# Patient Record
Sex: MALE | Race: WHITE | Employment: PART TIME | URBAN - METROPOLITAN AREA
[De-identification: names, ages, dates, MRNs, and addresses within clinical notes are randomized per-mention and may not be internally consistent; named-entity substitution may affect disease eponyms.]

---

## 2017-01-24 ENCOUNTER — OFFICE VISIT (OUTPATIENT)
Dept: FAMILY MEDICINE CLINIC | Facility: CLINIC | Age: 22
End: 2017-01-24

## 2017-01-24 VITALS
TEMPERATURE: 99 F | DIASTOLIC BLOOD PRESSURE: 80 MMHG | SYSTOLIC BLOOD PRESSURE: 139 MMHG | HEIGHT: 64 IN | BODY MASS INDEX: 19.26 KG/M2 | WEIGHT: 112.81 LBS | HEART RATE: 102 BPM | RESPIRATION RATE: 14 BRPM

## 2017-01-24 DIAGNOSIS — M21.372 FOOT DROP, BILATERAL: ICD-10-CM

## 2017-01-24 DIAGNOSIS — M21.371 FOOT DROP, BILATERAL: ICD-10-CM

## 2017-01-24 DIAGNOSIS — Q05.9 SPINA BIFIDA, UNSPECIFIED HYDROCEPHALUS PRESENCE, UNSPECIFIED SPINAL REGION (HCC): Primary | ICD-10-CM

## 2017-01-24 PROCEDURE — 99212 OFFICE O/P EST SF 10 MIN: CPT | Performed by: FAMILY MEDICINE

## 2017-01-24 PROCEDURE — 99214 OFFICE O/P EST MOD 30 MIN: CPT | Performed by: FAMILY MEDICINE
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
## 2017-02-16 ENCOUNTER — TELEPHONE (OUTPATIENT)
Dept: FAMILY MEDICINE CLINIC | Facility: CLINIC | Age: 22
End: 2017-02-16

## 2017-02-16 NOTE — TELEPHONE ENCOUNTER
Patient requesting a note from DR. Chris Avery stating he is okay to return to work.    Patient states he has been out from work for about 3 weeks due to his broken leg and is now ready to go back   Requesting note to state he can return back to work tomorrow Fr

## 2017-04-26 ENCOUNTER — APPOINTMENT (OUTPATIENT)
Dept: GENERAL RADIOLOGY | Facility: HOSPITAL | Age: 22
End: 2017-04-26
Payer: OTHER MISCELLANEOUS

## 2017-04-26 ENCOUNTER — HOSPITAL ENCOUNTER (EMERGENCY)
Facility: HOSPITAL | Age: 22
Discharge: HOME | End: 2017-04-26
Attending: EMERGENCY MEDICINE
Payer: OTHER MISCELLANEOUS

## 2017-04-26 VITALS
SYSTOLIC BLOOD PRESSURE: 144 MMHG | TEMPERATURE: 98 F | HEART RATE: 100 BPM | RESPIRATION RATE: 16 BRPM | OXYGEN SATURATION: 97 % | DIASTOLIC BLOOD PRESSURE: 79 MMHG

## 2017-04-26 DIAGNOSIS — S90.212A CONTUSION OF LEFT GREAT TOE WITH DAMAGE TO NAIL, INITIAL ENCOUNTER: ICD-10-CM

## 2017-04-26 PROCEDURE — 73630 X-RAY EXAM OF FOOT: CPT

## 2017-04-26 PROCEDURE — 99283 EMERGENCY DEPT VISIT LOW MDM: CPT

## 2017-04-26 RX ORDER — IBUPROFEN 600 MG/1
600 TABLET ORAL EVERY 8 HOURS PRN
Qty: 30 TABLET | Refills: 0 | Status: SHIPPED | OUTPATIENT
Start: 2017-04-26 | End: 2017-05-03

## 2017-04-26 RX ORDER — IBUPROFEN 600 MG/1
600 TABLET ORAL ONCE
Status: COMPLETED | OUTPATIENT
Start: 2017-04-26 | End: 2017-04-26

## 2017-04-27 NOTE — ED PROVIDER NOTES
Patient Seen in: [facility] AND [facility] Emergency Department    History   Patient presents with:  Lower Extremity Injury (musculoskeletal)    Stated Complaint:     HPI    24-year-old male up-to-date on immunizations presents for complaint of left great toe in Current:/79 mmHg  Pulse 100  Temp(Src) 98 °F (36.7 °C) (Oral)  Resp 16  SpO2 97%        Physical Exam   Constitutional: He is oriented to person, place, and time. He appears well-developed and well-nourished. HENT:   Head: Normocephalic.    Pulmonar Clinical impression as well as any lab results and radiology findings were discussed with the patient. Patient is advised to follow up with PCP for reevaluation. Return precautions were given.  Patient voices understanding and agreement with the treatment p

## 2017-04-28 ENCOUNTER — APPOINTMENT (OUTPATIENT)
Dept: OTHER | Facility: HOSPITAL | Age: 22
End: 2017-04-28
Attending: EMERGENCY MEDICINE

## 2017-05-04 ENCOUNTER — HOSPITAL ENCOUNTER (OUTPATIENT)
Dept: GENERAL RADIOLOGY | Facility: HOSPITAL | Age: 22
Discharge: HOME | End: 2017-05-04
Attending: EMERGENCY MEDICINE

## 2017-05-04 ENCOUNTER — OFFICE VISIT (OUTPATIENT)
Dept: OTHER | Facility: HOSPITAL | Age: 22
End: 2017-05-04
Attending: EMERGENCY MEDICINE

## 2017-05-04 DIAGNOSIS — R52 PAIN: ICD-10-CM

## 2017-05-04 DIAGNOSIS — R52 PAIN: Primary | ICD-10-CM

## 2017-05-04 PROCEDURE — 73650 X-RAY EXAM OF HEEL: CPT | Performed by: EMERGENCY MEDICINE

## 2017-06-14 ENCOUNTER — OFFICE VISIT (OUTPATIENT)
Dept: FAMILY MEDICINE CLINIC | Facility: CLINIC | Age: 22
End: 2017-06-14

## 2017-06-14 VITALS
HEART RATE: 95 BPM | RESPIRATION RATE: 16 BRPM | TEMPERATURE: 98 F | HEIGHT: 64 IN | SYSTOLIC BLOOD PRESSURE: 121 MMHG | DIASTOLIC BLOOD PRESSURE: 77 MMHG

## 2017-06-14 DIAGNOSIS — T30.0 FIRST DEGREE BURN: Primary | ICD-10-CM

## 2017-06-14 PROCEDURE — 99213 OFFICE O/P EST LOW 20 MIN: CPT | Performed by: FAMILY MEDICINE

## 2017-06-14 PROCEDURE — 99212 OFFICE O/P EST SF 10 MIN: CPT | Performed by: FAMILY MEDICINE

## 2017-06-14 NOTE — PROGRESS NOTES
Harmony Bonilla is a 20year old male. Patient presents with: Foot Injury    HPI:   Pt had therapy and they placed heat on his toes instead of ice and did not realize was burning because has no feeling due to spina bifida.  In evenings noted the blisters on his

## 2017-06-16 ENCOUNTER — OFFICE VISIT (OUTPATIENT)
Dept: FAMILY MEDICINE CLINIC | Facility: CLINIC | Age: 22
End: 2017-06-16

## 2017-06-16 VITALS
TEMPERATURE: 98 F | WEIGHT: 113 LBS | DIASTOLIC BLOOD PRESSURE: 77 MMHG | RESPIRATION RATE: 16 BRPM | HEART RATE: 80 BPM | BODY MASS INDEX: 19.29 KG/M2 | SYSTOLIC BLOOD PRESSURE: 128 MMHG | HEIGHT: 64 IN

## 2017-06-16 DIAGNOSIS — S90.212A CONTUSION OF LEFT GREAT TOE WITH DAMAGE TO NAIL, INITIAL ENCOUNTER: Primary | ICD-10-CM

## 2017-06-16 DIAGNOSIS — T25.222D BURN OF LEFT FOOT, SECOND DEGREE, SUBSEQUENT ENCOUNTER: ICD-10-CM

## 2017-06-16 PROCEDURE — 99214 OFFICE O/P EST MOD 30 MIN: CPT | Performed by: FAMILY MEDICINE

## 2017-06-16 PROCEDURE — 99212 OFFICE O/P EST SF 10 MIN: CPT | Performed by: FAMILY MEDICINE
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
## 2017-06-18 ENCOUNTER — HOSPITAL ENCOUNTER (EMERGENCY)
Facility: HOSPITAL | Age: 22
Discharge: HOME | End: 2017-06-18
Attending: EMERGENCY MEDICINE
Payer: MEDICAID

## 2017-06-18 VITALS
HEART RATE: 93 BPM | SYSTOLIC BLOOD PRESSURE: 151 MMHG | BODY MASS INDEX: 19.63 KG/M2 | WEIGHT: 115 LBS | OXYGEN SATURATION: 99 % | HEIGHT: 64 IN | RESPIRATION RATE: 18 BRPM | DIASTOLIC BLOOD PRESSURE: 91 MMHG | TEMPERATURE: 99 F

## 2017-06-18 DIAGNOSIS — L03.032 CELLULITIS OF TOE OF LEFT FOOT: ICD-10-CM

## 2017-06-18 DIAGNOSIS — T30.0 BURN: Primary | ICD-10-CM

## 2017-06-18 PROCEDURE — 99283 EMERGENCY DEPT VISIT LOW MDM: CPT

## 2017-06-18 RX ORDER — CEFADROXIL 500 MG/1
500 CAPSULE ORAL 2 TIMES DAILY
Qty: 14 CAPSULE | Refills: 0 | Status: SHIPPED | OUTPATIENT
Start: 2017-06-18 | End: 2017-06-25
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
## 2017-06-21 ENCOUNTER — HOSPITAL (OUTPATIENT)
Dept: OTHER | Age: 22
End: 2017-06-21
Attending: EMERGENCY MEDICINE

## 2017-06-21 LAB
ANALYZER ANC (IANC): ABNORMAL
ANION GAP SERPL CALC-SCNC: 11 MMOL/L (ref 10–20)
BASOPHILS # BLD: 0 THOUSAND/MCL (ref 0–0.3)
BASOPHILS NFR BLD: 0 %
BUN SERPL-MCNC: 9 MG/DL (ref 6–20)
BUN/CREAT SERPL: 13 (ref 7–25)
CALCIUM SERPL-MCNC: 9.2 MG/DL (ref 8.4–10.2)
CHLORIDE: 101 MMOL/L (ref 98–107)
CO2 SERPL-SCNC: 29 MMOL/L (ref 21–32)
CREAT SERPL-MCNC: 0.7 MG/DL (ref 0.67–1.17)
DIFFERENTIAL METHOD BLD: ABNORMAL
EOSINOPHIL # BLD: 0.1 THOUSAND/MCL (ref 0.1–0.5)
EOSINOPHIL NFR BLD: 1 %
ERYTHROCYTE [DISTWIDTH] IN BLOOD: 12.5 % (ref 11–15)
GLUCOSE SERPL-MCNC: 92 MG/DL (ref 65–99)
HEMATOCRIT: 46.1 % (ref 39–51)
HGB BLD-MCNC: 16.1 GM/DL (ref 13–17)
LYMPHOCYTES # BLD: 1.7 THOUSAND/MCL (ref 1–4.8)
LYMPHOCYTES NFR BLD: 16 %
MCH RBC QN AUTO: 30 PG (ref 26–34)
MCHC RBC AUTO-ENTMCNC: 34.9 GM/DL (ref 32–36.5)
MCV RBC AUTO: 85.8 FL (ref 78–100)
MONOCYTES # BLD: 0.6 THOUSAND/MCL (ref 0.3–0.9)
MONOCYTES NFR BLD: 6 %
NEUTROPHILS # BLD: 8.4 THOUSAND/MCL (ref 1.8–7.7)
NEUTROPHILS NFR BLD: 77 %
NEUTS SEG NFR BLD: ABNORMAL %
PERCENT NRBC: ABNORMAL
PLATELET # BLD: 239 THOUSAND/MCL (ref 140–450)
POTASSIUM SERPL-SCNC: 4.1 MMOL/L (ref 3.4–5.1)
RBC # BLD: 5.37 MILLION/MCL (ref 4.5–5.9)
SODIUM SERPL-SCNC: 137 MMOL/L (ref 135–145)
WBC # BLD: 10.7 THOUSAND/MCL (ref 4.2–11)

## 2017-07-28 ENCOUNTER — TELEPHONE (OUTPATIENT)
Dept: FAMILY MEDICINE CLINIC | Facility: CLINIC | Age: 22
End: 2017-07-28

## 2017-07-28 NOTE — TELEPHONE ENCOUNTER
Colby calling requesting order for catheter supplies, saline and syringes. Pt needs orders to receive shipment this month current orders have . Please call back with verbal orders. Pt is low on supplies.

## 2017-09-21 ENCOUNTER — OFFICE VISIT (OUTPATIENT)
Dept: FAMILY MEDICINE CLINIC | Facility: CLINIC | Age: 22
End: 2017-09-21

## 2017-09-21 VITALS
HEART RATE: 83 BPM | HEIGHT: 64 IN | DIASTOLIC BLOOD PRESSURE: 69 MMHG | SYSTOLIC BLOOD PRESSURE: 117 MMHG | WEIGHT: 120 LBS | TEMPERATURE: 98 F | BODY MASS INDEX: 20.49 KG/M2

## 2017-09-21 DIAGNOSIS — N31.9 NEUROGENIC BLADDER: Primary | ICD-10-CM

## 2017-09-21 DIAGNOSIS — Q05.9 SPINA BIFIDA, UNSPECIFIED HYDROCEPHALUS PRESENCE, UNSPECIFIED SPINAL REGION (HCC): ICD-10-CM

## 2017-09-21 DIAGNOSIS — L21.9 SEBORRHEIC DERMATITIS: ICD-10-CM

## 2017-09-21 PROCEDURE — 99212 OFFICE O/P EST SF 10 MIN: CPT | Performed by: FAMILY MEDICINE

## 2017-09-21 PROCEDURE — 99214 OFFICE O/P EST MOD 30 MIN: CPT | Performed by: FAMILY MEDICINE

## 2017-09-21 RX ORDER — OXYBUTYNIN CHLORIDE 15 MG/1
15 TABLET, EXTENDED RELEASE ORAL
Qty: 90 TABLET | Refills: 1 | Status: SHIPPED | OUTPATIENT
Start: 2017-09-21 | End: 2018-03-22

## 2017-09-21 RX ORDER — KETOCONAZOLE 20 MG/ML
SHAMPOO TOPICAL
Qty: 120 ML | Refills: 2 | Status: SHIPPED | OUTPATIENT
Start: 2017-09-21 | End: 2018-03-22

## 2017-09-21 NOTE — PROGRESS NOTES
Patient ID: Nettie Cockayne is a 20year old male. HPI  Patient presents with:  Medication Request    He has spina bifida and saw his specialist in May 2017. He needs his oxybutynin for his neurogenic bladder.   He has been compliant with cathing himself 4 kg)    Height: 5' 4\" (1.626 m)      Physical Exam   Constitutional: Patient is oriented to person, place, and time. Patient appears well-developed and well-nourished. No distress. HENT:   Head: Normocephalic and atraumatic.    Neck: Normal range of motio

## 2017-09-22 ENCOUNTER — TELEPHONE (OUTPATIENT)
Dept: FAMILY MEDICINE CLINIC | Facility: CLINIC | Age: 22
End: 2017-09-22

## 2017-09-22 DIAGNOSIS — K59.00 CONSTIPATION, UNSPECIFIED CONSTIPATION TYPE: ICD-10-CM

## 2017-09-22 RX ORDER — DOCUSATE SODIUM 100 MG/1
100 CAPSULE, LIQUID FILLED ORAL 2 TIMES DAILY
Qty: 60 CAPSULE | Refills: 2 | Status: SHIPPED | OUTPATIENT
Start: 2017-09-22 | End: 2017-11-07

## 2017-09-22 NOTE — TELEPHONE ENCOUNTER
Please advise regarding pended refill request.    Rx'd = 5/23/16 #60 & 2-R      Recent Outpatient Visits            Yesterday Neurogenic bladder    [address], [address], [place], [place]    Office Visit    3 months ago Contusion of left g

## 2017-09-23 RX ORDER — DOCUSATE SODIUM 100 MG/1
100 CAPSULE, LIQUID FILLED ORAL 2 TIMES DAILY
Qty: 60 CAPSULE | Refills: 2 | OUTPATIENT
Start: 2017-09-23

## 2017-11-07 ENCOUNTER — APPOINTMENT (OUTPATIENT)
Dept: CT IMAGING | Facility: HOSPITAL | Age: 22
DRG: 872 | End: 2017-11-07
Attending: PHYSICIAN ASSISTANT
Payer: COMMERCIAL

## 2017-11-07 ENCOUNTER — HOSPITAL ENCOUNTER (INPATIENT)
Facility: HOSPITAL | Age: 22
LOS: 3 days | Discharge: HOME | DRG: 872 | End: 2017-11-10
Attending: HOSPITALIST | Admitting: HOSPITALIST
Payer: COMMERCIAL

## 2017-11-07 ENCOUNTER — APPOINTMENT (OUTPATIENT)
Dept: GENERAL RADIOLOGY | Facility: HOSPITAL | Age: 22
DRG: 872 | End: 2017-11-07
Attending: PHYSICIAN ASSISTANT
Payer: COMMERCIAL

## 2017-11-07 DIAGNOSIS — N39.0 SEPSIS DUE TO URINARY TRACT INFECTION (HCC): ICD-10-CM

## 2017-11-07 DIAGNOSIS — R00.0 TACHYCARDIA: ICD-10-CM

## 2017-11-07 DIAGNOSIS — A41.9 SEPSIS DUE TO URINARY TRACT INFECTION (HCC): ICD-10-CM

## 2017-11-07 DIAGNOSIS — N12 PYELONEPHRITIS: ICD-10-CM

## 2017-11-07 DIAGNOSIS — Q05.9 SPINA BIFIDA, UNSPECIFIED HYDROCEPHALUS PRESENCE, UNSPECIFIED SPINAL REGION (HCC): Primary | ICD-10-CM

## 2017-11-07 PROCEDURE — 71010 XR CHEST AP PORTABLE  (CPT=71010): CPT | Performed by: PHYSICIAN ASSISTANT

## 2017-11-07 PROCEDURE — 74176 CT ABD & PELVIS W/O CONTRAST: CPT | Performed by: PHYSICIAN ASSISTANT

## 2017-11-07 PROCEDURE — 99223 1ST HOSP IP/OBS HIGH 75: CPT | Performed by: HOSPITALIST

## 2017-11-07 PROCEDURE — 99220 INITIAL OBSERVATION CARE,LEVL III: CPT | Performed by: UROLOGY

## 2017-11-07 PROCEDURE — 71260 CT THORAX DX C+: CPT | Performed by: PHYSICIAN ASSISTANT

## 2017-11-07 RX ORDER — POTASSIUM CHLORIDE 20 MEQ/1
40 TABLET, EXTENDED RELEASE ORAL EVERY 4 HOURS
Status: COMPLETED | OUTPATIENT
Start: 2017-11-07 | End: 2017-11-07

## 2017-11-07 RX ORDER — ONDANSETRON 2 MG/ML
4 INJECTION INTRAMUSCULAR; INTRAVENOUS EVERY 6 HOURS PRN
Status: DISCONTINUED | OUTPATIENT
Start: 2017-11-07 | End: 2017-11-10

## 2017-11-07 RX ORDER — POLYETHYLENE GLYCOL 3350 17 G/17G
17 POWDER, FOR SOLUTION ORAL 2 TIMES DAILY
Status: DISCONTINUED | OUTPATIENT
Start: 2017-11-07 | End: 2017-11-10

## 2017-11-07 RX ORDER — SODIUM CHLORIDE 9 MG/ML
INJECTION, SOLUTION INTRAVENOUS CONTINUOUS
Status: DISCONTINUED | OUTPATIENT
Start: 2017-11-07 | End: 2017-11-10

## 2017-11-07 RX ORDER — IBUPROFEN 600 MG/1
600 TABLET ORAL ONCE
Status: COMPLETED | OUTPATIENT
Start: 2017-11-07 | End: 2017-11-07

## 2017-11-07 RX ORDER — SODIUM CHLORIDE 0.9 % (FLUSH) 0.9 %
3 SYRINGE (ML) INJECTION AS NEEDED
Status: DISCONTINUED | OUTPATIENT
Start: 2017-11-07 | End: 2017-11-10

## 2017-11-07 RX ORDER — ACETAMINOPHEN 325 MG/1
650 TABLET ORAL EVERY 6 HOURS PRN
Status: DISCONTINUED | OUTPATIENT
Start: 2017-11-07 | End: 2017-11-10

## 2017-11-07 NOTE — ED INITIAL ASSESSMENT (HPI)
Fevers since Sunday, tmax 102 and reports \"dark urine\". Denies dysuria, urgency, frequency. Hx of spina bifida- takes oxybutin.

## 2017-11-07 NOTE — ED PROVIDER NOTES
Patient Seen in: [facility] AND [facility] Emergency Department    History   No chief complaint on file. Stated Complaint: Dark Urine; Fever    HPI    20 yom with pmhx of spina bifida who self caths with onset of fever of 102 2 days ago.  Patient recently retu HPI.  Constitutional and vital signs reviewed. All other systems reviewed and negative except as noted above.     Physical Exam   ED Triage Vitals [11/07/17 1104]  BP: (!) 161/77  Pulse: 130  Resp: 20  Temp: 100.9 °F (38.3 °C)  Temp src: Oral  SpO2: 95 suggesting acute on chronic cystitis in the setting of neurogenic bladder. 3. Nonspecific hypoattenuation of the left aspect of the prostate gland.  This is not well assessed, but correlation with clinical parameters is advised to assess for potential pros CULTURE REFLEX   Collection Time: 11/07/17 11:31 AM   Result Value Ref Range   Urine Color Yellow Yellow   Clarity Urine Cloudy (A) Clear   Spec Gravity 1.015 1.002 - 1.035   pH Urine 5.0 5.0 - 8.0   Protein Urine 100  (A) Negative mg/dL   Glucose Urine Ne 144 mmol/L   Potassium 3.5 3.3 - 5.1 mmol/L   Chloride 96 95 - 110 mmol/L   CO2 25 22 - 32 mmol/L   BUN 10 8 - 20 mg/dL   Creatinine 0.98 0.50 - 1.50 mg/dL   Calcium, Total 9.1 8.5 - 10.5 mg/dL   ALT 22 17 - 63 U/L   AST 23 15 - 41 U/L   Alkaline Phosphata 11/7/2017     Pyelonephritis N12 11/7/2017     Sepsis due to urinary tract infection ([facility] A41.9, N39.0 11/7/2017 Unknown    Tachycardia R00.0 11/7/2017     UTI (urinary tract infection) N39.0 11/7/2017 Unknown

## 2017-11-08 PROCEDURE — 99232 SBSQ HOSP IP/OBS MODERATE 35: CPT | Performed by: HOSPITALIST

## 2017-11-08 RX ORDER — BISACODYL 10 MG
10 SUPPOSITORY, RECTAL RECTAL
Status: DISCONTINUED | OUTPATIENT
Start: 2017-11-08 | End: 2017-11-10

## 2017-11-08 RX ORDER — SODIUM PHOSPHATE, DIBASIC AND SODIUM PHOSPHATE, MONOBASIC 7; 19 G/133ML; G/133ML
1 ENEMA RECTAL ONCE AS NEEDED
Status: DISCONTINUED | OUTPATIENT
Start: 2017-11-08 | End: 2017-11-10

## 2017-11-08 RX ORDER — DOCUSATE SODIUM 100 MG/1
100 CAPSULE, LIQUID FILLED ORAL 2 TIMES DAILY
Status: DISCONTINUED | OUTPATIENT
Start: 2017-11-08 | End: 2017-11-10

## 2017-11-08 NOTE — PLAN OF CARE
Problem: Patient/Family Goals  Goal: Patient/Family Long Term Goal  Patient's Long Term Goal: Discharge home    Interventions:  - IVF, IV abx, tyl as needed.    - See additional Care Plan goals for specific interventions   Outcome: Progressing    Goal: Ora retention  INTERVENTIONS:  - Assess patient’s ability to void and empty bladder  - Monitor intake/output and perform bladder scan as needed  - Follow urinary retention protocol/standard of care  - Consider collaborating with pharmacy to review patient's me

## 2017-11-08 NOTE — CM/SW NOTE
MDO received indicating the pt has switched insurances, but are awaiting the mother to bring in the documents. OMER spoke with Alcides Ace who is aware that these records will be emailed to them once obtained.     JONO nieto [id number]

## 2017-11-08 NOTE — CONSULTS
[facility]    Report of Consultation    Mel Maher Patient Status:  Observation    3/18/1995 MRN [id number]   Location [facility] 5SW/SE Attending Ruben Truong MD   Hosp Day # 0 PCP Ale Dejesus DO     Date of Admission:  6 SURGICAL HISTORY      Comment: bladder augmentation/catheterizable channel   Family History   Problem Relation Age of Onset   • No Known Problems Father    • No Known Problems Mother       Social History: Smoking status: Never Smoker interaction and psychiatric symptoms  Respiratory:  Negative for cough, dyspnea and wheezing    PHYSICAL EXAM:   Constitutional: appears well hydrated alert and responsive no acute distress noted  Neurological: Oriented to time, place, person with normal a Moderate*   NITRITE Positive*   UROBILINOGEN 2.0*   LEUUR Large*   UASA Negative   WBCUR 724*   RBCUR 93*   BACUR Many*       Urine Culture -- sent 17  Imagin/7/2017      CT ABDOMEN + PELVIS KIDNEYSTONE 2D RNDR (NO IV NO ORAL)  KIDNEYS: Incomplete fusion of the posterior elements is demonstrated the lumbosacral spine, spanning at least the L4-S1 levels. Associated probable meningocele  is noted, not well assessed.  There is levoscoliosis of the lumbar spine and compensatory scoliosis of th INDICATIONS: Tachycardia, elevated d- dimer, recent travel  TECHNIQUE: Multidetector CT images of the chest were obtained with non-ionic intravenous contrast material. Automated exposure control for dose reduction was used.  Adjustment of the mA and/or kV w Approved by (CST): Shakeel Landaverde MD on 11/07/2017 at 14:38        CONCLUSION:  1. No acute pulmonary embolism. 2. Multifocal partially imaged left superior pole striated nephrogram with possible minimal similar findings on the right.  Findings raise suspi hydrocephalus presence, unspecified spinal region ([facility]    1. Febrile infection with mild leukocytosis but spiking fevers--felt to be UTI. Urine cultures and blood culture sent and patient started on IV Rocephin    2.   Left pyelonephritis -  As per 11/7/ self intermittent catheterization. 6.  MiraLAX 17 g twice daily       Rosario Zepeda is a very pleasant patient and I thank you for consulting our urology service. I will do my best to keep you informed of  all significant urological  developments.              11/

## 2017-11-08 NOTE — PLAN OF CARE
Problem: CARDIOVASCULAR - ADULT  Goal: Absence of cardiac arrhythmias or at baseline  INTERVENTIONS:  - Continuous cardiac monitoring, monitor vital signs, obtain 12 lead EKG if indicated  - Evaluate effectiveness of antiarrhythmic and heart rate control m METABOLIC/FLUID AND ELECTROLYTES - ADULT  Goal: Electrolytes maintained within normal limits  INTERVENTIONS:  - Monitor labs and rhythm and assess patient for signs and symptoms of electrolyte imbalances  - Administer electrolyte replacement as ordered  -

## 2017-11-09 PROCEDURE — 99233 SBSQ HOSP IP/OBS HIGH 50: CPT | Performed by: UROLOGY

## 2017-11-09 PROCEDURE — 99232 SBSQ HOSP IP/OBS MODERATE 35: CPT | Performed by: HOSPITALIST

## 2017-11-09 NOTE — PLAN OF CARE
Problem: Patient/Family Goals  Goal: Patient/Family Long Term Goal  Patient's Long Term Goal: Discharge home    Interventions:  - IVF, IV abx, tyl as needed.    - See additional Care Plan goals for specific interventions    Outcome: Progressing  Pt continue Enhance eating environment   Outcome: Progressing  Pt with good appetite, able to eat most of each meal ordered.     Problem: GENITOURINARY - ADULT  Goal: Absence of urinary retention  INTERVENTIONS:  - Assess patient’s ability to void and empty bladder  -

## 2017-11-09 NOTE — PLAN OF CARE
Problem: Patient/Family Goals  Goal: Patient/Family Long Term Goal  Patient's Long Term Goal: Discharge home    Interventions:  - IVF, IV abx, tyl as needed.    - See additional Care Plan goals for specific interventions    Outcome: Progressing    Goal: Libby Parker ability to void and empty bladder  - Monitor intake/output and perform bladder scan as needed  - Follow urinary retention protocol/standard of care  - Consider collaborating with pharmacy to review patient's medication profile  - Implement strategies to pr

## 2017-11-09 NOTE — PROGRESS NOTES
Micah Sherman is a 20year old male. HPI:   No chief complaint on file. History provided by patient and mother. E. coli pyelonephritis  Pansensitive. Patient on IV ceftriaxone.   Patient states feels normal.  Denies fevers or chills or decrease in tobacco: Never Used                      Alcohol use:  No                 Medications :    Current Facility-Administered Medications:   •  docusate sodium (COLACE) cap 100 mg, 100 mg, Oral, BID  •  magnesium hydroxide (MILK OF MAGNESIA) 400 MG/5ML suspensio Hematology:    WBC   Date Value Ref Range Status   11/08/2017 10.6 4.0 - 11.0 K/UL Final   ----------  HGB   Date Value Ref Range Status   11/08/2017 13.8 13.5 - 17.5 g/dL Final   ----------  HCT   Date Value Ref Range Status   11/08/2017 40.1 (L) 41.0 - sacralization of the L5 vertebral body. Well-formed pseudoarthrosis formation is present between the right transverse process of the transitional segment and the right sacral ala.   A rudimentary disc is appreciated between the transitional body and the rem amount perihepatic fluid may relate to one of the catheters. No pseudocyst formation is appreciated.   7. Lesser incidental findings as above.             11/7/2017                CT OF THE CHEST W CONTRAST PAIN/PE PROTOCOL  COMPARISON:         Rosina Sahu superior pole striated nephrogram. Possible minimal involvement of the right kidney. There is trace perihepatic free fluid. BONES:   Mild thoracic levoscoliosis. A few scattered sclerotic foci likely represent bone islands.  OTHER:            Multiple right [facility]; After that, he has been on self intermittent catheterization; catheterizes himself 4-6 times a day; does not know how much urine comes out. No difficulty performing the self-catheterization, no pain, no obstruction.   Uses a 15 [address] c vesicoureteral reflux in the past     3. Neurogenic bladder --secondary to spina bifida  As an outpatient, patient on self-catheterization every 4-6 hours; presently has Brink catheter in place. During this febrile UTI, Brink catheter.   I discussed with with normal saline, as per his own routine every 1-2 days for urine mucus from intestinal augmentation bladder.     5.   I reviewed with patient and mother that after discharge patient is to follow-up with his urologist since my physician group of the [facility]

## 2017-11-10 VITALS
OXYGEN SATURATION: 97 % | DIASTOLIC BLOOD PRESSURE: 76 MMHG | WEIGHT: 121 LBS | HEART RATE: 83 BPM | SYSTOLIC BLOOD PRESSURE: 129 MMHG | TEMPERATURE: 98 F | BODY MASS INDEX: 20.66 KG/M2 | HEIGHT: 64 IN | RESPIRATION RATE: 18 BRPM

## 2017-11-10 PROCEDURE — 99239 HOSP IP/OBS DSCHRG MGMT >30: CPT | Performed by: HOSPITALIST

## 2017-11-10 RX ORDER — SULFAMETHOXAZOLE AND TRIMETHOPRIM 800; 160 MG/1; MG/1
1 TABLET ORAL EVERY 12 HOURS SCHEDULED
Status: DISCONTINUED | OUTPATIENT
Start: 2017-11-10 | End: 2017-11-10

## 2017-11-10 RX ORDER — SULFAMETHOXAZOLE AND TRIMETHOPRIM 800; 160 MG/1; MG/1
1 TABLET ORAL EVERY 12 HOURS SCHEDULED
Qty: 20 TABLET | Refills: 0 | Status: SHIPPED | OUTPATIENT
Start: 2017-11-11 | End: 2017-11-21

## 2017-11-10 NOTE — PAYOR COMM NOTE
--------------  ADMISSION REVIEW     Payor: Lyle Cantorwilliam #:  [id number]  Authorization Number: N/A    Admit date: 11/7/17  Admit time: 0960       Admitting Physician: Kaleb Quezada MD  Attending Physician:  Saray Rasheed MD  Primary Care P chills[SW.3],[SW.1] diaphoresis[SW.3] and[SW.1] fever[SW.3].    HENT: Negative for[SW.1] congestion[SW.3],[SW.1] ear discharge[SW.3],[SW.1] ear pain[SW.3],[SW.1] facial swelling[SW.3],[SW.1] rhinorrhea[SW.3],[SW.1] sinus pressure[SW.3] and[SW.1] sneezing[SW and[SW.1] mucous membranes are normal[SW.3]. No[SW.1] oropharyngeal exudate[SW.3],[SW.1] posterior oropharyngeal erythema[SW.3] or[SW.1] tonsillar abscesses[SW.3].    Eyes:[SW.1] Conjunctivae[SW.3] and[SW.1] EOM[SW.3] are normal.[SW.1] Pupils are equal, rou over the aforementioned meningocele defect. 6. There appear to be 2 ventriculoperitoneal shunt catheters extending into the peritoneal cavity. A small amount perihepatic fluid may relate to one of the catheters. No pseudocyst formation is appreciated.   7. Negative   Nitrite Urine Positive (A) Negative   Urobilinogen Urine 2.0 (A) <2.0   Leukocyte Esterase Urine Large (A) Negative   Ascorbic Acid Urine Negative Negative mg/dL   Squamous Epi.  Cells Few /HPF   WBC Urine 724 (H) 0 - 5 /HPF   WBC Clump Few (A) N Globulin 3.9 (H) 2.5 - 3.7 g/dL   A/G Ratio 1.0 1.0 - 2.0   Anion Gap 12 0 - 18 mmol/L   BUN/CREA Ratio 10.2 10.0 - 20.0   Calculated Osmolality 276 275 - 295 mOsm/kg   GFR, Non-African American >60 >=60   GFR, -American >60 >=60   -CK CREATINE KI initially seen and evaluated by Jim Ponce for complaint of fever. Please see their dictation for complete details. The midlevel and I discussed the care and disposition of the patient.      Luiz Rodrigez    I authorize my typed signature that I authentica also had a  shunt surgery. PAST SURGICAL HISTORY:  As mentioned above. MEDICATIONS:  Please see medication reconciliation list.    ALLERGIES:  Latex. FAMILY HISTORY:  Both parents are alive, no significant medical problems.     SOCIAL HISTORY: 13:18:53  [place] [id number]  FB/  [FB.1]  Electronically signed by Juan Carlos Aguirre MD on 11/8/2017 11:29 AM   Attribution Key     FB. 1 - Juan Carlos Aguirre MD on 11/7/2017  1:28 PM                  MEDICATIONS ADMINISTERED IN LAST 1 DAY:  CefTRIAXon

## 2017-11-10 NOTE — PLAN OF CARE
Problem: Patient/Family Goals  Goal: Patient/Family Long Term Goal  Patient's Long Term Goal: Discharge home    Interventions:  - IVF, IV abx, tyl as needed.    - See additional Care Plan goals for specific interventions    Outcome: Progressing    Goal: Yessica Mcmanus Outcome: Progressing  Pt with good appetite, able to eat breakfast and lunch.     Problem: GENITOURINARY - ADULT  Goal: Absence of urinary retention  INTERVENTIONS:  - Assess patient’s ability to void and empty bladder  - Monitor intake/output and perform

## 2017-11-10 NOTE — PROGRESS NOTES
[facility]    Progress Note    Kathrine Bejarano Patient Status:  Inpatient    3/18/1995 MRN [id number]   Location [facility] 5SW/SE Attending Comfort Dunlap MD   Hosp Day # 2 PCP Kimo Ceballos DO       Subjective:   Kathrine Bejarano is f Lab Results  Component Value Date   WBC 10.6 11/08/2017   HGB 13.8 11/08/2017   HCT 40.1 (L) 11/08/2017    11/08/2017   CREATSERUM 1.02 11/08/2017   BUN 5 (L) 11/08/2017    (L) 11/08/2017   K 4.3 11/08/2017    11/08/2017   CO2 25 1 plan and workup  Rachel Wilson MD

## 2017-11-10 NOTE — PROGRESS NOTES
Pt discharged, mother provided transportation home. Discharge information and prescriptions reviewed. Pt verbalized understanding. All questions answered. IV access discontinued.

## 2017-11-10 NOTE — PROGRESS NOTES
[facility]    Progress Note    Chari Cruz Patient Status:  Inpatient    3/18/1995 MRN [id number]   Location [facility] 5SW/SE Attending Merry Zambrano MD   Hosp Day # 2 PCP Malgorzata Irby,        Subjective:   Chari Cruz is f Lab Results  Component Value Date   WBC 10.6 11/08/2017   HGB 13.8 11/08/2017   HCT 40.1 (L) 11/08/2017    11/08/2017   CREATSERUM 1.02 11/08/2017   BUN 5 (L) 11/08/2017    (L) 11/08/2017   K 4.3 11/08/2017    11/08/2017   CO2 25 11/ plan and workup  Zhao Kennedy MD

## 2017-11-11 NOTE — DISCHARGE SUMMARY
[facility] - [facility]    Discharge Summary    Michael Reese Patient Status:  Inpatient    3/18/1995 MRN [id number]   Location [facility] 5SW/SE Attending No att. providers found   Hosp Day # 3 PCP Berta Mckenzie DO     Date of Admission:  catheter, suggesting acute on chronic cystitis in the setting of neurogenic bladder. 3. Nonspecific hypoattenuation of the left aspect of the prostate gland.  This is not well assessed, but correlation with clinical parameters is advised to assess for pote 11/11/2017      Take 1 tablet by mouth every 12 (twelve) hours.    Stop taking on:  11/21/2017  Quantity:  20 tablet  Refills:  0        CONTINUE taking these medications      Instructions Prescription details   Ketoconazole 2 % Sham  Commonly known as:  NI

## 2017-11-13 NOTE — PAYOR COMM NOTE
--------------  DISCHARGE REVIEW    Payor: Jorje Pauline #:  [id number]  Authorization Number: [id number]    Admit date: 11/7/17  Admit time:  7767  Discharge Date: 11/10/2017  3:52 PM     Admitting Physician: Toni Ramey MD  Attending Physi CULTURE, ROUTINE     Status: Abnormal   Collection Time: 11/07/17 11:31 AM   Result Value Ref Range   Urine Culture >100,000 CFU/ML Escherichia coli (A) N/A   *Culture results found, see result in Chart Review         IMAGING STUDIES: SOME MAY NEED FOLLOW examination suggested to ensure resolution. 3. Minimal subsegmental dependent atelectasis, left greater than right. 4. Noncalcified 2 mm subpleural nodule in the right upper lobe is unchanged since January, 2013.  Long-term stability suggests benign etiolog

## 2017-11-17 ENCOUNTER — OFFICE VISIT (OUTPATIENT)
Dept: FAMILY MEDICINE CLINIC | Facility: CLINIC | Age: 22
End: 2017-11-17

## 2017-11-17 VITALS
HEIGHT: 64 IN | TEMPERATURE: 98 F | SYSTOLIC BLOOD PRESSURE: 127 MMHG | BODY MASS INDEX: 21.68 KG/M2 | WEIGHT: 127 LBS | DIASTOLIC BLOOD PRESSURE: 75 MMHG | HEART RATE: 83 BPM

## 2017-11-17 DIAGNOSIS — A41.9 SEPSIS DUE TO URINARY TRACT INFECTION (HCC): ICD-10-CM

## 2017-11-17 DIAGNOSIS — K59.00 CONSTIPATION, UNSPECIFIED CONSTIPATION TYPE: ICD-10-CM

## 2017-11-17 DIAGNOSIS — N12 E. COLI PYELONEPHRITIS: ICD-10-CM

## 2017-11-17 DIAGNOSIS — N39.0 SEPSIS DUE TO URINARY TRACT INFECTION (HCC): ICD-10-CM

## 2017-11-17 DIAGNOSIS — B96.20 E. COLI PYELONEPHRITIS: ICD-10-CM

## 2017-11-17 DIAGNOSIS — N31.9 NEUROGENIC BLADDER: Primary | ICD-10-CM

## 2017-11-17 PROCEDURE — 99212 OFFICE O/P EST SF 10 MIN: CPT | Performed by: FAMILY MEDICINE

## 2017-11-17 PROCEDURE — 99214 OFFICE O/P EST MOD 30 MIN: CPT | Performed by: FAMILY MEDICINE

## 2017-11-17 RX ORDER — DOCUSATE SODIUM 100 MG/1
100 CAPSULE, LIQUID FILLED ORAL 2 TIMES DAILY
Qty: 60 CAPSULE | Refills: 2 | Status: SHIPPED | OUTPATIENT
Start: 2017-11-17 | End: 2018-03-22 | Stop reason: ALTCHOICE

## 2017-11-17 NOTE — PROGRESS NOTES
Patient ID: Desi Yoder is a 20year old male.     HPI  Patient presents with:  Hospital F/U: Dean Brown MD   Hospitalist   Expand All Collapse All    []Manual[]Template  []Copied     [facility]     Discharge Summary    CONCLUSION:  1. Extensive urothelial thickening throughout the left ureter and left collecting system with surrounding inflammatory changes. Findings are most compatible with ascending urinary tract infection/pyelonephritis.   2. Irregular bladder wall thic CONCLUSION:  1. No acute pulmonary embolism. 2. Multifocal partially imaged left superior pole striated nephrogram with possible minimal similar findings on the right. Findings raise suspicion for multifocal pyelonephritis.  No drainable perinephric fluid c He returned to [place] on November 1, 2017. He then started having had fevers and just feeling a bit tired on November 7, 2017 and was admitted to the hospital.  He is here with his mother. He feels much better now.   They went from [facility] to [address] [place] [address] Blood pressure 127/75, pulse 83, temperature 98 °F (36.7 °C), temperature source Oral, height 5' 4\" (1.626 m), weight 127 lb (57.6 kg). Physical Exam   Constitutional: Patient is oriented to person, place, and time.  Patient appears well-developed and wel

## 2017-11-21 ENCOUNTER — TELEPHONE (OUTPATIENT)
Dept: SURGERY | Facility: CLINIC | Age: 22
End: 2017-11-21

## 2017-11-21 NOTE — TELEPHONE ENCOUNTER
Pt. States that he was in the hosp from 11/7 through 11/11, for UTI, and he states that he saw [facility], and was told to sched a f/up after taking his antibiotic for 10 days, but this pt has Rani Francisco., Please advise.  Does an auth need to be called in fo

## 2017-12-04 ENCOUNTER — TELEPHONE (OUTPATIENT)
Dept: FAMILY MEDICINE CLINIC | Facility: CLINIC | Age: 22
End: 2017-12-04

## 2017-12-04 NOTE — TELEPHONE ENCOUNTER
Called patient left message to call office. Prior authorization is needed and initiated at this time for ER follow up visit.

## 2017-12-04 NOTE — TELEPHONE ENCOUNTER
Patient called requesting an order to see his Neurologist, Dr Karie Pereyra. He currently has [facility] and will have [facility] in January. Dr Zaina Flannery requires order to be seen. Order can be faxed to [phone].

## 2018-01-02 ENCOUNTER — OFFICE VISIT (OUTPATIENT)
Dept: SURGERY | Facility: CLINIC | Age: 23
End: 2018-01-02

## 2018-01-02 VITALS
WEIGHT: 120 LBS | TEMPERATURE: 98 F | DIASTOLIC BLOOD PRESSURE: 72 MMHG | HEART RATE: 76 BPM | BODY MASS INDEX: 20.49 KG/M2 | HEIGHT: 64 IN | SYSTOLIC BLOOD PRESSURE: 130 MMHG

## 2018-01-02 DIAGNOSIS — N31.9 NEUROGENIC BLADDER: ICD-10-CM

## 2018-01-02 DIAGNOSIS — N12 PYELONEPHRITIS: Primary | ICD-10-CM

## 2018-01-02 DIAGNOSIS — N13.70 VESICOURETERAL REFLUX: ICD-10-CM

## 2018-01-02 DIAGNOSIS — K59.09 CHRONIC CONSTIPATION: ICD-10-CM

## 2018-01-02 PROCEDURE — 99215 OFFICE O/P EST HI 40 MIN: CPT | Performed by: UROLOGY

## 2018-01-02 PROCEDURE — 99212 OFFICE O/P EST SF 10 MIN: CPT | Performed by: UROLOGY

## 2018-01-02 NOTE — PATIENT INSTRUCTIONS
1.  Continue sterile intermittent catheterization 4 times a day as you are doing--for your neurogenic bladder    2.    Please take cranberries daily to lower possibility of urinary tract infection--either a glass of pure cranberry juice twice daily or else

## 2018-01-02 NOTE — PROGRESS NOTES
Carl Bhandari is a 20year old male. HPI:     History provided by pt and mother. 1. Febrile E. coli pyelonephritis   Pt hospitalized [address] [place] [address] 11/7-11/9/17. Pansensitive.  Patient was on IV ceftriaxone; pt had temperature of 103 F and denied flank pain; martina age.  Neurogenic bladder . History of left vesicoureteral reflux. Bladder augmentation with intestine of neurogenic bladder, associated with spina bifida, [address] 2010;    After that, he has been on self intermittent catheterization; 90 tablet Rfl: 1   Ketoconazole 2 % External Shampoo Apply to scalp 2-3 times per week as needed. Disp: 120 mL Rfl: 2   docusate sodium 100 MG Oral Cap Take 1 capsule (100 mg total) by mouth 2 (two) times daily.  Disp: 60 capsule Rfl: 2       Allergies: TempSrc: Oral   Weight: 120 lb (54.4 kg)   Height: 5' 4\" (1.626 m)      I spent 40 minutes with patient, and majority of this time was spent discussing treatment options, answering questions about treatment and coordinating care.       Body mass index is feels this condition is resolved and will monitor symptomatically.     (N31.9) Neurogenic bladder  Plan: Pt feels this condition is stable. Discussed treatment options including to continue sterile intermittent catheterization 4 times a day.  I fully explai you start experiencing urinary tract infections, we will want to see you sooner than that.   Blood draw for renal function/ kidney  panel a few days before visit      Desi Yoder  is a very pleasant patient and I thoroughly enjoyed evaluating him  in consult

## 2018-02-08 ENCOUNTER — TELEPHONE (OUTPATIENT)
Dept: FAMILY MEDICINE CLINIC | Facility: CLINIC | Age: 23
End: 2018-02-08

## 2018-02-08 NOTE — TELEPHONE ENCOUNTER
Pt's mother dropped off parking placard form to be completed. Please call patient when ready. Placed in 's file at [facility].

## 2018-03-22 ENCOUNTER — OFFICE VISIT (OUTPATIENT)
Dept: FAMILY MEDICINE CLINIC | Facility: CLINIC | Age: 23
End: 2018-03-22

## 2018-03-22 ENCOUNTER — LAB ENCOUNTER (OUTPATIENT)
Dept: LAB | Age: 23
End: 2018-03-22
Attending: FAMILY MEDICINE
Payer: MEDICAID

## 2018-03-22 VITALS
SYSTOLIC BLOOD PRESSURE: 139 MMHG | HEIGHT: 64 IN | DIASTOLIC BLOOD PRESSURE: 88 MMHG | BODY MASS INDEX: 21.34 KG/M2 | TEMPERATURE: 98 F | WEIGHT: 125 LBS | HEART RATE: 76 BPM

## 2018-03-22 DIAGNOSIS — N31.9 NEUROGENIC BLADDER: ICD-10-CM

## 2018-03-22 DIAGNOSIS — Z23 NEED FOR VACCINATION: ICD-10-CM

## 2018-03-22 DIAGNOSIS — Q05.9 SPINA BIFIDA, UNSPECIFIED HYDROCEPHALUS PRESENCE, UNSPECIFIED SPINAL REGION (HCC): ICD-10-CM

## 2018-03-22 DIAGNOSIS — Z00.00 ADULT GENERAL MEDICAL EXAM: ICD-10-CM

## 2018-03-22 DIAGNOSIS — M21.372 FOOT DROP, BILATERAL: ICD-10-CM

## 2018-03-22 DIAGNOSIS — M21.371 FOOT DROP, BILATERAL: ICD-10-CM

## 2018-03-22 DIAGNOSIS — L70.0 PUSTULAR ACNE: ICD-10-CM

## 2018-03-22 DIAGNOSIS — Z01.00 ENCOUNTER FOR VISION SCREENING: ICD-10-CM

## 2018-03-22 DIAGNOSIS — Z00.00 ADULT GENERAL MEDICAL EXAM: Primary | ICD-10-CM

## 2018-03-22 DIAGNOSIS — L21.9 SEBORRHEIC DERMATITIS: ICD-10-CM

## 2018-03-22 LAB
ALBUMIN SERPL BCP-MCNC: 4.6 G/DL (ref 3.5–4.8)
ALBUMIN/GLOB SERPL: 1.4 {RATIO} (ref 1–2)
ALP SERPL-CCNC: 74 U/L (ref 32–100)
ALT SERPL-CCNC: 23 U/L (ref 17–63)
ANION GAP SERPL CALC-SCNC: 7 MMOL/L (ref 0–18)
AST SERPL-CCNC: 24 U/L (ref 15–41)
BASOPHILS # BLD: 0.1 K/UL (ref 0–0.2)
BASOPHILS NFR BLD: 1 %
BILIRUB SERPL-MCNC: 0.6 MG/DL (ref 0.3–1.2)
BUN SERPL-MCNC: 8 MG/DL (ref 8–20)
BUN/CREAT SERPL: 9.9 (ref 10–20)
CALCIUM SERPL-MCNC: 9.4 MG/DL (ref 8.5–10.5)
CHLORIDE SERPL-SCNC: 102 MMOL/L (ref 95–110)
CHOLEST SERPL-MCNC: 176 MG/DL (ref 110–200)
CO2 SERPL-SCNC: 29 MMOL/L (ref 22–32)
CREAT SERPL-MCNC: 0.81 MG/DL (ref 0.5–1.5)
EOSINOPHIL # BLD: 0.2 K/UL (ref 0–0.7)
EOSINOPHIL NFR BLD: 2 %
ERYTHROCYTE [DISTWIDTH] IN BLOOD BY AUTOMATED COUNT: 12.9 % (ref 11–15)
GLOBULIN PLAS-MCNC: 3.2 G/DL (ref 2.5–3.7)
GLUCOSE SERPL-MCNC: 94 MG/DL (ref 70–99)
HCT VFR BLD AUTO: 49.6 % (ref 41–52)
HDLC SERPL-MCNC: 47 MG/DL
HGB BLD-MCNC: 16.9 G/DL (ref 13.5–17.5)
LDLC SERPL CALC-MCNC: 88 MG/DL (ref 0–99)
LYMPHOCYTES # BLD: 2.9 K/UL (ref 1–4)
LYMPHOCYTES NFR BLD: 34 %
MCH RBC QN AUTO: 29.5 PG (ref 27–32)
MCHC RBC AUTO-ENTMCNC: 34.1 G/DL (ref 32–37)
MCV RBC AUTO: 86.6 FL (ref 80–100)
MONOCYTES # BLD: 0.6 K/UL (ref 0–1)
MONOCYTES NFR BLD: 7 %
NEUTROPHILS # BLD AUTO: 4.7 K/UL (ref 1.8–7.7)
NEUTROPHILS NFR BLD: 56 %
NONHDLC SERPL-MCNC: 129 MG/DL
OSMOLALITY UR CALC.SUM OF ELEC: 284 MOSM/KG (ref 275–295)
PATIENT FASTING: YES
PHOSPHATE SERPL-MCNC: 3.1 MG/DL (ref 2.4–4.7)
PLATELET # BLD AUTO: 203 K/UL (ref 140–400)
PMV BLD AUTO: 9.8 FL (ref 7.4–10.3)
POTASSIUM SERPL-SCNC: 3.9 MMOL/L (ref 3.3–5.1)
PROT SERPL-MCNC: 7.8 G/DL (ref 5.9–8.4)
RBC # BLD AUTO: 5.73 M/UL (ref 4.5–5.9)
SODIUM SERPL-SCNC: 138 MMOL/L (ref 136–144)
TRIGL SERPL-MCNC: 203 MG/DL (ref 1–149)
TSH SERPL-ACNC: 2.64 UIU/ML (ref 0.45–5.33)
WBC # BLD AUTO: 8.4 K/UL (ref 4–11)

## 2018-03-22 PROCEDURE — 99214 OFFICE O/P EST MOD 30 MIN: CPT | Performed by: FAMILY MEDICINE

## 2018-03-22 PROCEDURE — 80050 GENERAL HEALTH PANEL: CPT

## 2018-03-22 PROCEDURE — 90715 TDAP VACCINE 7 YRS/> IM: CPT | Performed by: FAMILY MEDICINE

## 2018-03-22 PROCEDURE — 90471 IMMUNIZATION ADMIN: CPT | Performed by: FAMILY MEDICINE

## 2018-03-22 PROCEDURE — 99395 PREV VISIT EST AGE 18-39: CPT | Performed by: FAMILY MEDICINE

## 2018-03-22 PROCEDURE — 36415 COLL VENOUS BLD VENIPUNCTURE: CPT

## 2018-03-22 PROCEDURE — 84100 ASSAY OF PHOSPHORUS: CPT

## 2018-03-22 PROCEDURE — 80061 LIPID PANEL: CPT

## 2018-03-22 PROCEDURE — 85025 COMPLETE CBC W/AUTO DIFF WBC: CPT

## 2018-03-22 PROCEDURE — 80053 COMPREHEN METABOLIC PANEL: CPT

## 2018-03-22 RX ORDER — OXYBUTYNIN CHLORIDE 15 MG/1
15 TABLET, EXTENDED RELEASE ORAL
Qty: 90 TABLET | Refills: 1 | Status: SHIPPED | OUTPATIENT
Start: 2018-03-22 | End: 2018-11-26

## 2018-03-22 RX ORDER — KETOCONAZOLE 20 MG/ML
SHAMPOO TOPICAL
Qty: 120 ML | Refills: 2 | Status: SHIPPED | OUTPATIENT
Start: 2018-03-22 | End: 2018-05-30

## 2018-03-22 NOTE — PROGRESS NOTES
Patient ID: Harmony Bonilla is a 24year old male. HPI  Patient presents with:  Routine Physical: Completion of form SOS  license medical review form. Pt did want to change for physical.    Took  ed in HS and never gave him his permit.  DMV: . arsenio allergies. Neurological: Positive for weakness (in legs BILAT, left worse. ). Negative for dizziness, seizures, syncope, numbness and headaches. Hematological: Negative for adenopathy. Does not bruise/bleed easily.    Psychiatric/Behavioral: Positive fo no  murmur heard. Pulmonary/Chest: Effort normal and breath sounds normal. No respiratory distress. Abdominal: Soft. Bowel sounds are normal. There is no hepatosplenomegaly. There is no tenderness. Lymphadenopathy:     He has no cervical adenopathy. although he still was able to pass but he does need to see the eye doctor  Neurogenic bladder  -     Oxybutynin Chloride ER 15 MG Oral Tablet 24 Hr; Take 1 tablet (15 mg total) by mouth once daily.     Pustular acne  -     DERM - INTERNAL    Seborrheic derm

## 2018-03-27 ENCOUNTER — TELEPHONE (OUTPATIENT)
Dept: FAMILY MEDICINE CLINIC | Facility: CLINIC | Age: 23
End: 2018-03-27

## 2018-04-04 ENCOUNTER — OFFICE VISIT (OUTPATIENT)
Dept: DERMATOLOGY CLINIC | Facility: CLINIC | Age: 23
End: 2018-04-04

## 2018-04-04 DIAGNOSIS — L70.0 ACNE VULGARIS: Primary | ICD-10-CM

## 2018-04-04 PROCEDURE — 99212 OFFICE O/P EST SF 10 MIN: CPT | Performed by: DERMATOLOGY

## 2018-04-04 PROCEDURE — 99202 OFFICE O/P NEW SF 15 MIN: CPT | Performed by: DERMATOLOGY

## 2018-04-04 RX ORDER — DOXYCYCLINE HYCLATE 50 MG/1
50 CAPSULE ORAL 2 TIMES DAILY
Qty: 60 CAPSULE | Refills: 12 | Status: SHIPPED | OUTPATIENT
Start: 2018-04-04 | End: 2019-04-04

## 2018-04-10 ENCOUNTER — OFFICE VISIT (OUTPATIENT)
Dept: OPTOMETRY | Facility: CLINIC | Age: 23
End: 2018-04-10

## 2018-04-10 DIAGNOSIS — H52.223 REGULAR ASTIGMATISM OF BOTH EYES: ICD-10-CM

## 2018-04-10 DIAGNOSIS — H50.9 STRABISMUS: Primary | ICD-10-CM

## 2018-04-10 PROCEDURE — 92015 DETERMINE REFRACTIVE STATE: CPT | Performed by: OPTOMETRIST

## 2018-04-10 PROCEDURE — 92004 COMPRE OPH EXAM NEW PT 1/>: CPT | Performed by: OPTOMETRIST

## 2018-04-10 NOTE — ASSESSMENT & PLAN NOTE
New glasses RX given to update as needed. Patient knows that RX is mild and it is his choice to have RX filled.

## 2018-04-10 NOTE — PATIENT INSTRUCTIONS
Strabismus  No treatment is required. Will continue to observe. Patient had surgery to correct problem as a child. Regular astigmatism of both eyes  New glasses RX given to update as needed.  Patient knows that RX is mild and it is his choice to have RX

## 2018-04-10 NOTE — ASSESSMENT & PLAN NOTE
No treatment is required. Will continue to observe. Patient had surgery to correct problem as a child.

## 2018-04-10 NOTE — PROGRESS NOTES
Micah Sherman is a 24year old male. HPI:     HPI     Patient is in for an annual eye exam. He had surgery on his left eye  as a child because the eye turned in. His eyes are cosmetically straight today.  He notes that his distance vision is not as clear wi - Linear)       Right Left    Dist sc 20/25 20/30    Near sc 4pt 4pt          Tonometry (Non-contact air puff, 8:20 AM)       Right Left    Pressure 20 18          Pupils       Pupils    Right PERRL    Left PERRL          Visual Fields       Left Right prescriptions requested or ordered in this encounter     Follow up instructions:  Return in about 2 years (around 4/10/2020) for Eye Exam.    4/10/2018  Scribed by: Radha Santiago

## 2018-04-11 ENCOUNTER — HOSPITAL (OUTPATIENT)
Dept: OTHER | Age: 23
End: 2018-04-11
Attending: INTERNAL MEDICINE

## 2018-04-11 LAB
ALBUMIN SERPL-MCNC: 4.3 GM/DL (ref 3.6–5.1)
ALBUMIN/GLOB SERPL: 1.1 {RATIO} (ref 1–2.4)
ALP SERPL-CCNC: 94 UNIT/L (ref 45–117)
ALT SERPL-CCNC: 32 UNIT/L
ANALYZER ANC (IANC): NORMAL
ANION GAP SERPL CALC-SCNC: 10 MMOL/L (ref 10–20)
APTT PPP: 28 SECONDS (ref 22–30)
APTT PPP: NORMAL S
AST SERPL-CCNC: 15 UNIT/L
BASOPHILS # BLD: 0 THOUSAND/MCL (ref 0–0.3)
BASOPHILS NFR BLD: 0 %
BILIRUB SERPL-MCNC: 0.7 MG/DL (ref 0.2–1)
BUN SERPL-MCNC: 15 MG/DL (ref 6–20)
BUN/CREAT SERPL: 18 (ref 7–25)
CALCIUM SERPL-MCNC: 8.7 MG/DL (ref 8.4–10.2)
CHLORIDE: 104 MMOL/L (ref 98–107)
CO2 SERPL-SCNC: 29 MMOL/L (ref 21–32)
CREAT SERPL-MCNC: 0.82 MG/DL (ref 0.67–1.17)
DIFFERENTIAL METHOD BLD: NORMAL
EOSINOPHIL # BLD: 0.2 THOUSAND/MCL (ref 0.1–0.5)
EOSINOPHIL NFR BLD: 2 %
ERYTHROCYTE [DISTWIDTH] IN BLOOD: 12.5 % (ref 11–15)
GLOBULIN SER-MCNC: 4 GM/DL (ref 2–4)
GLUCOSE SERPL-MCNC: 88 MG/DL (ref 65–99)
HEMATOCRIT: 45.9 % (ref 39–51)
HGB BLD-MCNC: 16.2 GM/DL (ref 13–17)
INR PPP: 1
LYMPHOCYTES # BLD: 3.8 THOUSAND/MCL (ref 1–4.8)
LYMPHOCYTES NFR BLD: 38 %
MCH RBC QN AUTO: 30.2 PG (ref 26–34)
MCHC RBC AUTO-ENTMCNC: 35.3 GM/DL (ref 32–36.5)
MCV RBC AUTO: 85.6 FL (ref 78–100)
MONOCYTES # BLD: 0.8 THOUSAND/MCL (ref 0.3–0.9)
MONOCYTES NFR BLD: 8 %
NEUTROPHILS # BLD: 5.1 THOUSAND/MCL (ref 1.8–7.7)
NEUTROPHILS NFR BLD: 52 %
NEUTS SEG NFR BLD: NORMAL %
PERCENT NRBC: NORMAL
PLATELET # BLD: 251 THOUSAND/MCL (ref 140–450)
POTASSIUM SERPL-SCNC: 3.8 MMOL/L (ref 3.4–5.1)
PROT SERPL-MCNC: 8.3 GM/DL (ref 6.4–8.2)
PROTHROMBIN TIME: 10.5 SECONDS (ref 9.7–11.8)
PROTHROMBIN TIME: NORMAL
RBC # BLD: 5.36 MILLION/MCL (ref 4.5–5.9)
SODIUM SERPL-SCNC: 139 MMOL/L (ref 135–145)
WBC # BLD: 10 THOUSAND/MCL (ref 4.2–11)

## 2018-04-15 NOTE — PROGRESS NOTES
Chari Cruz is a 24year old male. Patient presents with:  Acne: New pt with c/o of acne \"mostly at back,\" also some at chest, neck and face. Chest is itchy. Using ketoconazole shampoo at forehead that he states helps the redness.              Latex Number of children: N/A     Occupational History  None on file     Social History Main Topics   Smoking status: Never Smoker    Smokeless tobacco: Never Used    Alcohol use No    Drug use: No    Sexual activity: Not on file     Other Topics Concern    Caff regimen. Consider more aggressive therapy if not responding. Over-the-counter washes continue. topical medications unlikely to be covered his insurance reviewed.   Consider more aggressive management higher dose medication    ASSESSMENT AND PLAN:

## 2018-05-02 ENCOUNTER — HOSPITAL (OUTPATIENT)
Dept: OTHER | Age: 23
End: 2018-05-02
Attending: NEUROLOGICAL SURGERY

## 2018-05-24 ENCOUNTER — CHARTING TRANS (OUTPATIENT)
Dept: OTHER | Age: 23
End: 2018-05-24

## 2018-05-30 ENCOUNTER — OFFICE VISIT (OUTPATIENT)
Dept: DERMATOLOGY CLINIC | Facility: CLINIC | Age: 23
End: 2018-05-30

## 2018-05-30 DIAGNOSIS — L70.0 ACNE VULGARIS: Primary | ICD-10-CM

## 2018-05-30 DIAGNOSIS — L21.9 SEBORRHEIC DERMATITIS: ICD-10-CM

## 2018-05-30 PROCEDURE — 99212 OFFICE O/P EST SF 10 MIN: CPT | Performed by: DERMATOLOGY

## 2018-05-30 PROCEDURE — 99213 OFFICE O/P EST LOW 20 MIN: CPT | Performed by: DERMATOLOGY

## 2018-05-30 RX ORDER — ACETAMINOPHEN 500 MG
TABLET ORAL
COMMUNITY
Start: 2018-04-13 | End: 2020-07-16

## 2018-05-30 RX ORDER — CLINDAMYCIN PHOSPHATE 10 MG/G
1 GEL TOPICAL 2 TIMES DAILY
Qty: 60 G | Refills: 11 | Status: SHIPPED | OUTPATIENT
Start: 2018-05-30 | End: 2018-06-29

## 2018-05-30 RX ORDER — KETOCONAZOLE 20 MG/ML
SHAMPOO TOPICAL
Qty: 120 ML | Refills: 6 | Status: SHIPPED | OUTPATIENT
Start: 2018-05-30 | End: 2019-12-21

## 2018-06-10 NOTE — PROGRESS NOTES
Laureano Bell is a 24year old male. Patient presents with:  Acne: LOV: 04/04/18. Pt presents with f/u to back acne, pt states condition is slightly improved. Pt states he stopped Doxycycline due to \"issues\".              Latex    Current Outpatient Pre Disp: 90 tablet Rfl: 1   Doxycycline Hyclate 50 MG Oral Cap Take 1 capsule (50 mg total) by mouth 2 (two) times daily.  Disp: 60 capsule Rfl: 12     Allergies:     Latex                   RASH    Past Medical History:   Diagnosis Date   • Depression    • Ot complaints above. No sports or external factors aggravating. Denies GI upset, photosensitivity. No other systemic complaints. No problems tolerating previous medications.       Physical examination:  Well-developed well-nourished  man alert, oriented to instead of further systemic meds. Also would not be a great candidate for isotretinoin given possible increased intracranial pressure with this as well. Discussed possible Bactrim if worsening acne. Alternatives reviewed.   Over-the-counter's, additional

## 2018-06-20 ENCOUNTER — TELEPHONE (OUTPATIENT)
Dept: FAMILY MEDICINE CLINIC | Facility: CLINIC | Age: 23
End: 2018-06-20

## 2018-06-20 NOTE — TELEPHONE ENCOUNTER
Pt stts he has a test scheduled tomorrow to determine if he can drive. Pt stts the company is requesting a note from PCP stating he is okay to have test. Pt did not know the name of the test or the name of the company . Pt asking to  note ASAP.  Plea

## 2018-06-30 ENCOUNTER — HOSPITAL (OUTPATIENT)
Dept: OTHER | Age: 23
End: 2018-06-30
Attending: NEUROLOGICAL SURGERY

## 2018-07-02 ENCOUNTER — HOSPITAL ENCOUNTER (EMERGENCY)
Facility: HOSPITAL | Age: 23
Discharge: HOME | End: 2018-07-02
Attending: EMERGENCY MEDICINE
Payer: MEDICAID

## 2018-07-02 ENCOUNTER — NURSE TRIAGE (OUTPATIENT)
Dept: OTHER | Age: 23
End: 2018-07-02

## 2018-07-02 ENCOUNTER — APPOINTMENT (OUTPATIENT)
Dept: GENERAL RADIOLOGY | Facility: HOSPITAL | Age: 23
End: 2018-07-02
Attending: EMERGENCY MEDICINE
Payer: MEDICAID

## 2018-07-02 VITALS
SYSTOLIC BLOOD PRESSURE: 136 MMHG | BODY MASS INDEX: 21 KG/M2 | RESPIRATION RATE: 18 BRPM | OXYGEN SATURATION: 97 % | DIASTOLIC BLOOD PRESSURE: 91 MMHG | WEIGHT: 125 LBS | HEART RATE: 105 BPM | TEMPERATURE: 98 F

## 2018-07-02 DIAGNOSIS — M79.2 RADICULAR PAIN IN RIGHT ARM: ICD-10-CM

## 2018-07-02 DIAGNOSIS — M79.601 RIGHT ARM PAIN: Primary | ICD-10-CM

## 2018-07-02 PROCEDURE — 73030 X-RAY EXAM OF SHOULDER: CPT | Performed by: EMERGENCY MEDICINE

## 2018-07-02 PROCEDURE — 96372 THER/PROPH/DIAG INJ SC/IM: CPT

## 2018-07-02 PROCEDURE — 99283 EMERGENCY DEPT VISIT LOW MDM: CPT

## 2018-07-02 PROCEDURE — 73060 X-RAY EXAM OF HUMERUS: CPT | Performed by: EMERGENCY MEDICINE

## 2018-07-02 RX ORDER — KETOROLAC TROMETHAMINE 30 MG/ML
60 INJECTION, SOLUTION INTRAMUSCULAR; INTRAVENOUS ONCE
Status: COMPLETED | OUTPATIENT
Start: 2018-07-02 | End: 2018-07-02

## 2018-07-02 NOTE — TELEPHONE ENCOUNTER
Action Requested: Summary for Provider     []  Critical Lab, Recommendations Needed  [] Need Additional Advice  []   FYI    []   Need Orders  [] Need Medications Sent to Pharmacy  []  Other     SUMMARY: advised pt to go ER for numbness, tingling right arm.

## 2018-07-02 NOTE — ED INITIAL ASSESSMENT (HPI)
Triage:  Patient c/o R arm pain. States last night had headache and stomach pain and then developed sudden R arm numbness. Now unable to make a fist and cannot lift R arm. Hx of  shunt. Last normal 5pm yesterday.

## 2018-07-02 NOTE — ED NOTES
Pt provided with arm sling for comfort, pt able to sign discharge instructions, and elevate arm easier. Pt will f/u with pmd and neurology as referred and return with any new or worsening symptoms.

## 2018-07-02 NOTE — ED PROVIDER NOTES
Patient Seen in: [facility] AND [facility] Emergency Department    History   Patient presents with:  Arm Pain  Numbness Weakness (neurologic)    Stated Complaint: R arm pain    HPI    Patient presents to the emergency department with he had some transient pain e External Shampoo,  Apply to scalp 2-3 times per week as needed. Doxycycline Hyclate 50 MG Oral Cap,  Take 1 capsule (50 mg total) by mouth 2 (two) times daily.        Family History   Problem Relation Age of Onset   • No Known Problems Father    • No Know this caused him significant pain in the shoulder and arm he was able to hold it in the air when I put it in the air he was able to move his fingers but he stated was causing him severe pain is very tender to touch to the anterior right shoulder and the mus

## 2018-07-03 NOTE — TELEPHONE ENCOUNTER
Pt seen at [facility] on 7/2/18 for right arm pain/weakness. Pt scheduled f/u appt with Tristen Fay. PAVAN 7/5/18 @ 1:45pm and will need referral to ortho. Dr OVIEDO for further evaluation and may need MRI. Tasked to Tristen BROWNE as update.

## 2018-07-05 ENCOUNTER — OFFICE VISIT (OUTPATIENT)
Dept: FAMILY MEDICINE CLINIC | Facility: CLINIC | Age: 23
End: 2018-07-05

## 2018-07-05 VITALS
HEART RATE: 85 BPM | BODY MASS INDEX: 21.85 KG/M2 | DIASTOLIC BLOOD PRESSURE: 80 MMHG | WEIGHT: 128 LBS | SYSTOLIC BLOOD PRESSURE: 131 MMHG | HEIGHT: 64 IN

## 2018-07-05 DIAGNOSIS — Q05.9 SPINA BIFIDA, UNSPECIFIED HYDROCEPHALUS PRESENCE, UNSPECIFIED SPINAL REGION (HCC): ICD-10-CM

## 2018-07-05 DIAGNOSIS — R20.0 RIGHT ARM NUMBNESS: Primary | ICD-10-CM

## 2018-07-05 PROCEDURE — 99213 OFFICE O/P EST LOW 20 MIN: CPT | Performed by: NURSE PRACTITIONER

## 2018-07-05 NOTE — PATIENT INSTRUCTIONS
con't ibuprofen  Ice to right upper arm 20 min 4-6 times per day    Please call if symptoms worsen or are not resolving.

## 2018-07-05 NOTE — ASSESSMENT & PLAN NOTE
Ibuprofen 400 mg q 6 hrs prn  Ice to post right upper arm q 4-6 hrs prn  Please call if symptoms worsen or are not resolving.

## 2018-07-05 NOTE — PROGRESS NOTES
HPI    Pt here for right upper arm numbness. Was seen in ER for s/s 2 days ago. Has recently started taking class for hand operated motor vehicle operation. S/s started 4 days ago. S/s are improved with ibuprofen intake. Pt has h/o spina bifida.    Review Doxycycline Hyclate 50 MG Oral Cap Take 1 capsule (50 mg total) by mouth 2 (two) times daily. Disp: 60 capsule Rfl: 12   Oxybutynin Chloride ER 15 MG Oral Tablet 24 Hr Take 1 tablet (15 mg total) by mouth once daily.  Disp: 90 tablet Rfl: 1       Allergies:

## 2018-07-16 ENCOUNTER — OFFICE VISIT (OUTPATIENT)
Dept: ORTHOPEDICS CLINIC | Facility: CLINIC | Age: 23
End: 2018-07-16

## 2018-07-16 DIAGNOSIS — M75.121 COMPLETE TEAR OF RIGHT ROTATOR CUFF: Primary | ICD-10-CM

## 2018-07-16 PROCEDURE — 99243 OFF/OP CNSLTJ NEW/EST LOW 30: CPT | Performed by: ORTHOPAEDIC SURGERY

## 2018-07-16 PROCEDURE — 99212 OFFICE O/P EST SF 10 MIN: CPT | Performed by: ORTHOPAEDIC SURGERY

## 2018-07-16 RX ORDER — MELOXICAM 15 MG/1
15 TABLET ORAL DAILY
Qty: 30 TABLET | Refills: 1 | Status: SHIPPED | OUTPATIENT
Start: 2018-07-16 | End: 2018-09-17

## 2018-07-16 NOTE — H&P
Chief Complaint: Right shoulder pain    NURSING INTAKE COMMENTS: Patient presents with:  Consult: Pt had lost feeling in his right arm  this happened about 2-3 weeks ago. Went to the ER last Monday.   Pt states now he stiff and pain in the shoulder and in t    Abd WNL 90   IR WNL T12   ER WNL WNL   Atrophy None None   Bruising None None   Strength        Supraspinatus 5/5 5/5      Infraspinatus 5/5 5/5      Teres Minor 5/5 5/5      Subscapularis 5/5 5/5      Deltoid 5/5 5/5      Biceps 5/5 5/5        P

## 2018-07-19 ENCOUNTER — TELEPHONE (OUTPATIENT)
Dept: ORTHOPEDICS CLINIC | Facility: CLINIC | Age: 23
End: 2018-07-19

## 2018-07-19 NOTE — TELEPHONE ENCOUNTER
Called Reji and s/w Damien KAUR to initiate PA for MRI right shoulder. Gave clinicals per [facility] OV note. MRI approved [facility]#[id number] exp 9/2/18 at [address] [place] [address]. Called pt and informed him of MRI auth and exp date.  Gave him phone # to CS to make appt and advised to f

## 2018-07-27 ENCOUNTER — TELEPHONE (OUTPATIENT)
Dept: ORTHOPEDICS CLINIC | Facility: CLINIC | Age: 23
End: 2018-07-27

## 2018-07-27 NOTE — TELEPHONE ENCOUNTER
S/w pt and he states he has been taking mobic since OV on 7/16/18 and it has helped and he doesn't have any pain. He wants to know  1. Should he keep taking Mobic consistently or just prn? 2. Can he return to learning to drive with the hand controls?  And

## 2018-07-27 NOTE — TELEPHONE ENCOUNTER
If he is feeling completely fine, he may stop the meloxicam and return to learning how to drive his car with the hand controls and other activities as tolerated.

## 2018-07-27 NOTE — TELEPHONE ENCOUNTER
Patient states his arm is feeling better after taking meloxicam. Patient would like to know if he should continue taking medication or stop. Please call. Thank you.      Patient would also like to know if he is able to go back to normal activities with no r

## 2018-09-17 ENCOUNTER — NURSE TRIAGE (OUTPATIENT)
Dept: OTHER | Age: 23
End: 2018-09-17

## 2018-09-17 ENCOUNTER — HOSPITAL ENCOUNTER (OUTPATIENT)
Dept: GENERAL RADIOLOGY | Age: 23
Discharge: HOME | End: 2018-09-17
Attending: FAMILY MEDICINE
Payer: MEDICAID

## 2018-09-17 ENCOUNTER — APPOINTMENT (OUTPATIENT)
Dept: LAB | Age: 23
End: 2018-09-17
Attending: FAMILY MEDICINE
Payer: MEDICAID

## 2018-09-17 ENCOUNTER — OFFICE VISIT (OUTPATIENT)
Dept: FAMILY MEDICINE CLINIC | Facility: CLINIC | Age: 23
End: 2018-09-17
Payer: MEDICAID

## 2018-09-17 VITALS
TEMPERATURE: 97 F | HEART RATE: 79 BPM | BODY MASS INDEX: 21.34 KG/M2 | DIASTOLIC BLOOD PRESSURE: 83 MMHG | SYSTOLIC BLOOD PRESSURE: 143 MMHG | WEIGHT: 125 LBS | OXYGEN SATURATION: 97 % | HEIGHT: 64 IN

## 2018-09-17 DIAGNOSIS — R07.89 ATYPICAL CHEST PAIN: ICD-10-CM

## 2018-09-17 DIAGNOSIS — M94.0 COSTOCHONDRITIS: ICD-10-CM

## 2018-09-17 DIAGNOSIS — R07.89 ATYPICAL CHEST PAIN: Primary | ICD-10-CM

## 2018-09-17 PROCEDURE — 93010 ELECTROCARDIOGRAM REPORT: CPT | Performed by: FAMILY MEDICINE

## 2018-09-17 PROCEDURE — 93005 ELECTROCARDIOGRAM TRACING: CPT

## 2018-09-17 PROCEDURE — 99214 OFFICE O/P EST MOD 30 MIN: CPT | Performed by: FAMILY MEDICINE

## 2018-09-17 PROCEDURE — 99212 OFFICE O/P EST SF 10 MIN: CPT | Performed by: FAMILY MEDICINE

## 2018-09-17 PROCEDURE — 71046 X-RAY EXAM CHEST 2 VIEWS: CPT | Performed by: FAMILY MEDICINE

## 2018-09-17 RX ORDER — NAPROXEN 375 MG/1
375 TABLET ORAL 2 TIMES DAILY WITH MEALS
Qty: 42 TABLET | Refills: 0 | Status: SHIPPED | OUTPATIENT
Start: 2018-09-17 | End: 2019-02-21

## 2018-09-17 NOTE — TELEPHONE ENCOUNTER
Action Requested: Summary for Provider     []  Critical Lab, Recommendations Needed  [] Need Additional Advice  []   FYI    []   Need Orders  [] Need Medications Sent to Pharmacy  []  Other     SUMMARY: appt scheduled 9/17/18 as add on  Onset 2 days ago of

## 2018-09-17 NOTE — PROGRESS NOTES
Patient ID: Snehal Keating is a 24year old male.     HPI  Patient presents with:  Shortness Of Breath  Chest Pressure    Action Requested: Summary for Provider     []  Critical Lab, Recommendations Needed  [] Need Additional Advice  []   FYI    []   Need Orde 125 lb (56.7 kg)  03/22/18 : 125 lb (56.7 kg)  01/02/18 : 120 lb (54.4 kg)  11/17/17 : 127 lb (57.6 kg)      BMI Readings from Last 6 Encounters:  09/17/18 : 21.46 kg/m²  07/05/18 : 21.97 kg/m²  07/02/18 : 21.46 kg/m²  03/22/18 : 21.46 kg/m²  01/02/18 : 20 daily. Disp: 60 capsule Rfl: 12     Allergies:  Latex                   RASH   PHYSICAL EXAM:   Physical Exam  Blood pressure 143/83, pulse 79, temperature 97.3 °F (36.3 °C), temperature source Oral, height 5' 4\" (1.626 m), weight 125 lb (56.7 kg), SpO2 9 the defined types were placed in this encounter. Follow up if symptoms persist.  Take medicine (if given) as prescribed. Approach to treatment discussed and patient/family member understands and agrees to plan.          Doroteo Burnham DO  9/17/2018

## 2018-10-31 ENCOUNTER — APPOINTMENT (OUTPATIENT)
Dept: GENERAL RADIOLOGY | Age: 23
End: 2018-10-31
Attending: EMERGENCY MEDICINE
Payer: MEDICAID

## 2018-10-31 ENCOUNTER — HOSPITAL ENCOUNTER (OUTPATIENT)
Age: 23
Discharge: HOME | End: 2018-10-31
Attending: EMERGENCY MEDICINE
Payer: MEDICAID

## 2018-10-31 VITALS
RESPIRATION RATE: 16 BRPM | BODY MASS INDEX: 21 KG/M2 | SYSTOLIC BLOOD PRESSURE: 143 MMHG | OXYGEN SATURATION: 97 % | HEART RATE: 93 BPM | DIASTOLIC BLOOD PRESSURE: 90 MMHG | WEIGHT: 120 LBS | TEMPERATURE: 98 F

## 2018-10-31 DIAGNOSIS — R31.9 URINARY TRACT INFECTION WITH HEMATURIA, SITE UNSPECIFIED: Primary | ICD-10-CM

## 2018-10-31 DIAGNOSIS — N39.0 URINARY TRACT INFECTION WITH HEMATURIA, SITE UNSPECIFIED: Primary | ICD-10-CM

## 2018-10-31 PROCEDURE — 87430 STREP A AG IA: CPT

## 2018-10-31 PROCEDURE — 87088 URINE BACTERIA CULTURE: CPT | Performed by: EMERGENCY MEDICINE

## 2018-10-31 PROCEDURE — 99214 OFFICE O/P EST MOD 30 MIN: CPT

## 2018-10-31 PROCEDURE — 85025 COMPLETE CBC W/AUTO DIFF WBC: CPT | Performed by: EMERGENCY MEDICINE

## 2018-10-31 PROCEDURE — 71046 X-RAY EXAM CHEST 2 VIEWS: CPT | Performed by: EMERGENCY MEDICINE

## 2018-10-31 PROCEDURE — 87186 SC STD MICRODIL/AGAR DIL: CPT | Performed by: EMERGENCY MEDICINE

## 2018-10-31 PROCEDURE — 87086 URINE CULTURE/COLONY COUNT: CPT | Performed by: EMERGENCY MEDICINE

## 2018-10-31 PROCEDURE — 81003 URINALYSIS AUTO W/O SCOPE: CPT

## 2018-10-31 RX ORDER — CEPHALEXIN 500 MG/1
500 TABLET ORAL 3 TIMES DAILY
Qty: 30 TABLET | Refills: 0 | Status: SHIPPED | OUTPATIENT
Start: 2018-10-31 | End: 2018-11-10

## 2018-11-05 ENCOUNTER — OFFICE VISIT (OUTPATIENT)
Dept: FAMILY MEDICINE CLINIC | Facility: CLINIC | Age: 23
End: 2018-11-05
Payer: MEDICAID

## 2018-11-05 VITALS
HEIGHT: 64 IN | WEIGHT: 128.81 LBS | BODY MASS INDEX: 21.99 KG/M2 | TEMPERATURE: 99 F | RESPIRATION RATE: 16 BRPM | SYSTOLIC BLOOD PRESSURE: 132 MMHG | DIASTOLIC BLOOD PRESSURE: 83 MMHG | HEART RATE: 104 BPM

## 2018-11-05 DIAGNOSIS — L21.9 SEBORRHEIC DERMATITIS: ICD-10-CM

## 2018-11-05 DIAGNOSIS — R07.89 LEFT-SIDED CHEST WALL PAIN: ICD-10-CM

## 2018-11-05 DIAGNOSIS — Q05.9 SPINA BIFIDA, UNSPECIFIED HYDROCEPHALUS PRESENCE, UNSPECIFIED SPINAL REGION (HCC): ICD-10-CM

## 2018-11-05 DIAGNOSIS — N30.90 CYSTITIS: Primary | ICD-10-CM

## 2018-11-05 PROCEDURE — 99214 OFFICE O/P EST MOD 30 MIN: CPT | Performed by: FAMILY MEDICINE

## 2018-11-05 PROCEDURE — 99212 OFFICE O/P EST SF 10 MIN: CPT | Performed by: FAMILY MEDICINE

## 2018-11-15 ENCOUNTER — CHARTING TRANS (OUTPATIENT)
Dept: OTHER | Age: 23
End: 2018-11-15

## 2018-11-26 DIAGNOSIS — N31.9 NEUROGENIC BLADDER: ICD-10-CM

## 2018-11-26 DIAGNOSIS — Q05.9 SPINA BIFIDA, UNSPECIFIED HYDROCEPHALUS PRESENCE, UNSPECIFIED SPINAL REGION (HCC): ICD-10-CM

## 2018-11-26 RX ORDER — OXYBUTYNIN CHLORIDE 15 MG/1
TABLET, EXTENDED RELEASE ORAL
Qty: 90 TABLET | Refills: 0 | Status: SHIPPED | OUTPATIENT
Start: 2018-11-26 | End: 2019-03-07

## 2018-12-04 ENCOUNTER — OFFICE VISIT (OUTPATIENT)
Dept: FAMILY MEDICINE CLINIC | Facility: CLINIC | Age: 23
End: 2018-12-04
Payer: MEDICARE

## 2018-12-04 VITALS
BODY MASS INDEX: 21.34 KG/M2 | WEIGHT: 125 LBS | SYSTOLIC BLOOD PRESSURE: 140 MMHG | DIASTOLIC BLOOD PRESSURE: 84 MMHG | TEMPERATURE: 98 F | HEIGHT: 64 IN | HEART RATE: 83 BPM

## 2018-12-04 DIAGNOSIS — Z23 NEED FOR VACCINATION: ICD-10-CM

## 2018-12-04 DIAGNOSIS — M21.372 FOOT DROP, BILATERAL: ICD-10-CM

## 2018-12-04 DIAGNOSIS — Q05.9 SPINA BIFIDA, UNSPECIFIED HYDROCEPHALUS PRESENCE, UNSPECIFIED SPINAL REGION (HCC): Primary | ICD-10-CM

## 2018-12-04 DIAGNOSIS — M21.371 FOOT DROP, BILATERAL: ICD-10-CM

## 2018-12-04 PROCEDURE — 99213 OFFICE O/P EST LOW 20 MIN: CPT | Performed by: FAMILY MEDICINE

## 2018-12-04 PROCEDURE — G0463 HOSPITAL OUTPT CLINIC VISIT: HCPCS | Performed by: FAMILY MEDICINE

## 2018-12-04 PROCEDURE — 90686 IIV4 VACC NO PRSV 0.5 ML IM: CPT | Performed by: FAMILY MEDICINE

## 2018-12-04 PROCEDURE — G0008 ADMIN INFLUENZA VIRUS VAC: HCPCS | Performed by: FAMILY MEDICINE

## 2018-12-04 NOTE — PROGRESS NOTES
Patient ID: Yudith Joyce is a 24year old male. HPI  Patient presents with:  Complete Form    He did occupational therapy and behind the wheel  evaluations completed on June 21, 2018 through [facility].   He has a post rock hand control for the le Q4H, PRN pain moderate, Tab, Maintenance Disp:  Rfl:    Benzoyl Peroxide 6 % External Lotion Use bid as dir Disp: 340 g Rfl: 3   Ketoconazole 2 % External Shampoo Apply to scalp 2-3 times per week as needed.  Disp: 120 mL Rfl: 6   Doxycycline Hyclate 50 MG understands and agrees to plan.          James Norris, DO  12/4/2018

## 2018-12-07 VITALS — SYSTOLIC BLOOD PRESSURE: 139 MMHG | WEIGHT: 125.33 LBS | HEART RATE: 93 BPM | DIASTOLIC BLOOD PRESSURE: 96 MMHG

## 2019-01-03 ENCOUNTER — TELEPHONE (OUTPATIENT)
Dept: NEUROSURGERY | Age: 24
End: 2019-01-03

## 2019-01-09 ENCOUNTER — HOSPITAL (OUTPATIENT)
Dept: OTHER | Age: 24
End: 2019-01-09

## 2019-01-09 ENCOUNTER — HOSPITAL (OUTPATIENT)
Dept: OTHER | Age: 24
End: 2019-01-09
Attending: FAMILY MEDICINE

## 2019-01-09 LAB
ANALYZER ANC (IANC): NORMAL
ANION GAP SERPL CALC-SCNC: 8 MMOL/L (ref 10–20)
BASOPHILS # BLD: 0 THOUSAND/MCL (ref 0–0.3)
BASOPHILS NFR BLD: 0 %
BUN SERPL-MCNC: 9 MG/DL (ref 6–20)
BUN/CREAT SERPL: 11 (ref 7–25)
CALCIUM SERPL-MCNC: 8.8 MG/DL (ref 8.4–10.2)
CHLORIDE: 106 MMOL/L (ref 98–107)
CO2 SERPL-SCNC: 27 MMOL/L (ref 21–32)
CREAT SERPL-MCNC: 0.79 MG/DL (ref 0.67–1.17)
CRP SERPL-MCNC: <0.3 MG/DL
DIFFERENTIAL METHOD BLD: NORMAL
EOSINOPHIL # BLD: 0.2 THOUSAND/MCL (ref 0.1–0.5)
EOSINOPHIL NFR BLD: 2 %
ERYTHROCYTE [DISTWIDTH] IN BLOOD: 12.3 % (ref 11–15)
GLUCOSE SERPL-MCNC: 98 MG/DL (ref 65–99)
HEMATOCRIT: 46.2 % (ref 39–51)
HGB BLD-MCNC: 15.8 GM/DL (ref 13–17)
IMM GRANULOCYTES # BLD AUTO: 0.1 THOUSAND/MCL (ref 0–0.2)
IMM GRANULOCYTES NFR BLD: 1 %
INR PPP: 1.1
LYMPHOCYTES # BLD: 2.2 THOUSAND/MCL (ref 1–4.8)
LYMPHOCYTES NFR BLD: 28 %
MCH RBC QN AUTO: 29.9 PG (ref 26–34)
MCHC RBC AUTO-ENTMCNC: 34.2 GM/DL (ref 32–36.5)
MCV RBC AUTO: 87.3 FL (ref 78–100)
MONOCYTES # BLD: 0.6 THOUSAND/MCL (ref 0.3–0.9)
MONOCYTES NFR BLD: 7 %
NEUTROPHILS # BLD: 4.8 THOUSAND/MCL (ref 1.8–7.7)
NEUTROPHILS NFR BLD: 62 %
NEUTS SEG NFR BLD: NORMAL %
NRBC (NRBCRE): 0 /100 WBC
PLATELET # BLD: 227 THOUSAND/MCL (ref 140–450)
POTASSIUM SERPL-SCNC: 3.8 MMOL/L (ref 3.4–5.1)
PROTHROMBIN TIME: 11.1 SECONDS (ref 9.7–11.8)
PROTHROMBIN TIME: NORMAL
RBC # BLD: 5.29 MILLION/MCL (ref 4.5–5.9)
SODIUM SERPL-SCNC: 137 MMOL/L (ref 135–145)
WBC # BLD: 7.8 THOUSAND/MCL (ref 4.2–11)

## 2019-01-10 LAB
ALBUMIN SERPL-MCNC: 3.9 GM/DL (ref 3.6–5.1)
ALBUMIN/GLOB SERPL: 1 {RATIO} (ref 1–2.4)
ALP SERPL-CCNC: 76 UNIT/L (ref 45–117)
ALT SERPL-CCNC: 31 UNIT/L
AMORPH SED URNS QL MICRO: ABNORMAL
ANALYZER ANC (IANC): NORMAL
ANION GAP SERPL CALC-SCNC: 11 MMOL/L (ref 10–20)
APPEARANCE UR: ABNORMAL
AST SERPL-CCNC: 18 UNIT/L
BACTERIA #/AREA URNS HPF: ABNORMAL /HPF
BASOPHILS # BLD: 0.1 THOUSAND/MCL (ref 0–0.3)
BASOPHILS NFR BLD: 1 %
BILIRUB SERPL-MCNC: 0.5 MG/DL (ref 0.2–1)
BILIRUB UR QL: NEGATIVE
BUN SERPL-MCNC: 13 MG/DL (ref 6–20)
BUN/CREAT SERPL: 15 (ref 7–25)
CALCIUM SERPL-MCNC: 9.2 MG/DL (ref 8.4–10.2)
CAOX CRY URNS QL MICRO: ABNORMAL
CHLORIDE: 107 MMOL/L (ref 98–107)
CO2 SERPL-SCNC: 28 MMOL/L (ref 21–32)
COLOR UR: YELLOW
CREAT SERPL-MCNC: 0.88 MG/DL (ref 0.67–1.17)
DIFFERENTIAL METHOD BLD: NORMAL
EOSINOPHIL # BLD: 0.2 THOUSAND/MCL (ref 0.1–0.5)
EOSINOPHIL NFR BLD: 3 %
EPITH CASTS #/AREA URNS LPF: ABNORMAL /[LPF]
ERYTHROCYTE [DISTWIDTH] IN BLOOD: 12.3 % (ref 11–15)
FATTY CASTS #/AREA URNS LPF: ABNORMAL /[LPF]
GLOBULIN SER-MCNC: 3.9 GM/DL (ref 2–4)
GLUCOSE SERPL-MCNC: 108 MG/DL (ref 65–99)
GLUCOSE UR-MCNC: NEGATIVE MG/DL
GRAN CASTS #/AREA URNS LPF: ABNORMAL /[LPF]
HEMATOCRIT: 46.9 % (ref 39–51)
HGB BLD-MCNC: 15.8 GM/DL (ref 13–17)
HGB UR QL: ABNORMAL
HYALINE CASTS #/AREA URNS LPF: ABNORMAL /LPF (ref 0–5)
IMM GRANULOCYTES # BLD AUTO: 0 THOUSAND/MCL (ref 0–0.2)
IMM GRANULOCYTES NFR BLD: 1 %
KETONES UR-MCNC: NEGATIVE MG/DL
LEUKOCYTE ESTERASE UR QL STRIP: ABNORMAL
LYMPHOCYTES # BLD: 2.8 THOUSAND/MCL (ref 1–4.8)
LYMPHOCYTES NFR BLD: 33 %
MCH RBC QN AUTO: 29.5 PG (ref 26–34)
MCHC RBC AUTO-ENTMCNC: 33.7 GM/DL (ref 32–36.5)
MCV RBC AUTO: 87.7 FL (ref 78–100)
MIXED CELL CASTS #/AREA URNS LPF: ABNORMAL /[LPF]
MONOCYTES # BLD: 0.7 THOUSAND/MCL (ref 0.3–0.9)
MONOCYTES NFR BLD: 8 %
MUCOUS THREADS URNS QL MICRO: PRESENT
NEUTROPHILS # BLD: 4.7 THOUSAND/MCL (ref 1.8–7.7)
NEUTROPHILS NFR BLD: 54 %
NEUTS SEG NFR BLD: NORMAL %
NITRITE UR QL: NEGATIVE
NRBC (NRBCRE): 0 /100 WBC
PH UR: 6 UNIT (ref 5–7)
PLATELET # BLD: 248 THOUSAND/MCL (ref 140–450)
POTASSIUM SERPL-SCNC: 4.4 MMOL/L (ref 3.4–5.1)
PROT SERPL-MCNC: 7.8 GM/DL (ref 6.4–8.2)
PROT UR QL: NEGATIVE MG/DL
RBC # BLD: 5.35 MILLION/MCL (ref 4.5–5.9)
RBC #/AREA URNS HPF: >100 /HPF (ref 0–3)
RBC CASTS #/AREA URNS LPF: ABNORMAL /[LPF]
RENAL EPI CELLS #/AREA URNS HPF: ABNORMAL /[HPF]
SODIUM SERPL-SCNC: 142 MMOL/L (ref 135–145)
SP GR UR: 1.01 (ref 1–1.03)
SPECIMEN SOURCE: ABNORMAL
SPERM URNS QL MICRO: ABNORMAL
SQUAMOUS #/AREA URNS HPF: ABNORMAL /HPF (ref 0–5)
T VAGINALIS URNS QL MICRO: ABNORMAL
TRI-PHOS CRY URNS QL MICRO: ABNORMAL
URATE CRY URNS QL MICRO: ABNORMAL
URINE REFLEX: ABNORMAL
URNS CMNT MICRO: ABNORMAL
UROBILINOGEN UR QL: 0.2 MG/DL (ref 0–1)
WAXY CASTS #/AREA URNS LPF: ABNORMAL /[LPF]
WBC # BLD: 8.6 THOUSAND/MCL (ref 4.2–11)
WBC #/AREA URNS HPF: >100 /HPF (ref 0–5)
WBC CASTS #/AREA URNS LPF: ABNORMAL /[LPF]
YEAST HYPHAE URNS QL MICRO: ABNORMAL
YEAST URNS QL MICRO: ABNORMAL

## 2019-01-15 ENCOUNTER — TELEPHONE (OUTPATIENT)
Dept: FAMILY MEDICINE CLINIC | Facility: CLINIC | Age: 24
End: 2019-01-15

## 2019-01-15 NOTE — TELEPHONE ENCOUNTER
Patient requesting to see Dr. Kelly Pettit today 01/15/2019 for hospital followup. Patient was discharged 01/11/2019. Patient only wants to see Dr. Kelly Pettit. Declined visit with another provider. Can he be added to schedule today?     Please reply to pool:

## 2019-01-18 ENCOUNTER — OFFICE VISIT (OUTPATIENT)
Dept: FAMILY MEDICINE CLINIC | Facility: CLINIC | Age: 24
End: 2019-01-18
Payer: MEDICARE

## 2019-01-18 VITALS
HEART RATE: 98 BPM | HEIGHT: 64 IN | WEIGHT: 129.19 LBS | BODY MASS INDEX: 22.06 KG/M2 | RESPIRATION RATE: 20 BRPM | TEMPERATURE: 97 F | DIASTOLIC BLOOD PRESSURE: 85 MMHG | SYSTOLIC BLOOD PRESSURE: 136 MMHG

## 2019-01-18 DIAGNOSIS — L02.811 CELLULITIS AND ABSCESS OF HEAD: Primary | ICD-10-CM

## 2019-01-18 DIAGNOSIS — L21.9 SEBORRHEIC DERMATITIS: ICD-10-CM

## 2019-01-18 DIAGNOSIS — L03.811 CELLULITIS AND ABSCESS OF HEAD: Primary | ICD-10-CM

## 2019-01-18 DIAGNOSIS — L21.9 SEBORRHEIC DERMATITIS OF SCALP: ICD-10-CM

## 2019-01-18 PROCEDURE — 99214 OFFICE O/P EST MOD 30 MIN: CPT | Performed by: FAMILY MEDICINE

## 2019-01-18 PROCEDURE — G0463 HOSPITAL OUTPT CLINIC VISIT: HCPCS | Performed by: FAMILY MEDICINE

## 2019-01-18 RX ORDER — SULFAMETHOXAZOLE AND TRIMETHOPRIM 800; 160 MG/1; MG/1
1 TABLET ORAL 2 TIMES DAILY
COMMUNITY
Start: 2019-01-11 | End: 2019-02-21

## 2019-01-18 NOTE — PROGRESS NOTES
Patient ID: Matty Padilla is a 24year old male. HPI  Patient presents with:  ER F/U: pt was in ER for headache and light sensativity, skin rash     He is admitted to [facility] on January 9, 2019 and then discharged on January 11, 2019.   H Current Outpatient Medications:  Sulfamethoxazole-TMP -160 MG Oral Tab per tablet Take 1 tablet by mouth 2 (two) times daily. Disp:  Rfl:    Cetirizine HCl 10 MG Oral Cap Take 1 tablet by mouth daily.  Disp:  Rfl:    OXYBUTYNIN CHLORIDE ER 15 MG Ora Neurological: Patient is alert and oriented to person, place, and time. Patient has normal strength and normal reflexes. No cranial nerve deficit or sensory deficit. Psychiatric: Patient has a normal mood and affect.     Skin: he has some minimal seborr

## 2019-02-21 ENCOUNTER — HOSPITAL ENCOUNTER (OUTPATIENT)
Dept: GENERAL RADIOLOGY | Age: 24
Discharge: HOME | End: 2019-02-21
Attending: FAMILY MEDICINE
Payer: MEDICARE

## 2019-02-21 ENCOUNTER — OFFICE VISIT (OUTPATIENT)
Dept: FAMILY MEDICINE CLINIC | Facility: CLINIC | Age: 24
End: 2019-02-21
Payer: MEDICARE

## 2019-02-21 VITALS
BODY MASS INDEX: 22.94 KG/M2 | SYSTOLIC BLOOD PRESSURE: 140 MMHG | RESPIRATION RATE: 16 BRPM | WEIGHT: 134.38 LBS | HEIGHT: 64 IN | DIASTOLIC BLOOD PRESSURE: 84 MMHG | HEART RATE: 119 BPM | TEMPERATURE: 97 F

## 2019-02-21 DIAGNOSIS — Q05.9 SPINA BIFIDA, UNSPECIFIED HYDROCEPHALUS PRESENCE, UNSPECIFIED SPINAL REGION (HCC): ICD-10-CM

## 2019-02-21 DIAGNOSIS — M25.562 ACUTE PAIN OF LEFT KNEE: ICD-10-CM

## 2019-02-21 DIAGNOSIS — M25.562 ACUTE PAIN OF LEFT KNEE: Primary | ICD-10-CM

## 2019-02-21 PROCEDURE — G0463 HOSPITAL OUTPT CLINIC VISIT: HCPCS | Performed by: FAMILY MEDICINE

## 2019-02-21 PROCEDURE — 99214 OFFICE O/P EST MOD 30 MIN: CPT | Performed by: FAMILY MEDICINE

## 2019-02-21 PROCEDURE — 73562 X-RAY EXAM OF KNEE 3: CPT | Performed by: FAMILY MEDICINE

## 2019-02-21 RX ORDER — NAPROXEN 500 MG/1
500 TABLET ORAL 2 TIMES DAILY WITH MEALS
Qty: 42 TABLET | Refills: 0 | Status: SHIPPED | OUTPATIENT
Start: 2019-02-21 | End: 2019-08-05

## 2019-02-21 NOTE — PROGRESS NOTES
Patient ID: Tevin Seo is a 24year old male. HPI  Patient presents with:  Pain: left knee swelling     The patient has left knee pain. He was walking on a turf field on 2/17/2019 and thinks he may have gotten his foot caught on the turf.  He was able t Surgical History:   Procedure Laterality Date   • OTHER SURGICAL HISTORY  2010    bladder augmentation/catheterizable channel   • STRABISMUS SURGERY Left 1999 or 2000          Current Outpatient Medications:  Cetirizine HCl 10 MG Oral Cap Take 1 tablet by line tenderness       Medial ++      Lateral neg      Pes anserinus None      Patellar tendon None   Posteromedial joint Site of the most pain       Jose's Medial Negative   Jose's Lateral Negative       Stability Testing       Anterior Drawer Nega attest that this documentation has been prepared under the direction and in the presence of Shaw Gonzalez DO.    Electronically Signed: Ayanna Quiñones, 2/21/2019, 10:35 AM.    I, Shaw Gonzalez DO,  personally performed the services described in this d

## 2019-02-25 RX ORDER — OXYBUTYNIN CHLORIDE 15 MG/1
TABLET, EXTENDED RELEASE ORAL
COMMUNITY
End: 2023-11-02 | Stop reason: SDUPTHER

## 2019-03-07 DIAGNOSIS — N31.9 NEUROGENIC BLADDER: ICD-10-CM

## 2019-03-07 DIAGNOSIS — Q05.9 SPINA BIFIDA, UNSPECIFIED HYDROCEPHALUS PRESENCE, UNSPECIFIED SPINAL REGION (HCC): ICD-10-CM

## 2019-03-08 RX ORDER — OXYBUTYNIN CHLORIDE 15 MG/1
TABLET, EXTENDED RELEASE ORAL
Qty: 90 TABLET | Refills: 0 | Status: SHIPPED | OUTPATIENT
Start: 2019-03-08 | End: 2019-06-04

## 2019-03-08 NOTE — TELEPHONE ENCOUNTER
Laura Casillas called from [facility] to follow up on medication refill    She stated Pt is completely out of the medication and mom was there yesterday    See previous encounter

## 2019-05-10 ENCOUNTER — HOSPITAL ENCOUNTER (OUTPATIENT)
Age: 24
Discharge: HOME | End: 2019-05-10
Attending: FAMILY MEDICINE
Payer: MEDICARE

## 2019-05-10 VITALS
SYSTOLIC BLOOD PRESSURE: 139 MMHG | DIASTOLIC BLOOD PRESSURE: 86 MMHG | RESPIRATION RATE: 16 BRPM | HEART RATE: 103 BPM | TEMPERATURE: 98 F

## 2019-05-10 DIAGNOSIS — L70.0 NODULOCYSTIC ACNE: Primary | ICD-10-CM

## 2019-05-10 PROCEDURE — 99214 OFFICE O/P EST MOD 30 MIN: CPT

## 2019-05-10 PROCEDURE — 99213 OFFICE O/P EST LOW 20 MIN: CPT

## 2019-05-10 RX ORDER — MINOCYCLINE HYDROCHLORIDE 50 MG/1
50 CAPSULE ORAL DAILY
Qty: 7 CAPSULE | Refills: 0 | Status: SHIPPED | OUTPATIENT
Start: 2019-05-10 | End: 2019-05-17

## 2019-05-10 NOTE — ED PROVIDER NOTES
Patient Seen in: [facility] AND CLINICS Immediate Care In [address] [place] [address]    History   Patient presents with:   Allergic Rxn Allergies (immune)    Stated Complaint: Left side of face swollen     HPI    Patient is here with a small bump noted over the left  upper jayleen is alert and oriented to person, place, and time. Skin: He is not diaphoretic. Small approximately 0.25 cm in diameter nodulocystic acne single lesion noted over the left upper cheek. No abrasion. No ulceration. No drainage or discharge.    Nursing n

## 2019-05-23 ENCOUNTER — OFFICE VISIT (OUTPATIENT)
Dept: FAMILY MEDICINE CLINIC | Facility: CLINIC | Age: 24
End: 2019-05-23
Payer: MEDICARE

## 2019-05-23 VITALS
TEMPERATURE: 97 F | BODY MASS INDEX: 22.64 KG/M2 | SYSTOLIC BLOOD PRESSURE: 126 MMHG | WEIGHT: 132.63 LBS | DIASTOLIC BLOOD PRESSURE: 77 MMHG | HEIGHT: 64 IN | HEART RATE: 92 BPM

## 2019-05-23 DIAGNOSIS — L21.9 SEBORRHEIC DERMATITIS: ICD-10-CM

## 2019-05-23 DIAGNOSIS — L21.9 SEBORRHEIC DERMATITIS OF SCALP: ICD-10-CM

## 2019-05-23 DIAGNOSIS — L70.0 NODULOCYSTIC ACNE: Primary | ICD-10-CM

## 2019-05-23 PROCEDURE — G0463 HOSPITAL OUTPT CLINIC VISIT: HCPCS | Performed by: FAMILY MEDICINE

## 2019-05-23 PROCEDURE — 99214 OFFICE O/P EST MOD 30 MIN: CPT | Performed by: FAMILY MEDICINE

## 2019-05-23 RX ORDER — MINOCYCLINE HYDROCHLORIDE 50 MG/1
50 CAPSULE ORAL 2 TIMES DAILY
Qty: 180 CAPSULE | Refills: 0 | Status: SHIPPED | OUTPATIENT
Start: 2019-05-23 | End: 2019-08-05

## 2019-05-23 RX ORDER — KETOCONAZOLE 20 MG/ML
SHAMPOO TOPICAL
Qty: 120 ML | Refills: 1 | Status: SHIPPED | OUTPATIENT
Start: 2019-05-23 | End: 2019-08-05

## 2019-05-23 RX ORDER — CLINDAMYCIN AND BENZOYL PEROXIDE 10; 50 MG/G; MG/G
1 GEL TOPICAL NIGHTLY
Qty: 50 G | Refills: 0 | Status: SHIPPED | OUTPATIENT
Start: 2019-05-23 | End: 2019-08-05

## 2019-05-23 NOTE — PROGRESS NOTES
Patient ID: Tracy Zhao is a 21year old male. HPI  Patient presents with: Follow - Up: urgent care    HPI from [address] [place] [address] on 5/10/19  Patient is here with a small bump noted over the left  upper cheek earlier today.   Patient was concerned about possible 20.60 kg/m²      BP Readings from Last 6 Encounters:  05/23/19 : 126/77  05/10/19 : 139/86  02/21/19 : 140/84  01/18/19 : 136/85  12/04/18 : 140/84  11/05/18 : 132/83        Review of Systems   Constitutional: Negative for chills and fever.    HENT: Negativ well-developed and well-nourished. No distress. HENT:   Head: Normocephalic. Eyes: Conjunctivae and EOM are normal.   Neck: Normal range of motion. No thyromegaly present. Cardiovascular: Normal rate, regular rhythm and normal heart sounds.     Pulmon Approach to treatment discussed and patient/family member understands and agrees to plan. Return in about 2 months (around 7/23/2019).       Lila Back  5/23/2019      By signing my name below, I, Lila Back,  attest that this documentation has be

## 2019-05-23 NOTE — PATIENT INSTRUCTIONS
The clindamycin gel you use at nighttime on your face where the acne is. Take the minocycline twice daily which is the antibiotic to kill off the acne as well.   The shampoo is to be used 2-3 times per week in the shower and you can use this on your scalp

## 2019-06-04 DIAGNOSIS — Q05.9 SPINA BIFIDA, UNSPECIFIED HYDROCEPHALUS PRESENCE, UNSPECIFIED SPINAL REGION (HCC): ICD-10-CM

## 2019-06-04 DIAGNOSIS — N31.9 NEUROGENIC BLADDER: ICD-10-CM

## 2019-06-04 RX ORDER — OXYBUTYNIN CHLORIDE 15 MG/1
TABLET, EXTENDED RELEASE ORAL
Qty: 90 TABLET | Refills: 1 | Status: SHIPPED | OUTPATIENT
Start: 2019-06-04 | End: 2019-12-04

## 2019-06-04 NOTE — TELEPHONE ENCOUNTER
Refill passed per [facility] protocol.   Refill Protocol Appointment Criteria  · Appointment scheduled in the past 12 months or in the next 3 months  Recent Outpatient Visits            1 week ago [facility] [facility], [facility]

## 2019-08-05 ENCOUNTER — HOSPITAL ENCOUNTER (OUTPATIENT)
Dept: GENERAL RADIOLOGY | Age: 24
Discharge: HOME | End: 2019-08-05
Attending: FAMILY MEDICINE
Payer: MEDICARE

## 2019-08-05 ENCOUNTER — OFFICE VISIT (OUTPATIENT)
Dept: FAMILY MEDICINE CLINIC | Facility: CLINIC | Age: 24
End: 2019-08-05
Payer: MEDICARE

## 2019-08-05 VITALS
TEMPERATURE: 99 F | DIASTOLIC BLOOD PRESSURE: 73 MMHG | BODY MASS INDEX: 22.2 KG/M2 | HEART RATE: 97 BPM | SYSTOLIC BLOOD PRESSURE: 110 MMHG | HEIGHT: 64 IN | WEIGHT: 130 LBS

## 2019-08-05 DIAGNOSIS — S63.635A SPRAIN OF INTERPHALANGEAL JOINT OF LEFT RING FINGER, INITIAL ENCOUNTER: ICD-10-CM

## 2019-08-05 DIAGNOSIS — S63.635A SPRAIN OF INTERPHALANGEAL JOINT OF LEFT RING FINGER, INITIAL ENCOUNTER: Primary | ICD-10-CM

## 2019-08-05 DIAGNOSIS — Q05.9 SPINA BIFIDA, UNSPECIFIED HYDROCEPHALUS PRESENCE, UNSPECIFIED SPINAL REGION (HCC): ICD-10-CM

## 2019-08-05 DIAGNOSIS — M79.645 PAIN OF FINGER OF LEFT HAND: ICD-10-CM

## 2019-08-05 PROCEDURE — 99214 OFFICE O/P EST MOD 30 MIN: CPT | Performed by: FAMILY MEDICINE

## 2019-08-05 PROCEDURE — 73140 X-RAY EXAM OF FINGER(S): CPT | Performed by: FAMILY MEDICINE

## 2019-08-05 PROCEDURE — G0463 HOSPITAL OUTPT CLINIC VISIT: HCPCS | Performed by: FAMILY MEDICINE

## 2019-08-05 RX ORDER — NAPROXEN 500 MG/1
500 TABLET ORAL 2 TIMES DAILY WITH MEALS
Qty: 42 TABLET | Refills: 0 | Status: SHIPPED | OUTPATIENT
Start: 2019-08-05 | End: 2019-08-26

## 2019-08-05 NOTE — PROGRESS NOTES
Patient ID: Jason Cavanaugh is a 21year old male. HPI  Patient presents with:  Hand Pain: left hand x 1 day  Swelling: left hand x 1 day        He walked in today wanting to be seen. He states yesterday at 6 PM his left hand was swollen and painful.   He d RASH   PHYSICAL EXAM:   Physical Exam  Blood pressure (!) 147/99, pulse 97, temperature 98.5 °F (36.9 °C), temperature source Oral, height 5' 4\" (1.626 m), weight 130 lb (59 kg).    08/05/19  1324 08/05/19  1357   BP: (!) 147/99 110/73   Pulse: 97    Tem of left hand  -     XR FINGER(S) (MIN 2 VIEWS), LEFT 4TH (CPT=73140); Future  -     naproxen 500 MG Oral Tab; Take 1 tablet (500 mg total) by mouth 2 (two) times daily with meals for 21 days.  (Pain and Inflammation)    Spina bifida, unspecified hydrocephal

## 2019-08-16 DIAGNOSIS — L21.9 SEBORRHEIC DERMATITIS OF SCALP: ICD-10-CM

## 2019-08-16 DIAGNOSIS — L21.9 SEBORRHEIC DERMATITIS: ICD-10-CM

## 2019-08-17 RX ORDER — KETOCONAZOLE 20 MG/ML
SHAMPOO TOPICAL
Qty: 120 ML | Refills: 1 | Status: SHIPPED | OUTPATIENT
Start: 2019-08-17 | End: 2019-08-22

## 2019-08-17 NOTE — TELEPHONE ENCOUNTER
Last refilled by Osvaldo Santillan MD      Review pended refill request as it does not fall under a protocol.   Requested Prescriptions     Pending Prescriptions Disp Refills   • KETOCONAZOLE 2 % External Shampoo [Pharmacy Med Name: Ketoconazole 2 % Dickson Foster

## 2019-08-20 ENCOUNTER — APPOINTMENT (OUTPATIENT)
Dept: GENERAL RADIOLOGY | Facility: HOSPITAL | Age: 24
End: 2019-08-20
Attending: NURSE PRACTITIONER
Payer: MEDICARE

## 2019-08-20 ENCOUNTER — HOSPITAL ENCOUNTER (OUTPATIENT)
Age: 24
Discharge: ANOTHER HOSPITAL | End: 2019-08-20
Attending: EMERGENCY MEDICINE
Payer: MEDICARE

## 2019-08-20 ENCOUNTER — HOSPITAL ENCOUNTER (EMERGENCY)
Facility: HOSPITAL | Age: 24
Discharge: HOME | End: 2019-08-20
Payer: MEDICARE

## 2019-08-20 VITALS
OXYGEN SATURATION: 99 % | TEMPERATURE: 98 F | HEART RATE: 97 BPM | SYSTOLIC BLOOD PRESSURE: 154 MMHG | RESPIRATION RATE: 18 BRPM | WEIGHT: 135 LBS | DIASTOLIC BLOOD PRESSURE: 104 MMHG | BODY MASS INDEX: 23 KG/M2

## 2019-08-20 VITALS
HEIGHT: 64 IN | RESPIRATION RATE: 20 BRPM | OXYGEN SATURATION: 94 % | WEIGHT: 130 LBS | SYSTOLIC BLOOD PRESSURE: 130 MMHG | BODY MASS INDEX: 22.2 KG/M2 | HEART RATE: 79 BPM | TEMPERATURE: 98 F | DIASTOLIC BLOOD PRESSURE: 85 MMHG

## 2019-08-20 DIAGNOSIS — R20.2 NUMBNESS AND TINGLING OF RIGHT ARM: Primary | ICD-10-CM

## 2019-08-20 DIAGNOSIS — M25.511 RIGHT SHOULDER PAIN, UNSPECIFIED CHRONICITY: Primary | ICD-10-CM

## 2019-08-20 DIAGNOSIS — R20.0 NUMBNESS AND TINGLING OF RIGHT ARM: Primary | ICD-10-CM

## 2019-08-20 PROCEDURE — 99213 OFFICE O/P EST LOW 20 MIN: CPT

## 2019-08-20 PROCEDURE — 99283 EMERGENCY DEPT VISIT LOW MDM: CPT

## 2019-08-20 PROCEDURE — 99212 OFFICE O/P EST SF 10 MIN: CPT

## 2019-08-20 PROCEDURE — 96372 THER/PROPH/DIAG INJ SC/IM: CPT

## 2019-08-20 PROCEDURE — 73030 X-RAY EXAM OF SHOULDER: CPT | Performed by: NURSE PRACTITIONER

## 2019-08-20 RX ORDER — KETOROLAC TROMETHAMINE 30 MG/ML
30 INJECTION, SOLUTION INTRAMUSCULAR; INTRAVENOUS ONCE
Status: DISCONTINUED | OUTPATIENT
Start: 2019-08-20 | End: 2019-08-20

## 2019-08-20 RX ORDER — KETOROLAC TROMETHAMINE 30 MG/ML
60 INJECTION, SOLUTION INTRAMUSCULAR; INTRAVENOUS ONCE
Status: COMPLETED | OUTPATIENT
Start: 2019-08-20 | End: 2019-08-20

## 2019-08-20 NOTE — ED PROVIDER NOTES
Patient Seen in: [facility] AND CLINICS Immediate Care In [address] [place] [address]    History   Patient presents with:  Upper Extremity Injury (musculoskeletal)    Stated Complaint: rt shoulder pain    HPI    Patient complains of right shoulder pain and right paracervical ne per week as needed. naproxen 500 MG Oral Tab,  Take 1 tablet (500 mg total) by mouth 2 (two) times daily with meals for 21 days.  (Pain and Inflammation)       Family History   Problem Relation Age of Onset   • No Known Problems Father    • No Known Probl Reviewed - No data to display    MDM       Cardiac Monitor: Pulse Readings from Last 1 Encounters:  08/20/19 : 97  , ,      Radiology findings:       Procedures:      Critical Care:        MDM   Patient would like to go to ER to possibly get CT.   Explained

## 2019-08-20 NOTE — ED INITIAL ASSESSMENT (HPI)
sukhdev has a pinched nerve in his neck for years, and today a few hours ago it started acting up with numbness in rt arm and unable to move fingers - took nothing for pain used ice.

## 2019-08-20 NOTE — ED INITIAL ASSESSMENT (HPI)
Patient here with c/o right arm numbness after getting up from couch this afternoon. States he felt a \"popping\" due to his pinched nerve he has had for years. Patient denies any other injury.

## 2019-08-21 NOTE — ED PROVIDER NOTES
Patient Seen in: [facility] AND [facility] Emergency Department    History   Patient presents with:  Numbness    Stated Complaint: Right Arm Pain     HPI    HPI: Marvia Felty is a 21year old male who presents after an injury to the right shoulder  that occurred p 08/20/19 1723 98.8 °F (37.1 °C)   Temp src 08/20/19 1916 Oral   SpO2 08/20/19 1723 97 %   O2 Device 08/20/19 1723 None (Room air)       Current:/85   Pulse 79   Temp 98.2 °F (36.8 °C) (Oral)   Resp 20   Ht 162.6 cm (5' 4\")   Wt 59 kg   SpO2 94%   BM

## 2019-08-21 NOTE — PROGRESS NOTES
Make sure he follows up with me regard to his emergency room visit. I have openings tomorrow morning and then also Friday.   If he comes in Friday please make sure he comes before 2 PM.

## 2019-08-22 ENCOUNTER — HOSPITAL ENCOUNTER (OUTPATIENT)
Dept: GENERAL RADIOLOGY | Age: 24
Discharge: HOME | End: 2019-08-22
Attending: FAMILY MEDICINE
Payer: MEDICARE

## 2019-08-22 ENCOUNTER — OFFICE VISIT (OUTPATIENT)
Dept: FAMILY MEDICINE CLINIC | Facility: CLINIC | Age: 24
End: 2019-08-22
Payer: MEDICARE

## 2019-08-22 VITALS
WEIGHT: 130.63 LBS | HEIGHT: 64 IN | BODY MASS INDEX: 22.3 KG/M2 | HEART RATE: 90 BPM | TEMPERATURE: 98 F | SYSTOLIC BLOOD PRESSURE: 135 MMHG | DIASTOLIC BLOOD PRESSURE: 82 MMHG

## 2019-08-22 DIAGNOSIS — R20.2 PARESTHESIA OF RIGHT ARM: ICD-10-CM

## 2019-08-22 DIAGNOSIS — Q05.9 SPINA BIFIDA, UNSPECIFIED HYDROCEPHALUS PRESENCE, UNSPECIFIED SPINAL REGION (HCC): ICD-10-CM

## 2019-08-22 DIAGNOSIS — M54.12 CERVICAL RADICULOPATHY: ICD-10-CM

## 2019-08-22 DIAGNOSIS — S46.811A TRAPEZIUS STRAIN, RIGHT, INITIAL ENCOUNTER: ICD-10-CM

## 2019-08-22 DIAGNOSIS — M54.12 CERVICAL RADICULOPATHY: Primary | ICD-10-CM

## 2019-08-22 PROCEDURE — G0463 HOSPITAL OUTPT CLINIC VISIT: HCPCS | Performed by: FAMILY MEDICINE

## 2019-08-22 PROCEDURE — 72050 X-RAY EXAM NECK SPINE 4/5VWS: CPT | Performed by: FAMILY MEDICINE

## 2019-08-22 PROCEDURE — 99214 OFFICE O/P EST MOD 30 MIN: CPT | Performed by: FAMILY MEDICINE

## 2019-08-22 RX ORDER — CYCLOBENZAPRINE HCL 10 MG
10 TABLET ORAL NIGHTLY
Qty: 30 TABLET | Refills: 0 | Status: SHIPPED | OUTPATIENT
Start: 2019-08-22 | End: 2019-09-21

## 2019-08-22 NOTE — PROGRESS NOTES
Patient ID: Leighton Davidson is a 21year old male. HPI  Patient presents with:  ER F/U: right arm numbness    HPI from UC visit on 8/20/19:  Patient complains of right shoulder pain and right paracervical neck pain that is been going on since today.   [place] trip at the start of September.  He will tell me the name of the specialist when he goes to the spina bifida specialist.      Xr Shoulder, Complete (min 2 Views), Right (cpt=73030)    Result Date: 8/20/2019  CONCLUSION: Unremarkable right shoulder radiograp Medications:  naproxen 500 MG Oral Tab Take 1 tablet (500 mg total) by mouth 2 (two) times daily with meals for 21 days.  (Pain and Inflammation) Disp: 42 tablet Rfl: 0   OXYBUTYNIN CHLORIDE ER 15 MG Oral Tablet 24 Hr take 1 tablet once daily Disp: 90 table and all orders for this visit:    Cervical radiculopathy  -     XR CERVICAL SPINE (4VIEWS) (CPT=72049); Future  -     PHYSICAL THERAPY - INTERNAL  -     Cyclobenzaprine HCl 10 MG Oral Tab; Take 1 tablet (10 mg total) by mouth nightly.  As needed for muscle at home. Start Flexeril at bedtime which will help you sleep and help relax the muscles. Work on your posture and do not always look downward. Let me the know the name of the new spina bifida clinic doctorNasrin Reno  8/22/2019      By sign

## 2019-08-22 NOTE — PATIENT INSTRUCTIONS
Ice your right trapezius and neck area 2-3 times daily for 5 to 10 minutes at a time. Start physical therapy. Take the naproxen twice daily with food which you already have at home.   Start Flexeril at bedtime which will help you sleep and help relax the

## 2019-08-26 ENCOUNTER — OFFICE VISIT (OUTPATIENT)
Dept: PHYSICAL THERAPY | Age: 24
End: 2019-08-26
Attending: FAMILY MEDICINE
Payer: MEDICARE

## 2019-08-26 DIAGNOSIS — M54.12 CERVICAL RADICULOPATHY: ICD-10-CM

## 2019-08-26 DIAGNOSIS — S46.811A TRAPEZIUS STRAIN, RIGHT, INITIAL ENCOUNTER: ICD-10-CM

## 2019-08-26 DIAGNOSIS — R20.2 PARESTHESIA OF RIGHT ARM: ICD-10-CM

## 2019-08-26 PROCEDURE — 97162 PT EVAL MOD COMPLEX 30 MIN: CPT | Performed by: PHYSICAL THERAPIST

## 2019-08-26 PROCEDURE — 97110 THERAPEUTIC EXERCISES: CPT | Performed by: PHYSICAL THERAPIST

## 2019-08-26 NOTE — PROGRESS NOTES
P.T. EVALUATION:   Referring Physician: Dr. Kylie Feliciano  Diagnosis: Cervical radiculopathy (M54.12)  Paresthesia of right arm (R20.2)  Trapezius strain, right, initial encounter (T85.562G)    Date of Onset: 8/22/2019 Date of Service: 8/26/2019     PATIENT SUMM all planes. Grossly 3/5 into L elbow flexion and extension. Sensation: decreased sensation of R forearm and hand    Posture: slouched sitting, some forward head protrusion. Balance: WFL    Gait: Walks independently without a device.     Today’s Treatm and return this letter via fax as soon as possible to [phone].  If you have any questions, please contact me at Dept: [phone]    Sincerely,  Electronically signed by therapist: Montserrat Williamson PT    Physician's certification required: Yes  I certi

## 2019-08-29 ENCOUNTER — OFFICE VISIT (OUTPATIENT)
Dept: PHYSICAL THERAPY | Age: 24
End: 2019-08-29
Attending: FAMILY MEDICINE
Payer: MEDICARE

## 2019-08-29 DIAGNOSIS — R20.2 PARESTHESIA OF RIGHT ARM: ICD-10-CM

## 2019-08-29 DIAGNOSIS — S46.811A TRAPEZIUS STRAIN, RIGHT, INITIAL ENCOUNTER: ICD-10-CM

## 2019-08-29 DIAGNOSIS — M54.12 CERVICAL RADICULOPATHY: ICD-10-CM

## 2019-08-29 PROCEDURE — 97110 THERAPEUTIC EXERCISES: CPT

## 2019-08-29 NOTE — PROGRESS NOTES
Diagnosis: Cervical radiculopathy (M54.12)  Paresthesia of right arm (R20.2)  Trapezius strain, right, initial encounter (U23.836U)    Date of Onset: 8/22/2019        Next MD visit: none scheduled  Fall Risk: standard         Precautions: n/a          Medi

## 2019-09-05 ENCOUNTER — OFFICE VISIT (OUTPATIENT)
Dept: PHYSICAL THERAPY | Age: 24
End: 2019-09-05
Attending: FAMILY MEDICINE
Payer: MEDICARE

## 2019-09-05 DIAGNOSIS — R20.2 PARESTHESIA OF RIGHT ARM: ICD-10-CM

## 2019-09-05 DIAGNOSIS — S46.811A TRAPEZIUS STRAIN, RIGHT, INITIAL ENCOUNTER: ICD-10-CM

## 2019-09-05 DIAGNOSIS — M54.12 CERVICAL RADICULOPATHY: ICD-10-CM

## 2019-09-05 PROCEDURE — 97110 THERAPEUTIC EXERCISES: CPT | Performed by: PHYSICAL THERAPIST

## 2019-09-05 NOTE — PROGRESS NOTES
Diagnosis: Cervical radiculopathy (M54.12)  Paresthesia of right arm (R20.2)  Trapezius strain, right, initial encounter (B93.096B)    Date of Onset: 8/22/2019        Next MD visit: none scheduled  Fall Risk: standard         Precautions: n/a          Medi ability. 4. Increase UE strength to at least grossly 4/5 to improve liting and carrying ability of L UE  5. Increase cervical spine ROM to [facility] to improve neck mobility. Plan: Continue PT.      Charges: EX 3       Total Timed Treatment: 40 min  Total

## 2019-09-12 ENCOUNTER — OFFICE VISIT (OUTPATIENT)
Dept: PHYSICAL THERAPY | Age: 24
End: 2019-09-12
Attending: FAMILY MEDICINE
Payer: MEDICARE

## 2019-09-12 DIAGNOSIS — M54.12 CERVICAL RADICULOPATHY: ICD-10-CM

## 2019-09-12 DIAGNOSIS — S46.811A TRAPEZIUS STRAIN, RIGHT, INITIAL ENCOUNTER: ICD-10-CM

## 2019-09-12 DIAGNOSIS — R20.2 PARESTHESIA OF RIGHT ARM: ICD-10-CM

## 2019-09-12 PROCEDURE — 97110 THERAPEUTIC EXERCISES: CPT | Performed by: PHYSICAL THERAPIST

## 2019-09-12 PROCEDURE — 97140 MANUAL THERAPY 1/> REGIONS: CPT | Performed by: PHYSICAL THERAPIST

## 2019-09-12 NOTE — PROGRESS NOTES
Diagnosis: Cervical radiculopathy (M54.12)  Paresthesia of right arm (R20.2)  Trapezius strain, right, initial encounter (Q15.687I)    Date of Onset: 8/22/2019        Next MD visit: none scheduled  Fall Risk: standard         Precautions: n/a          Medi high row with [address] 10 x 2 sets   - standing B shoulder ER with [address] 10 x 2 sets   - standing R/L shoulder ER with towel with RTB 10 x 1 set each  - seated C-retraction 10 x 1     Manual PT   x10min  - supine cervical mobilization into rotation and lateral fl

## 2019-09-18 ENCOUNTER — OFFICE VISIT (OUTPATIENT)
Dept: PHYSICAL THERAPY | Age: 24
End: 2019-09-18
Attending: FAMILY MEDICINE
Payer: MEDICARE

## 2019-09-18 DIAGNOSIS — M54.12 CERVICAL RADICULOPATHY: ICD-10-CM

## 2019-09-18 DIAGNOSIS — S46.811A TRAPEZIUS STRAIN, RIGHT, INITIAL ENCOUNTER: ICD-10-CM

## 2019-09-18 DIAGNOSIS — R20.2 PARESTHESIA OF RIGHT ARM: ICD-10-CM

## 2019-09-18 PROCEDURE — 97110 THERAPEUTIC EXERCISES: CPT

## 2019-09-18 PROCEDURE — 97140 MANUAL THERAPY 1/> REGIONS: CPT

## 2019-09-18 NOTE — PROGRESS NOTES
Diagnosis: Cervical radiculopathy (M54.12)  Paresthesia of right arm (R20.2)  Trapezius strain, right, initial encounter (C09.802O)    Date of Onset: 8/22/2019        Next MD visit: none scheduled  Fall Risk: standard         Precautions: n/a          Medi sets  - seated B shoulder row with [address] 10 x 2 sets   - seated B shoulder ER with [address] 10 x 2 sets   - B shoulder flexion, abduction AROM 10 x 2 - seated C-retraction 10 x   - seated C-extension 10 x  - supine B shoulder overhead flexion with tatiana 10 x 2  - s to [facility] to improve neck mobility. Plan: Continue PT.      Charges: EX 3, Manual PT 1       Total Timed Treatment: 55 min  Total Treatment Time: 55 min

## 2019-09-20 ENCOUNTER — APPOINTMENT (OUTPATIENT)
Dept: PHYSICAL THERAPY | Age: 24
End: 2019-09-20
Attending: FAMILY MEDICINE
Payer: MEDICARE

## 2019-09-25 ENCOUNTER — OFFICE VISIT (OUTPATIENT)
Dept: PHYSICAL THERAPY | Age: 24
End: 2019-09-25
Attending: FAMILY MEDICINE
Payer: MEDICARE

## 2019-09-25 DIAGNOSIS — M54.12 CERVICAL RADICULOPATHY: ICD-10-CM

## 2019-09-25 DIAGNOSIS — R20.2 PARESTHESIA OF RIGHT ARM: ICD-10-CM

## 2019-09-25 DIAGNOSIS — S46.811A TRAPEZIUS STRAIN, RIGHT, INITIAL ENCOUNTER: ICD-10-CM

## 2019-09-25 PROCEDURE — 97110 THERAPEUTIC EXERCISES: CPT

## 2019-09-25 PROCEDURE — 97140 MANUAL THERAPY 1/> REGIONS: CPT

## 2019-09-25 NOTE — PROGRESS NOTES
Diagnosis: Cervical radiculopathy (M54.12)  Paresthesia of right arm (R20.2)  Trapezius strain, right, initial encounter (U25.513Y)    Date of Onset: 8/22/2019        Next MD visit: none scheduled  Fall Risk: standard         Precautions: n/a          Medi supine B horizontal abduction AROM 10 x 2  - supine B shoulder flex OH with 1# DB 10 x 2 sets  - sidelying R shoulder external rotation with 1lb wt 10 x 3  - sidelying R horizontal abduction AROM 10 x 3 sets   - prone R shoulder extension 10 x 2 sets  - st min  Total Treatment Time: 55 min

## 2019-09-27 ENCOUNTER — APPOINTMENT (OUTPATIENT)
Dept: PHYSICAL THERAPY | Age: 24
End: 2019-09-27
Attending: FAMILY MEDICINE
Payer: MEDICARE

## 2019-09-30 ENCOUNTER — OFFICE VISIT (OUTPATIENT)
Dept: PHYSICAL THERAPY | Age: 24
End: 2019-09-30
Attending: FAMILY MEDICINE
Payer: MEDICARE

## 2019-09-30 DIAGNOSIS — S46.811A TRAPEZIUS STRAIN, RIGHT, INITIAL ENCOUNTER: ICD-10-CM

## 2019-09-30 DIAGNOSIS — M54.12 CERVICAL RADICULOPATHY: ICD-10-CM

## 2019-09-30 DIAGNOSIS — R20.2 PARESTHESIA OF RIGHT ARM: ICD-10-CM

## 2019-09-30 PROCEDURE — 97140 MANUAL THERAPY 1/> REGIONS: CPT | Performed by: PHYSICAL THERAPIST

## 2019-09-30 PROCEDURE — 97110 THERAPEUTIC EXERCISES: CPT | Performed by: PHYSICAL THERAPIST

## 2019-09-30 NOTE — PROGRESS NOTES
Diagnosis: Cervical radiculopathy (M54.12)  Paresthesia of right arm (R20.2)  Trapezius strain, right, initial encounter (S00.690I)    Date of Onset: 8/22/2019        Next MD visit: none scheduled  Fall Risk: standard         Precautions: n/a          Medi seated C-retraction 10 x 2 sets   - seated C-retraction with extension 10 x 1   - seated c-retraction with extension R/L wiggle x 10   - seated shoulder rolls x 20 each   - seated thoracic extension 10 x 2 sets   - supine neck retraction with OP with towel

## 2019-10-02 ENCOUNTER — OFFICE VISIT (OUTPATIENT)
Dept: PHYSICAL THERAPY | Age: 24
End: 2019-10-02
Attending: FAMILY MEDICINE
Payer: MEDICARE

## 2019-10-02 DIAGNOSIS — M54.12 CERVICAL RADICULOPATHY: ICD-10-CM

## 2019-10-02 DIAGNOSIS — S46.811A TRAPEZIUS STRAIN, RIGHT, INITIAL ENCOUNTER: ICD-10-CM

## 2019-10-02 DIAGNOSIS — R20.2 PARESTHESIA OF RIGHT ARM: ICD-10-CM

## 2019-10-02 PROCEDURE — 97140 MANUAL THERAPY 1/> REGIONS: CPT | Performed by: PHYSICAL THERAPIST

## 2019-10-02 PROCEDURE — 97110 THERAPEUTIC EXERCISES: CPT | Performed by: PHYSICAL THERAPIST

## 2019-10-02 NOTE — PROGRESS NOTES
Diagnosis: Cervical radiculopathy (M54.12)  Paresthesia of right arm (R20.2)  Trapezius strain, right, initial encounter (T77.864W)    Date of Onset: 8/22/2019        Next MD visit: none scheduled  Fall Risk: standard         Precautions: n/a          Medi horizontal abduction AROM 10 x 3 sets   - prone B shoulder extension 10 x 3 sets with 1# DBs  - prone B mid trap 10 x 3 sets   - standing B shoulder extension with [address] 10  x 3 sets   - standing B shoulder narrow row with [address] 10 x 3 sets   - standing R shou

## 2019-10-07 ENCOUNTER — OFFICE VISIT (OUTPATIENT)
Dept: PHYSICAL THERAPY | Age: 24
End: 2019-10-07
Attending: FAMILY MEDICINE
Payer: MEDICARE

## 2019-10-07 PROCEDURE — 97110 THERAPEUTIC EXERCISES: CPT

## 2019-10-07 PROCEDURE — 97140 MANUAL THERAPY 1/> REGIONS: CPT

## 2019-10-07 NOTE — PROGRESS NOTES
Diagnosis: Cervical radiculopathy (M54.12)  Paresthesia of right arm (R20.2)  Trapezius strain, right, initial encounter (Y45.554G)    Date of Onset: 8/22/2019        Next MD visit: none scheduled  Fall Risk: standard         Precautions: n/a          Medi abduction 10 x 2  - seated B shoulder extension with [address] 10  x 3 sets   - seated B shoulder narrow row with GSC 10 x 3 sets   - seated R shoulder ER with towel with RTB 10 x 3 sets  - standing R shoulder ER @ 90 degrees abduction with YTB 10 x 3 sets    x exercises. Therapy Goals      1. Patient will be independent with HEP, its progression, and management of residual symptoms. 2. Patient will report neck and L shoulder pain of not more than 1/10 during activity.   3. Increase L shoulder ROM to improve

## 2019-10-09 ENCOUNTER — TELEPHONE (OUTPATIENT)
Dept: PHYSICAL THERAPY | Age: 24
End: 2019-10-09

## 2019-10-14 ENCOUNTER — TELEPHONE (OUTPATIENT)
Dept: PHYSICAL THERAPY | Age: 24
End: 2019-10-14

## 2019-10-16 ENCOUNTER — OFFICE VISIT (OUTPATIENT)
Dept: PHYSICAL THERAPY | Age: 24
End: 2019-10-16
Attending: FAMILY MEDICINE
Payer: MEDICARE

## 2019-10-16 PROCEDURE — 97140 MANUAL THERAPY 1/> REGIONS: CPT | Performed by: PHYSICAL THERAPIST

## 2019-10-16 PROCEDURE — 97110 THERAPEUTIC EXERCISES: CPT | Performed by: PHYSICAL THERAPIST

## 2019-10-16 NOTE — PROGRESS NOTES
Diagnosis: Cervical radiculopathy (M54.12)  Paresthesia of right arm (R20.2)  Trapezius strain, right, initial encounter (V72.791I)    Date of Onset: 8/22/2019        Next MD visit: none scheduled  Fall Risk: standard         Precautions: n/a          Medi flexion with tatiana 10 x 2  - supine B shoulder horizontal abduction x 25  - manual PT  - repeat overhead flexion, horizontal abduction  10 x 2  - seated neck retraction   - seated neck rotation, lateral flexion  - scapula retraction 10 x 5 sec hold  - seated neck and L shoulder pain of not more than 1/10 during activity. 3. Increase L shoulder ROM to improve reaching ability. 4. Increase UE strength to at least grossly 4/5 to improve liting and carrying ability of L UE  5.  Increase cervical spine ROM to [facility]

## 2019-10-21 ENCOUNTER — OFFICE VISIT (OUTPATIENT)
Dept: PHYSICAL THERAPY | Age: 24
End: 2019-10-21
Attending: FAMILY MEDICINE
Payer: MEDICARE

## 2019-10-21 PROCEDURE — 97110 THERAPEUTIC EXERCISES: CPT

## 2019-10-21 NOTE — PROGRESS NOTES
Diagnosis: Cervical radiculopathy (M54.12)  Paresthesia of right arm (R20.2)  Trapezius strain, right, initial encounter (A29.074Q)    Date of Onset: 8/22/2019        Next MD visit: none scheduled  Fall Risk: standard         Precautions: n/a          Medi seated C-retraction with self OP 10 x 2   - seated C-retraction with extension 10 x 1   - supine C-retraction with self OP with towel roll under neck 5\" holds 10 x 2 sets  - supine B shoulder flexion OH x 20   - supine B shoulder horizontal abduction x 20 supine cervical mobilization into rotation and lateral flexion X 10 minutes  - Soft tissue mobilization over R SCM  - supine cervical mobilization into rotation and lateral flexion                                 Assessment: Increased focus on scapular str

## 2019-10-23 ENCOUNTER — OFFICE VISIT (OUTPATIENT)
Dept: PHYSICAL THERAPY | Age: 24
End: 2019-10-23
Attending: FAMILY MEDICINE
Payer: MEDICARE

## 2019-10-23 PROCEDURE — 97110 THERAPEUTIC EXERCISES: CPT

## 2019-10-23 NOTE — PROGRESS NOTES
Diagnosis: Cervical radiculopathy (M54.12)  Paresthesia of right arm (R20.2)  Trapezius strain, right, initial encounter (W21.845B)    Date of Onset: 8/22/2019        Next MD visit: none scheduled  Fall Risk: standard         Precautions: n/a          Medi sidelying R shoulder abduction 1# DB 10 x 3 sets each  - seated B shoulder rows with green sport cord 10 x 3  - seated B shoulder external rotation with red sport cord 10 x 3 sets  - seated R shoulder ER with RTB towel under elbow 10 x 3 sets  - seated C-r with 3# DBs 10 x 2   - supine B shoulder flex OH with 3# DB 10 x 2 sets  - sidelying R/L shoulder external rotation with 2 lb wt 10 x 3  - seated B shoulder abduction 10 x 2  - seated B shoulder extension with [address] 10  x 3 sets   - seated B shoulder narrow

## 2019-11-21 ENCOUNTER — OFFICE VISIT (OUTPATIENT)
Dept: NEUROSURGERY | Age: 24
End: 2019-11-21

## 2019-11-21 VITALS
HEIGHT: 64 IN | DIASTOLIC BLOOD PRESSURE: 85 MMHG | WEIGHT: 134.92 LBS | BODY MASS INDEX: 23.03 KG/M2 | SYSTOLIC BLOOD PRESSURE: 140 MMHG | HEART RATE: 94 BPM

## 2019-11-21 DIAGNOSIS — Q05.2: Primary | ICD-10-CM

## 2019-11-21 PROCEDURE — 99213 OFFICE O/P EST LOW 20 MIN: CPT | Performed by: NEUROLOGICAL SURGERY

## 2019-11-21 ASSESSMENT — ENCOUNTER SYMPTOMS
GASTROINTESTINAL NEGATIVE: 1
EYES NEGATIVE: 1
NEUROLOGICAL NEGATIVE: 1
RESPIRATORY NEGATIVE: 1
CONSTITUTIONAL NEGATIVE: 1

## 2019-12-04 DIAGNOSIS — N31.9 NEUROGENIC BLADDER: ICD-10-CM

## 2019-12-04 DIAGNOSIS — Q05.9 SPINA BIFIDA, UNSPECIFIED HYDROCEPHALUS PRESENCE, UNSPECIFIED SPINAL REGION (HCC): ICD-10-CM

## 2019-12-05 RX ORDER — OXYBUTYNIN CHLORIDE 15 MG/1
TABLET, EXTENDED RELEASE ORAL
Qty: 90 TABLET | Refills: 1 | Status: SHIPPED | OUTPATIENT
Start: 2019-12-05 | End: 2020-06-08

## 2019-12-06 NOTE — TELEPHONE ENCOUNTER
Refill passed per [facility] protocol.     Refill Protocol Appointment Criteria  · Appointment scheduled in the past 12 months or in the next 3 months  Recent Outpatient Visits            1 month ago     [facility]. in [place], [place]

## 2019-12-21 DIAGNOSIS — L21.9 SEBORRHEIC DERMATITIS: ICD-10-CM

## 2019-12-22 RX ORDER — KETOCONAZOLE 20 MG/ML
SHAMPOO TOPICAL
Qty: 120 ML | Refills: 0 | Status: SHIPPED | OUTPATIENT
Start: 2019-12-22 | End: 2020-03-01

## 2019-12-23 NOTE — TELEPHONE ENCOUNTER
Refilled times 1 but needs appointment before the next prescription will be filled. Please call patient and set up a physical exam.    He has Medicare and Medicaid. I do not know if he needs a Medicare physical or just a regular physical.  Please set up the appropriate 1.

## 2020-01-08 ENCOUNTER — HOSPITAL ENCOUNTER (OUTPATIENT)
Age: 25
Discharge: HOME | End: 2020-01-08
Attending: EMERGENCY MEDICINE
Payer: MEDICARE

## 2020-01-08 VITALS
DIASTOLIC BLOOD PRESSURE: 96 MMHG | TEMPERATURE: 98 F | HEART RATE: 79 BPM | BODY MASS INDEX: 22.2 KG/M2 | WEIGHT: 130 LBS | RESPIRATION RATE: 18 BRPM | HEIGHT: 64 IN | SYSTOLIC BLOOD PRESSURE: 156 MMHG | OXYGEN SATURATION: 96 %

## 2020-01-08 DIAGNOSIS — J06.9 UPPER RESPIRATORY TRACT INFECTION, UNSPECIFIED TYPE: Primary | ICD-10-CM

## 2020-01-08 DIAGNOSIS — H10.31 ACUTE CONJUNCTIVITIS OF RIGHT EYE, UNSPECIFIED ACUTE CONJUNCTIVITIS TYPE: ICD-10-CM

## 2020-01-08 PROCEDURE — 99212 OFFICE O/P EST SF 10 MIN: CPT

## 2020-01-22 NOTE — ED PROVIDER NOTES
Patient Seen in: [facility] In [address] [place] [address]      History   Patient presents with: Eye Visual Problem    Stated Complaint: rt eye redness swelling     HPI    Awoke with right eye pain and redness. No vision change. Some crusting.  Has ha Extraocular movements intact. Pupils: Pupils are equal, round, and reactive to light. Comments: Mild right  Conjunctival injection. Scant exudate. Neck:      Musculoskeletal: Normal range of motion.    Cardiovascular:      Rate and Rhythm: Norm

## 2020-01-28 ENCOUNTER — OFFICE VISIT (OUTPATIENT)
Dept: FAMILY MEDICINE CLINIC | Facility: CLINIC | Age: 25
End: 2020-01-28
Payer: MEDICARE

## 2020-01-28 VITALS
TEMPERATURE: 98 F | HEIGHT: 64 IN | WEIGHT: 132 LBS | DIASTOLIC BLOOD PRESSURE: 80 MMHG | BODY MASS INDEX: 22.53 KG/M2 | HEART RATE: 96 BPM | SYSTOLIC BLOOD PRESSURE: 142 MMHG

## 2020-01-28 DIAGNOSIS — Q05.9 SPINA BIFIDA, UNSPECIFIED HYDROCEPHALUS PRESENCE, UNSPECIFIED SPINAL REGION (HCC): ICD-10-CM

## 2020-01-28 DIAGNOSIS — N31.9 NEUROGENIC BLADDER: ICD-10-CM

## 2020-01-28 DIAGNOSIS — Z23 NEED FOR VACCINATION: ICD-10-CM

## 2020-01-28 DIAGNOSIS — N39.3 STRESS INCONTINENCE OF URINE: Primary | ICD-10-CM

## 2020-01-28 DIAGNOSIS — E55.9 VITAMIN D DEFICIENCY: ICD-10-CM

## 2020-01-28 PROCEDURE — G0463 HOSPITAL OUTPT CLINIC VISIT: HCPCS | Performed by: FAMILY MEDICINE

## 2020-01-28 PROCEDURE — 99214 OFFICE O/P EST MOD 30 MIN: CPT | Performed by: FAMILY MEDICINE

## 2020-01-28 PROCEDURE — G0008 ADMIN INFLUENZA VIRUS VAC: HCPCS | Performed by: FAMILY MEDICINE

## 2020-01-28 PROCEDURE — 90686 IIV4 VACC NO PRSV 0.5 ML IM: CPT | Performed by: FAMILY MEDICINE

## 2020-01-28 NOTE — PROGRESS NOTES
Patient ID: Pat Hutchison is a 21year old male. HPI  Patient presents with: Follow - Up    Last seen by me on 8/22/19. He was told by his doctor in [facility] that he will need bladder surgery.  He does not know the name of the surgery but states it will Negative for chest tightness and shortness of breath. Cardiovascular: Negative for chest pain. Gastrointestinal: Negative for abdominal pain. Genitourinary: Negative for hematuria. Skin: Negative for color change.    Neurological: Negative for spee 97.7 °F (36.5 °C), temperature source Oral, height 5' 4\" (1.626 m), weight 132 lb (59.9 kg).    01/28/20  1518 01/28/20  1528   BP: 157/89 142/80   Pulse: 102 96   Temp: 97.7 °F (36.5 °C)    TempSrc: Oral    Weight: 132 lb (59.9 kg)    Height: 5' 4\" (1.62

## 2020-01-28 NOTE — PATIENT INSTRUCTIONS
Ask your family who the doctor is that you saw downtown and then give them a call and have them fax the last progress note to [phone].

## 2020-02-27 DIAGNOSIS — L21.9 SEBORRHEIC DERMATITIS: ICD-10-CM

## 2020-02-28 NOTE — TELEPHONE ENCOUNTER
Review pended refill request as it does not fall under a protocol.     LOV:  1-    Last Rx: 12-      Recent Visits  Date Type Provider Dept   01/28/20 Office Visit Dede Farrell DO [facility]   08/22/19 Office Visit Dede Farrell DO

## 2020-03-01 RX ORDER — KETOCONAZOLE 20 MG/ML
SHAMPOO TOPICAL
Qty: 120 ML | Refills: 0 | Status: SHIPPED | OUTPATIENT
Start: 2020-03-01 | End: 2020-04-10

## 2020-04-10 ENCOUNTER — HOSPITAL ENCOUNTER (OUTPATIENT)
Dept: GENERAL RADIOLOGY | Age: 25
Discharge: HOME | End: 2020-04-10
Attending: FAMILY MEDICINE
Payer: MEDICARE

## 2020-04-10 ENCOUNTER — OFFICE VISIT (OUTPATIENT)
Dept: FAMILY MEDICINE CLINIC | Facility: CLINIC | Age: 25
End: 2020-04-10
Payer: MEDICARE

## 2020-04-10 ENCOUNTER — NURSE TRIAGE (OUTPATIENT)
Dept: FAMILY MEDICINE CLINIC | Facility: CLINIC | Age: 25
End: 2020-04-10

## 2020-04-10 VITALS
DIASTOLIC BLOOD PRESSURE: 91 MMHG | BODY MASS INDEX: 23 KG/M2 | HEIGHT: 64 IN | TEMPERATURE: 99 F | SYSTOLIC BLOOD PRESSURE: 142 MMHG | HEART RATE: 103 BPM

## 2020-04-10 DIAGNOSIS — L81.9 DISCOLORATION OF SKIN OF FOOT: ICD-10-CM

## 2020-04-10 DIAGNOSIS — M79.672 LEFT FOOT PAIN: ICD-10-CM

## 2020-04-10 DIAGNOSIS — L21.9 SEBORRHEIC DERMATITIS: ICD-10-CM

## 2020-04-10 DIAGNOSIS — Q05.9 SPINA BIFIDA, UNSPECIFIED HYDROCEPHALUS PRESENCE, UNSPECIFIED SPINAL REGION (HCC): ICD-10-CM

## 2020-04-10 DIAGNOSIS — M79.672 LEFT FOOT PAIN: Primary | ICD-10-CM

## 2020-04-10 PROCEDURE — 99214 OFFICE O/P EST MOD 30 MIN: CPT | Performed by: FAMILY MEDICINE

## 2020-04-10 PROCEDURE — 73630 X-RAY EXAM OF FOOT: CPT | Performed by: FAMILY MEDICINE

## 2020-04-10 RX ORDER — KETOCONAZOLE 20 MG/ML
SHAMPOO TOPICAL
Qty: 120 ML | Refills: 1 | Status: SHIPPED | OUTPATIENT
Start: 2020-04-10 | End: 2020-08-06

## 2020-04-10 NOTE — PROGRESS NOTES
Patient ID: Snehal Keating is a 31year old male. HPI  Patient presents with: Toe Pain: L     Telephone message from 8:58AM today 4/10/20:  SUMMARY: Per protocol disposition, ER should be advised.  Pt informed since purple/black are in between toes is so s She never received this note from Dr. Wilbur Fountain. She forgot to ask for the note during her visit with me yesterday.       Wt Readings from Last 6 Encounters:  01/28/20 : 132 lb (59.9 kg)  01/08/20 : 130 lb (59 kg)  08/22/19 : 130 lb 9.6 oz (59.2 kg)  08/20/19 : acetaminophen 500 MG Oral Tab   500 mg = 1 tab, Oral, Q4H, PRN pain moderate, Tab, Maintenance       Allergies:  Latex                   RASH     Physical Exam:       Physical Exam   Physical Exam   Constitutional: Patient is oriented to person, place, and by (CST): Vineet Andrew MD on 4/10/2020 at 11:45 AM              Discoloration of skin of foot  -     XR FOOT, COMPLETE (MIN 3 VIEWS), LEFT (CPT=73630);  Future  Due to the ecchymosis  Seborrheic dermatitis  -     Ketoconazole 2 % External Shampoo

## 2020-04-10 NOTE — TELEPHONE ENCOUNTER
Action Requested: Summary for Provider     []  Critical Lab, Recommendations Needed  [x] Need Additional Advice  []   FYI    []   Need Orders  [] Need Medications Sent to Pharmacy  []  Other     SUMMARY: Per protocol disposition, ER should be advised.  Pt i

## 2020-04-10 NOTE — TELEPHONE ENCOUNTER
Due to pt's hx pt should be having video or in person visit today for further evaluation-likely will need xray so in office may be preferred

## 2020-04-10 NOTE — TELEPHONE ENCOUNTER
This RN received response from Dr Zenaida Balderas instructing to schedule patient for office visit within the next hour. Pt contacted and scheduled for 9:40 am with Dr Zenaida Balderas today.

## 2020-05-05 ENCOUNTER — TELEPHONE (OUTPATIENT)
Dept: FAMILY MEDICINE CLINIC | Facility: CLINIC | Age: 25
End: 2020-05-05

## 2020-05-05 DIAGNOSIS — N31.9 NEUROGENIC BLADDER: ICD-10-CM

## 2020-05-05 DIAGNOSIS — Q05.9 SPINA BIFIDA, UNSPECIFIED HYDROCEPHALUS PRESENCE, UNSPECIFIED SPINAL REGION (HCC): Primary | ICD-10-CM

## 2020-05-05 NOTE — TELEPHONE ENCOUNTER
Pended referral for DME. Please review diagnosis and sign off if you agree.     Thank you,  [facility] [phone]

## 2020-05-12 NOTE — TELEPHONE ENCOUNTER
Spoke with the patient who wanted to check on the status of the DME referral. Message routed to the managed care department to provide the patient with an update.

## 2020-06-07 DIAGNOSIS — Q05.9 SPINA BIFIDA, UNSPECIFIED HYDROCEPHALUS PRESENCE, UNSPECIFIED SPINAL REGION (HCC): ICD-10-CM

## 2020-06-07 DIAGNOSIS — N31.9 NEUROGENIC BLADDER: ICD-10-CM

## 2020-06-08 RX ORDER — OXYBUTYNIN CHLORIDE 15 MG/1
TABLET, EXTENDED RELEASE ORAL
Qty: 90 TABLET | Refills: 0 | Status: SHIPPED | OUTPATIENT
Start: 2020-06-08 | End: 2020-08-27

## 2020-06-19 ENCOUNTER — OFFICE VISIT (OUTPATIENT)
Dept: FAMILY MEDICINE CLINIC | Facility: CLINIC | Age: 25
End: 2020-06-19
Payer: MEDICARE

## 2020-06-19 ENCOUNTER — NURSE TRIAGE (OUTPATIENT)
Dept: FAMILY MEDICINE CLINIC | Facility: CLINIC | Age: 25
End: 2020-06-19

## 2020-06-19 VITALS
WEIGHT: 129.19 LBS | HEIGHT: 64 IN | BODY MASS INDEX: 22.06 KG/M2 | SYSTOLIC BLOOD PRESSURE: 145 MMHG | HEART RATE: 97 BPM | TEMPERATURE: 99 F | DIASTOLIC BLOOD PRESSURE: 90 MMHG

## 2020-06-19 DIAGNOSIS — S31.41XA PERINEAL LACERATION INVOLVING SKIN, INITIAL ENCOUNTER: Primary | ICD-10-CM

## 2020-06-19 DIAGNOSIS — N31.9 NEUROGENIC BLADDER: ICD-10-CM

## 2020-06-19 DIAGNOSIS — Q05.9 SPINA BIFIDA, UNSPECIFIED HYDROCEPHALUS PRESENCE, UNSPECIFIED SPINAL REGION (HCC): ICD-10-CM

## 2020-06-19 PROCEDURE — 99214 OFFICE O/P EST MOD 30 MIN: CPT | Performed by: FAMILY MEDICINE

## 2020-06-19 RX ORDER — CEPHALEXIN 500 MG/1
500 CAPSULE ORAL 3 TIMES DAILY
Qty: 30 CAPSULE | Refills: 0 | Status: SHIPPED | OUTPATIENT
Start: 2020-06-19 | End: 2020-06-29

## 2020-06-19 NOTE — TELEPHONE ENCOUNTER
Action Requested: Summary for Provider     []  Critical Lab, Recommendations Needed  [] Need Additional Advice  []   FYI    []   Need Orders  [] Need Medications Sent to Pharmacy  []  Other     SUMMARY: Pt wanting to see only Dr Lorie Escalera.  Dr Lorie Escalera contacted

## 2020-06-19 NOTE — PATIENT INSTRUCTIONS
You may want to apply some gauze pads to this area just to keep it clean and avoid anything from getting inside the wound. Also keep it dry and after shower pat it dry with a towel. See the general surgeon.   If you start having odorous discharge coming f

## 2020-06-19 NOTE — PROGRESS NOTES
Patient ID: Ángela Elliott is a 31year old male. HPI  Patient presents with:  Cut: pt states he has a cut near groins    Pt noticed a cut in his groin that was bleeding minimally 2 days ago.  He states he showered this morning and did not have any bleeding History:   Procedure Laterality Date   • OTHER SURGICAL HISTORY  2010    bladder augmentation/catheterizable channel   • STRABISMUS SURGERY Left 1999 or 2000          Current Outpatient Medications   Medication Sig Dispense Refill   • OXYBUTYNIN CHLORIDE E unspecified spinal region [facility])  -     SURGERY - INTERNAL  -     cephALEXin (KEFLEX) 500 MG Oral Cap; Take 1 capsule (500 mg total) by mouth 3 (three) times daily for 10 days.  (Antibiotic)    Neurogenic bladder  -     SURGERY - INTERNAL  -     cephALEXin ( Caro, 6/19/2020, 10:14 AM.    I, Nitin Mejía DO,  personally performed the services described in this documentation. All medical record entries made by the scribe were at my direction and in my presence.   I have reviewed the chart and discharge ins

## 2020-06-25 ENCOUNTER — HOSPITAL ENCOUNTER (EMERGENCY)
Facility: HOSPITAL | Age: 25
Discharge: HOME | End: 2020-06-25
Attending: EMERGENCY MEDICINE
Payer: MEDICARE

## 2020-06-25 ENCOUNTER — TELEPHONE (OUTPATIENT)
Dept: FAMILY MEDICINE CLINIC | Facility: CLINIC | Age: 25
End: 2020-06-25

## 2020-06-25 ENCOUNTER — APPOINTMENT (OUTPATIENT)
Dept: CT IMAGING | Facility: HOSPITAL | Age: 25
End: 2020-06-25
Attending: EMERGENCY MEDICINE
Payer: MEDICARE

## 2020-06-25 VITALS
RESPIRATION RATE: 18 BRPM | OXYGEN SATURATION: 96 % | HEART RATE: 97 BPM | TEMPERATURE: 100 F | BODY MASS INDEX: 22.06 KG/M2 | WEIGHT: 129.19 LBS | DIASTOLIC BLOOD PRESSURE: 97 MMHG | HEIGHT: 64 IN | SYSTOLIC BLOOD PRESSURE: 144 MMHG

## 2020-06-25 DIAGNOSIS — L03.90 CELLULITIS, UNSPECIFIED CELLULITIS SITE: Primary | ICD-10-CM

## 2020-06-25 PROCEDURE — 80048 BASIC METABOLIC PNL TOTAL CA: CPT | Performed by: EMERGENCY MEDICINE

## 2020-06-25 PROCEDURE — 72193 CT PELVIS W/DYE: CPT | Performed by: EMERGENCY MEDICINE

## 2020-06-25 PROCEDURE — 99284 EMERGENCY DEPT VISIT MOD MDM: CPT

## 2020-06-25 PROCEDURE — 36415 COLL VENOUS BLD VENIPUNCTURE: CPT

## 2020-06-25 PROCEDURE — 85025 COMPLETE CBC W/AUTO DIFF WBC: CPT | Performed by: EMERGENCY MEDICINE

## 2020-06-25 RX ORDER — SULFAMETHOXAZOLE AND TRIMETHOPRIM 800; 160 MG/1; MG/1
1 TABLET ORAL 2 TIMES DAILY
Qty: 14 TABLET | Refills: 0 | Status: SHIPPED | OUTPATIENT
Start: 2020-06-25 | End: 2020-07-02 | Stop reason: ALTCHOICE

## 2020-06-25 NOTE — ED PROVIDER NOTES
Patient Seen in: [facility] Emergency Department    History   Patient presents with:  Laceration Abrasion    Stated Complaint: genital laceration + poss infection down leg    HPI    Patient is here with previous history of spina bifida who noticed ERGOCALCIFEROL, OR,  Take by mouth.    acetaminophen 500 MG Oral Tab,    500 mg = 1 tab, Oral, Q4H, PRN pain moderate, Tab, Maintenance         Review of Systems  Constitutional: no fever  Cardiovascular: no chest pain  Respiratory: no shortness of breath partner Dr. Fe Medina he states that he or Dr. Catherine Redd can see this patient on Monday. I recommended the patient to call today to change his appointment.   I recommended changing the antibiotic I discussed wound care as well we will replace and put a fresh band

## 2020-06-25 NOTE — TELEPHONE ENCOUNTER
Spoke with Al Jacobo RN at [id number]. Informed of message below. No availability today. First available appointment is Monday at [facility] June 29. Discussed case with Juan Rucker, will advise to ER for further evaluation.     Contacted patient and informed that we ar

## 2020-06-25 NOTE — TELEPHONE ENCOUNTER
Patient contacted office with a condition update. LOV 6-. Has a \"cut\" between left upper leg and testicle area. Patient started antibiotics 6-19-20. Does not recall how he cut himself.    Patient states he feels like his left upper leg is [employer]

## 2020-06-25 NOTE — ED INITIAL ASSESSMENT (HPI)
Pt wears AFO's, presents with genital laceration. Unknown etiology. Pain to left upper leg since last night. Denies injury.

## 2020-06-25 NOTE — TELEPHONE ENCOUNTER
Explained that Dr. Hi Mukherjee did not have any openings, and the earliest opening would be with Dr. Myesha Naqvi on Monday, June 29 at 3:00. Was notified that Dr. Jah Munguia would be asked and they would get back to me.   Received a call at approximately 4:15, the jamia

## 2020-06-25 NOTE — TELEPHONE ENCOUNTER
Contacted 's office, spoke with Tena Fontanez. No answer on Nurses line. Was advised to call [phone] for nurses station. No answer at this time.

## 2020-06-25 NOTE — TELEPHONE ENCOUNTER
Is there any way you can call and see if he can get into general surgery sooner like possibly today with any of the providers?

## 2020-06-26 NOTE — TELEPHONE ENCOUNTER
Pt went to ER. Clinical Impression:  Cellulitis, unspecified cellulitis site  (primary encounter diagnosis)     Disposition:  Discharge     Follow-up:  Jacques Sousa MD  [address]  [address]  [phone]          Monday

## 2020-06-29 ENCOUNTER — OFFICE VISIT (OUTPATIENT)
Dept: SURGERY | Facility: CLINIC | Age: 25
End: 2020-06-29
Payer: MEDICARE

## 2020-06-29 VITALS — BODY MASS INDEX: 22.02 KG/M2 | WEIGHT: 129 LBS | HEIGHT: 64 IN

## 2020-06-29 DIAGNOSIS — N36.0 PERINEAL FISTULA: Primary | ICD-10-CM

## 2020-06-29 PROCEDURE — 99204 OFFICE O/P NEW MOD 45 MIN: CPT | Performed by: SURGERY

## 2020-06-29 NOTE — H&P
History and Physical      HPI   Patient presents with:  Laceration: Patient is here to follow up for ED visit last week for laceration near his groin and swelling of L upper thigh.        HPI  Leighton Davidson is a 31year old male who presents with evidence of a Activity      Alcohol use: No      Drug use: No    Family History   Problem Relation Age of Onset   • No Known Problems Father    • No Known Problems Mother    • Diabetes Neg    • Glaucoma Neg        Review of Systems   A comprehensive 10 point review of s

## 2020-06-29 NOTE — H&P (VIEW-ONLY)
History and Physical      HPI   Patient presents with:  Laceration: Patient is here to follow up for ED visit last week for laceration near his groin and swelling of L upper thigh.        HPI  Navdeep Callaway is a 31year old male who presents with evidence of a Activity      Alcohol use: No      Drug use: No    Family History   Problem Relation Age of Onset   • No Known Problems Father    • No Known Problems Mother    • Diabetes Neg    • Glaucoma Neg        Review of Systems   A comprehensive 10 point review of s

## 2020-06-30 ENCOUNTER — TELEPHONE (OUTPATIENT)
Dept: SURGERY | Facility: CLINIC | Age: 25
End: 2020-06-30

## 2020-06-30 DIAGNOSIS — K60.5 ANORECTAL FISTULA: Primary | ICD-10-CM

## 2020-07-02 ENCOUNTER — TELEPHONE (OUTPATIENT)
Dept: FAMILY MEDICINE CLINIC | Facility: CLINIC | Age: 25
End: 2020-07-02

## 2020-07-02 NOTE — TELEPHONE ENCOUNTER
Patient is asking for a call back regarding the cut on his left leg he is not getting and answer form the Dr who was suppose to do surgery and he just trying to seeking some advise       Please advise   [phone]

## 2020-07-03 ENCOUNTER — APPOINTMENT (OUTPATIENT)
Dept: LAB | Facility: HOSPITAL | Age: 25
End: 2020-07-03
Attending: SURGERY
Payer: MEDICARE

## 2020-07-03 ENCOUNTER — VIRTUAL PHONE E/M (OUTPATIENT)
Dept: FAMILY MEDICINE CLINIC | Facility: CLINIC | Age: 25
End: 2020-07-03

## 2020-07-03 DIAGNOSIS — N36.0 PERINEAL FISTULA: Primary | ICD-10-CM

## 2020-07-03 DIAGNOSIS — N36.0 PERINEAL FISTULA: ICD-10-CM

## 2020-07-03 DIAGNOSIS — N31.9 NEUROGENIC BLADDER: ICD-10-CM

## 2020-07-03 DIAGNOSIS — Q05.9 SPINA BIFIDA, UNSPECIFIED HYDROCEPHALUS PRESENCE, UNSPECIFIED SPINAL REGION (HCC): ICD-10-CM

## 2020-07-03 PROBLEM — D69.6 THROMBOCYTOPENIA: Chronic | Status: ACTIVE | Noted: 2020-07-03

## 2020-07-03 PROBLEM — D69.6 THROMBOCYTOPENIA (HCC): Chronic | Status: ACTIVE | Noted: 2020-07-03

## 2020-07-03 LAB
ALBUMIN SERPL-MCNC: 4.4 G/DL (ref 3.4–5)
ALBUMIN/GLOB SERPL: 1.1 {RATIO} (ref 1–2)
ALP LIVER SERPL-CCNC: 73 U/L (ref 45–117)
ALT SERPL-CCNC: 29 U/L (ref 16–61)
ANION GAP SERPL CALC-SCNC: 7 MMOL/L (ref 0–18)
AST SERPL-CCNC: 16 U/L (ref 15–37)
BILIRUB SERPL-MCNC: 1 MG/DL (ref 0.1–2)
BUN BLD-MCNC: 11 MG/DL (ref 7–18)
BUN/CREAT SERPL: 11.3 (ref 10–20)
CALCIUM BLD-MCNC: 9.4 MG/DL (ref 8.5–10.1)
CHLORIDE SERPL-SCNC: 107 MMOL/L (ref 98–112)
CO2 SERPL-SCNC: 28 MMOL/L (ref 21–32)
CREAT BLD-MCNC: 0.97 MG/DL (ref 0.7–1.3)
GLOBULIN PLAS-MCNC: 4 G/DL (ref 2.8–4.4)
GLUCOSE BLD-MCNC: 95 MG/DL (ref 70–99)
M PROTEIN MFR SERPL ELPH: 8.4 G/DL (ref 6.4–8.2)
OSMOLALITY SERPL CALC.SUM OF ELEC: 293 MOSM/KG (ref 275–295)
PATIENT FASTING Y/N/NP: YES
POTASSIUM SERPL-SCNC: 3.8 MMOL/L (ref 3.5–5.1)
SODIUM SERPL-SCNC: 142 MMOL/L (ref 136–145)

## 2020-07-03 PROCEDURE — 80053 COMPREHEN METABOLIC PANEL: CPT

## 2020-07-03 PROCEDURE — 99442 PHONE E/M BY PHYS 11-20 MIN: CPT | Performed by: FAMILY MEDICINE

## 2020-07-03 PROCEDURE — 36415 COLL VENOUS BLD VENIPUNCTURE: CPT

## 2020-07-03 NOTE — PROGRESS NOTES
TELEPHONE VISIT PROGRESS NOTE  Todays date: 7/3/2020 6:07 PM        Most recent Nurse Triage message / [facility] [phone] message from patient:      Sue Barrett         Telephone Encounter   Signed   Encounter Date:  7/2/2020               Signed             Zachariah Blum This also goes for family members who would rather speak to me on behalf of their Romansh-speaking (or another foreign language) patient. The patient will give consent but I will be speaking to the person that would be translating.   Also some of these pat ? Other:        Treatments tried: nothing     Symptoms since onset: Improving []   Worsening  []   Unchanged  [x]     Waxing/Waning  []  N/A  []            Physical Exam:   Limited examination due to this being a telephone visit       Patient was speaking Medication Sig Dispense Refill   • OXYBUTYNIN CHLORIDE ER 15 MG Oral Tablet 24 Hr take 1 tablet once daily 90 tablet 0   • Ketoconazole 2 % External Shampoo You can apply this not only to the scalp 2 or 3 times a week in the shower but also to your beard a

## 2020-07-04 ENCOUNTER — LAB ENCOUNTER (OUTPATIENT)
Dept: LAB | Facility: HOSPITAL | Age: 25
End: 2020-07-04
Attending: SURGERY
Payer: MEDICARE

## 2020-07-04 DIAGNOSIS — Z01.818 PREOP TESTING: ICD-10-CM

## 2020-07-04 LAB — SARS-COV-2 RNA RESP QL NAA+PROBE: NOT DETECTED

## 2020-07-06 ENCOUNTER — HOSPITAL ENCOUNTER (OUTPATIENT)
Facility: HOSPITAL | Age: 25
Setting detail: HOSPITAL OUTPATIENT SURGERY
Discharge: HOME | End: 2020-07-06
Attending: SURGERY | Admitting: SURGERY
Payer: MEDICARE

## 2020-07-06 ENCOUNTER — ANESTHESIA (OUTPATIENT)
Dept: SURGERY | Facility: HOSPITAL | Age: 25
End: 2020-07-06
Payer: MEDICARE

## 2020-07-06 ENCOUNTER — ANESTHESIA EVENT (OUTPATIENT)
Dept: SURGERY | Facility: HOSPITAL | Age: 25
End: 2020-07-06
Payer: MEDICARE

## 2020-07-06 VITALS
OXYGEN SATURATION: 96 % | RESPIRATION RATE: 16 BRPM | WEIGHT: 126 LBS | HEART RATE: 91 BPM | DIASTOLIC BLOOD PRESSURE: 83 MMHG | HEIGHT: 64 IN | TEMPERATURE: 98 F | SYSTOLIC BLOOD PRESSURE: 133 MMHG | BODY MASS INDEX: 21.51 KG/M2

## 2020-07-06 DIAGNOSIS — K60.5 ANORECTAL FISTULA: ICD-10-CM

## 2020-07-06 DIAGNOSIS — Z01.818 PREOP TESTING: Primary | ICD-10-CM

## 2020-07-06 PROCEDURE — 0DBP3ZZ EXCISION OF RECTUM, PERCUTANEOUS APPROACH: ICD-10-PCS | Performed by: SURGERY

## 2020-07-06 PROCEDURE — 0DBQ3ZZ EXCISION OF ANUS, PERCUTANEOUS APPROACH: ICD-10-PCS | Performed by: SURGERY

## 2020-07-06 PROCEDURE — 46270 REMOVE ANAL FIST SUBQ: CPT | Performed by: SURGERY

## 2020-07-06 RX ORDER — HYDROMORPHONE HYDROCHLORIDE 1 MG/ML
0.4 INJECTION, SOLUTION INTRAMUSCULAR; INTRAVENOUS; SUBCUTANEOUS EVERY 5 MIN PRN
Status: DISCONTINUED | OUTPATIENT
Start: 2020-07-06 | End: 2020-07-06

## 2020-07-06 RX ORDER — MORPHINE SULFATE 4 MG/ML
2 INJECTION, SOLUTION INTRAMUSCULAR; INTRAVENOUS EVERY 10 MIN PRN
Status: DISCONTINUED | OUTPATIENT
Start: 2020-07-06 | End: 2020-07-06

## 2020-07-06 RX ORDER — HYDROMORPHONE HYDROCHLORIDE 1 MG/ML
0.6 INJECTION, SOLUTION INTRAMUSCULAR; INTRAVENOUS; SUBCUTANEOUS EVERY 5 MIN PRN
Status: DISCONTINUED | OUTPATIENT
Start: 2020-07-06 | End: 2020-07-06

## 2020-07-06 RX ORDER — SODIUM CHLORIDE, SODIUM LACTATE, POTASSIUM CHLORIDE, CALCIUM CHLORIDE 600; 310; 30; 20 MG/100ML; MG/100ML; MG/100ML; MG/100ML
INJECTION, SOLUTION INTRAVENOUS CONTINUOUS
Status: DISCONTINUED | OUTPATIENT
Start: 2020-07-06 | End: 2020-07-06

## 2020-07-06 RX ORDER — ONDANSETRON 2 MG/ML
INJECTION INTRAMUSCULAR; INTRAVENOUS AS NEEDED
Status: DISCONTINUED | OUTPATIENT
Start: 2020-07-06 | End: 2020-07-06 | Stop reason: SURG

## 2020-07-06 RX ORDER — DOCUSATE SODIUM 100 MG/1
100 CAPSULE, LIQUID FILLED ORAL 2 TIMES DAILY
Qty: 20 CAPSULE | Refills: 0 | Status: SHIPPED | OUTPATIENT
Start: 2020-07-06 | End: 2020-07-16

## 2020-07-06 RX ORDER — ACETAMINOPHEN 500 MG
1000 TABLET ORAL ONCE
Status: COMPLETED | OUTPATIENT
Start: 2020-07-06 | End: 2020-07-06

## 2020-07-06 RX ORDER — LIDOCAINE HYDROCHLORIDE 10 MG/ML
INJECTION, SOLUTION EPIDURAL; INFILTRATION; INTRACAUDAL; PERINEURAL AS NEEDED
Status: DISCONTINUED | OUTPATIENT
Start: 2020-07-06 | End: 2020-07-06 | Stop reason: SURG

## 2020-07-06 RX ORDER — MIDAZOLAM HYDROCHLORIDE 1 MG/ML
INJECTION INTRAMUSCULAR; INTRAVENOUS AS NEEDED
Status: DISCONTINUED | OUTPATIENT
Start: 2020-07-06 | End: 2020-07-06 | Stop reason: SURG

## 2020-07-06 RX ORDER — METOCLOPRAMIDE 10 MG/1
10 TABLET ORAL ONCE
Status: DISCONTINUED | OUTPATIENT
Start: 2020-07-06 | End: 2020-07-06 | Stop reason: HOSPADM

## 2020-07-06 RX ORDER — FAMOTIDINE 20 MG/1
20 TABLET ORAL ONCE
Status: DISCONTINUED | OUTPATIENT
Start: 2020-07-06 | End: 2020-07-06 | Stop reason: HOSPADM

## 2020-07-06 RX ORDER — MORPHINE SULFATE 4 MG/ML
4 INJECTION, SOLUTION INTRAMUSCULAR; INTRAVENOUS EVERY 10 MIN PRN
Status: DISCONTINUED | OUTPATIENT
Start: 2020-07-06 | End: 2020-07-06

## 2020-07-06 RX ORDER — MORPHINE SULFATE 10 MG/ML
6 INJECTION, SOLUTION INTRAMUSCULAR; INTRAVENOUS EVERY 10 MIN PRN
Status: DISCONTINUED | OUTPATIENT
Start: 2020-07-06 | End: 2020-07-06

## 2020-07-06 RX ORDER — PROCHLORPERAZINE EDISYLATE 5 MG/ML
5 INJECTION INTRAMUSCULAR; INTRAVENOUS ONCE AS NEEDED
Status: DISCONTINUED | OUTPATIENT
Start: 2020-07-06 | End: 2020-07-06

## 2020-07-06 RX ORDER — METRONIDAZOLE 500 MG/100ML
500 INJECTION, SOLUTION INTRAVENOUS ONCE
Status: COMPLETED | OUTPATIENT
Start: 2020-07-06 | End: 2020-07-06

## 2020-07-06 RX ORDER — HYDROMORPHONE HYDROCHLORIDE 1 MG/ML
0.2 INJECTION, SOLUTION INTRAMUSCULAR; INTRAVENOUS; SUBCUTANEOUS EVERY 5 MIN PRN
Status: DISCONTINUED | OUTPATIENT
Start: 2020-07-06 | End: 2020-07-06

## 2020-07-06 RX ORDER — HYDROCODONE BITARTRATE AND ACETAMINOPHEN 5; 325 MG/1; MG/1
2 TABLET ORAL AS NEEDED
Status: DISCONTINUED | OUTPATIENT
Start: 2020-07-06 | End: 2020-07-06

## 2020-07-06 RX ORDER — DEXAMETHASONE SODIUM PHOSPHATE 4 MG/ML
VIAL (ML) INJECTION AS NEEDED
Status: DISCONTINUED | OUTPATIENT
Start: 2020-07-06 | End: 2020-07-06 | Stop reason: SURG

## 2020-07-06 RX ORDER — BUPIVACAINE HYDROCHLORIDE AND EPINEPHRINE 2.5; 5 MG/ML; UG/ML
INJECTION, SOLUTION INFILTRATION; PERINEURAL AS NEEDED
Status: DISCONTINUED | OUTPATIENT
Start: 2020-07-06 | End: 2020-07-06 | Stop reason: HOSPADM

## 2020-07-06 RX ORDER — ONDANSETRON 2 MG/ML
4 INJECTION INTRAMUSCULAR; INTRAVENOUS ONCE AS NEEDED
Status: DISCONTINUED | OUTPATIENT
Start: 2020-07-06 | End: 2020-07-06

## 2020-07-06 RX ORDER — CEFAZOLIN SODIUM/WATER 2 G/20 ML
2 SYRINGE (ML) INTRAVENOUS ONCE
Status: COMPLETED | OUTPATIENT
Start: 2020-07-06 | End: 2020-07-06

## 2020-07-06 RX ORDER — HYDROCODONE BITARTRATE AND ACETAMINOPHEN 5; 325 MG/1; MG/1
1 TABLET ORAL EVERY 6 HOURS PRN
Qty: 20 TABLET | Refills: 0 | Status: SHIPPED | OUTPATIENT
Start: 2020-07-06 | End: 2020-07-16

## 2020-07-06 RX ORDER — NALOXONE HYDROCHLORIDE 0.4 MG/ML
80 INJECTION, SOLUTION INTRAMUSCULAR; INTRAVENOUS; SUBCUTANEOUS AS NEEDED
Status: DISCONTINUED | OUTPATIENT
Start: 2020-07-06 | End: 2020-07-06

## 2020-07-06 RX ORDER — HALOPERIDOL 5 MG/ML
0.25 INJECTION INTRAMUSCULAR ONCE AS NEEDED
Status: DISCONTINUED | OUTPATIENT
Start: 2020-07-06 | End: 2020-07-06

## 2020-07-06 RX ORDER — HYDROCODONE BITARTRATE AND ACETAMINOPHEN 5; 325 MG/1; MG/1
1 TABLET ORAL AS NEEDED
Status: DISCONTINUED | OUTPATIENT
Start: 2020-07-06 | End: 2020-07-06

## 2020-07-06 RX ADMIN — MIDAZOLAM HYDROCHLORIDE 2 MG: 1 INJECTION INTRAMUSCULAR; INTRAVENOUS at 07:44:00

## 2020-07-06 RX ADMIN — METRONIDAZOLE 500 MG: 500 INJECTION, SOLUTION INTRAVENOUS at 07:52:00

## 2020-07-06 RX ADMIN — ONDANSETRON 4 MG: 2 INJECTION INTRAMUSCULAR; INTRAVENOUS at 07:44:00

## 2020-07-06 RX ADMIN — LIDOCAINE HYDROCHLORIDE 50 MG: 10 INJECTION, SOLUTION EPIDURAL; INFILTRATION; INTRACAUDAL; PERINEURAL at 07:44:00

## 2020-07-06 RX ADMIN — SODIUM CHLORIDE, SODIUM LACTATE, POTASSIUM CHLORIDE, CALCIUM CHLORIDE: 600; 310; 30; 20 INJECTION, SOLUTION INTRAVENOUS at 08:08:00

## 2020-07-06 RX ADMIN — SODIUM CHLORIDE, SODIUM LACTATE, POTASSIUM CHLORIDE, CALCIUM CHLORIDE: 600; 310; 30; 20 INJECTION, SOLUTION INTRAVENOUS at 07:40:00

## 2020-07-06 RX ADMIN — DEXAMETHASONE SODIUM PHOSPHATE 4 MG: 4 MG/ML VIAL (ML) INJECTION at 07:44:00

## 2020-07-06 RX ADMIN — CEFAZOLIN SODIUM/WATER 2 G: 2 G/20 ML SYRINGE (ML) INTRAVENOUS at 07:48:00

## 2020-07-06 NOTE — OPERATIVE REPORT
[facility]    PATIENT'S NAME: Ted Main PHYSICIAN: Codey Salamanca MD   OPERATING PHYSICIAN: Codey Salamanca MD   PATIENT ACCOUNT#:   [id number]    LOCATION:  [facility] [address] [place] [address]  MEDICAL RECORD #:   [id number]       DATE OF BIRTH:

## 2020-07-06 NOTE — ANESTHESIA PROCEDURE NOTES
Airway  Urgency: Elective      General Information and Staff    Patient location during procedure: OR  Anesthesiologist: Roderick Jung MD  Performed: anesthesiologist     Indications and Patient Condition  Indications for airway management: anesthesia  Se

## 2020-07-06 NOTE — INTERVAL H&P NOTE
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   I discussed wit

## 2020-07-06 NOTE — ANESTHESIA POSTPROCEDURE EVALUATION
Patient: Micah Sherman    Procedure Summary     Date:  07/06/20 Room / Location:  [address] [place] [address] MAIN OR 11 / [address] [place] [address] MAIN OR    Anesthesia Start:  6541 Anesthesia Stop:      Procedure:  RECTAL FISSURECTOMY OR FISTULECTOMY (N/A Anus) Diagnosis:       Anorectal fistula

## 2020-07-06 NOTE — ANESTHESIA PREPROCEDURE EVALUATION
Anesthesia PreOp Note    HPI:     Yesenia Toth is a 31year old male who presents for preoperative consultation requested by: Arabella Manning MD    Date of Surgery: 7/6/2020    Procedure(s):  RECTAL FISSURECTOMY OR FISTULECTOMY  Indication: Anorectal fistula 1994   • STRABISMUS SURGERY Left 1999 or 2000       OXYBUTYNIN CHLORIDE ER 15 MG Oral Tablet 24 Hr, take 1 tablet once daily, Disp: 90 tablet, Rfl: 0, 7/6/2020 at 0445  Ketoconazole 2 % External Shampoo, You can apply this not only to the scalp 2 or 3 time Alcohol use: Yes        Frequency: Monthly or less      Drug use: No      Sexual activity: Not on file    Lifestyle      Physical activity:        Days per week: Not on file        Minutes per session: Not on file      Stress: Not on file    Relationships GLU 95 07/03/2020    CA 9.4 07/03/2020          Vital Signs: Body mass index is 21.63 kg/m². height is 1.626 m (5' 4\") and weight is 57.2 kg (126 lb). His oral temperature is 98.7 °F (37.1 °C). His blood pressure is 146/84 and his pulse is 101.  His

## 2020-07-07 ENCOUNTER — TELEPHONE (OUTPATIENT)
Dept: FAMILY MEDICINE CLINIC | Facility: CLINIC | Age: 25
End: 2020-07-07

## 2020-07-07 ENCOUNTER — TELEPHONE (OUTPATIENT)
Dept: SURGERY | Facility: CLINIC | Age: 25
End: 2020-07-07

## 2020-07-07 NOTE — TELEPHONE ENCOUNTER
Went over the discharge instructions with patient with verbal understanding received.         Malcolm Lopes MD      Pull packing out and replace with new piece as written in dc instructions

## 2020-07-07 NOTE — TELEPHONE ENCOUNTER
Patient calling reports had surgery on his leg yesterday  ( fistula ) and is home recovering     Wants to express his thanks  to Dr. Gorge Cottrell  for his care     Has updates on pt condition and pt will drop off papers in regards to the surgery     Asking if ne

## 2020-07-10 ENCOUNTER — TELEPHONE (OUTPATIENT)
Dept: SURGERY | Facility: CLINIC | Age: 25
End: 2020-07-10

## 2020-07-10 NOTE — TELEPHONE ENCOUNTER
Patient had surgery this past Monday, and has been bleeding on surgery sight, please call at:[phone],thanks.

## 2020-07-10 NOTE — TELEPHONE ENCOUNTER
Spoke to Radha. He states that after walking with his walker there was some blood coming from his surgical site, which is decreasing today. No fever/chills, but some constipation.  I recommended that he continue stool softeners 2X daily, and he can drink pru

## 2020-07-13 ENCOUNTER — OFFICE VISIT (OUTPATIENT)
Dept: SURGERY | Facility: CLINIC | Age: 25
End: 2020-07-13
Payer: MEDICARE

## 2020-07-13 DIAGNOSIS — T14.8XXA OPEN WOUND: Primary | ICD-10-CM

## 2020-07-13 PROBLEM — K59.00 CONSTIPATION: Status: ACTIVE | Noted: 2020-07-13

## 2020-07-13 PROCEDURE — 99024 POSTOP FOLLOW-UP VISIT: CPT | Performed by: SURGERY

## 2020-07-13 NOTE — TELEPHONE ENCOUNTER
Pt has an appt today states his stitches opened over the weekend and that he hasn't had a bowel movement at all since surgery on last Monday please advise

## 2020-07-13 NOTE — PATIENT INSTRUCTIONS
Change packing once a day to the wound. Okay to shower or sit in a warm bath.   Take MiraLAX for constipation and stop the Norco.  Take ibuprofen or Tylenol for pain    Follow-up 2 weeks

## 2020-07-13 NOTE — PROGRESS NOTES
19 [facility] SURGERY    Progress Note    Navdeep Callaway Patient Status:  Outpatient    3/18/1995 MRN [id number]   Location [address] [place] [address] Attending No att. providers found   Hosp Day # 0 PCP Gal Serra DO

## 2020-07-13 NOTE — TELEPHONE ENCOUNTER
Informed patient to cont. To pack area and MD will access wound this afternoon. Pt instructed to cont. Prune juice and stool softners per MD.  Patient verbalized instructions.

## 2020-07-16 ENCOUNTER — OFFICE VISIT (OUTPATIENT)
Dept: FAMILY MEDICINE CLINIC | Facility: CLINIC | Age: 25
End: 2020-07-16
Payer: MEDICARE

## 2020-07-16 ENCOUNTER — TELEPHONE (OUTPATIENT)
Dept: FAMILY MEDICINE CLINIC | Facility: CLINIC | Age: 25
End: 2020-07-16

## 2020-07-16 VITALS
SYSTOLIC BLOOD PRESSURE: 136 MMHG | BODY MASS INDEX: 22 KG/M2 | HEART RATE: 91 BPM | TEMPERATURE: 98 F | DIASTOLIC BLOOD PRESSURE: 87 MMHG | HEIGHT: 64 IN

## 2020-07-16 DIAGNOSIS — Q05.9 SPINA BIFIDA, UNSPECIFIED HYDROCEPHALUS PRESENCE, UNSPECIFIED SPINAL REGION (HCC): ICD-10-CM

## 2020-07-16 DIAGNOSIS — N36.0 PERINEAL FISTULA: Primary | ICD-10-CM

## 2020-07-16 DIAGNOSIS — K59.09 OTHER CONSTIPATION: ICD-10-CM

## 2020-07-16 DIAGNOSIS — N31.9 NEUROGENIC BLADDER: ICD-10-CM

## 2020-07-16 PROBLEM — D69.6 THROMBOCYTOPENIA (HCC): Chronic | Status: RESOLVED | Noted: 2020-07-03 | Resolved: 2020-07-16

## 2020-07-16 PROBLEM — D69.6 THROMBOCYTOPENIA: Chronic | Status: RESOLVED | Noted: 2020-07-03 | Resolved: 2020-07-16

## 2020-07-16 PROCEDURE — 3075F SYST BP GE 130 - 139MM HG: CPT | Performed by: FAMILY MEDICINE

## 2020-07-16 PROCEDURE — 3008F BODY MASS INDEX DOCD: CPT | Performed by: FAMILY MEDICINE

## 2020-07-16 PROCEDURE — 99214 OFFICE O/P EST MOD 30 MIN: CPT | Performed by: FAMILY MEDICINE

## 2020-07-16 PROCEDURE — 3079F DIAST BP 80-89 MM HG: CPT | Performed by: FAMILY MEDICINE

## 2020-07-16 RX ORDER — SENNOSIDES 8.6 MG
8.6 TABLET ORAL 2 TIMES DAILY
Qty: 20 TABLET | Refills: 0 | Status: SHIPPED | OUTPATIENT
Start: 2020-07-16 | End: 2021-01-18

## 2020-07-16 RX ORDER — IBUPROFEN 600 MG/1
600 TABLET ORAL EVERY 6 HOURS PRN
COMMUNITY
End: 2021-06-22

## 2020-07-16 RX ORDER — POLYETHYLENE GLYCOL 3350 17 G/17G
17 POWDER, FOR SOLUTION ORAL DAILY
COMMUNITY
End: 2021-06-22

## 2020-07-16 NOTE — PROGRESS NOTES
Patient ID: Santos Calderón is a 31year old male. HPI  Patient presents with:  Surgical Followup    Present today with his mother. Pt is present today for a post-surgical follow up. He had a perineal fistula excision on 7/6/20 per Dr. Martha Booker.  The surger 22.18 kg/m²  04/10/20 : 22.66 kg/m²      BP Readings from Last 6 Encounters:  07/16/20 : 136/87  07/06/20 : 133/83  06/25/20 : (!) 144/97  06/19/20 : 145/90  04/10/20 : (!) 142/91  01/28/20 : 142/80        Review of Systems   Constitutional: Negative for c RASH     Physical Exam:       Physical Exam   Physical Exam   Constitutional: Patient is oriented to person, place, and time. Patient appears well-developed and well-nourished. No distress. Using a wheelchair. Head: Normocephalic.    Eyes: Conjunctivae now send this request to for the catheter, syringes, tubing etc?  Please see the other referrals that we have done in the past as he needs his supplies.           Referral Priority:Routine          Referral Type:[facility]          Requested Specialty:Durable Me

## 2020-07-16 NOTE — TELEPHONE ENCOUNTER
Kathy from [employer] stated  Senna 8.6 MG Oral Tab 20 tablet 0 7/16/2020     Sig - Route: Take 1 tablet (8.6 mg total) by mouth 2 (two) times daily. To help soften the stool. - Oral          Is not covered, but the Senna S is covered. Thank you.

## 2020-07-16 NOTE — TELEPHONE ENCOUNTER
Routed to Dr Reji Snell for advise, thanks.       Future Appointments   Date Time Provider Arti Kong   7/27/2020  3:00 PM Jd Taylor  [facility]   8/6/2020 10:15 AM Nitin Mejía DO [facility]

## 2020-07-17 ENCOUNTER — TELEPHONE (OUTPATIENT)
Dept: FAMILY MEDICINE CLINIC | Facility: CLINIC | Age: 25
End: 2020-07-17

## 2020-07-17 RX ORDER — AMOXICILLIN 250 MG
1 CAPSULE ORAL DAILY
Qty: 20 TABLET | Refills: 0 | Status: SHIPPED | OUTPATIENT
Start: 2020-07-17 | End: 2020-07-17

## 2020-07-17 NOTE — TELEPHONE ENCOUNTER
Sent Mychart message to mom to forward her paperwork to the Forms dept and that the HIPAA packet will need to be signed by her son.

## 2020-07-22 ENCOUNTER — TELEPHONE (OUTPATIENT)
Dept: SURGERY | Facility: CLINIC | Age: 25
End: 2020-07-22

## 2020-07-22 NOTE — TELEPHONE ENCOUNTER
Per pt he is running out of medication that he packs wound with, pt requesting to speak to RN. Please call thank you.

## 2020-07-22 NOTE — TELEPHONE ENCOUNTER
Spoke with patient, advising him he may not need packing after he is done with that bottle. If he does, then he will be provided with the open bottle of packing gauze at his Monday appointment.

## 2020-07-24 ENCOUNTER — TELEPHONE (OUTPATIENT)
Dept: SURGERY | Facility: CLINIC | Age: 25
End: 2020-07-24

## 2020-07-24 NOTE — TELEPHONE ENCOUNTER
Returned call. Patient has follow up appointment on Monday 7-. Patient has ran out of dressing and unable to buy it over the counter. (Per patient)     I will have some ready for patient to  at the  at [facility]. Patient agreed to the plan to pick uip and will follow up with GL Monday 7-27-20.

## 2020-07-27 ENCOUNTER — OFFICE VISIT (OUTPATIENT)
Dept: SURGERY | Facility: CLINIC | Age: 25
End: 2020-07-27
Payer: MEDICARE

## 2020-07-27 VITALS
TEMPERATURE: 98 F | WEIGHT: 128 LBS | SYSTOLIC BLOOD PRESSURE: 132 MMHG | DIASTOLIC BLOOD PRESSURE: 78 MMHG | HEIGHT: 64 IN | BODY MASS INDEX: 21.85 KG/M2 | HEART RATE: 86 BPM

## 2020-07-27 DIAGNOSIS — T14.8XXA OPEN WOUND: Primary | ICD-10-CM

## 2020-07-27 PROCEDURE — 3078F DIAST BP <80 MM HG: CPT | Performed by: SURGERY

## 2020-07-27 PROCEDURE — 99024 POSTOP FOLLOW-UP VISIT: CPT | Performed by: SURGERY

## 2020-07-27 PROCEDURE — 3008F BODY MASS INDEX DOCD: CPT | Performed by: SURGERY

## 2020-07-27 PROCEDURE — 3075F SYST BP GE 130 - 139MM HG: CPT | Performed by: SURGERY

## 2020-07-27 NOTE — PATIENT INSTRUCTIONS
Continue dressing changes daily. Apply bacitracin ointment or Vaseline to the wound if you notice it is dry.   2 weeks

## 2020-07-27 NOTE — PROGRESS NOTES
19 [facility] SURGERY    Progress Note    Mel Gunnar Patient Status:  Outpatient    3/18/1995 MRN [id number]   Location [address] [facility] Attending No att. providers found   Hosp Day # 0 PCP Ale Dejesus DO

## 2020-08-06 ENCOUNTER — OFFICE VISIT (OUTPATIENT)
Dept: FAMILY MEDICINE CLINIC | Facility: CLINIC | Age: 25
End: 2020-08-06
Payer: MEDICARE

## 2020-08-06 VITALS
HEIGHT: 64 IN | TEMPERATURE: 98 F | HEART RATE: 97 BPM | SYSTOLIC BLOOD PRESSURE: 126 MMHG | BODY MASS INDEX: 21.85 KG/M2 | WEIGHT: 128 LBS | DIASTOLIC BLOOD PRESSURE: 78 MMHG

## 2020-08-06 DIAGNOSIS — N36.0 PERINEAL FISTULA: Primary | ICD-10-CM

## 2020-08-06 DIAGNOSIS — Q05.9 SPINA BIFIDA, UNSPECIFIED HYDROCEPHALUS PRESENCE, UNSPECIFIED SPINAL REGION (HCC): ICD-10-CM

## 2020-08-06 DIAGNOSIS — L21.9 SEBORRHEIC DERMATITIS: ICD-10-CM

## 2020-08-06 DIAGNOSIS — N31.9 NEUROGENIC BLADDER: ICD-10-CM

## 2020-08-06 PROCEDURE — 3078F DIAST BP <80 MM HG: CPT | Performed by: FAMILY MEDICINE

## 2020-08-06 PROCEDURE — 99214 OFFICE O/P EST MOD 30 MIN: CPT | Performed by: FAMILY MEDICINE

## 2020-08-06 PROCEDURE — 3008F BODY MASS INDEX DOCD: CPT | Performed by: FAMILY MEDICINE

## 2020-08-06 PROCEDURE — 3074F SYST BP LT 130 MM HG: CPT | Performed by: FAMILY MEDICINE

## 2020-08-06 RX ORDER — KETOCONAZOLE 20 MG/ML
SHAMPOO TOPICAL
Qty: 120 ML | Refills: 1 | Status: SHIPPED | OUTPATIENT
Start: 2020-08-06 | End: 2020-12-15

## 2020-08-06 NOTE — PROGRESS NOTES
Patient ID: Matty Padilla is a 31year old male. HPI  Patient presents with:  Surgical Followup    Last seen by me on 7/16/2020. Pt is present today for a post-surgical follow up. He had a perineal fistula excision on 7/6/20 per Dr. Tamy Esparza.  During his v Readings from Last 6 Encounters:  08/06/20 : 21.97 kg/m²  07/27/20 : 21.97 kg/m²  07/16/20 : 21.63 kg/m²  07/06/20 : 21.63 kg/m²  06/29/20 : 22.14 kg/m²  06/25/20 : 22.18 kg/m²      BP Readings from Last 6 Encounters:  08/06/20 : 126/78  07/27/20 : 132/78 Ketoconazole 2 % External Shampoo You can apply this not only to the scalp 2 or 3 times a week in the shower but also to your beard area and then also the chest where the rash is. 120 mL 1   • VITAMIN D, ERGOCALCIFEROL, OR Take by mouth daily.          [facility] your beard area and then also the chest where the rash is. Can also use on the face. -     DERM - INTERNAL  He did not realize he to use the ketoconazole shampoo that he is using on his scalp and chest for the seborrheic dermatitis on his face.   If he do

## 2020-08-10 ENCOUNTER — OFFICE VISIT (OUTPATIENT)
Dept: SURGERY | Facility: CLINIC | Age: 25
End: 2020-08-10
Payer: MEDICARE

## 2020-08-10 DIAGNOSIS — T14.8XXA OPEN WOUND: Primary | ICD-10-CM

## 2020-08-10 PROCEDURE — 17250 CHEM CAUT OF GRANLTJ TISSUE: CPT | Performed by: SURGERY

## 2020-08-10 PROCEDURE — 99024 POSTOP FOLLOW-UP VISIT: CPT | Performed by: SURGERY

## 2020-08-10 NOTE — PROGRESS NOTES
[facility] SURGERY    Progress Note    Marvia Felty Patient Status:  Outpatient    3/18/1995 MRN [id number]   Location [address] [facility] Attending No att. providers found   Hosp Day # 0 PCP Nena Schumacher DO

## 2020-08-24 ENCOUNTER — TELEPHONE (OUTPATIENT)
Dept: FAMILY MEDICINE CLINIC | Facility: CLINIC | Age: 25
End: 2020-08-24

## 2020-08-24 NOTE — TELEPHONE ENCOUNTER
Patient calling on the status of his referral for DME for catheter supplies for his spina bifida (dated 7/16/20). Patient indicates the company who used to supply the equipment no longer accepts his insurance.  He is calling on status of referral.

## 2020-08-26 DIAGNOSIS — N31.9 NEUROGENIC BLADDER: ICD-10-CM

## 2020-08-26 DIAGNOSIS — Q05.9 SPINA BIFIDA, UNSPECIFIED HYDROCEPHALUS PRESENCE, UNSPECIFIED SPINAL REGION (HCC): ICD-10-CM

## 2020-08-27 RX ORDER — OXYBUTYNIN CHLORIDE 15 MG/1
TABLET, EXTENDED RELEASE ORAL
Qty: 90 TABLET | Refills: 0 | Status: SHIPPED | OUTPATIENT
Start: 2020-08-27 | End: 2020-12-01

## 2020-08-31 ENCOUNTER — TELEPHONE (OUTPATIENT)
Dept: FAMILY MEDICINE CLINIC | Facility: CLINIC | Age: 25
End: 2020-08-31

## 2020-08-31 NOTE — TELEPHONE ENCOUNTER
Patient is asking for recommendations for a different company who sends out medical supplies. He is having difficulty with getting catheters and other equipment, the current company does not accept current insurance.   Instructed him to contact his dafnee

## 2020-09-17 NOTE — TELEPHONE ENCOUNTER
Spoke with patient and advised need to contact [facility] for participating providers. Patient voiced understanding.

## 2020-09-18 ENCOUNTER — NURSE TRIAGE (OUTPATIENT)
Dept: FAMILY MEDICINE CLINIC | Facility: CLINIC | Age: 25
End: 2020-09-18

## 2020-09-18 NOTE — TELEPHONE ENCOUNTER
Spoke with pt,  verified  Pt informed of  MD recommendation, pt stated understanding. Pt requested appt on Monday as he is off work, appt made.        FYI      Future Appointments   Date Time Provider Arti oKng   2020 11:00 AM Steph Trinidad

## 2020-09-18 NOTE — TELEPHONE ENCOUNTER
Okay to give appointment with PCP for next week. Symptoms could be related to a panic attack or anxiety. Please advise patient that if his symptoms worsen or starts to develop new associated symptoms he should go to the emergency room for evaluation.

## 2020-09-18 NOTE — TELEPHONE ENCOUNTER
for . Pt stated that he has been feeling chest pressure on/off this week. Also he feels if he talks a lot he feels some SOB and has to take a deep breath. Pt stated that it does not happen all the time.  Pt denied having a cough,fever or ch

## 2020-09-21 NOTE — PATIENT INSTRUCTIONS
Try over-the-counter melatonin at 5 mg every night. This should help regulate your sleep. If after 1 week you are still having trouble sleeping start taking 2 at the same time at nighttime.
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
## 2020-10-27 ENCOUNTER — TELEPHONE (OUTPATIENT)
Dept: FAMILY MEDICINE CLINIC | Facility: CLINIC | Age: 25
End: 2020-10-27

## 2020-10-27 NOTE — TELEPHONE ENCOUNTER
Spoke with patient ( verified)--following up on supplies ordered by Dr. Cordelia Soni at last office visit.     Please see DME order placed at 2020 office visit    Routed to triage support for assist

## 2020-10-28 NOTE — TELEPHONE ENCOUNTER
I called [address] [place] [address] at [phone] to see fit they received the referral that was placed in 9-21-20. They state they can't locate the patient. I called and spoke to [facility] to see if he's heard from [address] [place] [address] and he states he hasn't.  I stated to him that it doesn

## 2020-11-13 NOTE — TELEPHONE ENCOUNTER
Patient calling again regarding receipt of his DME. Per message below, referral sent to [employer] [facility] [place] on 9/21/20 and again 10/27/20. Phoned [facility] today and they indicated they did not have patient in their system and that they do not carry catheter supplies.     Patric Melissa

## 2020-11-13 NOTE — TELEPHONE ENCOUNTER
Left message for patient to call us back.     [facility] doesn't supply catheters so he will need to let us know what other supplier his insurance accepts so that we can send them the order

## 2020-11-16 NOTE — TELEPHONE ENCOUNTER
Advised of Abigail's message below. He plans to schedule an appointment for follow-up, will address this, advised of providers covering for Dr Rosita Vigil. Patient verbalized understanding, will call back to schedule.

## 2020-11-18 NOTE — TELEPHONE ENCOUNTER
Patient called back because he wasn't sure who he should schedule an appointment with since gus Le is out of the office. I stated to him that he can see Kay Garcia, PAVAN.  He was transferred so he could make an appointment

## 2020-11-19 ENCOUNTER — OFFICE VISIT (OUTPATIENT)
Dept: NEUROSURGERY | Age: 25
End: 2020-11-19

## 2020-11-19 VITALS
HEIGHT: 65 IN | HEART RATE: 104 BPM | DIASTOLIC BLOOD PRESSURE: 96 MMHG | BODY MASS INDEX: 20.79 KG/M2 | SYSTOLIC BLOOD PRESSURE: 148 MMHG | WEIGHT: 124.78 LBS

## 2020-11-19 DIAGNOSIS — Q05.4: Primary | ICD-10-CM

## 2020-11-19 PROCEDURE — 99213 OFFICE O/P EST LOW 20 MIN: CPT | Performed by: NEUROLOGICAL SURGERY

## 2020-11-19 ASSESSMENT — ENCOUNTER SYMPTOMS
GASTROINTESTINAL NEGATIVE: 1
ENDOCRINE NEGATIVE: 1
PSYCHIATRIC NEGATIVE: 1
RESPIRATORY NEGATIVE: 1
ALLERGIC/IMMUNOLOGIC NEGATIVE: 1
HEMATOLOGIC/LYMPHATIC NEGATIVE: 1
CONSTITUTIONAL NEGATIVE: 1
EYES NEGATIVE: 1

## 2020-11-20 ENCOUNTER — TELEPHONE (OUTPATIENT)
Dept: FAMILY MEDICINE CLINIC | Facility: CLINIC | Age: 25
End: 2020-11-20

## 2020-11-20 ENCOUNTER — OFFICE VISIT (OUTPATIENT)
Dept: FAMILY MEDICINE CLINIC | Facility: CLINIC | Age: 25
End: 2020-11-20
Payer: MEDICARE

## 2020-11-20 VITALS
BODY MASS INDEX: 21 KG/M2 | WEIGHT: 123 LBS | DIASTOLIC BLOOD PRESSURE: 93 MMHG | HEIGHT: 64 IN | SYSTOLIC BLOOD PRESSURE: 139 MMHG | HEART RATE: 83 BPM

## 2020-11-20 DIAGNOSIS — Q05.9 SPINA BIFIDA, UNSPECIFIED HYDROCEPHALUS PRESENCE, UNSPECIFIED SPINAL REGION (HCC): ICD-10-CM

## 2020-11-20 DIAGNOSIS — N31.9 NEUROGENIC BLADDER: ICD-10-CM

## 2020-11-20 DIAGNOSIS — Q05.9 SPINA BIFIDA, UNSPECIFIED HYDROCEPHALUS PRESENCE, UNSPECIFIED SPINAL REGION (HCC): Primary | ICD-10-CM

## 2020-11-20 DIAGNOSIS — K52.9 GASTROENTERITIS: Primary | ICD-10-CM

## 2020-11-20 PROBLEM — N39.0 UTI (URINARY TRACT INFECTION): Status: RESOLVED | Noted: 2017-11-07 | Resolved: 2020-11-20

## 2020-11-20 PROCEDURE — 3075F SYST BP GE 130 - 139MM HG: CPT | Performed by: NURSE PRACTITIONER

## 2020-11-20 PROCEDURE — 3008F BODY MASS INDEX DOCD: CPT | Performed by: NURSE PRACTITIONER

## 2020-11-20 PROCEDURE — 99213 OFFICE O/P EST LOW 20 MIN: CPT | Performed by: NURSE PRACTITIONER

## 2020-11-20 PROCEDURE — 3080F DIAST BP >= 90 MM HG: CPT | Performed by: NURSE PRACTITIONER

## 2020-11-20 NOTE — ASSESSMENT & PLAN NOTE
Advised to contact his insurance to find out who their preferred medical supply provider is and then I will send in new order for self cath supplies.

## 2020-11-20 NOTE — PROGRESS NOTES
HPI  Pt here for abd discomfort. Denies n/v/d/c  Moved bowels normally this am    No change in diet  No covid s/s    Has spina bifida-needs self cath supplies-former supplier no longer takes his insurance.      Review of Systems   Constitutional: Negative f Medical: Not on file        Non-medical: Not on file    Tobacco Use      Smoking status: Never Smoker      Smokeless tobacco: Never Used    Substance and Sexual Activity      Alcohol use: Yes        Frequency: Monthly or less      Drug use: No      Sexual but also to your beard area and then also the chest where the rash is. Can also use on the face. 120 mL 1   • ibuprofen 600 MG Oral Tab Take 600 mg by mouth every 6 (six) hours as needed for Pain.      • PEG 3350 17 g Oral Powd Pack Take 17 g by mouth felix

## 2020-11-20 NOTE — PATIENT INSTRUCTIONS
Noninfectious Gastroenteritis (Adult)    Gastroenteritis can cause nausea, vomiting, diarrhea, and cramping in the belly.  This may occur from food sensitivity, inflammation of your gastrointestinal tract, medicines, stress, or other causes not related to · If you eat, avoid fatty, greasy, spicy, or fried foods. · Don't eat dairy products if you have diarrhea; they can make the diarrhea worse.   During the first 24 hours (the first full day), follow the diet below:  · Beverages: Water, clear liquids, soft d · Seizure  · Stiff neck  When to seek medical advice  Call your healthcare provider right away if any of these occur:   · Increasing belly pain or constant lower right belly pain  · Continued vomiting (unable to keep liquids down)  · Frequent diarrhea (mor

## 2020-11-20 NOTE — TELEPHONE ENCOUNTER
The patient called back with the names of supply company  I pended the order but diagnotic code is needed      He needs his medical supplies to either    [address] [facility]  [facility]     Supplies need self  Catheters  12 g

## 2020-11-23 NOTE — TELEPHONE ENCOUNTER
Left message for patient, see Sheri's message below. Also, clarification needed for supply company. Scanned order for [facility] on chart. Is patient changing companies?

## 2020-11-24 NOTE — TELEPHONE ENCOUNTER
Shobha Orosco, [facility] order has been pended.  Please sign off    Please reply to pool: EM TRIAGE SUPPORT    [facility] at [phone]

## 2020-11-30 DIAGNOSIS — N31.9 NEUROGENIC BLADDER: ICD-10-CM

## 2020-11-30 DIAGNOSIS — Q05.9 SPINA BIFIDA, UNSPECIFIED HYDROCEPHALUS PRESENCE, UNSPECIFIED SPINAL REGION (HCC): ICD-10-CM

## 2020-12-01 RX ORDER — OXYBUTYNIN CHLORIDE 15 MG/1
15 TABLET, EXTENDED RELEASE ORAL DAILY
Qty: 90 TABLET | Refills: 1 | Status: SHIPPED | OUTPATIENT
Start: 2020-12-01 | End: 2021-06-07

## 2020-12-15 DIAGNOSIS — L21.9 SEBORRHEIC DERMATITIS: ICD-10-CM

## 2020-12-15 RX ORDER — KETOCONAZOLE 20 MG/ML
SHAMPOO TOPICAL
Qty: 120 ML | Refills: 0 | Status: SHIPPED | OUTPATIENT
Start: 2020-12-15 | End: 2021-01-18

## 2020-12-17 NOTE — TELEPHONE ENCOUNTER
Per pt he states that he was to send orders to [facility] do not stock those items; contacted [employer] and his insurance is accepted    New orders placed.

## 2020-12-17 NOTE — TELEPHONE ENCOUNTER
Joshua Gudino, please advise on order location. Order was changed by you to [facility] from [facility] as pended.       [facility] to [facility], spoke to Kulwant who states order was not received and they are unable to dispense requested supplie

## 2020-12-30 ENCOUNTER — TELEPHONE (OUTPATIENT)
Dept: FAMILY MEDICINE CLINIC | Facility: CLINIC | Age: 25
End: 2020-12-30

## 2020-12-30 NOTE — TELEPHONE ENCOUNTER
Verified name and . Patient calling to follow up on medical supply order as seen below. He states that no one has reached back out to him in regards to this and that he is running out of catheters.  Patient was advised that the order was sent to [facility]

## 2021-01-10 ENCOUNTER — HOSPITAL ENCOUNTER (OUTPATIENT)
Age: 26
Discharge: HOME | End: 2021-01-10
Payer: MEDICARE

## 2021-01-10 VITALS
OXYGEN SATURATION: 98 % | WEIGHT: 120 LBS | HEIGHT: 64 IN | BODY MASS INDEX: 20.49 KG/M2 | HEART RATE: 98 BPM | SYSTOLIC BLOOD PRESSURE: 162 MMHG | DIASTOLIC BLOOD PRESSURE: 95 MMHG | TEMPERATURE: 99 F | RESPIRATION RATE: 14 BRPM

## 2021-01-10 DIAGNOSIS — R82.90 CLOUDY URINE: ICD-10-CM

## 2021-01-10 DIAGNOSIS — R43.0 LOSS OF SENSE OF SMELL: Primary | ICD-10-CM

## 2021-01-10 LAB — SARS-COV-2 RNA RESP QL NAA+PROBE: NOT DETECTED

## 2021-01-10 PROCEDURE — 99203 OFFICE O/P NEW LOW 30 MIN: CPT | Performed by: NURSE PRACTITIONER
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
## 2021-01-11 ENCOUNTER — LAB ENCOUNTER (OUTPATIENT)
Dept: LAB | Age: 26
End: 2021-01-11
Attending: NURSE PRACTITIONER
Payer: MEDICARE

## 2021-01-11 DIAGNOSIS — R43.0 LOSS OF SENSE OF SMELL: ICD-10-CM

## 2021-01-11 LAB
RBC #/AREA URNS AUTO: 4 /HPF
WBC #/AREA URNS AUTO: 38 /HPF

## 2021-01-11 PROCEDURE — 87086 URINE CULTURE/COLONY COUNT: CPT

## 2021-01-11 PROCEDURE — 87077 CULTURE AEROBIC IDENTIFY: CPT

## 2021-01-11 PROCEDURE — 81015 MICROSCOPIC EXAM OF URINE: CPT

## 2021-01-11 PROCEDURE — 87186 SC STD MICRODIL/AGAR DIL: CPT

## 2021-01-15 ENCOUNTER — NURSE TRIAGE (OUTPATIENT)
Dept: FAMILY MEDICINE CLINIC | Facility: CLINIC | Age: 26
End: 2021-01-15

## 2021-01-15 NOTE — TELEPHONE ENCOUNTER
Action Requested: Summary for Provider     []  Critical Lab, Recommendations Needed  [] Need Additional Advice  []   FYI    []   Need Orders  [] Need Medications Sent to Pharmacy  []  Other     SUMMARY: reports right lower leg/ankle pain for over 2 weeks.

## 2021-01-18 ENCOUNTER — OFFICE VISIT (OUTPATIENT)
Dept: FAMILY MEDICINE CLINIC | Facility: CLINIC | Age: 26
End: 2021-01-18
Payer: MEDICARE

## 2021-01-18 ENCOUNTER — HOSPITAL ENCOUNTER (OUTPATIENT)
Dept: GENERAL RADIOLOGY | Age: 26
Discharge: HOME | End: 2021-01-18
Attending: NURSE PRACTITIONER
Payer: MEDICARE

## 2021-01-18 VITALS
DIASTOLIC BLOOD PRESSURE: 88 MMHG | HEIGHT: 64 IN | BODY MASS INDEX: 21.85 KG/M2 | SYSTOLIC BLOOD PRESSURE: 140 MMHG | WEIGHT: 128 LBS | HEART RATE: 88 BPM

## 2021-01-18 DIAGNOSIS — M21.371 FOOT DROP, BILATERAL: ICD-10-CM

## 2021-01-18 DIAGNOSIS — Q05.9 SPINA BIFIDA, UNSPECIFIED HYDROCEPHALUS PRESENCE, UNSPECIFIED SPINAL REGION (HCC): ICD-10-CM

## 2021-01-18 DIAGNOSIS — M21.372 FOOT DROP, BILATERAL: ICD-10-CM

## 2021-01-18 DIAGNOSIS — M25.571 ACUTE RIGHT ANKLE PAIN: ICD-10-CM

## 2021-01-18 DIAGNOSIS — R26.9 GAIT DISTURBANCE: ICD-10-CM

## 2021-01-18 DIAGNOSIS — M25.571 ACUTE RIGHT ANKLE PAIN: Primary | ICD-10-CM

## 2021-01-18 PROBLEM — A41.9 SEPSIS DUE TO URINARY TRACT INFECTION (HCC): Status: RESOLVED | Noted: 2017-11-07 | Resolved: 2021-01-18

## 2021-01-18 PROBLEM — N39.0 SEPSIS DUE TO URINARY TRACT INFECTION: Status: RESOLVED | Noted: 2017-11-07 | Resolved: 2021-01-18

## 2021-01-18 PROBLEM — A41.9 SEPSIS DUE TO URINARY TRACT INFECTION: Status: RESOLVED | Noted: 2017-11-07 | Resolved: 2021-01-18

## 2021-01-18 PROBLEM — N39.0 SEPSIS DUE TO URINARY TRACT INFECTION  (HCC): Status: RESOLVED | Noted: 2017-11-07 | Resolved: 2021-01-18

## 2021-01-18 PROBLEM — N12 PYELONEPHRITIS: Status: RESOLVED | Noted: 2017-11-07 | Resolved: 2021-01-18

## 2021-01-18 PROBLEM — N39.0 SEPSIS DUE TO URINARY TRACT INFECTION (HCC): Status: RESOLVED | Noted: 2017-11-07 | Resolved: 2021-01-18

## 2021-01-18 PROBLEM — A41.9 SEPSIS DUE TO URINARY TRACT INFECTION  (HCC): Status: RESOLVED | Noted: 2017-11-07 | Resolved: 2021-01-18

## 2021-01-18 PROBLEM — K52.9 GASTROENTERITIS: Status: RESOLVED | Noted: 2020-11-20 | Resolved: 2021-01-18

## 2021-01-18 PROCEDURE — 99214 OFFICE O/P EST MOD 30 MIN: CPT | Performed by: NURSE PRACTITIONER

## 2021-01-18 PROCEDURE — 73610 X-RAY EXAM OF ANKLE: CPT | Performed by: NURSE PRACTITIONER

## 2021-01-18 NOTE — PROGRESS NOTES
HPI  Pt has sharp pain in right ankle for the past 3 weeks. Will radiate up leg. Will have some pain to right hip area. No recent injuries. Has h/o spina bifida. Has seen PT in the past.   Numbness to feet is intermittent.  Has notice increase in [place] Financial resource strain: Not on file      Food insecurity        Worry: Not on file        Inability: Not on file      Transportation needs        Medical: Not on file        Non-medical: Not on file    Tobacco Use      Smoking status: Never Smoker • Oxybutynin Chloride ER 15 MG Oral Tablet 24 Hr Take 1 tablet (15 mg total) by mouth daily. 90 tablet 1   • ibuprofen 600 MG Oral Tab Take 600 mg by mouth every 6 (six) hours as needed for Pain. • PEG 3350 17 g Oral Powd Pack Take 17 g by mouth daily.

## 2021-01-19 ENCOUNTER — TELEPHONE (OUTPATIENT)
Dept: FAMILY MEDICINE CLINIC | Facility: CLINIC | Age: 26
End: 2021-01-19

## 2021-01-19 NOTE — TELEPHONE ENCOUNTER
The patient would like to know should he still see the specialist for his foot since the x ray was negative? He continues to have a sharp pain when standing or walking on the foot.     LOS Will   1/18/2021  7:34 PM      Right ankle xray n

## 2021-01-27 ENCOUNTER — HOSPITAL ENCOUNTER (OUTPATIENT)
Dept: GENERAL RADIOLOGY | Age: 26
Discharge: HOME | End: 2021-01-27
Attending: PHYSICAL MEDICINE & REHABILITATION
Payer: MEDICARE

## 2021-01-27 ENCOUNTER — OFFICE VISIT (OUTPATIENT)
Dept: NEUROLOGY | Facility: CLINIC | Age: 26
End: 2021-01-27
Payer: MEDICARE

## 2021-01-27 ENCOUNTER — TELEPHONE (OUTPATIENT)
Dept: NEUROLOGY | Facility: CLINIC | Age: 26
End: 2021-01-27

## 2021-01-27 VITALS
HEART RATE: 101 BPM | WEIGHT: 120 LBS | OXYGEN SATURATION: 97 % | HEIGHT: 68 IN | SYSTOLIC BLOOD PRESSURE: 136 MMHG | BODY MASS INDEX: 18.19 KG/M2 | DIASTOLIC BLOOD PRESSURE: 80 MMHG

## 2021-01-27 DIAGNOSIS — M79.671 RIGHT FOOT PAIN: ICD-10-CM

## 2021-01-27 DIAGNOSIS — Q05.9 SPINA BIFIDA, UNSPECIFIED HYDROCEPHALUS PRESENCE, UNSPECIFIED SPINAL REGION (HCC): ICD-10-CM

## 2021-01-27 DIAGNOSIS — M79.10 MYALGIA: ICD-10-CM

## 2021-01-27 DIAGNOSIS — M25.571 ACUTE RIGHT ANKLE PAIN: Primary | ICD-10-CM

## 2021-01-27 DIAGNOSIS — S86.111A RIGHT POSTERIOR TIBIAL STRAIN, INITIAL ENCOUNTER: ICD-10-CM

## 2021-01-27 DIAGNOSIS — M25.571 ACUTE RIGHT ANKLE PAIN: ICD-10-CM

## 2021-01-27 PROCEDURE — 3008F BODY MASS INDEX DOCD: CPT | Performed by: PHYSICAL MEDICINE & REHABILITATION

## 2021-01-27 PROCEDURE — 3075F SYST BP GE 130 - 139MM HG: CPT | Performed by: PHYSICAL MEDICINE & REHABILITATION

## 2021-01-27 PROCEDURE — 73630 X-RAY EXAM OF FOOT: CPT | Performed by: PHYSICAL MEDICINE & REHABILITATION

## 2021-01-27 PROCEDURE — 73610 X-RAY EXAM OF ANKLE: CPT | Performed by: PHYSICAL MEDICINE & REHABILITATION

## 2021-01-27 PROCEDURE — 99204 OFFICE O/P NEW MOD 45 MIN: CPT | Performed by: PHYSICAL MEDICINE & REHABILITATION

## 2021-01-27 PROCEDURE — 3079F DIAST BP 80-89 MM HG: CPT | Performed by: PHYSICAL MEDICINE & REHABILITATION

## 2021-01-27 NOTE — TELEPHONE ENCOUNTER
[facility] for authorization of approval of MRI ANKLE, RIGHT wo cpt code 62643. Talked to Christian Oneill who initiated request. Authorization # [id number] effective             01/27/21 to 02/26/21.   Pt. informed of approval. He will call later to

## 2021-01-27 NOTE — H&P
[address] H&P    Requesting Physician: Malick Jimenez DO    Chief Complaint (Reason for Visit):  Patient presents with:   Ankle Pain: New right handed patient states he has R ankle pain with tingling History   Problem Relation Age of Onset   • No Known Problems Father    • No Known Problems Mother    • Diabetes Neg    • Glaucoma Neg        SOCIAL HISTORY:   Social History    Occupational History      Not on file    Tobacco Use      Smoking status: Joella Mcardle guarding  Extremities: No lower extremity edema bilaterally   Skin: No lesions noted.    Cognition: alert & oriented x 3, attentive, able to follow 2 step commands, comprehention intact, spontaneous speech intact  Motor:    Musculoskeletal:    ANKLE/FOOT  I RT, 10/22/2008, 3:56 PM.     INDICATIONS: Right ankle pain. TECHNIQUE: 3 views were obtained. FINDINGS:   BONES: The osseous structures are again demineralized.   Chronic healed fractures are again seen involving the distal right tibial and fibula will follow-up with [facility] to review the images. RTC in to review MRI. Okay for virtual    Discharge Instructions were provided as documented in AVS summary. The patient was in agreement with the assessment and plan. All questions were answered.   Nuha Bach

## 2021-01-27 NOTE — PATIENT INSTRUCTIONS
1) Get Xr of the right foot and ankle today on your way out  2) My office will call you once the MRI is approved by your insurance.  You should then schedule the MRI and call my office again to make an appointment with me 2-3 days after your exam for review

## 2021-01-28 ENCOUNTER — TELEPHONE (OUTPATIENT)
Dept: FAMILY MEDICINE CLINIC | Facility: CLINIC | Age: 26
End: 2021-01-28

## 2021-01-28 RX ORDER — VITAMIN B COMPLEX
2 TABLET ORAL DAILY
Qty: 60 TABLET | Refills: 3 | Status: SHIPPED | OUTPATIENT
Start: 2021-01-28 | End: 2021-01-29

## 2021-01-28 NOTE — TELEPHONE ENCOUNTER
Per pharmacy, pt requesting a refill on the following medication:    VITAMIN D3 Oral Tablet 25 MCG (1000UT)  Sig: Take 2 tablets daily  QTY: 60  Refills: 11       .

## 2021-01-28 NOTE — TELEPHONE ENCOUNTER
Spoke with pt,  verified  Pt is looking for rx ref for Vit D 25 mcg 2 tabs every day. pls advise, thanks.      Requested Prescriptions     Pending Prescriptions Disp Refills   • Cholecalciferol (VITAMIN D) 25 MCG (1000 UT) Oral Tab 60 tablet 3     Sig:

## 2021-01-29 ENCOUNTER — HOSPITAL ENCOUNTER (OUTPATIENT)
Dept: MRI IMAGING | Facility: HOSPITAL | Age: 26
Discharge: HOME | End: 2021-01-29
Attending: PHYSICAL MEDICINE & REHABILITATION
Payer: MEDICARE

## 2021-01-29 DIAGNOSIS — S86.111A RIGHT POSTERIOR TIBIAL STRAIN, INITIAL ENCOUNTER: ICD-10-CM

## 2021-01-29 DIAGNOSIS — M25.571 ACUTE RIGHT ANKLE PAIN: ICD-10-CM

## 2021-01-29 DIAGNOSIS — M79.671 RIGHT FOOT PAIN: ICD-10-CM

## 2021-01-29 DIAGNOSIS — M79.10 MYALGIA: ICD-10-CM

## 2021-01-29 DIAGNOSIS — Q05.9 SPINA BIFIDA, UNSPECIFIED HYDROCEPHALUS PRESENCE, UNSPECIFIED SPINAL REGION (HCC): ICD-10-CM

## 2021-01-29 PROCEDURE — 73721 MRI JNT OF LWR EXTRE W/O DYE: CPT | Performed by: PHYSICAL MEDICINE & REHABILITATION

## 2021-01-29 RX ORDER — VITAMIN B COMPLEX
2 TABLET ORAL DAILY
Qty: 180 TABLET | Refills: 3 | Status: SHIPPED | OUTPATIENT
Start: 2021-01-29 | End: 2021-02-28

## 2021-02-06 ENCOUNTER — TELEMEDICINE (OUTPATIENT)
Dept: NEUROLOGY | Facility: CLINIC | Age: 26
End: 2021-02-06

## 2021-02-06 DIAGNOSIS — Q05.9 SPINA BIFIDA, UNSPECIFIED HYDROCEPHALUS PRESENCE, UNSPECIFIED SPINAL REGION (HCC): ICD-10-CM

## 2021-02-06 DIAGNOSIS — M79.10 MYALGIA: ICD-10-CM

## 2021-02-06 DIAGNOSIS — M25.571 ACUTE RIGHT ANKLE PAIN: Primary | ICD-10-CM

## 2021-02-06 DIAGNOSIS — M79.671 RIGHT FOOT PAIN: ICD-10-CM

## 2021-02-06 DIAGNOSIS — S86.111A RIGHT POSTERIOR TIBIAL STRAIN, INITIAL ENCOUNTER: ICD-10-CM

## 2021-02-06 PROCEDURE — 99214 OFFICE O/P EST MOD 30 MIN: CPT | Performed by: PHYSICAL MEDICINE & REHABILITATION

## 2021-02-06 NOTE — PROGRESS NOTES
[address] [facility]  Video Visit Progress Note      Telehealth outside of [address] Verbal Consent   I conducted a telehealth visit with Juve Curry today, 02/06/21, which was completed using two-way, real-t been using Voltaren gel and states his pain is a 1 out of 10 but does feel occasional weakness in the right lower leg. This is not something new and he has had this for long time given his history of spina bifida.   He denies any paresthesias or incontinen daily. 90 tablet 1   • ibuprofen 600 MG Oral Tab Take 600 mg by mouth every 6 (six) hours as needed for Pain. • PEG 3350 17 g Oral Powd Pack Take 17 g by mouth daily. • VITAMIN D, ERGOCALCIFEROL, OR Take by mouth daily.            ALLERGIES:     Lat (EBI=40797)  Narrative: PROCEDURE: MRI ANKLE, RIGHT (CPT=73721)     COMPARISON: [facility], XR ANKLE (MIN 3 VIEWS), RIGHT (CPT=73610), 1/27/2021, 2:57 PM.  [facility], XR ANKLE (MIN 3 VIEWS), RIGHT (CPT=73610), 1/18/2021, 9:34 is also seen along the distal aspects of the calf musculature. Impression: CONCLUSION:      Extensive fatty atrophy involving the distal calf musculature and plantar foot musculature, likely related to disuse.      Bony deformity and dysplastic infection. The patient was advised that given the current situation with COVID-19, it is in his/her best interest to socially distance his/herself.  Given this, we are not recommending any elective procedures or office visits at the outpatient surgery ce

## 2021-02-06 NOTE — PATIENT INSTRUCTIONS
1) Continue Voltaren gel  2) Continue tylenol as needed  3) Start formal therapy at [address] [place] [address]  4) Consider getting your AFO's re-evaluated  5) Follow up with me in 6 weeks.  If no improvement, then we can consider a RIGHT posterior tibialis or tarsal tunnel C

## 2021-02-10 ENCOUNTER — OFFICE VISIT (OUTPATIENT)
Dept: PHYSICAL THERAPY | Age: 26
End: 2021-02-10
Attending: PHYSICAL MEDICINE & REHABILITATION
Payer: MEDICARE

## 2021-02-10 DIAGNOSIS — M25.571 ACUTE RIGHT ANKLE PAIN: ICD-10-CM

## 2021-02-10 DIAGNOSIS — S86.111A RIGHT POSTERIOR TIBIAL STRAIN, INITIAL ENCOUNTER: ICD-10-CM

## 2021-02-10 DIAGNOSIS — M79.671 RIGHT FOOT PAIN: ICD-10-CM

## 2021-02-10 DIAGNOSIS — M79.10 MYALGIA: ICD-10-CM

## 2021-02-10 DIAGNOSIS — Q05.9 SPINA BIFIDA, UNSPECIFIED HYDROCEPHALUS PRESENCE, UNSPECIFIED SPINAL REGION (HCC): ICD-10-CM

## 2021-02-10 PROCEDURE — 97162 PT EVAL MOD COMPLEX 30 MIN: CPT

## 2021-02-10 PROCEDURE — 97140 MANUAL THERAPY 1/> REGIONS: CPT

## 2021-02-10 NOTE — PROGRESS NOTES
P.T. EVALUATION:   Referring Physician: Dr. Leslie Hernandez  Diagnosis:  Acute right ankle pain (M25.571)  Right posterior tibial strain, initial encounter (A07.545Y)  Right foot pain (M79.671)  Myalgia (M79.10)  Spina bifida, unspecified hydrocephalus presence, un ability to stand, walk, negotiate stairs, and work out. Camelia Whiting would benefit from skilled Physical Therapy to address the above impairments to relieve pain and return to prior level of function.      Precautions:  none   OBJECTIVE:   Observation: pt ambulates inversion/eversion strength to 4/5 or better to improve ankle stability with household ambulation   4- Pt will demo increase in R hip and knee strength to >4/5 to improve overall RLE stability and assist with stair negotiation    Frequency / Duration: Edgar Quintana

## 2021-02-11 ENCOUNTER — TELEPHONE (OUTPATIENT)
Dept: PHYSICAL THERAPY | Age: 26
End: 2021-02-11

## 2021-02-11 ENCOUNTER — APPOINTMENT (OUTPATIENT)
Dept: PHYSICAL THERAPY | Age: 26
End: 2021-02-11
Attending: PHYSICAL MEDICINE & REHABILITATION
Payer: MEDICARE

## 2021-02-12 ENCOUNTER — OFFICE VISIT (OUTPATIENT)
Dept: PHYSICAL THERAPY | Age: 26
End: 2021-02-12
Attending: PHYSICAL MEDICINE & REHABILITATION
Payer: MEDICARE

## 2021-02-12 PROCEDURE — 97110 THERAPEUTIC EXERCISES: CPT

## 2021-02-12 NOTE — PROGRESS NOTES
Diagnosis: Acute right ankle pain (M25.571)  Right posterior tibial strain, initial encounter (Q39.352Q)  Right foot pain (M79.671)  Myalgia (M79.10)  Spina bifida, unspecified hydrocephalus presence, unspecified spinal region ([facility] (Q05.9)     Date of Ons maintenance and progression of HEP  2- Pt will report decrease in R ankle pain to 1/10 or less with community ambulation  3- Pt will demo increase in R ankle inversion/eversion strength to 4/5 or better to improve ankle stability with household ambulation

## 2021-02-15 ENCOUNTER — TELEPHONE (OUTPATIENT)
Dept: PHYSICAL THERAPY | Facility: HOSPITAL | Age: 26
End: 2021-02-15

## 2021-02-15 ENCOUNTER — APPOINTMENT (OUTPATIENT)
Dept: PHYSICAL THERAPY | Age: 26
End: 2021-02-15
Attending: PHYSICAL MEDICINE & REHABILITATION
Payer: MEDICARE

## 2021-02-16 ENCOUNTER — OFFICE VISIT (OUTPATIENT)
Dept: PHYSICAL THERAPY | Age: 26
End: 2021-02-16
Attending: PHYSICAL MEDICINE & REHABILITATION
Payer: MEDICARE

## 2021-02-16 PROCEDURE — 97110 THERAPEUTIC EXERCISES: CPT

## 2021-02-16 NOTE — PROGRESS NOTES
Diagnosis: Acute right ankle pain (M25.571)  Right posterior tibial strain, initial encounter (T37.265V)  Right foot pain (M79.671)  Myalgia (M79.10)  Spina bifida, unspecified hydrocephalus presence, unspecified spinal region ([facility] (Q05.9)     Date of Ons sidelying R hip abduction isometric 5\" holds 1 x 10   - sidelying R hip abduction (unable)  - supine R hip abduction with YTB above knee 2 x 10 for 3\" holds   - supine R hip ER/abduction isometrics 5\" holds 2 x 10 each   - SLS R (unable)  - NBOS 5 x 10

## 2021-02-18 ENCOUNTER — APPOINTMENT (OUTPATIENT)
Dept: PHYSICAL THERAPY | Age: 26
End: 2021-02-18
Attending: PHYSICAL MEDICINE & REHABILITATION
Payer: MEDICARE

## 2021-02-18 ENCOUNTER — TELEPHONE (OUTPATIENT)
Dept: PHYSICAL THERAPY | Facility: HOSPITAL | Age: 26
End: 2021-02-18

## 2021-02-23 ENCOUNTER — OFFICE VISIT (OUTPATIENT)
Dept: PHYSICAL THERAPY | Age: 26
End: 2021-02-23
Attending: PHYSICAL MEDICINE & REHABILITATION
Payer: MEDICARE

## 2021-02-23 PROCEDURE — 97110 THERAPEUTIC EXERCISES: CPT

## 2021-02-23 NOTE — PROGRESS NOTES
Diagnosis: Acute right ankle pain (M25.571)  Right posterior tibial strain, initial encounter (Q75.408O)  Right foot pain (M79.671)  Myalgia (M79.10)  Spina bifida, unspecified hydrocephalus presence, unspecified spinal region ([facility] (Q05.9)     Date of Ons x 10  - supine R SLR with IR/ER 1 x 10 each   - hooklying B hip ER with YTB above knee 3 x 10   - supine R hip abduction with YTB above knee 2 x 10 for 5\" holds   - supine R hip ER/abduction isometrics 5\" holds 3 x 10 each   - supine SB bridging 2 x 10

## 2021-02-25 ENCOUNTER — OFFICE VISIT (OUTPATIENT)
Dept: PHYSICAL THERAPY | Age: 26
End: 2021-02-25
Attending: PHYSICAL MEDICINE & REHABILITATION
Payer: MEDICARE

## 2021-02-25 PROCEDURE — 97110 THERAPEUTIC EXERCISES: CPT

## 2021-02-25 NOTE — PROGRESS NOTES
Diagnosis: Acute right ankle pain (M25.571)  Right posterior tibial strain, initial encounter (S65.182X)  Right foot pain (M79.671)  Myalgia (M79.10)  Spina bifida, unspecified hydrocephalus presence, unspecified spinal region ([facility] (Q05.9)     Date of Ons holds x 10  - supine R SLR with IR/ER 1 x 10 each   - hooklying R/L hip ER with YTB above knee 2 x 10 ea 5 sec holds  - supine R hip abduction with YTB above knee 2 x 10 for 5\" holds   - supine SB bridging 2 x 10   - supine passive R gastroc stretching x

## 2021-03-01 ENCOUNTER — OFFICE VISIT (OUTPATIENT)
Dept: PHYSICAL THERAPY | Age: 26
End: 2021-03-01
Attending: PHYSICAL MEDICINE & REHABILITATION
Payer: MEDICARE

## 2021-03-01 PROCEDURE — 97110 THERAPEUTIC EXERCISES: CPT

## 2021-03-01 NOTE — PROGRESS NOTES
Diagnosis: Acute right ankle pain (M25.571)  Right posterior tibial strain, initial encounter (G28.433P)  Right foot pain (M79.671)  Myalgia (M79.10)  Spina bifida, unspecified hydrocephalus presence, unspecified spinal region ([facility] (Q05.9)     Date of Ons supine R SLR with RTB 2x10  - hooklying R/L hip ER with YTB above knee 2 x 10 ea 5 sec holds  - supine R hip abduction with YTB above knee 2 x 10 for 5\" holds   - supine SB bridging 2 x 10  - supine R/L hip flexion 2x10 ea  - sidelying L clam 1x10 5 sec h

## 2021-03-03 ENCOUNTER — OFFICE VISIT (OUTPATIENT)
Dept: PHYSICAL THERAPY | Age: 26
End: 2021-03-03
Attending: PHYSICAL MEDICINE & REHABILITATION
Payer: MEDICARE

## 2021-03-03 PROCEDURE — 97110 THERAPEUTIC EXERCISES: CPT

## 2021-03-03 NOTE — PROGRESS NOTES
Diagnosis: Acute right ankle pain (M25.571)  Right posterior tibial strain, initial encounter (H44.544E)  Right foot pain (M79.671)  Myalgia (M79.10)  Spina bifida, unspecified hydrocephalus presence, unspecified spinal region ([facility] (Q05.9)     Date of Ons supine R SLR with RTB end range repeats 10 reps x 5 sets  - hooklying R/L hip ER with YTB above knee 2 x 10 ea 5 sec holds  - supine R hip abduction with YTB above knee 3 x 10 for 5\" holds   - supine SB bridging 2 x 10  - supine R/L hip flexion 2x10 ea  -

## 2021-03-09 ENCOUNTER — OFFICE VISIT (OUTPATIENT)
Dept: PHYSICAL THERAPY | Age: 26
End: 2021-03-09
Attending: PHYSICAL MEDICINE & REHABILITATION
Payer: MEDICARE

## 2021-03-09 PROCEDURE — 97110 THERAPEUTIC EXERCISES: CPT

## 2021-03-09 NOTE — PROGRESS NOTES
Diagnosis: Acute right ankle pain (M25.571)  Right posterior tibial strain, initial encounter (T78.428K)  Right foot pain (M79.671)  Myalgia (M79.10)  Spina bifida, unspecified hydrocephalus presence, unspecified spinal region ([facility] (Q05.9)     Date of Ons L/E shuttle knee extension with RTB above knee 3 bands 2 x 10 - supine R SLR with IR/ER 2 x 10 each  - supine R SLR with RTB end range repeats 10 reps x 5 sets  - hooklying R/L hip ER with YTB above knee 2 x 10 ea 5 sec holds  - supine R hip abduction with

## 2021-03-11 ENCOUNTER — APPOINTMENT (OUTPATIENT)
Dept: PHYSICAL THERAPY | Age: 26
End: 2021-03-11
Attending: PHYSICAL MEDICINE & REHABILITATION
Payer: MEDICARE

## 2021-03-12 ENCOUNTER — OFFICE VISIT (OUTPATIENT)
Dept: PHYSICAL THERAPY | Age: 26
End: 2021-03-12
Attending: PHYSICAL MEDICINE & REHABILITATION
Payer: MEDICARE

## 2021-03-12 PROCEDURE — 97110 THERAPEUTIC EXERCISES: CPT

## 2021-03-12 NOTE — PROGRESS NOTES
Diagnosis: Acute right ankle pain (M25.571)  Right posterior tibial strain, initial encounter (D05.162J)  Right foot pain (M79.671)  Myalgia (M79.10)  Spina bifida, unspecified hydrocephalus presence, unspecified spinal region ([facility] (Q05.9)     Date of Ons each  - supine R SLR with RTB end range repeats 10 reps x 5 sets  - hooklying R/L hip ER with YTB above knee 1 x 10 ea 5 sec holds (fatigued today)  - supine R hip abduction with YTB just below knee 2 x 10 for 5\" holds   - supine SB bridging 3 x 10  - sup

## 2021-03-15 DIAGNOSIS — L21.9 SEBORRHEIC DERMATITIS: ICD-10-CM

## 2021-03-15 RX ORDER — KETOCONAZOLE 20 MG/ML
SHAMPOO TOPICAL
Qty: 120 ML | Refills: 0 | OUTPATIENT
Start: 2021-03-15

## 2021-03-17 ENCOUNTER — OFFICE VISIT (OUTPATIENT)
Dept: PHYSICAL THERAPY | Age: 26
End: 2021-03-17
Attending: PHYSICAL MEDICINE & REHABILITATION
Payer: MEDICARE

## 2021-03-17 PROCEDURE — 97110 THERAPEUTIC EXERCISES: CPT

## 2021-03-17 NOTE — PROGRESS NOTES
Diagnosis: Acute right ankle pain (M25.571)  Right posterior tibial strain, initial encounter (G15.156M)  Right foot pain (M79.671)  Myalgia (M79.10)  Spina bifida, unspecified hydrocephalus presence, unspecified spinal region ([facility] (Q05.9)     Date of Ons R/L hip abduction with RTB just below knee 1 x 10 for 5\" holds   - supine R SLR with RTB end range repeats 10 reps x 5 sets  - supine SB bridging 3 x 10 arms across chest  - supine R/L hip flexion with 1.5# 3 x 10 each  - hooklying diaphragmatic breathing

## 2021-03-17 NOTE — LETTER
May 10, 2023      No Recipients     Patient: Jim Rizvi   YOB: 1995   Date of Visit: 5/10/2023       Dear Dr. Santy Bass Recipients: Thank you for referring Jim Rizvi to me for evaluation. Here is my assessment and plan of care:    Jim Rizvi is a 33year old male. HPI:     HPI    EP. 32 yo M here for complete eye exam.  Consult per Dr. Niurka Acosta   Patient complains of decreased vision in the left eye for the past few months. 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 He last saw Dr. Sudhir Hi in 2018. POH: eye muscle surgery OU for strabismus at age 2 done at [facility].  Patient was born in [facility] and came to the [facility] when he was 2  PMH: Spina Bifida  FH: no COAG or ARMD    Last edited by LendingStandard Pump on 5/10/2023  2:58 PM.        Patient History:  Past Medical History:   Diagnosis Date    Depression     Other ill-defined conditions(799.89)     shunt from hydrocephalus    PONV (postoperative nausea and vomiting)     if masked used    S/P  shunt     Spina bifida ([address]     Management:  closure    Strabismus 1998    Done at [facility]       Surgical History: Jim Rizvi has a past surgical history that includes Strabismus surgery (Left, 1999 or 2000); spine surgery procedure unlisted (1994); other surgical history (2010) (bladder augmentation/catheterizable channel); and other surgical history (07/06/2020) (fistulotomy).     Family History   Problem Relation Age of Onset    No Known Problems Father     No Known Problems Mother     Diabetes Neg     Glaucoma Neg        Social History:   Social History     Socioeconomic History    Marital status: Single   Tobacco Use    Smoking status: Never    Smokeless tobacco: Never   Vaping Use    Vaping Use: Never used   Substance and Sexual Activity    Alcohol use: Yes    Drug use: No   Other Topics Concern    Caffeine Concern Yes     Comment: 1 cup a day. Exercise No    Pt has a pacemaker No    Pt has a defibrillator No    Reaction to local anesthetic No   Social History Narrative    The patient uses the following assistive device(s):  Splint on R leg . The patient does live in a home with stairs. Medications:  Current Outpatient Medications   Medication Sig Dispense Refill    Oxybutynin Chloride ER 15 MG Oral Tablet 24 Hr Take 1 tablet (15 mg total) by mouth daily. 90 tablet 1    sodium chloride 0.9 % Irrigation Solution IRRIGATE BLADDER AS DIRECTED      carvedilol 3.125 MG Oral Tab Take 1 tablet (3.125 mg total) by mouth 2 (two) times daily with meals. 180 tablet 1    CHOLECALCIFEROL 25 MCG (1000 UT) Oral Tab TAKE TWO TABLETS BY MOUTH DAILY 180 tablet 0    hydrocortisone 2.5 % External Cream Apply to the affected areas twice daily Monday-Friday. Take weekends off. Stop when rash resolved. 453 g 1    clindamycin 1 % External Lotion Apply twice daily with flares 60 mL 11    ketoconazole 2 % External Shampoo You can apply this not only to the scalp 2 or 3 times a week in the shower but also to your beard area and then also the chest where the rash is. Can also use on the face. 120 mL 11    VITAMIN D, ERGOCALCIFEROL, OR Take by mouth daily.            Allergies:    Latex                   RASH    ROS:     ROS    Positive for: Eyes  Negative for: Constitutional, Gastrointestinal, Neurological, Skin, Genitourinary, Musculoskeletal, HENT, Endocrine, Cardiovascular, Respiratory, Psychiatric, Allergic/Imm, Heme/Lymph  Last edited by Ivana Kang on 5/10/2023  2:52 PM.          PHYSICAL EXAM:     Base Eye Exam       Visual Acuity (Snellen - Linear)         Right Left    Dist sc 20/25 +3 20/30-    Dist ph sc  20/25-    Near sc 20/25 20/20              Tonometry (Icare, 3:12 PM)         Right Left    Pressure 21 17              Pupils         Pupils    Right PERRL    Left PERRL              Visual Fields         Left Right     Full Full Extraocular Movement         Right Left     Full, Nystagmus Full, Nystagmus   Small alternating X at distance; end gaze nystagmus OU    Alternating XT per, end gaze nystagmus OU [facility]             Neuro/Psych       Oriented x3: Yes    Mood/Affect: Normal              Dilation       Both eyes: 1.0% Mydriacyl and 2.5% Bonifacio Synephrine @ 3:10 PM                  Slit Lamp and Fundus Exam       External Exam         Right Left    External Normal Normal              Slit Lamp Exam         Right Left    Lids/Lashes Meibomian gland dysfunction Meibomian gland dysfunction    Conjunctiva/Sclera Normal Normal    Cornea Clear Clear    Anterior Chamber Deep and quiet Deep and quiet    Iris Normal Normal    Lens Clear Clear    Vitreous Clear Clear              Fundus Exam         Right Left    Disc Good rim Good rim    C/D Ratio 0.4 0.4    Macula Normal Normal    Vessels Normal Normal    Periphery Normal Normal                  Refraction       Wearing Rx       Type: No glasses              Manifest Refraction (Auto)         Sphere Cylinder Axis Dist VA    Right +0.75 Sphere      Left -0.50 +0.25 015               Manifest Refraction #2         Sphere Cylinder Axis Dist VA    Right +0.25 Sphere  20/20-    Left -0.50 Sphere  20/20-              Manifest Refraction Comments    Glasses RX given from very mild RX.   Explained to patient that he should ask his optometrist for what lens coating may be beneficial for computer             Cycloplegic Refraction (Auto)         Sphere Cylinder Axis    Right +0.75 Sphere     Left -0.50 +0.25 020              Final Rx         Sphere Cylinder Dist VA    Right +0.25 Sphere 20/20-    Left -0.50 Sphere 20/20-      Type: Single vision                     ASSESSMENT/PLAN:     Diagnoses and Plan:     Hyperopia of right eye  Glasses Rx given today, update as needed    Myopia of left eye  New glasses Rx given today    Exotropia  No treatment    Will see patient as needed      No orders of the defined types were placed in this encounter. Meds This Visit:  Requested Prescriptions      No prescriptions requested or ordered in this encounter        Follow up instructions:  Return if symptoms worsen or fail to improve. 5/10/2023  Scribed by: Sotero Lake MD      If you have questions, please do not hesitate to call me. I look forward to following Isabel Ivy along with you.     Sincerely,        Sotero Lake MD        CC:   No Recipients    Document electronically generated by: Sotero Lake MD Additional Notes: 9 warts on left side, 1 on right Detail Level: Simple Render Risk Assessment In Note?: no Additional Notes: Patient consent was obtained to proceed with the visit and recommended plan of care after discussion of all risks and benefits, including the risks of COVID-19 exposure.

## 2021-03-23 ENCOUNTER — OFFICE VISIT (OUTPATIENT)
Dept: NEUROLOGY | Facility: CLINIC | Age: 26
End: 2021-03-23
Payer: MEDICARE

## 2021-03-23 VITALS
HEIGHT: 64 IN | BODY MASS INDEX: 20.49 KG/M2 | WEIGHT: 120 LBS | DIASTOLIC BLOOD PRESSURE: 88 MMHG | HEART RATE: 106 BPM | OXYGEN SATURATION: 96 % | SYSTOLIC BLOOD PRESSURE: 128 MMHG

## 2021-03-23 DIAGNOSIS — S86.111A RIGHT POSTERIOR TIBIAL STRAIN, INITIAL ENCOUNTER: ICD-10-CM

## 2021-03-23 DIAGNOSIS — M25.571 ACUTE RIGHT ANKLE PAIN: Primary | ICD-10-CM

## 2021-03-23 DIAGNOSIS — M54.17 LUMBOSACRAL RADICULITIS: ICD-10-CM

## 2021-03-23 DIAGNOSIS — Q05.9 SPINA BIFIDA, UNSPECIFIED HYDROCEPHALUS PRESENCE, UNSPECIFIED SPINAL REGION (HCC): ICD-10-CM

## 2021-03-23 DIAGNOSIS — M79.10 MYALGIA: ICD-10-CM

## 2021-03-23 DIAGNOSIS — Q07.01 CHIARI MALFORMATION TYPE II (HCC): ICD-10-CM

## 2021-03-23 DIAGNOSIS — M79.671 RIGHT FOOT PAIN: ICD-10-CM

## 2021-03-23 PROCEDURE — 3008F BODY MASS INDEX DOCD: CPT | Performed by: PHYSICAL MEDICINE & REHABILITATION

## 2021-03-23 PROCEDURE — 99213 OFFICE O/P EST LOW 20 MIN: CPT | Performed by: PHYSICAL MEDICINE & REHABILITATION

## 2021-03-23 PROCEDURE — 3074F SYST BP LT 130 MM HG: CPT | Performed by: PHYSICAL MEDICINE & REHABILITATION

## 2021-03-23 PROCEDURE — 3079F DIAST BP 80-89 MM HG: CPT | Performed by: PHYSICAL MEDICINE & REHABILITATION

## 2021-03-23 NOTE — PROGRESS NOTES
[address]  Progress Note    CHIEF COMPLAINT:  Patient presents with: Ankle Pain: LOV: 2/6/21 Pt comes in for f/u for right ankle pulsing pain, denies N/T. Currently in PT/HEP. Denies taking rx for pain. 1999 or 2000       SOCIAL HISTORY:   Social History    Occupational History      Not on file    Tobacco Use      Smoking status: Never Smoker      Smokeless tobacco: Never Used    Vaping Use      Vaping Use: Never used    Substance and Sexual Activity lesions noted.    Cognition: alert & oriented x 3, attentive, able to follow 2 step commands, comprehention intact, spontaneous speech intact  Motor:    Musculoskeletal:      Gait:  Normal with BILATERAL AFO    Data  [facility] Lab Encounter on 01/11/2021   Compon COMPLEX: Intact   SPRING LIGAMENT: Intact   TARSAL TUNNEL: Normal without mass or space-occupying lesion. FLEXORS TENDONS: Intact     OTHER TENDONS:   EXTENSORS:  Intact  ACHILLES:  Intact. No surrounding abnormality.        PLANTAR FASCIA: No tear or manjeet injections at this time. If his symptoms persist, then I would recommend an MRI of the lumbosacral spine. I will follow-up with Yu Ly as needed going forward. It is okay for him to begin independently working out and he should have his AFOs rechecked.

## 2021-03-24 ENCOUNTER — APPOINTMENT (OUTPATIENT)
Dept: PHYSICAL THERAPY | Age: 26
End: 2021-03-24
Attending: PHYSICAL MEDICINE & REHABILITATION
Payer: MEDICARE

## 2021-03-30 ENCOUNTER — APPOINTMENT (OUTPATIENT)
Dept: PHYSICAL THERAPY | Age: 26
End: 2021-03-30
Attending: PHYSICAL MEDICINE & REHABILITATION
Payer: MEDICARE

## 2021-04-27 ENCOUNTER — OFFICE VISIT (OUTPATIENT)
Dept: NEUROLOGY | Facility: CLINIC | Age: 26
End: 2021-04-27
Payer: MEDICARE

## 2021-04-27 VITALS
HEIGHT: 64 IN | OXYGEN SATURATION: 97 % | BODY MASS INDEX: 20.49 KG/M2 | WEIGHT: 120 LBS | DIASTOLIC BLOOD PRESSURE: 72 MMHG | SYSTOLIC BLOOD PRESSURE: 144 MMHG | HEART RATE: 102 BPM

## 2021-04-27 DIAGNOSIS — Q07.01 CHIARI MALFORMATION TYPE II (HCC): ICD-10-CM

## 2021-04-27 DIAGNOSIS — M79.671 RIGHT FOOT PAIN: ICD-10-CM

## 2021-04-27 DIAGNOSIS — M25.571 ACUTE RIGHT ANKLE PAIN: Primary | ICD-10-CM

## 2021-04-27 DIAGNOSIS — Q05.9 SPINA BIFIDA, UNSPECIFIED HYDROCEPHALUS PRESENCE, UNSPECIFIED SPINAL REGION (HCC): ICD-10-CM

## 2021-04-27 DIAGNOSIS — S86.111A RIGHT POSTERIOR TIBIAL STRAIN, INITIAL ENCOUNTER: ICD-10-CM

## 2021-04-27 DIAGNOSIS — M79.10 MYALGIA: ICD-10-CM

## 2021-04-27 DIAGNOSIS — M54.17 LUMBOSACRAL RADICULITIS: ICD-10-CM

## 2021-04-27 PROCEDURE — 99213 OFFICE O/P EST LOW 20 MIN: CPT | Performed by: PHYSICAL MEDICINE & REHABILITATION

## 2021-04-27 NOTE — PROGRESS NOTES
[address]  Progress Note    CHIEF COMPLAINT:  Patient presents with:  Leg Pain: Right Lower leg. Calf pain. Pain is a 2/10. Patient has been going to the gym and eating healthier.  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 BONES:  There is bony deformity seen involving the talar dome and tibial plafond with a dysplastic appearance of the talar dome and flattening the talar dome. There is no marrow edema to suggest acute injury. unspecified hydrocephalus presence, unspecified spinal region [facility])  Chiari malformation type II [facility]    Fadumo Harmon.  Yany Nelson MD  Physical Medicine and Rehabilitation/Sports Medicine  MED[facility]

## 2021-05-05 DIAGNOSIS — L21.9 SEBORRHEIC DERMATITIS: ICD-10-CM

## 2021-05-05 RX ORDER — KETOCONAZOLE 20 MG/ML
SHAMPOO TOPICAL
Qty: 120 ML | Refills: 0 | Status: SHIPPED | OUTPATIENT
Start: 2021-05-05 | End: 2021-05-17

## 2021-05-17 DIAGNOSIS — L21.9 SEBORRHEIC DERMATITIS: ICD-10-CM

## 2021-05-17 RX ORDER — KETOCONAZOLE 20 MG/ML
SHAMPOO TOPICAL
Qty: 120 ML | Refills: 0 | Status: SHIPPED | OUTPATIENT
Start: 2021-05-17

## 2021-06-07 DIAGNOSIS — Q05.9 SPINA BIFIDA, UNSPECIFIED HYDROCEPHALUS PRESENCE, UNSPECIFIED SPINAL REGION (HCC): ICD-10-CM

## 2021-06-07 DIAGNOSIS — N31.9 NEUROGENIC BLADDER: ICD-10-CM

## 2021-06-07 RX ORDER — OXYBUTYNIN CHLORIDE 15 MG/1
15 TABLET, EXTENDED RELEASE ORAL DAILY
Qty: 90 TABLET | Refills: 0 | Status: SHIPPED | OUTPATIENT
Start: 2021-06-07 | End: 2021-09-07

## 2021-06-19 ENCOUNTER — APPOINTMENT (OUTPATIENT)
Dept: GENERAL RADIOLOGY | Age: 26
End: 2021-06-19
Attending: PHYSICIAN ASSISTANT
Payer: MEDICARE

## 2021-06-19 ENCOUNTER — HOSPITAL ENCOUNTER (OUTPATIENT)
Age: 26
Discharge: HOME | End: 2021-06-19
Attending: PHYSICIAN ASSISTANT
Payer: MEDICARE

## 2021-06-19 ENCOUNTER — NURSE TRIAGE (OUTPATIENT)
Dept: FAMILY MEDICINE CLINIC | Facility: CLINIC | Age: 26
End: 2021-06-19

## 2021-06-19 VITALS
BODY MASS INDEX: 20.49 KG/M2 | HEART RATE: 105 BPM | OXYGEN SATURATION: 97 % | DIASTOLIC BLOOD PRESSURE: 73 MMHG | TEMPERATURE: 99 F | SYSTOLIC BLOOD PRESSURE: 139 MMHG | HEIGHT: 64 IN | RESPIRATION RATE: 18 BRPM | WEIGHT: 120 LBS

## 2021-06-19 DIAGNOSIS — M79.672 LEFT FOOT PAIN: ICD-10-CM

## 2021-06-19 DIAGNOSIS — M25.572 ACUTE LEFT ANKLE PAIN: Primary | ICD-10-CM

## 2021-06-19 DIAGNOSIS — Z87.728 HISTORY OF SPINA BIFIDA: ICD-10-CM

## 2021-06-19 PROCEDURE — 73610 X-RAY EXAM OF ANKLE: CPT | Performed by: PHYSICIAN ASSISTANT

## 2021-06-19 PROCEDURE — 73630 X-RAY EXAM OF FOOT: CPT | Performed by: PHYSICIAN ASSISTANT

## 2021-06-19 PROCEDURE — 99213 OFFICE O/P EST LOW 20 MIN: CPT | Performed by: PHYSICIAN ASSISTANT

## 2021-06-19 RX ORDER — IBUPROFEN 600 MG/1
TABLET ORAL
Qty: 20 TABLET | Refills: 0 | Status: SHIPPED | OUTPATIENT
Start: 2021-06-19 | End: 2021-12-06

## 2021-06-19 RX ORDER — ONDANSETRON 4 MG/1
4 TABLET, ORALLY DISINTEGRATING ORAL EVERY 6 HOURS PRN
Qty: 10 TABLET | Refills: 0 | Status: SHIPPED | OUTPATIENT
Start: 2021-06-19 | End: 2021-06-22

## 2021-06-19 NOTE — TELEPHONE ENCOUNTER
NO PROTOCOL     Advised urgent care today. Patient states his mom will take him. Patient states he woke up this morning with a swollen left ankle and looks \"bruised. \"  Patient's mom concerned and had patient call.   Due to patient's disabilities, he

## 2021-06-19 NOTE — ED PROVIDER NOTES
Patient Seen in: [facility]    History   Patient presents with:  Leg or Foot Injury    Stated Complaint: left ankle pain /nausea    HPI    HPI: Mel Maher is a 31year old male who presents with chief complaint of left ankle pain and left monika for Pain. PEG 3350 17 g Oral Powd Pack,  Take 17 g by mouth daily. Patient not taking: Reported on 4/27/2021    VITAMIN D, ERGOCALCIFEROL, OR,  Take by mouth daily.          Family History   Problem Relation Age of Onset   • No Known Problems Father    • noted. No guarding or peritoneal signs. Genitourinary: Not examined. Lymphatic: No gross lymphadenopathy noted. Musculoskeletal: Left lower extremity-Left lower extremity without acute bony deformity.   Positive swelling, ecchymosis and tenderness to pal reviewed by this provider–no acute fracture. Physical exam remained stable over serial reexaminations as previously documented. Results reviewed with patient. Patient declined offered crutches and walker.     I have given the patient instructions reg

## 2021-06-22 ENCOUNTER — OFFICE VISIT (OUTPATIENT)
Dept: FAMILY MEDICINE CLINIC | Facility: CLINIC | Age: 26
End: 2021-06-22
Payer: MEDICARE

## 2021-06-22 VITALS
DIASTOLIC BLOOD PRESSURE: 83 MMHG | BODY MASS INDEX: 21 KG/M2 | HEIGHT: 64 IN | HEART RATE: 88 BPM | SYSTOLIC BLOOD PRESSURE: 141 MMHG | TEMPERATURE: 98 F

## 2021-06-22 DIAGNOSIS — M21.372 FOOT DROP, BILATERAL: ICD-10-CM

## 2021-06-22 DIAGNOSIS — M25.472 LEFT ANKLE SWELLING: ICD-10-CM

## 2021-06-22 DIAGNOSIS — Q05.9 SPINA BIFIDA, UNSPECIFIED HYDROCEPHALUS PRESENCE, UNSPECIFIED SPINAL REGION (HCC): ICD-10-CM

## 2021-06-22 DIAGNOSIS — M21.371 FOOT DROP, BILATERAL: ICD-10-CM

## 2021-06-22 DIAGNOSIS — M25.572 ACUTE LEFT ANKLE PAIN: Primary | ICD-10-CM

## 2021-06-22 PROCEDURE — 99214 OFFICE O/P EST MOD 30 MIN: CPT | Performed by: FAMILY MEDICINE

## 2021-06-22 NOTE — PROGRESS NOTES
Patient ID: Harmony Bonlila is a 31year old male. HPI  Patient presents with:  Urgent Care F/u    Last seen by me on 9/21/2020. Pt presented to [facility] on 6/19/2021 with left foot and left ankle pain; I reviewed the note.  Pt has spina bif pain.   Musculoskeletal: Positive for arthralgias.            Medical History:      Past Medical History:   Diagnosis Date   • Depression    • Other ill-defined conditions(239.89)     shunt from hydrocephalus   • PONV (postoperative nausea and vomiting) laterally with mild tenderness to palpation. Mild minimal redness over lateral malleolus but without tenderness at this time. There is no skin breakdown. No cellulitis at either of these spots. .     Vitals reviewed.            Assessment/Plan:      Diagno

## 2021-07-13 ENCOUNTER — NURSE TRIAGE (OUTPATIENT)
Dept: FAMILY MEDICINE CLINIC | Facility: CLINIC | Age: 26
End: 2021-07-13

## 2021-07-13 ENCOUNTER — HOSPITAL ENCOUNTER (OUTPATIENT)
Age: 26
Discharge: HOME | End: 2021-07-13
Payer: MEDICARE

## 2021-07-13 ENCOUNTER — TELEPHONE (OUTPATIENT)
Dept: OPHTHALMOLOGY | Facility: CLINIC | Age: 26
End: 2021-07-13

## 2021-07-13 VITALS
DIASTOLIC BLOOD PRESSURE: 86 MMHG | RESPIRATION RATE: 18 BRPM | BODY MASS INDEX: 21.54 KG/M2 | HEIGHT: 64 IN | TEMPERATURE: 99 F | OXYGEN SATURATION: 97 % | HEART RATE: 74 BPM | WEIGHT: 126.19 LBS | SYSTOLIC BLOOD PRESSURE: 152 MMHG

## 2021-07-13 DIAGNOSIS — H00.014 HORDEOLUM OF LEFT UPPER EYELID, UNSPECIFIED HORDEOLUM TYPE: Primary | ICD-10-CM

## 2021-07-13 PROCEDURE — 99212 OFFICE O/P EST SF 10 MIN: CPT | Performed by: NURSE PRACTITIONER

## 2021-07-13 NOTE — TELEPHONE ENCOUNTER
Action Requested: Summary for Provider     []  Critical Lab, Recommendations Needed  [] Need Additional Advice  []   FYI    []   Need Orders  [] Need Medications Sent to Pharmacy  []  Other     SUMMARY: Patient requesting in office appt with Dr. Loi Alford

## 2021-07-13 NOTE — TELEPHONE ENCOUNTER
I spoke to patient who was seen at Urgent care and diagnosed with a Hordeolum JAMISON. Per Dr. Yobany Acuña we advised patient to use warm compresses 30-40 times a day for at least 1 week. Patient will call back to schedule appointment if bump does not improve.

## 2021-07-13 NOTE — ED PROVIDER NOTES
Patient Seen in: [facility]      History   Patient presents with: Eye Visual Problem    Stated Complaint: inflammed lt eye    HPI/Subjective:   HPI    This is a 41-year-old male presenting for upper eyelid swelling.   Patient states, he notic Uncorrected  Left Eye Chart Acuity: 20/50, Uncorrected    Physical Exam  Vitals and nursing note reviewed. Constitutional:       Appearance: Normal appearance. HENT:      Head: Normocephalic.       Right Ear: External ear normal.      Left Ear: External type  (primary encounter diagnosis)     Disposition:  Discharge  7/13/2021 12:24 pm    Follow-up:  Mitul Coppola MD  [address] [place] [address] [address]  [phone]    Schedule an appointment as soon as possible for a visit in 1 day  Re

## 2021-09-07 DIAGNOSIS — N31.9 NEUROGENIC BLADDER: ICD-10-CM

## 2021-09-07 DIAGNOSIS — Q05.9 SPINA BIFIDA, UNSPECIFIED HYDROCEPHALUS PRESENCE, UNSPECIFIED SPINAL REGION (HCC): ICD-10-CM

## 2021-09-07 RX ORDER — OXYBUTYNIN CHLORIDE 15 MG/1
15 TABLET, EXTENDED RELEASE ORAL DAILY
Qty: 90 TABLET | Refills: 1 | Status: SHIPPED | OUTPATIENT
Start: 2021-09-07

## 2021-09-07 NOTE — TELEPHONE ENCOUNTER
Refill passed per [facility] protocol.   Requested Prescriptions   Pending Prescriptions Disp Refills    OXYBUTYNIN CHLORIDE ER 15 MG Oral Tablet 24 Hr [Pharmacy Med Name: Oxybutynin Chloride Er 24hr 15 Mg Tab Zydu] 90 tablet 0     Sig: Take 1 tablet (

## 2021-09-19 ENCOUNTER — HOSPITAL ENCOUNTER (OUTPATIENT)
Age: 26
Discharge: ANOTHER HOSPITAL | End: 2021-09-19
Payer: MEDICARE

## 2021-09-19 VITALS
HEART RATE: 100 BPM | RESPIRATION RATE: 16 BRPM | TEMPERATURE: 99 F | DIASTOLIC BLOOD PRESSURE: 95 MMHG | SYSTOLIC BLOOD PRESSURE: 145 MMHG

## 2021-09-19 DIAGNOSIS — R19.7 DIARRHEA, UNSPECIFIED TYPE: ICD-10-CM

## 2021-09-19 DIAGNOSIS — R10.9 FLANK PAIN: Primary | ICD-10-CM

## 2021-09-19 PROCEDURE — 99213 OFFICE O/P EST LOW 20 MIN: CPT | Performed by: NURSE PRACTITIONER

## 2021-09-19 NOTE — ED PROVIDER NOTES
Patient Seen in: [facility]      History   Patient presents with:  Urinary Symptoms  Back Pain    Stated Complaint: uti sx, back and rib pain    Subjective:   HPI    This is a 23-year-old male with a history of spina bifida who presents with midline. Eyes:      General: Lids are normal.      Extraocular Movements: Extraocular movements intact. Conjunctiva/sclera: Conjunctivae normal.      Pupils: Pupils are equal, round, and reactive to light.    Cardiovascular:      Rate and Rhythm: Nor

## 2021-10-04 ENCOUNTER — HOSPITAL ENCOUNTER (EMERGENCY)
Facility: HOSPITAL | Age: 26
Discharge: HOME | End: 2021-10-04
Attending: EMERGENCY MEDICINE
Payer: MEDICARE

## 2021-10-04 ENCOUNTER — HOSPITAL ENCOUNTER (OUTPATIENT)
Age: 26
Discharge: ANOTHER HOSPITAL | End: 2021-10-04
Payer: MEDICARE

## 2021-10-04 VITALS
WEIGHT: 125 LBS | HEART RATE: 85 BPM | RESPIRATION RATE: 20 BRPM | SYSTOLIC BLOOD PRESSURE: 162 MMHG | BODY MASS INDEX: 21.34 KG/M2 | HEIGHT: 64 IN | OXYGEN SATURATION: 98 % | DIASTOLIC BLOOD PRESSURE: 103 MMHG | TEMPERATURE: 98 F

## 2021-10-04 DIAGNOSIS — R33.9: ICD-10-CM

## 2021-10-04 DIAGNOSIS — T83.511A URINARY TRACT INFECTION ASSOCIATED WITH CATHETERIZATION OF URINARY TRACT, UNSPECIFIED INDWELLING URINARY CATHETER TYPE, INITIAL ENCOUNTER (HCC): Primary | ICD-10-CM

## 2021-10-04 DIAGNOSIS — N39.0 URINARY TRACT INFECTION ASSOCIATED WITH CATHETERIZATION OF URINARY TRACT, UNSPECIFIED INDWELLING URINARY CATHETER TYPE, INITIAL ENCOUNTER (HCC): Primary | ICD-10-CM

## 2021-10-04 DIAGNOSIS — R10.9 FLANK PAIN: Primary | ICD-10-CM

## 2021-10-04 PROCEDURE — 80048 BASIC METABOLIC PNL TOTAL CA: CPT | Performed by: EMERGENCY MEDICINE

## 2021-10-04 PROCEDURE — 81001 URINALYSIS AUTO W/SCOPE: CPT | Performed by: EMERGENCY MEDICINE

## 2021-10-04 PROCEDURE — 99284 EMERGENCY DEPT VISIT MOD MDM: CPT

## 2021-10-04 PROCEDURE — 85025 COMPLETE CBC W/AUTO DIFF WBC: CPT | Performed by: EMERGENCY MEDICINE

## 2021-10-04 PROCEDURE — 99215 OFFICE O/P EST HI 40 MIN: CPT | Performed by: NURSE PRACTITIONER

## 2021-10-04 PROCEDURE — 96365 THER/PROPH/DIAG IV INF INIT: CPT

## 2021-10-04 PROCEDURE — 87186 SC STD MICRODIL/AGAR DIL: CPT | Performed by: EMERGENCY MEDICINE

## 2021-10-04 PROCEDURE — 87077 CULTURE AEROBIC IDENTIFY: CPT | Performed by: EMERGENCY MEDICINE

## 2021-10-04 PROCEDURE — 87086 URINE CULTURE/COLONY COUNT: CPT

## 2021-10-04 PROCEDURE — 81001 URINALYSIS AUTO W/SCOPE: CPT

## 2021-10-04 PROCEDURE — 87086 URINE CULTURE/COLONY COUNT: CPT | Performed by: EMERGENCY MEDICINE

## 2021-10-04 RX ORDER — CEPHALEXIN 500 MG/1
500 CAPSULE ORAL 2 TIMES DAILY
Qty: 20 CAPSULE | Refills: 0 | Status: SHIPPED | OUTPATIENT
Start: 2021-10-04 | End: 2021-10-14

## 2021-10-04 NOTE — ED INITIAL ASSESSMENT (HPI)
Pt complaining of urinary symptoms since 1pm.  Pt had increase urinary frequency with only discharge coming from the penis. Pt has left lower abd pain.

## 2021-10-04 NOTE — ED INITIAL ASSESSMENT (HPI)
Pt to ED from [facility] with c/o urinary retention and difficulty with straight cath at home. Pt concerned for UTI. Pt denies fever. Pt states last successful straight cath at noon today. Pt denies hematuria. Pt denies fall or trauma.  Pt with hx of spina bifida

## 2021-10-04 NOTE — ED PROVIDER NOTES
Patient Seen in: [facility]      History   Patient presents with:  Urinary Symptoms    Stated Complaint: urinary symptoms; rib/abd pain    Subjective:   HPI    Spina bifida, cannot urinate, pus from penis, tachy, flank pain, states recent inc 88   Temp (P) 98.7 °F (37.1 °C) (Temporal)   Resp (P) 18   SpO2 (P) 100%         Physical Exam  Constitutional:       Appearance: He is well-developed. HENT:      Head: Normocephalic and atraumatic.    Eyes:      Extraocular Movements: Extraocular movemen

## 2021-10-05 NOTE — ED QUICK NOTES
Patient notes significant relief post straight cath. Per patient he was having difficulties with his straight caths today. Notes he did not have lubrication when attempting to straight cath today as well, so unsure if that contributed to his issues.

## 2021-10-06 NOTE — ED PROVIDER NOTES
Physician Discharge Summary    Patient ID:  Darion Buckner  [id number]  79 year old  1937    Admit date: 9/4/2017    Discharge date and time:  9/7/2017  12:30    Admitting Physician: Raul Hinds MD     Discharge Physician: Jose David Louise MD    Discharge Diagnoses: DEANNA    Concurrent Diagnoses:  Patient Active Problem List   Diagnosis   • Dyslipidemia (high LDL; low HDL)   • Essential hypertension, benign   • TIA (transient ischemic attack)   • Acute on chronic combined systolic and diastolic congestive heart failure (CMS/HCC)   • Leg cramps, sleep related   • Controlled type 2 diabetes mellitus with diabetic nephropathy, with long-term current use of insulin (CMS/HCC)   • Coronary artery disease involving native coronary artery of native heart without angina pectoris   • Chronic kidney disease, stage IV (severe) (CMS/HCC)   • Paroxysmal atrial fibrillation (CMS/HCC)   • Chronic constipation   • Lichenification   • Leg edema, left   • Hypovolemia with active loss of fluid       Admission Condition: fair    Discharged Condition: fair    Hospital Course:     79 year old male with past medical history of heart failure with reduced EF and CKD IV admitted to the hospital with an acute kidney injury.  For details on admission please refer to the initial history and physical on day one of her hospital stay.  The following acute medical problems were addressed during this admission:    Acute kidney injury    Likely due to overdiuresis, lightly fluid overloaded in the periphery, started on dominick hydration with Creatinine improving from 4.7 to 4.2, his base line is around 3.7. Renal was consulted and the patient is almost at the point of needing RRT.  The patient prefers to wait to see his primary nephrologist for final decision.  Will continue outpatient lasix 80 mg BID.     Heart failure with reduced EF    Nor on exacerbation. EP consulted to evaluate the need for an AICD. Final decision to be made  Patient Seen in: [facility] Emergency Department      History   Patient presents with:  Abdomen/Flank Pain    Stated Complaint: Urine issues, abd discomfort    Subjective:   HPI    31year old male with h/o spina bifida who performs straight cath as an outpatient.    DM/HTN  Continued home medication regimen    Atrial fibrillation/flutter  On rate control with diltiazem. Not on anticoagulation currently due to high bleeding risks, further decisions to be made by PCP and primary cardiologist.              Pending issues to be followed up by PCP   DEANNA    New Medications Started During Hospitalization: None    Medications Discontinued During Hospitalization: None      Discharge Medications:     Summary of your Discharge Medications      Take these Medications      Details   allopurinol 300 MG tablet  Commonly known as:  ZYLOPRIM   Take 1 tablet by mouth daily. Take for gout     aspirin 81 MG tablet   Take 81 mg by mouth daily.     atorvastatin 80 MG tablet  Commonly known as:  LIPITOR   Segundo Gutierrez, Sat     carvedilol 12.5 MG tablet  Commonly known as:  COREG   12.5 mg in the AM, 12.5 mg at noon, 18.75 mg (1.5 tablets) in the PM  Comment:  Patient will contact when refill is needed.     dilTIAZem 120 MG 24 hr capsule  Commonly known as:  CARDIZEM CD   Take 1 capsule by mouth daily (at noon).  Comment:  DOSE ADJUSTMENT     furosemide 40 MG tablet  Commonly known as:  LASIX   2 bid- higher. Refill not needed now     hydrALAZINE 25 MG tablet  Commonly known as:  APRESOLINE   Take 1 tablet by mouth 3  times a day     insulin lispro protamine-insulin lispro (75-25) 100 UNIT/ML pen-injector  Commonly known as:  HUMALOG MIX 75/25 KWIKPEN   20 QAM AND 20 QPM AC  Dx  E11.9- HIGHER DOSE     iron polysaccharide complex 150 MG capsule  Commonly known as:  POLY-IRON 150   Take 1 capsule by mouth 2 times daily.     isosorbide mononitrate 60 MG 24 hr tablet  Commonly known as:  IMDUR   Take 1 tablet by mouth  daily     mupirocin 2 % ointment  Commonly known as:  BACTROBAN   Apply to to affected area BID     tiZANidine 2 MG tablet  Commonly known as:  ZANAFLEX   1-2 QHS     triamcinolone 0.1 % cream  Commonly known as:  ARISTOCORT   Apply bid to rash for 2-3 weeks then prn    reviewed. Constitutional:       General: He is not in acute distress. Appearance: He is well-developed. He is not toxic-appearing or diaphoretic. HENT:      Head: Normocephalic and atraumatic.    Eyes:      Conjunctiva/sclera: Conjunctivae normal.   vitamin B-1 100 MG tablet   Take 100 mg by mouth daily.     vitamin C 500 MG tablet   Take 1 tablet by mouth daily.     Vitamin D 2000 units tablet   Take 2,000 Units by mouth daily.            Consults: nephrology, electrophysiology    Procedures performed Us Renal Complete (comp Urinary System)    Result Date: 9/5/2017  EXAM:US RENAL COMPLETE (COMPLETE URINARY SYSTEM) INDICATION: Acute on chronic renal failure COMPARISON: CT scan 12/14/2015 TECHNIQUE: Ultrasound kidneys complete FINDINGS: Right kidney measures 10.7 cm in length and left kidney measures 11.4 cm in length. Numerous bilateral renal cysts are present. Cysts partially replaces the normal renal parenchyma. The residual renal parenchyma is echogenic suggesting underlying medical renal disease. No hydronephrosis. The largest cyst on the right measures 3.2 cm and the largest cyst on the left measures 5.6 cm. No solid renal mass identified. The urinary bladder is decompressed. Both ureteral jets are identified.     IMPRESSION: 1. Extensive bilateral cystic change in the kidneys could reflect underlying polycystic kidney disease. Increased echogenicity in the renal parenchyma consistent with underlying medical renal disease. 2. No hydronephrosis.     Xr Chest Pa And Lateral    Result Date: 9/4/2017  EXAM:  XR CHEST PA AND LATERAL CLINICAL HISTORY:  eval for dry cough TECHNIQUE:  XR CHEST PA AND LATERAL COMPARISON:  July 24, 2017 FINDINGS: Lung parenchyma:  Unremarkable Pulmonary vascular pattern:  Evenly distributed Heart and mediastinum:  Unremarkable Pleural margins:  Unremarkable Osseous structures and subcutaneous tissues:  Unremarkable Miscellaneous findings:  Cardiac generator as well as respective leads are in expected position.     IMPRESSION: No acute cardiopulmonary process Linear reticular interstitial markings right lung base, remaining stable as compared to July 24, 2017, as well as December 15, 2015. Findings may indicate underlying  11.1 (*)     All other components within normal limits   BASIC METABOLIC PANEL (8) - Normal   CBC WITH DIFFERENTIAL WITH PLATELET    Narrative: The following orders were created for panel order CBC With Differential With Platelet.   Procedure interstitial fibrosis.      Discharge Labs:    Recent Labs  Lab 09/07/17  0400 09/06/17  0405 09/05/17  0352 09/04/17  1540 09/01/17  1018   SODIUM 144 143 140 138 141   POTASSIUM 3.4 3.5 3.4 3.9 4.0   CHLORIDE 102 101 96* 94* 96*   CO2 30 28 30 33* 35*   * 161* 168* 179* 195*   CREATININE 4.15* 4.34* 4.71* 5.27* 5.53*   GLUCOSE 56* 67 191* 277* 277*   WBC  --   --  13.0* 15.2*  --    HGB  --   --  13.0 13.6 13.5   HCT  --   --  38.9* 40.1 41.1   PLT  --   --  138* 128*  --    PT  --   --  11.4  --   --    INR  --   --  1.1  --   --            Discharge Exam:  Blood pressure 138/70, pulse 65, temperature 97.7 °F (36.5 °C), temperature source Oral, resp. rate 18, height 6' 1\" (1.854 m), weight 82.4 kg, SpO2 99 %.    GEN: NAD. AOx3  CHEST: Lungs CTA and percussion anteriorly and posteriorly. No rales, wheezing or crackles.  CV: RRR. S1, S2 normal. No S3, S4. No murmurs, rubs, gallops.   ABDOMEN: Soft, non-tender. No rebound or guarding. No masses or hepatosplenomegaly. No hernias.  EXT: No cyanosis or edema. No joint effusion.      Disposition: Home    Patient Instructions:     Activity:activity as tolerated  Diet: Renal  Wound Care: None needed     Code status: Full Resuscitation    Darrius Smiley MD  [address] [facility]    [facility] [address]  [phone]    On 9/25/2017  at 3:20 pm to discuss ICD    Renal appointment Dr. Luo    Schedule an appointment as soon as possible for a visit in 1 week      Felix López MD  [address] [facility]    [facility] [address] [phone]    Schedule an appointment as soon as possible for a visit in 2 weeks            .    Time spent on discharge was greater then 30 minutes    Signed:  Jose David Louise MD  9/7/2017  12:14 PM

## 2021-11-18 ENCOUNTER — OFFICE VISIT (OUTPATIENT)
Dept: NEUROSURGERY | Age: 26
End: 2021-11-18

## 2021-11-18 VITALS
DIASTOLIC BLOOD PRESSURE: 89 MMHG | SYSTOLIC BLOOD PRESSURE: 145 MMHG | HEART RATE: 102 BPM | WEIGHT: 131.5 LBS | HEIGHT: 68 IN | BODY MASS INDEX: 19.93 KG/M2

## 2021-11-18 DIAGNOSIS — Q05.4: Primary | ICD-10-CM

## 2021-11-18 PROBLEM — M25.569 KNEE PAIN: Status: ACTIVE | Noted: 2021-11-18

## 2021-11-18 PROBLEM — N13.70 VESICOURETERAL REFLUX: Status: ACTIVE | Noted: 2021-11-18

## 2021-11-18 PROCEDURE — 99213 OFFICE O/P EST LOW 20 MIN: CPT | Performed by: NEUROLOGICAL SURGERY

## 2021-11-18 RX ORDER — OXYBUTYNIN CHLORIDE 15 MG/1
15 TABLET, EXTENDED RELEASE ORAL DAILY
COMMUNITY
Start: 2021-09-07

## 2021-11-18 RX ORDER — CHOLECALCIFEROL (VITAMIN D3) 125 MCG
2000 CAPSULE ORAL DAILY
COMMUNITY
Start: 2021-09-07

## 2021-11-18 RX ORDER — IBUPROFEN 600 MG/1
TABLET ORAL
Status: ON HOLD | COMMUNITY
Start: 2021-06-19 | End: 2023-11-03 | Stop reason: ALTCHOICE

## 2021-11-18 ASSESSMENT — ENCOUNTER SYMPTOMS
GASTROINTESTINAL NEGATIVE: 1
RESPIRATORY NEGATIVE: 1
CONSTITUTIONAL NEGATIVE: 1
ALLERGIC/IMMUNOLOGIC NEGATIVE: 1
EYES NEGATIVE: 1
PSYCHIATRIC NEGATIVE: 1
ENDOCRINE NEGATIVE: 1
HEMATOLOGIC/LYMPHATIC NEGATIVE: 1

## 2021-12-03 ENCOUNTER — HOSPITAL ENCOUNTER (OUTPATIENT)
Age: 26
Discharge: HOME | End: 2021-12-03
Payer: MEDICARE

## 2021-12-03 VITALS
SYSTOLIC BLOOD PRESSURE: 154 MMHG | TEMPERATURE: 99 F | OXYGEN SATURATION: 96 % | DIASTOLIC BLOOD PRESSURE: 86 MMHG | RESPIRATION RATE: 18 BRPM | HEART RATE: 105 BPM

## 2021-12-03 DIAGNOSIS — M54.50 ACUTE LEFT-SIDED LOW BACK PAIN WITHOUT SCIATICA: Primary | ICD-10-CM

## 2021-12-03 PROCEDURE — 99213 OFFICE O/P EST LOW 20 MIN: CPT | Performed by: PHYSICIAN ASSISTANT

## 2021-12-03 RX ORDER — IBUPROFEN 600 MG/1
600 TABLET ORAL ONCE
Status: COMPLETED | OUTPATIENT
Start: 2021-12-03 | End: 2021-12-03

## 2021-12-03 RX ORDER — TIZANIDINE 4 MG/1
4 TABLET ORAL 3 TIMES DAILY
Qty: 21 TABLET | Refills: 0 | Status: SHIPPED | OUTPATIENT
Start: 2021-12-03 | End: 2021-12-10

## 2021-12-03 RX ORDER — LIDOCAINE 50 MG/G
1 PATCH TOPICAL EVERY 24 HOURS
Qty: 10 PATCH | Refills: 0 | Status: SHIPPED | OUTPATIENT
Start: 2021-12-03 | End: 2021-12-13

## 2021-12-04 NOTE — ED PROVIDER NOTES
Patient Seen in: [facility]      History   Patient presents with:  Back Pain    Stated Complaint: back pain    Subjective:   HPI    30 yo male with PMH of spina bifida, depression here for evaluation of L sided low back pain.   Pain started 4- distress. HENT:      Head: Normocephalic and atraumatic.       Right Ear: External ear normal.      Left Ear: External ear normal.      Nose: Nose normal.      Mouth/Throat:      Mouth: Mucous membranes are moist.   Eyes:      Extraocular Movements: [address]  [address]  [facility] [address]  [phone]      contact monday if pain persists          Medications Prescribed:  Current Discharge Medication List    START taking these medications    tiZANidine 4 MG Oral Tab  Take 1 tablet (4 mg total) by mouth 3

## 2021-12-06 ENCOUNTER — OFFICE VISIT (OUTPATIENT)
Dept: FAMILY MEDICINE CLINIC | Facility: CLINIC | Age: 26
End: 2021-12-06
Payer: MEDICARE

## 2021-12-06 VITALS
BODY MASS INDEX: 22.26 KG/M2 | WEIGHT: 130.38 LBS | SYSTOLIC BLOOD PRESSURE: 141 MMHG | DIASTOLIC BLOOD PRESSURE: 90 MMHG | HEIGHT: 64 IN | HEART RATE: 101 BPM | TEMPERATURE: 98 F

## 2021-12-06 DIAGNOSIS — N31.9 NEUROGENIC BLADDER: ICD-10-CM

## 2021-12-06 DIAGNOSIS — Q05.9 SPINA BIFIDA, UNSPECIFIED HYDROCEPHALUS PRESENCE, UNSPECIFIED SPINAL REGION (HCC): ICD-10-CM

## 2021-12-06 DIAGNOSIS — R20.2 PARESTHESIAS IN LEFT HAND: ICD-10-CM

## 2021-12-06 DIAGNOSIS — R10.9 LEFT FLANK PAIN: Primary | ICD-10-CM

## 2021-12-06 DIAGNOSIS — R82.81 PYURIA: ICD-10-CM

## 2021-12-06 PROCEDURE — 99214 OFFICE O/P EST MOD 30 MIN: CPT | Performed by: FAMILY MEDICINE

## 2021-12-06 PROCEDURE — 81003 URINALYSIS AUTO W/O SCOPE: CPT | Performed by: FAMILY MEDICINE

## 2021-12-06 RX ORDER — CIPROFLOXACIN 500 MG/1
500 TABLET, FILM COATED ORAL 2 TIMES DAILY
Qty: 14 TABLET | Refills: 0 | Status: SHIPPED | OUTPATIENT
Start: 2021-12-06 | End: 2021-12-13

## 2021-12-06 RX ORDER — MELATONIN
COMMUNITY
Start: 2021-12-04 | End: 2021-12-06

## 2021-12-06 NOTE — PROGRESS NOTES
Patient ID: Caprice Stewart is a 31year old male. HPI  Patient presents with:  Urgent Care F/u    Last seen by me on 6/22/2021. Pt presented to [facility] on 12/3/2021 with left sided back pain; I reviewed the note.  Pt's back pain began 5 d Positive for numbness.            Medical History:      Past Medical History:   Diagnosis Date   • Depression    • Other ill-defined conditions(604.45)     shunt from hydrocephalus   • PONV (postoperative nausea and vomiting)     if masked used   • S/P  s Head: Normocephalic. Eyes: Conjunctivae and EOM are normal.   Cardiovascular: Normal rate, regular rhythm and normal heart sounds. Pulmonary/Chest: Effort normal and breath sounds normal. No respiratory distress.    Neurological: Patient is alert and patient/family member understands and agrees to plan. No follow-ups on file. There are no Patient Instructions on file for this visit.     Harrison Mcgee    12/6/2021    By signing my name below, Katelyn Franklin,  attest that this documentati

## 2021-12-07 ENCOUNTER — TELEPHONE (OUTPATIENT)
Dept: FAMILY MEDICINE CLINIC | Facility: CLINIC | Age: 26
End: 2021-12-07

## 2021-12-07 NOTE — TELEPHONE ENCOUNTER
It should not be an issue. The tizanidine is only a temporary medication. This would be if you are taking these 2 medications on a chronic basis.

## 2021-12-07 NOTE — TELEPHONE ENCOUNTER
Dr. Chelo Lowe:   Patient wants to make sure he can take cipro while on Tizanidine. He states when his mother picked up medication, the pharmacy told her that he should hold off take tizanidine. He just wants to make sure with you also.      Patient also shelly

## 2021-12-08 ENCOUNTER — TELEPHONE (OUTPATIENT)
Dept: FAMILY MEDICINE CLINIC | Facility: CLINIC | Age: 26
End: 2021-12-08

## 2021-12-08 NOTE — TELEPHONE ENCOUNTER
Spoke with patient ( verified)--continues with back pain--is taking Cipro as prescribed, but has not taken Tizanidine since Monday.  Again, relayed to patient Dr. Kelli Clark did state it is ok to take both Cipro and Tizanidine on a temporary basis (see yeste

## 2021-12-09 ENCOUNTER — TELEPHONE (OUTPATIENT)
Dept: FAMILY MEDICINE CLINIC | Facility: CLINIC | Age: 26
End: 2021-12-09

## 2021-12-09 DIAGNOSIS — Q05.9 SPINA BIFIDA, UNSPECIFIED HYDROCEPHALUS PRESENCE, UNSPECIFIED SPINAL REGION (HCC): Primary | ICD-10-CM

## 2021-12-09 DIAGNOSIS — N31.9 NEUROGENIC BLADDER: ICD-10-CM

## 2021-12-09 NOTE — TELEPHONE ENCOUNTER
I spoke to patient. Refer to result notes. I will generate letter.       Ciprofloxacin should be working as the urine culture shows E. coli.  Take the tizanidine for your back pain.  See me next Tuesday.  I will have the staff write a letter stating no work

## 2021-12-09 NOTE — TELEPHONE ENCOUNTER
Patient requesting a DME order to [facility] for saline and syringes for urinary catheter irrigation. Please advise. Order pended.

## 2021-12-14 ENCOUNTER — OFFICE VISIT (OUTPATIENT)
Dept: FAMILY MEDICINE CLINIC | Facility: CLINIC | Age: 26
End: 2021-12-14
Payer: MEDICARE

## 2021-12-14 VITALS
HEIGHT: 64 IN | WEIGHT: 132.38 LBS | SYSTOLIC BLOOD PRESSURE: 124 MMHG | HEART RATE: 88 BPM | BODY MASS INDEX: 22.6 KG/M2 | TEMPERATURE: 98 F | DIASTOLIC BLOOD PRESSURE: 77 MMHG

## 2021-12-14 DIAGNOSIS — R20.2 PARESTHESIAS IN LEFT HAND: ICD-10-CM

## 2021-12-14 DIAGNOSIS — Q05.9 SPINA BIFIDA, UNSPECIFIED HYDROCEPHALUS PRESENCE, UNSPECIFIED SPINAL REGION (HCC): ICD-10-CM

## 2021-12-14 DIAGNOSIS — N30.90 CYSTITIS: Primary | ICD-10-CM

## 2021-12-14 PROCEDURE — 99214 OFFICE O/P EST MOD 30 MIN: CPT | Performed by: FAMILY MEDICINE

## 2021-12-14 NOTE — PROGRESS NOTES
Patient ID: Yesenia Toth is a 31year old male. HPI  Patient presents with:  Note: work note if cleared from back pain   Referral    Last seen by me on 12/6/2021. Pt states he is feeling better since his visit on 12/6/2021.  Pt reports the muscle rela Medical History:      Past Medical History:   Diagnosis Date   • Depression    • Other ill-defined conditions(974.89)     shunt from hydrocephalus   • PONV (postoperative nausea and vomiting)     if masked used   • S/P  shunt    • [facility] tenderness. Left hand: There is no swelling, redness, coolness of the fingers. He has good pulses of the hand. Brisk capillary refill. Vitals reviewed.            Assessment/Plan:      Diagnoses and all orders for this visit:    Cystitis  Doing incredi

## 2022-01-07 ENCOUNTER — TELEPHONE (OUTPATIENT)
Dept: FAMILY MEDICINE CLINIC | Facility: CLINIC | Age: 27
End: 2022-01-07

## 2022-01-07 NOTE — TELEPHONE ENCOUNTER
[place] with [facility] faxed a prescription request for catheter, syringes, lubricant, and sodium chloride.  Please fax the prescriptions to [phone]

## 2022-01-11 NOTE — TELEPHONE ENCOUNTER
Patient calling back, states that he just spoke to [facility] today like 5 minutes ago and they are requesting to fax the orders at [phone]. OFFICE STAFF=please re fax. Thanks.

## 2022-02-14 ENCOUNTER — NURSE TRIAGE (OUTPATIENT)
Dept: FAMILY MEDICINE CLINIC | Facility: CLINIC | Age: 27
End: 2022-02-14

## 2022-02-14 ENCOUNTER — LAB ENCOUNTER (OUTPATIENT)
Dept: LAB | Age: 27
End: 2022-02-14
Attending: FAMILY MEDICINE
Payer: MEDICARE

## 2022-02-14 ENCOUNTER — OFFICE VISIT (OUTPATIENT)
Dept: FAMILY MEDICINE CLINIC | Facility: CLINIC | Age: 27
End: 2022-02-14
Payer: MEDICARE

## 2022-02-14 VITALS
HEIGHT: 64 IN | BODY MASS INDEX: 22.53 KG/M2 | DIASTOLIC BLOOD PRESSURE: 80 MMHG | HEART RATE: 96 BPM | SYSTOLIC BLOOD PRESSURE: 140 MMHG | WEIGHT: 132 LBS | TEMPERATURE: 97 F

## 2022-02-14 DIAGNOSIS — R10.9 LEFT LATERAL ABDOMINAL PAIN: Primary | ICD-10-CM

## 2022-02-14 DIAGNOSIS — M54.50 ACUTE LEFT-SIDED LOW BACK PAIN WITHOUT SCIATICA: ICD-10-CM

## 2022-02-14 DIAGNOSIS — N31.9 NEUROGENIC BLADDER: ICD-10-CM

## 2022-02-14 DIAGNOSIS — R19.7 DIARRHEA, UNSPECIFIED TYPE: ICD-10-CM

## 2022-02-14 DIAGNOSIS — R51.9 HEADACHE, UNSPECIFIED HEADACHE TYPE: ICD-10-CM

## 2022-02-14 LAB
BILIRUB UR QL: NEGATIVE
COLOR UR: YELLOW
GLUCOSE UR-MCNC: NEGATIVE MG/DL
KETONES UR-MCNC: NEGATIVE MG/DL
NITRITE UR QL STRIP.AUTO: NEGATIVE
PH UR: 6.5 [PH] (ref 5–8)
SP GR UR STRIP: 1.02 (ref 1–1.03)
UROBILINOGEN UR STRIP-ACNC: 0.2
WBC #/AREA URNS AUTO: >50 /HPF
WBC CLUMPS UR QL AUTO: PRESENT /HPF

## 2022-02-14 PROCEDURE — 99214 OFFICE O/P EST MOD 30 MIN: CPT | Performed by: FAMILY MEDICINE

## 2022-02-14 PROCEDURE — 87086 URINE CULTURE/COLONY COUNT: CPT

## 2022-02-14 PROCEDURE — 81015 MICROSCOPIC EXAM OF URINE: CPT

## 2022-02-14 PROCEDURE — 81001 URINALYSIS AUTO W/SCOPE: CPT

## 2022-02-14 NOTE — PATIENT INSTRUCTIONS
Start a EcorNaturaSÃ¬ which can help with diarrhea: Bananas, rice, applesauce and toast. Also can try Pedialyte or Gatorade as needed to stay hydrated.

## 2022-02-16 ENCOUNTER — TELEPHONE (OUTPATIENT)
Dept: FAMILY MEDICINE CLINIC | Facility: CLINIC | Age: 27
End: 2022-02-16

## 2022-02-16 RX ORDER — OXYBUTYNIN CHLORIDE 15 MG/1
15 TABLET, EXTENDED RELEASE ORAL DAILY
Qty: 90 TABLET | Refills: 1 | Status: SHIPPED | OUTPATIENT
Start: 2022-02-16

## 2022-02-16 NOTE — TELEPHONE ENCOUNTER
Patient reports viewed ua results via [employer]. Advised urine culture still in process, we will call back once results are received.

## 2022-02-16 NOTE — TELEPHONE ENCOUNTER
Refill passed per [facility] protocol. Requested Prescriptions   Pending Prescriptions Disp Refills    OXYBUTYNIN CHLORIDE ER 15 MG Oral Tablet 24 Hr [Pharmacy Med Name: Oxybutynin Chloride Er 24hr 15 Mg Tab Zydu] 90 tablet 0     Sig: Take 1 tablet (15 mg total) by mouth daily.         Genitourinary Medications Passed - 2/15/2022  7:26 PM        Passed - Patient does not have pulmonary hypertension on problem list        Passed - Appointment in the past 12 or next 3 months            Recent Outpatient Visits              2 days ago Left lateral abdominal pain    [facility], [address], [address], [place], [place]    Office Visit    2 months ago [address], [address], [address], [place]    Office Visit    2 months ago Left flank pain    [facility], [address], [address], [place], [place]    Office Visit    7 months ago Acute left ankle pain    [facility], [address], [address], [place]    Office Visit    9 months ago Acute right ankle pain    [address] MD    Office Visit

## 2022-02-17 NOTE — TELEPHONE ENCOUNTER
Patient calling back regarding urine test. Advised below. He verbalized understanding. Test results now post immediately to your [facility] account. However, your provider may not have the chance to review or comment on them before the results reach you. Our providers review and comment on all test results, normal or otherwise. If you have not heard from our office with comments on your test results within the next several days, please contact us again so we can assist you.

## 2022-03-06 ENCOUNTER — HOSPITAL ENCOUNTER (EMERGENCY)
Facility: HOSPITAL | Age: 27
Discharge: HOME | End: 2022-03-06
Attending: EMERGENCY MEDICINE
Payer: MEDICARE

## 2022-03-06 ENCOUNTER — APPOINTMENT (OUTPATIENT)
Dept: ULTRASOUND IMAGING | Facility: HOSPITAL | Age: 27
End: 2022-03-06
Attending: EMERGENCY MEDICINE
Payer: MEDICARE

## 2022-03-06 ENCOUNTER — HOSPITAL ENCOUNTER (OUTPATIENT)
Age: 27
Discharge: ANOTHER HOSPITAL | End: 2022-03-06
Payer: MEDICARE

## 2022-03-06 ENCOUNTER — APPOINTMENT (OUTPATIENT)
Dept: GENERAL RADIOLOGY | Facility: HOSPITAL | Age: 27
End: 2022-03-06
Attending: EMERGENCY MEDICINE
Payer: MEDICARE

## 2022-03-06 VITALS
DIASTOLIC BLOOD PRESSURE: 110 MMHG | SYSTOLIC BLOOD PRESSURE: 176 MMHG | RESPIRATION RATE: 18 BRPM | HEART RATE: 110 BPM | BODY MASS INDEX: 22.88 KG/M2 | TEMPERATURE: 99 F | WEIGHT: 134 LBS | OXYGEN SATURATION: 97 % | HEIGHT: 64 IN

## 2022-03-06 VITALS
TEMPERATURE: 98 F | DIASTOLIC BLOOD PRESSURE: 95 MMHG | WEIGHT: 134 LBS | OXYGEN SATURATION: 98 % | RESPIRATION RATE: 18 BRPM | SYSTOLIC BLOOD PRESSURE: 146 MMHG | HEART RATE: 102 BPM | BODY MASS INDEX: 23 KG/M2

## 2022-03-06 DIAGNOSIS — R10.9 ABDOMINAL PAIN OF UNKNOWN ETIOLOGY: ICD-10-CM

## 2022-03-06 DIAGNOSIS — M79.89 SWELLING OF LEFT FOOT: Primary | ICD-10-CM

## 2022-03-06 DIAGNOSIS — M79.89 FOOT SWELLING: Primary | ICD-10-CM

## 2022-03-06 DIAGNOSIS — R55 PRE-SYNCOPE: ICD-10-CM

## 2022-03-06 DIAGNOSIS — I10 PRIMARY HYPERTENSION: ICD-10-CM

## 2022-03-06 PROCEDURE — 99205 OFFICE O/P NEW HI 60 MIN: CPT | Performed by: NURSE PRACTITIONER

## 2022-03-06 PROCEDURE — 93971 EXTREMITY STUDY: CPT | Performed by: EMERGENCY MEDICINE

## 2022-03-06 PROCEDURE — 73630 X-RAY EXAM OF FOOT: CPT | Performed by: EMERGENCY MEDICINE

## 2022-03-06 PROCEDURE — 99284 EMERGENCY DEPT VISIT MOD MDM: CPT

## 2022-03-06 RX ORDER — LISINOPRIL 5 MG/1
5 TABLET ORAL DAILY
Qty: 30 TABLET | Refills: 0 | Status: SHIPPED | OUTPATIENT
Start: 2022-03-06 | End: 2022-03-29

## 2022-03-06 NOTE — ED INITIAL ASSESSMENT (HPI)
Patient complains of bilateral foot/anlkle swelling since today, patient states he has spina bifida, denies injury

## 2022-03-06 NOTE — ED INITIAL ASSESSMENT (HPI)
Pt noticed swelling in left foot that he noticed today. Pt states today began having nausea and dizziness with stomach pain. Pt states about 30 minutes into work began feeling like he wanted to pass out.

## 2022-03-07 ENCOUNTER — OFFICE VISIT (OUTPATIENT)
Dept: FAMILY MEDICINE CLINIC | Facility: CLINIC | Age: 27
End: 2022-03-07
Payer: MEDICARE

## 2022-03-07 ENCOUNTER — LAB ENCOUNTER (OUTPATIENT)
Dept: LAB | Age: 27
End: 2022-03-07
Attending: FAMILY MEDICINE
Payer: MEDICARE

## 2022-03-07 VITALS
SYSTOLIC BLOOD PRESSURE: 134 MMHG | WEIGHT: 129.19 LBS | TEMPERATURE: 98 F | HEART RATE: 118 BPM | DIASTOLIC BLOOD PRESSURE: 70 MMHG | BODY MASS INDEX: 22.06 KG/M2 | HEIGHT: 64 IN

## 2022-03-07 DIAGNOSIS — I10 ESSENTIAL HYPERTENSION: ICD-10-CM

## 2022-03-07 DIAGNOSIS — R20.0 NUMBNESS IN BOTH LEGS: ICD-10-CM

## 2022-03-07 DIAGNOSIS — R22.42 LOCALIZED SWELLING OF LEFT FOOT: Primary | ICD-10-CM

## 2022-03-07 DIAGNOSIS — Q05.9 SPINA BIFIDA, UNSPECIFIED HYDROCEPHALUS PRESENCE, UNSPECIFIED SPINAL REGION (HCC): ICD-10-CM

## 2022-03-07 LAB
ANION GAP SERPL CALC-SCNC: 5 MMOL/L (ref 0–18)
BUN BLD-MCNC: 14 MG/DL (ref 7–18)
BUN/CREAT SERPL: 15.6 (ref 10–20)
CALCIUM BLD-MCNC: 9.4 MG/DL (ref 8.5–10.1)
CHLORIDE SERPL-SCNC: 103 MMOL/L (ref 98–112)
CO2 SERPL-SCNC: 31 MMOL/L (ref 21–32)
CREAT BLD-MCNC: 0.9 MG/DL
FASTING STATUS PATIENT QL REPORTED: NO
GLUCOSE BLD-MCNC: 94 MG/DL (ref 70–99)
OSMOLALITY SERPL CALC.SUM OF ELEC: 288 MOSM/KG (ref 275–295)
POTASSIUM SERPL-SCNC: 4.2 MMOL/L (ref 3.5–5.1)
SODIUM SERPL-SCNC: 139 MMOL/L (ref 136–145)

## 2022-03-07 PROCEDURE — 36415 COLL VENOUS BLD VENIPUNCTURE: CPT

## 2022-03-07 PROCEDURE — 99214 OFFICE O/P EST MOD 30 MIN: CPT | Performed by: FAMILY MEDICINE

## 2022-03-07 PROCEDURE — 80048 BASIC METABOLIC PNL TOTAL CA: CPT

## 2022-03-07 PROCEDURE — 84244 ASSAY OF RENIN: CPT

## 2022-03-07 PROCEDURE — 82088 ASSAY OF ALDOSTERONE: CPT

## 2022-03-11 LAB
ALDOSTERONE: 8.3 NG/DL
RENIN ACTIVITY: 16.4 NG/ML/HR

## 2022-03-17 ENCOUNTER — TELEPHONE (OUTPATIENT)
Dept: FAMILY MEDICINE CLINIC | Facility: CLINIC | Age: 27
End: 2022-03-17

## 2022-03-23 NOTE — TELEPHONE ENCOUNTER
Please review refill protocol failed/ no protocol  Requested Prescriptions   Pending Prescriptions Disp Refills    KETOCONAZOLE 2 % External Shampoo [Pharmacy Med Name: Ketoconazole 2 % Sha Pada] 120 mL 0     Sig: You can apply this not only to the scalp 2 or 3 times a week in the shower but also to your beard area and then also the chest where the rash is.   Can also use on the face        There is no refill protocol information for this order

## 2022-03-24 RX ORDER — KETOCONAZOLE 20 MG/ML
SHAMPOO TOPICAL
Qty: 120 ML | Refills: 2 | Status: SHIPPED | OUTPATIENT
Start: 2022-03-24

## 2022-03-24 NOTE — TELEPHONE ENCOUNTER
Please review. Protocol failed or has no protocol. Requested Prescriptions   Pending Prescriptions Disp Refills    ketoconazole 2 % External Shampoo [Pharmacy Med Name: Ketoconazole 2 % Sha Pada] 120 mL 1     Sig: You can apply this not only to the scalp 2 or 3 times a week in the shower but also to your beard area and then also the chest where the rash is. Can also use on the face.         There is no refill protocol information for this order           Recent Outpatient Visits              2 weeks ago Localized swelling of left foot    [address], [address], [address], [place]    Office Visit    1 month ago Left lateral abdominal pain    [address], [address], [place]    Office Visit    3 months ago [address], [address], [place]    Office Visit    3 months ago Left flank pain    [address], [address], [place]    Office Visit    9 months ago Acute left ankle pain    [address], [address], [address], [place]    Office Visit            Future Appointments         Provider Department Appt Notes    In 2 days ADO [address]     In 5 days [facility], [address] [place] [address], [place] Kaminski bp follow up    In 1 week Imelda Tovar MD [address], [facility] Essential hypertension, policy informed

## 2022-03-26 ENCOUNTER — HOSPITAL ENCOUNTER (OUTPATIENT)
Dept: ULTRASOUND IMAGING | Age: 27
Discharge: HOME | End: 2022-03-26
Attending: FAMILY MEDICINE
Payer: MEDICARE

## 2022-03-26 DIAGNOSIS — I10 ESSENTIAL HYPERTENSION: ICD-10-CM

## 2022-03-26 DIAGNOSIS — R79.89 HIGH SERUM RENIN: ICD-10-CM

## 2022-03-29 ENCOUNTER — OFFICE VISIT (OUTPATIENT)
Dept: FAMILY MEDICINE CLINIC | Facility: CLINIC | Age: 27
End: 2022-03-29
Payer: MEDICARE

## 2022-03-29 ENCOUNTER — HOSPITAL ENCOUNTER (OUTPATIENT)
Dept: ULTRASOUND IMAGING | Facility: HOSPITAL | Age: 27
Discharge: HOME | End: 2022-03-29
Attending: FAMILY MEDICINE
Payer: MEDICARE

## 2022-03-29 VITALS
BODY MASS INDEX: 23.22 KG/M2 | WEIGHT: 136 LBS | SYSTOLIC BLOOD PRESSURE: 145 MMHG | HEIGHT: 64 IN | HEART RATE: 94 BPM | TEMPERATURE: 99 F | DIASTOLIC BLOOD PRESSURE: 80 MMHG

## 2022-03-29 DIAGNOSIS — Q05.9 SPINA BIFIDA, UNSPECIFIED HYDROCEPHALUS PRESENCE, UNSPECIFIED SPINAL REGION (HCC): ICD-10-CM

## 2022-03-29 DIAGNOSIS — I70.1 RIGHT RENAL ARTERY STENOSIS (HCC): ICD-10-CM

## 2022-03-29 DIAGNOSIS — I10 ESSENTIAL HYPERTENSION: Primary | ICD-10-CM

## 2022-03-29 DIAGNOSIS — N31.9 NEUROGENIC BLADDER: ICD-10-CM

## 2022-03-29 DIAGNOSIS — N32.9 LESION OF URINARY BLADDER: ICD-10-CM

## 2022-03-29 PROCEDURE — 93975 VASCULAR STUDY: CPT | Performed by: FAMILY MEDICINE

## 2022-03-29 PROCEDURE — 99214 OFFICE O/P EST MOD 30 MIN: CPT | Performed by: FAMILY MEDICINE

## 2022-03-29 PROCEDURE — 76775 US EXAM ABDO BACK WALL LIM: CPT | Performed by: FAMILY MEDICINE

## 2022-03-29 RX ORDER — AMLODIPINE BESYLATE 5 MG/1
5 TABLET ORAL DAILY
Qty: 90 TABLET | Refills: 1 | Status: SHIPPED | OUTPATIENT
Start: 2022-03-29 | End: 2023-03-24

## 2022-04-02 ENCOUNTER — HOSPITAL ENCOUNTER (OUTPATIENT)
Age: 27
Discharge: ANOTHER HOSPITAL | End: 2022-04-02
Payer: MEDICARE

## 2022-04-02 ENCOUNTER — HOSPITAL ENCOUNTER (EMERGENCY)
Facility: HOSPITAL | Age: 27
Discharge: HOME | End: 2022-04-02
Attending: EMERGENCY MEDICINE
Payer: MEDICARE

## 2022-04-02 ENCOUNTER — APPOINTMENT (OUTPATIENT)
Dept: GENERAL RADIOLOGY | Facility: HOSPITAL | Age: 27
End: 2022-04-02
Attending: EMERGENCY MEDICINE
Payer: MEDICARE

## 2022-04-02 ENCOUNTER — APPOINTMENT (OUTPATIENT)
Dept: ULTRASOUND IMAGING | Facility: HOSPITAL | Age: 27
End: 2022-04-02
Attending: EMERGENCY MEDICINE
Payer: MEDICARE

## 2022-04-02 VITALS
WEIGHT: 136 LBS | HEART RATE: 99 BPM | HEIGHT: 64 IN | DIASTOLIC BLOOD PRESSURE: 85 MMHG | OXYGEN SATURATION: 96 % | TEMPERATURE: 98 F | SYSTOLIC BLOOD PRESSURE: 133 MMHG | BODY MASS INDEX: 23.22 KG/M2 | RESPIRATION RATE: 18 BRPM

## 2022-04-02 VITALS
RESPIRATION RATE: 18 BRPM | HEIGHT: 64 IN | SYSTOLIC BLOOD PRESSURE: 126 MMHG | TEMPERATURE: 98 F | HEART RATE: 84 BPM | DIASTOLIC BLOOD PRESSURE: 70 MMHG | WEIGHT: 134 LBS | OXYGEN SATURATION: 98 % | BODY MASS INDEX: 22.88 KG/M2

## 2022-04-02 DIAGNOSIS — R07.9 CHEST PAIN OF UNCERTAIN ETIOLOGY: Primary | ICD-10-CM

## 2022-04-02 DIAGNOSIS — K80.20 CALCULUS OF GALLBLADDER WITHOUT CHOLECYSTITIS WITHOUT OBSTRUCTION: Primary | ICD-10-CM

## 2022-04-02 DIAGNOSIS — R10.11 ABDOMINAL PAIN, RIGHT UPPER QUADRANT: ICD-10-CM

## 2022-04-02 LAB
ALBUMIN SERPL-MCNC: 4.5 G/DL (ref 3.4–5)
ALBUMIN/GLOB SERPL: 1.2 {RATIO} (ref 1–2)
ALP LIVER SERPL-CCNC: 90 U/L
ALT SERPL-CCNC: 30 U/L
ANION GAP SERPL CALC-SCNC: 5 MMOL/L (ref 0–18)
AST SERPL-CCNC: 15 U/L (ref 15–37)
BASOPHILS # BLD AUTO: 0.03 X10(3) UL (ref 0–0.2)
BASOPHILS NFR BLD AUTO: 0.3 %
BILIRUB SERPL-MCNC: 0.6 MG/DL (ref 0.1–2)
BILIRUB UR QL: NEGATIVE
BUN BLD-MCNC: 9 MG/DL (ref 7–18)
BUN/CREAT SERPL: 10 (ref 10–20)
CALCIUM BLD-MCNC: 9.3 MG/DL (ref 8.5–10.1)
CHLORIDE SERPL-SCNC: 103 MMOL/L (ref 98–112)
CLARITY UR: CLEAR
CO2 SERPL-SCNC: 28 MMOL/L (ref 21–32)
COLOR UR: YELLOW
CREAT BLD-MCNC: 0.9 MG/DL
D DIMER PPP FEU-MCNC: <0.27 UG/ML FEU (ref ?–0.5)
DEPRECATED RDW RBC AUTO: 38.1 FL (ref 35.1–46.3)
EOSINOPHIL # BLD AUTO: 0.15 X10(3) UL (ref 0–0.7)
EOSINOPHIL NFR BLD AUTO: 1.7 %
ERYTHROCYTE [DISTWIDTH] IN BLOOD BY AUTOMATED COUNT: 11.9 % (ref 11–15)
GLOBULIN PLAS-MCNC: 3.7 G/DL (ref 2.8–4.4)
GLUCOSE BLD-MCNC: 92 MG/DL (ref 70–99)
GLUCOSE UR-MCNC: NEGATIVE MG/DL
HCT VFR BLD AUTO: 49.9 %
HGB BLD-MCNC: 16.9 G/DL
IMM GRANULOCYTES # BLD AUTO: 0.03 X10(3) UL (ref 0–1)
IMM GRANULOCYTES NFR BLD: 0.3 %
KETONES UR-MCNC: NEGATIVE MG/DL
LIPASE SERPL-CCNC: 89 U/L (ref 73–393)
LYMPHOCYTES # BLD AUTO: 2.44 X10(3) UL (ref 1–4)
LYMPHOCYTES NFR BLD AUTO: 27.9 %
MCH RBC QN AUTO: 29.4 PG (ref 26–34)
MCHC RBC AUTO-ENTMCNC: 33.9 G/DL (ref 31–37)
MCV RBC AUTO: 86.9 FL
MONOCYTES # BLD AUTO: 0.63 X10(3) UL (ref 0.1–1)
MONOCYTES NFR BLD AUTO: 7.2 %
NEUTROPHILS # BLD AUTO: 5.46 X10 (3) UL (ref 1.5–7.7)
NEUTROPHILS # BLD AUTO: 5.46 X10(3) UL (ref 1.5–7.7)
NEUTROPHILS NFR BLD AUTO: 62.6 %
NITRITE UR QL STRIP.AUTO: NEGATIVE
OSMOLALITY SERPL CALC.SUM OF ELEC: 280 MOSM/KG (ref 275–295)
PH UR: 7 [PH] (ref 5–8)
PLATELET # BLD AUTO: 283 10(3)UL (ref 150–450)
POTASSIUM SERPL-SCNC: 3.6 MMOL/L (ref 3.5–5.1)
PROT SERPL-MCNC: 8.2 G/DL (ref 6.4–8.2)
PROT UR-MCNC: NEGATIVE MG/DL
RBC # BLD AUTO: 5.74 X10(6)UL
SODIUM SERPL-SCNC: 136 MMOL/L (ref 136–145)
SP GR UR STRIP: 1.01 (ref 1–1.03)
UROBILINOGEN UR STRIP-ACNC: <2
VIT C UR-MCNC: NEGATIVE MG/DL
WBC # BLD AUTO: 8.7 X10(3) UL (ref 4–11)

## 2022-04-02 PROCEDURE — 76705 ECHO EXAM OF ABDOMEN: CPT | Performed by: EMERGENCY MEDICINE

## 2022-04-02 PROCEDURE — 85025 COMPLETE CBC W/AUTO DIFF WBC: CPT | Performed by: EMERGENCY MEDICINE

## 2022-04-02 PROCEDURE — 71045 X-RAY EXAM CHEST 1 VIEW: CPT | Performed by: EMERGENCY MEDICINE

## 2022-04-02 PROCEDURE — 83690 ASSAY OF LIPASE: CPT | Performed by: EMERGENCY MEDICINE

## 2022-04-02 PROCEDURE — 93000 ELECTROCARDIOGRAM COMPLETE: CPT | Performed by: NURSE PRACTITIONER

## 2022-04-02 PROCEDURE — 99284 EMERGENCY DEPT VISIT MOD MDM: CPT

## 2022-04-02 PROCEDURE — 81001 URINALYSIS AUTO W/SCOPE: CPT | Performed by: EMERGENCY MEDICINE

## 2022-04-02 PROCEDURE — 85379 FIBRIN DEGRADATION QUANT: CPT | Performed by: EMERGENCY MEDICINE

## 2022-04-02 PROCEDURE — 99205 OFFICE O/P NEW HI 60 MIN: CPT | Performed by: NURSE PRACTITIONER

## 2022-04-02 PROCEDURE — 87086 URINE CULTURE/COLONY COUNT: CPT | Performed by: EMERGENCY MEDICINE

## 2022-04-02 PROCEDURE — 36415 COLL VENOUS BLD VENIPUNCTURE: CPT

## 2022-04-02 PROCEDURE — 80053 COMPREHEN METABOLIC PANEL: CPT | Performed by: EMERGENCY MEDICINE

## 2022-04-02 NOTE — ED INITIAL ASSESSMENT (HPI)
Pt c/o RUQ/R rib pain for the past few days, states \"I had an ultrasound of my kidneys a few days ago and they found my artery was blocked and also I'm taking antibiotics for my blood pressure\". Pt also notes nausea associated with the pain. Denies vomiting, diarrhea, fevers    Sent here from [facility] for concerns regarding gall bladder.  EKG done at [address] [place] [address]

## 2022-04-02 NOTE — ED INITIAL ASSESSMENT (HPI)
Pt c/o pain/pressure on right side of chest x 1 week, states he was dizzy yesterday at work, denies dizziness at the moment, c/o nausea and burping.

## 2022-04-04 ENCOUNTER — OFFICE VISIT (OUTPATIENT)
Dept: FAMILY MEDICINE CLINIC | Facility: CLINIC | Age: 27
End: 2022-04-04
Payer: MEDICARE

## 2022-04-04 ENCOUNTER — NURSE TRIAGE (OUTPATIENT)
Dept: FAMILY MEDICINE CLINIC | Facility: CLINIC | Age: 27
End: 2022-04-04

## 2022-04-04 VITALS
BODY MASS INDEX: 22.71 KG/M2 | DIASTOLIC BLOOD PRESSURE: 81 MMHG | HEIGHT: 64 IN | TEMPERATURE: 98 F | SYSTOLIC BLOOD PRESSURE: 137 MMHG | WEIGHT: 133 LBS | HEART RATE: 105 BPM

## 2022-04-04 DIAGNOSIS — K80.20 CALCULUS OF GALLBLADDER WITHOUT CHOLECYSTITIS WITHOUT OBSTRUCTION: Primary | ICD-10-CM

## 2022-04-04 DIAGNOSIS — I10 ESSENTIAL HYPERTENSION: ICD-10-CM

## 2022-04-04 DIAGNOSIS — R10.11 RUQ ABDOMINAL PAIN: ICD-10-CM

## 2022-04-04 PROCEDURE — 99214 OFFICE O/P EST MOD 30 MIN: CPT | Performed by: FAMILY MEDICINE

## 2022-04-04 NOTE — TELEPHONE ENCOUNTER
Patient went to [address] [place] [address] ER on 4/2/2022 and was found to have gall stones. Still having the right sided abdominal pain/pressure and wanted to follow up with Dr Ramón Lynch first before seeing a general surgeon. No other symptoms. Appointment made for today at 1:30pm with Dr Ramón Lynch in [address] [place] [address].

## 2022-04-05 ENCOUNTER — OFFICE VISIT (OUTPATIENT)
Dept: NEPHROLOGY | Facility: CLINIC | Age: 27
End: 2022-04-05
Payer: MEDICARE

## 2022-04-05 VITALS
WEIGHT: 134 LBS | BODY MASS INDEX: 22.88 KG/M2 | SYSTOLIC BLOOD PRESSURE: 120 MMHG | HEART RATE: 91 BPM | RESPIRATION RATE: 16 BRPM | HEIGHT: 64 IN | DIASTOLIC BLOOD PRESSURE: 75 MMHG

## 2022-04-05 DIAGNOSIS — I10 HYPERTENSION, UNSPECIFIED TYPE: Primary | ICD-10-CM

## 2022-04-05 DIAGNOSIS — I70.1 RENAL ARTERY STENOSIS (HCC): ICD-10-CM

## 2022-04-05 PROCEDURE — 99204 OFFICE O/P NEW MOD 45 MIN: CPT | Performed by: INTERNAL MEDICINE

## 2022-04-07 ENCOUNTER — OFFICE VISIT (OUTPATIENT)
Dept: SURGERY | Facility: CLINIC | Age: 27
End: 2022-04-07
Payer: MEDICARE

## 2022-04-07 DIAGNOSIS — K80.20 CALCULUS OF GALLBLADDER WITHOUT CHOLECYSTITIS WITHOUT OBSTRUCTION: Primary | ICD-10-CM

## 2022-04-07 PROCEDURE — 99214 OFFICE O/P EST MOD 30 MIN: CPT | Performed by: SURGERY

## 2022-04-21 ENCOUNTER — TELEPHONE (OUTPATIENT)
Dept: FAMILY MEDICINE CLINIC | Facility: CLINIC | Age: 27
End: 2022-04-21

## 2022-04-21 ENCOUNTER — HOSPITAL ENCOUNTER (OUTPATIENT)
Dept: NUCLEAR MEDICINE | Facility: HOSPITAL | Age: 27
Discharge: HOME | End: 2022-04-21
Attending: FAMILY MEDICINE
Payer: MEDICARE

## 2022-04-21 ENCOUNTER — HOSPITAL ENCOUNTER (OUTPATIENT)
Dept: NUCLEAR MEDICINE | Facility: HOSPITAL | Age: 27
End: 2022-04-21
Attending: FAMILY MEDICINE
Payer: MEDICARE

## 2022-04-21 DIAGNOSIS — K80.20 CALCULUS OF GALLBLADDER WITHOUT CHOLECYSTITIS WITHOUT OBSTRUCTION: ICD-10-CM

## 2022-04-21 PROCEDURE — 78227 HEPATOBIL SYST IMAGE W/DRUG: CPT | Performed by: FAMILY MEDICINE

## 2022-04-21 NOTE — TELEPHONE ENCOUNTER
Pt stated he did not understand results of HIDA scan- advised no inflammation, meaning he does not need surgery,verbalized understanding   Asking if he can return to eating as before, have been eating light -soups   Advised less fatty/fried foods

## 2022-04-23 ENCOUNTER — HOSPITAL ENCOUNTER (OUTPATIENT)
Age: 27
Discharge: HOME | End: 2022-04-23
Payer: MEDICARE

## 2022-04-23 ENCOUNTER — APPOINTMENT (OUTPATIENT)
Dept: GENERAL RADIOLOGY | Age: 27
End: 2022-04-23
Attending: NURSE PRACTITIONER
Payer: MEDICARE

## 2022-04-23 VITALS
RESPIRATION RATE: 16 BRPM | TEMPERATURE: 98 F | HEIGHT: 64 IN | BODY MASS INDEX: 22.71 KG/M2 | SYSTOLIC BLOOD PRESSURE: 130 MMHG | OXYGEN SATURATION: 96 % | HEART RATE: 94 BPM | DIASTOLIC BLOOD PRESSURE: 93 MMHG | WEIGHT: 133 LBS

## 2022-04-23 DIAGNOSIS — S99.922A INJURY OF LEFT FOOT, INITIAL ENCOUNTER: Primary | ICD-10-CM

## 2022-04-23 PROCEDURE — 99213 OFFICE O/P EST LOW 20 MIN: CPT | Performed by: NURSE PRACTITIONER

## 2022-04-23 PROCEDURE — 73630 X-RAY EXAM OF FOOT: CPT | Performed by: NURSE PRACTITIONER

## 2022-05-10 ENCOUNTER — OFFICE VISIT (OUTPATIENT)
Dept: FAMILY MEDICINE CLINIC | Facility: CLINIC | Age: 27
End: 2022-05-10
Payer: MEDICARE

## 2022-05-10 VITALS
WEIGHT: 135 LBS | HEIGHT: 64 IN | DIASTOLIC BLOOD PRESSURE: 84 MMHG | HEART RATE: 105 BPM | TEMPERATURE: 98 F | SYSTOLIC BLOOD PRESSURE: 138 MMHG | BODY MASS INDEX: 23.05 KG/M2

## 2022-05-10 DIAGNOSIS — N31.9 NEUROGENIC BLADDER: ICD-10-CM

## 2022-05-10 DIAGNOSIS — I10 ESSENTIAL HYPERTENSION: Primary | ICD-10-CM

## 2022-05-10 DIAGNOSIS — I70.1 RIGHT RENAL ARTERY STENOSIS (HCC): ICD-10-CM

## 2022-05-10 DIAGNOSIS — G47.00 FREQUENT NOCTURNAL AWAKENING: ICD-10-CM

## 2022-05-10 DIAGNOSIS — M21.372 FOOT DROP, BILATERAL: ICD-10-CM

## 2022-05-10 DIAGNOSIS — Q05.9 SPINA BIFIDA, UNSPECIFIED HYDROCEPHALUS PRESENCE, UNSPECIFIED SPINAL REGION (HCC): ICD-10-CM

## 2022-05-10 DIAGNOSIS — M21.371 FOOT DROP, BILATERAL: ICD-10-CM

## 2022-05-10 PROCEDURE — 99214 OFFICE O/P EST MOD 30 MIN: CPT | Performed by: FAMILY MEDICINE

## 2022-05-10 NOTE — PATIENT INSTRUCTIONS
Can try melatonin 5 mg every night to see if that keeps you asleep. If that stops working or if you become tolerant to it you can bump it up to 10 mg per night.

## 2022-05-11 ENCOUNTER — OFFICE VISIT (OUTPATIENT)
Dept: SURGERY | Facility: CLINIC | Age: 27
End: 2022-05-11
Payer: MEDICARE

## 2022-05-11 VITALS — DIASTOLIC BLOOD PRESSURE: 98 MMHG | SYSTOLIC BLOOD PRESSURE: 151 MMHG | HEART RATE: 98 BPM

## 2022-05-11 DIAGNOSIS — Q05.9 SPINA BIFIDA, UNSPECIFIED HYDROCEPHALUS PRESENCE, UNSPECIFIED SPINAL REGION (HCC): ICD-10-CM

## 2022-05-11 DIAGNOSIS — I70.1 RIGHT RENAL ARTERY STENOSIS (HCC): ICD-10-CM

## 2022-05-11 DIAGNOSIS — N32.9 LESION OF URINARY BLADDER: Primary | ICD-10-CM

## 2022-05-11 DIAGNOSIS — N31.9 NEUROGENIC BLADDER: ICD-10-CM

## 2022-05-11 PROCEDURE — 99204 OFFICE O/P NEW MOD 45 MIN: CPT | Performed by: UROLOGY

## 2022-05-12 NOTE — PROGRESS NOTES
Tell him I am sending him to a interventional radiologist for the renal artery stenosis. Referral to Dr. Leonela Blevins done.

## 2022-05-18 ENCOUNTER — TELEPHONE (OUTPATIENT)
Dept: INTERNAL MEDICINE CLINIC | Facility: CLINIC | Age: 27
End: 2022-05-18

## 2022-05-23 ENCOUNTER — TELEPHONE (OUTPATIENT)
Dept: FAMILY MEDICINE CLINIC | Facility: CLINIC | Age: 27
End: 2022-05-23

## 2022-05-23 ENCOUNTER — HOSPITAL ENCOUNTER (OUTPATIENT)
Age: 27
Discharge: HOME | End: 2022-05-23
Payer: MEDICARE

## 2022-05-23 VITALS
WEIGHT: 125 LBS | BODY MASS INDEX: 21.34 KG/M2 | SYSTOLIC BLOOD PRESSURE: 149 MMHG | TEMPERATURE: 98 F | OXYGEN SATURATION: 100 % | HEART RATE: 86 BPM | HEIGHT: 64 IN | RESPIRATION RATE: 18 BRPM | DIASTOLIC BLOOD PRESSURE: 96 MMHG

## 2022-05-23 DIAGNOSIS — R23.8 SKIN IRRITATION: Primary | ICD-10-CM

## 2022-05-23 PROCEDURE — 99213 OFFICE O/P EST LOW 20 MIN: CPT | Performed by: NURSE PRACTITIONER

## 2022-05-23 NOTE — ED INITIAL ASSESSMENT (HPI)
Pt here for evaluation of small amount of bleeding to old area of surgery on L groin area on Saturday. Denies injuries. No active bleeding.

## 2022-05-23 NOTE — TELEPHONE ENCOUNTER
Patient stated had surgery years ago to left side of abdominal area noticed some bleeding for that area and has bruising.     Patient poor historian, and not able to describe currently what he sees    Denies; blood, pain    Advised UC for evaluation

## 2022-05-28 RX ORDER — MELATONIN
2000 DAILY
Qty: 180 TABLET | Refills: 1 | Status: SHIPPED | OUTPATIENT
Start: 2022-05-28

## 2022-05-28 NOTE — TELEPHONE ENCOUNTER
Please review refill protocol failed/ no protocol  Requested Prescriptions   Pending Prescriptions Disp Refills    CHOLECALCIFEROL 25 MCG (1000 UT) Oral Tab [Pharmacy Med Name: Vitamin D3 25 Mcg Tab Rug] 180 tablet 0     Sig: Take 2 tablets by mouth daily.         There is no refill protocol information for this order

## 2022-06-01 ENCOUNTER — TELEPHONE (OUTPATIENT)
Dept: FAMILY MEDICINE CLINIC | Facility: CLINIC | Age: 27
End: 2022-06-01

## 2022-06-01 NOTE — TELEPHONE ENCOUNTER
Called patient to confirm medication as Lisinopril was discontinued in March. Patient states he does not need any other medications at the moment. He was a bit confused why this medication was being requested.  Please disregard refill request.

## 2022-06-01 NOTE — TELEPHONE ENCOUNTER
Andra is asking for a refill on Lisinopril Oral Tab 5MG   Was originally prescribed by Sophy Quintana and name wast hand scratched out and Dr Marybeth Quarles name was written in.

## 2022-06-02 NOTE — TELEPHONE ENCOUNTER
Darion [phone] is calling for status of the medication refill request. Pt is out of medication and Emry Comment states that the patients mother went to the pharmacy for it.

## 2022-06-21 ENCOUNTER — OFFICE VISIT (OUTPATIENT)
Dept: FAMILY MEDICINE CLINIC | Facility: CLINIC | Age: 27
End: 2022-06-21
Payer: MEDICARE

## 2022-06-21 VITALS
BODY MASS INDEX: 22.6 KG/M2 | HEIGHT: 64 IN | DIASTOLIC BLOOD PRESSURE: 89 MMHG | SYSTOLIC BLOOD PRESSURE: 145 MMHG | HEART RATE: 101 BPM | TEMPERATURE: 97 F | WEIGHT: 132.38 LBS

## 2022-06-21 DIAGNOSIS — I10 ESSENTIAL HYPERTENSION: Primary | ICD-10-CM

## 2022-06-21 DIAGNOSIS — L30.9 DERMATITIS: ICD-10-CM

## 2022-06-21 DIAGNOSIS — L21.9 SEBORRHEIC DERMATITIS: ICD-10-CM

## 2022-06-21 DIAGNOSIS — I70.1 RIGHT RENAL ARTERY STENOSIS (HCC): ICD-10-CM

## 2022-06-21 PROCEDURE — 99214 OFFICE O/P EST MOD 30 MIN: CPT | Performed by: FAMILY MEDICINE

## 2022-06-21 RX ORDER — FLUCONAZOLE 150 MG/1
300 TABLET ORAL
Qty: 4 TABLET | Refills: 0 | Status: SHIPPED | OUTPATIENT
Start: 2022-06-21 | End: 2022-06-29

## 2022-06-21 RX ORDER — AMLODIPINE BESYLATE 10 MG/1
10 TABLET ORAL DAILY
Qty: 90 TABLET | Refills: 1 | Status: SHIPPED | OUTPATIENT
Start: 2022-06-21 | End: 2023-06-16

## 2022-06-21 RX ORDER — KETOCONAZOLE 20 MG/ML
SHAMPOO TOPICAL
Qty: 120 ML | Refills: 2 | Status: SHIPPED | OUTPATIENT
Start: 2022-06-21

## 2022-06-21 NOTE — PATIENT INSTRUCTIONS
I increased her amlodipine dose to 10 mg a day to the 5 mg at the same time. See the nephrologist on July 8, 2022. For the rash on chest given to take 2 tablets at the same time and then you would do this again 7 days later.

## 2022-06-30 ENCOUNTER — HOSPITAL ENCOUNTER (OUTPATIENT)
Age: 27
Discharge: HOME | End: 2022-06-30
Payer: MEDICARE

## 2022-06-30 ENCOUNTER — APPOINTMENT (OUTPATIENT)
Dept: GENERAL RADIOLOGY | Age: 27
End: 2022-06-30
Attending: NURSE PRACTITIONER
Payer: MEDICARE

## 2022-06-30 VITALS
SYSTOLIC BLOOD PRESSURE: 133 MMHG | DIASTOLIC BLOOD PRESSURE: 74 MMHG | TEMPERATURE: 98 F | HEART RATE: 116 BPM | OXYGEN SATURATION: 98 % | RESPIRATION RATE: 18 BRPM

## 2022-06-30 DIAGNOSIS — M76.61 ACHILLES TENDINITIS OF RIGHT LOWER EXTREMITY: ICD-10-CM

## 2022-06-30 DIAGNOSIS — M79.671 RIGHT FOOT PAIN: Primary | ICD-10-CM

## 2022-06-30 PROCEDURE — 99213 OFFICE O/P EST LOW 20 MIN: CPT | Performed by: NURSE PRACTITIONER

## 2022-06-30 PROCEDURE — 73610 X-RAY EXAM OF ANKLE: CPT | Performed by: NURSE PRACTITIONER

## 2022-06-30 RX ORDER — PREDNISONE 20 MG/1
40 TABLET ORAL DAILY
Qty: 10 TABLET | Refills: 0 | Status: SHIPPED | OUTPATIENT
Start: 2022-06-30 | End: 2022-06-30

## 2022-06-30 NOTE — ED INITIAL ASSESSMENT (HPI)
Hx spina bifida. C/o right ankle pain, with a sharp pain radiating up leg, up to buttocks. Denies injury or trauma.

## 2022-07-01 ENCOUNTER — TELEPHONE (OUTPATIENT)
Dept: FAMILY MEDICINE CLINIC | Facility: CLINIC | Age: 27
End: 2022-07-01

## 2022-07-01 ENCOUNTER — OFFICE VISIT (OUTPATIENT)
Dept: FAMILY MEDICINE CLINIC | Facility: CLINIC | Age: 27
End: 2022-07-01
Payer: MEDICARE

## 2022-07-01 VITALS
BODY MASS INDEX: 23.39 KG/M2 | DIASTOLIC BLOOD PRESSURE: 79 MMHG | WEIGHT: 137 LBS | SYSTOLIC BLOOD PRESSURE: 137 MMHG | HEART RATE: 112 BPM | TEMPERATURE: 98 F | HEIGHT: 64 IN

## 2022-07-01 DIAGNOSIS — M79.671 PAIN OF RIGHT HEEL: ICD-10-CM

## 2022-07-01 DIAGNOSIS — M76.61 ACHILLES TENDINITIS OF RIGHT LOWER EXTREMITY: Primary | ICD-10-CM

## 2022-07-01 PROCEDURE — 99214 OFFICE O/P EST MOD 30 MIN: CPT | Performed by: FAMILY MEDICINE

## 2022-07-01 RX ORDER — MELOXICAM 15 MG/1
15 TABLET ORAL DAILY
Qty: 30 TABLET | Refills: 0 | Status: SHIPPED | OUTPATIENT
Start: 2022-07-01 | End: 2022-07-31

## 2022-07-01 NOTE — PATIENT INSTRUCTIONS
You can try places like  prosthetics which has CAM Walker boots. [facility] supply store may also have CAM Walker boots.

## 2022-07-01 NOTE — TELEPHONE ENCOUNTER
Patient tried to make an appointment for physical therapy in [place]. The first available is not until August. I advised him to call about the other locations about appointments at the other [facility] locations. Also advised to call insurance to find out other locations they may cover and call us back if he needs a new referral. He verbalized understanding.

## 2022-07-01 NOTE — TELEPHONE ENCOUNTER
Have him take the prednisone only and once he finishes that he can take the meloxicam as needed for discomfort.

## 2022-07-01 NOTE — TELEPHONE ENCOUNTER
Spoke with the patient,verified full name and     Informed him of message and instructions below    No further questions

## 2022-07-01 NOTE — TELEPHONE ENCOUNTER
Dr Wilfrido Rock: please clarify. .   Patient had picked up 2 medications from pharmacy. Prednisone 20mg and meloxicam 15mg. He had visit at [address] [place] [address] yesterday and prednisone was given. He took both today. Do you prefer he take one medication or both?

## 2022-07-08 ENCOUNTER — OFFICE VISIT (OUTPATIENT)
Dept: NEPHROLOGY | Facility: CLINIC | Age: 27
End: 2022-07-08
Payer: MEDICARE

## 2022-07-08 VITALS
DIASTOLIC BLOOD PRESSURE: 81 MMHG | BODY MASS INDEX: 23.39 KG/M2 | SYSTOLIC BLOOD PRESSURE: 130 MMHG | HEIGHT: 64 IN | HEART RATE: 99 BPM | WEIGHT: 137 LBS

## 2022-07-08 DIAGNOSIS — I70.1 RENAL ARTERY STENOSIS (HCC): ICD-10-CM

## 2022-07-08 DIAGNOSIS — I10 HYPERTENSION, UNSPECIFIED TYPE: Primary | ICD-10-CM

## 2022-07-08 PROCEDURE — 99214 OFFICE O/P EST MOD 30 MIN: CPT | Performed by: INTERNAL MEDICINE

## 2022-07-15 ENCOUNTER — TELEPHONE (OUTPATIENT)
Dept: FAMILY MEDICINE CLINIC | Facility: CLINIC | Age: 27
End: 2022-07-15

## 2022-07-15 DIAGNOSIS — M21.371 FOOT DROP, BILATERAL: ICD-10-CM

## 2022-07-15 DIAGNOSIS — Q05.9 SPINA BIFIDA, UNSPECIFIED HYDROCEPHALUS PRESENCE, UNSPECIFIED SPINAL REGION (HCC): Primary | ICD-10-CM

## 2022-07-15 DIAGNOSIS — M21.372 FOOT DROP, BILATERAL: ICD-10-CM

## 2022-07-15 DIAGNOSIS — R29.898 WEAKNESS OF BOTH LEGS: ICD-10-CM

## 2022-07-15 NOTE — TELEPHONE ENCOUNTER
Patient states he needs a note for work as he took off today due to redness and pain of his right achilles tendinitis and heel pain. Believes this is from his aging braces (other TE regarding order for new braces). Letter pended.

## 2022-07-29 NOTE — TELEPHONE ENCOUNTER
Referral has been pended please approve if appropriate    [facility] orthotics and prosthetics fax # [phone]

## 2022-08-01 ENCOUNTER — OFFICE VISIT (OUTPATIENT)
Dept: FAMILY MEDICINE CLINIC | Facility: CLINIC | Age: 27
End: 2022-08-01
Payer: MEDICARE

## 2022-08-01 VITALS
BODY MASS INDEX: 23.73 KG/M2 | DIASTOLIC BLOOD PRESSURE: 76 MMHG | HEIGHT: 64 IN | SYSTOLIC BLOOD PRESSURE: 115 MMHG | HEART RATE: 101 BPM | TEMPERATURE: 98 F | WEIGHT: 139 LBS

## 2022-08-01 DIAGNOSIS — N20.0 KIDNEY STONE ON LEFT SIDE: ICD-10-CM

## 2022-08-01 DIAGNOSIS — R60.0 EDEMA OF BOTH FEET: Primary | ICD-10-CM

## 2022-08-01 DIAGNOSIS — I10 ESSENTIAL HYPERTENSION: ICD-10-CM

## 2022-08-01 PROCEDURE — 99214 OFFICE O/P EST MOD 30 MIN: CPT | Performed by: FAMILY MEDICINE

## 2022-08-01 RX ORDER — LABETALOL 100 MG/1
100 TABLET, FILM COATED ORAL 2 TIMES DAILY
Qty: 180 TABLET | Refills: 0 | Status: SHIPPED | OUTPATIENT
Start: 2022-08-01

## 2022-08-02 ENCOUNTER — OFFICE VISIT (OUTPATIENT)
Dept: FAMILY MEDICINE CLINIC | Facility: CLINIC | Age: 27
End: 2022-08-02
Payer: MEDICARE

## 2022-08-02 ENCOUNTER — HOSPITAL ENCOUNTER (OUTPATIENT)
Dept: GENERAL RADIOLOGY | Age: 27
Discharge: HOME | End: 2022-08-02
Attending: FAMILY MEDICINE
Payer: MEDICARE

## 2022-08-02 VITALS
HEIGHT: 64 IN | DIASTOLIC BLOOD PRESSURE: 75 MMHG | TEMPERATURE: 97 F | BODY MASS INDEX: 23.73 KG/M2 | HEART RATE: 96 BPM | WEIGHT: 139 LBS | SYSTOLIC BLOOD PRESSURE: 127 MMHG

## 2022-08-02 DIAGNOSIS — L21.9 SEBORRHEIC DERMATITIS: ICD-10-CM

## 2022-08-02 DIAGNOSIS — L70.0 ACNE VULGARIS: ICD-10-CM

## 2022-08-02 DIAGNOSIS — I10 ESSENTIAL HYPERTENSION: Primary | ICD-10-CM

## 2022-08-02 DIAGNOSIS — R07.81 RIB PAIN ON LEFT SIDE: ICD-10-CM

## 2022-08-02 DIAGNOSIS — L30.9 DERMATITIS: ICD-10-CM

## 2022-08-02 PROCEDURE — 71101 X-RAY EXAM UNILAT RIBS/CHEST: CPT | Performed by: FAMILY MEDICINE

## 2022-08-02 PROCEDURE — 99214 OFFICE O/P EST MOD 30 MIN: CPT | Performed by: FAMILY MEDICINE

## 2022-08-08 ENCOUNTER — NURSE TRIAGE (OUTPATIENT)
Dept: FAMILY MEDICINE CLINIC | Facility: CLINIC | Age: 27
End: 2022-08-08

## 2022-08-08 ENCOUNTER — HOSPITAL ENCOUNTER (OUTPATIENT)
Age: 27
Discharge: HOME | End: 2022-08-08
Payer: MEDICARE

## 2022-08-08 VITALS
HEART RATE: 82 BPM | DIASTOLIC BLOOD PRESSURE: 94 MMHG | SYSTOLIC BLOOD PRESSURE: 139 MMHG | RESPIRATION RATE: 18 BRPM | TEMPERATURE: 98 F | OXYGEN SATURATION: 97 %

## 2022-08-08 DIAGNOSIS — N30.90 CYSTITIS: Primary | ICD-10-CM

## 2022-08-08 LAB
BILIRUB UR QL STRIP: NEGATIVE
COLOR UR: YELLOW
GLUCOSE UR STRIP-MCNC: NEGATIVE MG/DL
KETONES UR STRIP-MCNC: NEGATIVE MG/DL
NITRITE UR QL STRIP: NEGATIVE
PH UR STRIP: 7 [PH]
PROT UR STRIP-MCNC: NEGATIVE MG/DL
SP GR UR STRIP: 1.01
UROBILINOGEN UR STRIP-ACNC: <2 MG/DL

## 2022-08-08 PROCEDURE — 99213 OFFICE O/P EST LOW 20 MIN: CPT | Performed by: NURSE PRACTITIONER

## 2022-08-08 PROCEDURE — 81002 URINALYSIS NONAUTO W/O SCOPE: CPT | Performed by: NURSE PRACTITIONER

## 2022-08-08 RX ORDER — CEPHALEXIN 500 MG/1
500 CAPSULE ORAL 2 TIMES DAILY
Qty: 14 CAPSULE | Refills: 0 | Status: SHIPPED | OUTPATIENT
Start: 2022-08-08 | End: 2022-08-15

## 2022-08-16 ENCOUNTER — TELEPHONE (OUTPATIENT)
Dept: FAMILY MEDICINE CLINIC | Facility: CLINIC | Age: 27
End: 2022-08-16

## 2022-08-16 NOTE — TELEPHONE ENCOUNTER
Clinical Staff, please assist    Please Fax the orthopedic referral dated 7/29/22 to [facility] per patient request. Patient states that the office has not received the referral.    [phone]

## 2022-08-17 NOTE — TELEPHONE ENCOUNTER
Sinan Martin is requesting a copy of clinical notes and letter of necessity.  Please fax to [phone]

## 2022-08-17 NOTE — TELEPHONE ENCOUNTER
Spoke to Kim Smith from [employer] requesting clarification on letter of necessity, she states she will fax that letter and needs provided to sign and fax back. I informed her Dr. Loretta White is out of the office for the day and will fax letter back once he is back in the office.

## 2022-08-18 NOTE — TELEPHONE ENCOUNTER
Per Karen Buenrostro from [facility] @ [phone] would like nurses of doctor to call Orthotist Андрей Ramos @[phone] for addendum to office notes.

## 2022-08-22 ENCOUNTER — OFFICE VISIT (OUTPATIENT)
Dept: FAMILY MEDICINE CLINIC | Facility: CLINIC | Age: 27
End: 2022-08-22
Payer: MEDICARE

## 2022-08-22 VITALS
BODY MASS INDEX: 23.56 KG/M2 | SYSTOLIC BLOOD PRESSURE: 137 MMHG | HEIGHT: 64 IN | HEART RATE: 80 BPM | WEIGHT: 138 LBS | TEMPERATURE: 98 F | DIASTOLIC BLOOD PRESSURE: 84 MMHG

## 2022-08-22 DIAGNOSIS — L30.9 DERMATITIS: ICD-10-CM

## 2022-08-22 DIAGNOSIS — L21.9 SEBORRHEIC DERMATITIS: ICD-10-CM

## 2022-08-22 DIAGNOSIS — R19.7 DIARRHEA, UNSPECIFIED TYPE: ICD-10-CM

## 2022-08-22 DIAGNOSIS — R14.0 BLOATED ABDOMEN: ICD-10-CM

## 2022-08-22 DIAGNOSIS — R10.84 GENERALIZED ABDOMINAL PAIN: Primary | ICD-10-CM

## 2022-08-22 PROCEDURE — 99214 OFFICE O/P EST MOD 30 MIN: CPT | Performed by: FAMILY MEDICINE

## 2022-08-22 RX ORDER — KETOCONAZOLE 20 MG/ML
SHAMPOO TOPICAL
Qty: 120 ML | Refills: 2 | Status: SHIPPED | OUTPATIENT
Start: 2022-08-22

## 2022-08-22 RX ORDER — FLUCONAZOLE 150 MG/1
300 TABLET ORAL
Qty: 4 TABLET | Refills: 0 | Status: SHIPPED | OUTPATIENT
Start: 2022-08-22 | End: 2022-08-30

## 2022-08-25 DIAGNOSIS — N31.9 NEUROGENIC BLADDER: ICD-10-CM

## 2022-08-25 DIAGNOSIS — Q05.9 SPINA BIFIDA, UNSPECIFIED HYDROCEPHALUS PRESENCE, UNSPECIFIED SPINAL REGION (HCC): ICD-10-CM

## 2022-08-25 RX ORDER — OXYBUTYNIN CHLORIDE 15 MG/1
15 TABLET, EXTENDED RELEASE ORAL DAILY
Qty: 90 TABLET | Refills: 1 | Status: SHIPPED | OUTPATIENT
Start: 2022-08-25

## 2022-08-26 NOTE — TELEPHONE ENCOUNTER
Refill passed per [facility] protocol. Requested Prescriptions   Pending Prescriptions Disp Refills    OXYBUTYNIN CHLORIDE ER 15 MG Oral Tablet 24 Hr [Pharmacy Med Name: Oxybutynin Chloride Er 24hr 15 Mg Tab Zydu] 90 tablet 0     Sig: Take 1 tablet (15 mg total) by mouth daily.         Genitourinary Medications Passed - 8/25/2022  1:33 AM        Passed - Patient does not have pulmonary hypertension on problem list        Passed - In person appointment or virtual visit in the past 12 mos or appointment in next 3 mos       Recent Outpatient Visits              3 days ago Generalized abdominal pain    [facility], [address], [address], [place], [place]    Office Visit    3 weeks ago Essential hypertension    [facility], [address], [address], [place], [place]    Office Visit    3 weeks ago Edema of both feet    [facility], [address], [address], [place]    Office Visit    1 month ago Hypertension, unspecified type    [facility], [address] [place] [address], Nader Joiner MD    Office Visit    1 month ago Achilles tendinitis of right lower extremity    [facility], [address], [address], [place]    Office Visit     Future Appointments         Provider Department Appt Notes    In 1 week [address], [place], [address] [place] [address], [address], [place] f/u 1 month     In 4 months Adelina Mccord MD [facility], [address] [place] [address] 6 months                   Future Appointments         Provider Department Appt Notes    In 1 week Tegan Lim, [address] [place] [address], [address], [place] f/u 1 month     In 4 months Nader Cortez MD [facility], [address] [place] [address] 6 months          Recent Outpatient Visits              3 days ago Generalized abdominal pain    [facility], [address], [address], [place]    Office Visit    3 weeks ago Essential hypertension    [facility], [address], [address], [place], [place]    Office Visit    3 weeks ago Edema of both feet    [address], [address], [place],     Office Visit    1 month ago Hypertension, unspecified type    [facility], [facility], Isela Joiner MD    Office Visit    1 month ago Achilles tendinitis of right lower extremity    [facility], [address], [address], [place], [place]    Office Visit

## 2022-09-02 ENCOUNTER — OFFICE VISIT (OUTPATIENT)
Dept: FAMILY MEDICINE CLINIC | Facility: CLINIC | Age: 27
End: 2022-09-02
Payer: MEDICARE

## 2022-09-02 VITALS
HEIGHT: 64 IN | BODY MASS INDEX: 24.07 KG/M2 | TEMPERATURE: 97 F | SYSTOLIC BLOOD PRESSURE: 140 MMHG | WEIGHT: 141 LBS | HEART RATE: 99 BPM | DIASTOLIC BLOOD PRESSURE: 84 MMHG

## 2022-09-02 DIAGNOSIS — Q05.9 SPINA BIFIDA, UNSPECIFIED HYDROCEPHALUS PRESENCE, UNSPECIFIED SPINAL REGION (HCC): ICD-10-CM

## 2022-09-02 DIAGNOSIS — L30.9 DERMATITIS: ICD-10-CM

## 2022-09-02 DIAGNOSIS — L21.9 SEBORRHEIC DERMATITIS: ICD-10-CM

## 2022-09-02 DIAGNOSIS — N31.9 NEUROGENIC BLADDER: Primary | ICD-10-CM

## 2022-09-02 PROCEDURE — 99214 OFFICE O/P EST MOD 30 MIN: CPT | Performed by: FAMILY MEDICINE

## 2022-09-02 RX ORDER — SULFAMETHOXAZOLE AND TRIMETHOPRIM 800; 160 MG/1; MG/1
TABLET ORAL
COMMUNITY
Start: 2022-08-25 | End: 2022-09-02 | Stop reason: ALTCHOICE

## 2022-09-02 RX ORDER — LISINOPRIL 10 MG/1
10 TABLET ORAL DAILY
COMMUNITY
End: 2022-09-02

## 2022-09-15 ENCOUNTER — OFFICE VISIT (OUTPATIENT)
Dept: DERMATOLOGY CLINIC | Facility: CLINIC | Age: 27
End: 2022-09-15
Payer: MEDICARE

## 2022-09-15 DIAGNOSIS — L21.9 SEBORRHEIC DERMATITIS: ICD-10-CM

## 2022-09-15 PROCEDURE — 99204 OFFICE O/P NEW MOD 45 MIN: CPT | Performed by: STUDENT IN AN ORGANIZED HEALTH CARE EDUCATION/TRAINING PROGRAM

## 2022-09-15 RX ORDER — CLINDAMYCIN PHOSPHATE 10 UG/ML
LOTION TOPICAL
Qty: 60 ML | Refills: 11 | Status: SHIPPED | OUTPATIENT
Start: 2022-09-15

## 2022-09-15 RX ORDER — CEFDINIR 300 MG/1
300 CAPSULE ORAL 2 TIMES DAILY
COMMUNITY
Start: 2022-09-13

## 2022-09-15 RX ORDER — KETOCONAZOLE 20 MG/ML
SHAMPOO TOPICAL
Qty: 120 ML | Refills: 11 | Status: SHIPPED | OUTPATIENT
Start: 2022-09-15

## 2022-09-15 NOTE — PATIENT INSTRUCTIONS
Acne on back   - Apply clindamycin 1% lotion twice daily to affected areas with flares. Use benzoyl peroxide wash once daily while using this medication. Purchase an over the counter acne wash containing 3-6% benzoyl peroxide. (Examples: Cerave Acne Wash, Cerave Acne Foaming Cream Cleanser, PanOxyl). If you have difficulty finding one, ask your pharmacist for assistance. Use to affected areas. Be sure to rinse thoroughly, as medication may bleach clothing, towels and hair. Seborrheic dermatitis  - Ketoconazole 2% shampoo to use as a body wash and face wash daily for two weeks, then 3 times weekly long term. Lather in and leave on for 5 minutes before washing off.   - Start hydrocortisone 2.5%twice daily Monday-Friday. Take weekends off.  Stop when rash resolves

## 2022-10-14 ENCOUNTER — NURSE TRIAGE (OUTPATIENT)
Dept: FAMILY MEDICINE CLINIC | Facility: CLINIC | Age: 27
End: 2022-10-14

## 2022-10-18 ENCOUNTER — OFFICE VISIT (OUTPATIENT)
Dept: FAMILY MEDICINE CLINIC | Facility: CLINIC | Age: 27
End: 2022-10-18
Payer: MEDICARE

## 2022-10-18 VITALS
HEART RATE: 94 BPM | HEIGHT: 64 IN | TEMPERATURE: 98 F | SYSTOLIC BLOOD PRESSURE: 131 MMHG | BODY MASS INDEX: 24.07 KG/M2 | DIASTOLIC BLOOD PRESSURE: 97 MMHG | WEIGHT: 141 LBS

## 2022-10-18 DIAGNOSIS — N20.0 KIDNEY STONES: ICD-10-CM

## 2022-10-18 DIAGNOSIS — R35.0 URINATION FREQUENCY: Primary | ICD-10-CM

## 2022-10-18 DIAGNOSIS — I10 ESSENTIAL HYPERTENSION: ICD-10-CM

## 2022-10-18 PROCEDURE — 99214 OFFICE O/P EST MOD 30 MIN: CPT | Performed by: FAMILY MEDICINE

## 2022-10-18 RX ORDER — LISINOPRIL 10 MG/1
10 TABLET ORAL DAILY
Qty: 90 TABLET | Refills: 1 | Status: SHIPPED | OUTPATIENT
Start: 2022-10-18

## 2022-11-11 ENCOUNTER — APPOINTMENT (OUTPATIENT)
Dept: CT IMAGING | Facility: HOSPITAL | Age: 27
End: 2022-11-11
Attending: EMERGENCY MEDICINE
Payer: MEDICARE

## 2022-11-11 ENCOUNTER — HOSPITAL ENCOUNTER (OUTPATIENT)
Age: 27
Discharge: ANOTHER HOSPITAL | End: 2022-11-11
Payer: MEDICARE

## 2022-11-11 ENCOUNTER — HOSPITAL ENCOUNTER (EMERGENCY)
Facility: HOSPITAL | Age: 27
Discharge: HOME | End: 2022-11-11
Attending: EMERGENCY MEDICINE
Payer: MEDICARE

## 2022-11-11 VITALS
DIASTOLIC BLOOD PRESSURE: 105 MMHG | BODY MASS INDEX: 22.88 KG/M2 | TEMPERATURE: 99 F | HEART RATE: 103 BPM | RESPIRATION RATE: 16 BRPM | WEIGHT: 134 LBS | SYSTOLIC BLOOD PRESSURE: 145 MMHG | OXYGEN SATURATION: 98 % | HEIGHT: 64 IN

## 2022-11-11 VITALS
HEART RATE: 95 BPM | RESPIRATION RATE: 18 BRPM | OXYGEN SATURATION: 99 % | TEMPERATURE: 98 F | SYSTOLIC BLOOD PRESSURE: 156 MMHG | WEIGHT: 130 LBS | DIASTOLIC BLOOD PRESSURE: 107 MMHG | HEIGHT: 64 IN | BODY MASS INDEX: 22.2 KG/M2

## 2022-11-11 DIAGNOSIS — K29.00 ACUTE GASTRITIS WITHOUT HEMORRHAGE, UNSPECIFIED GASTRITIS TYPE: Primary | ICD-10-CM

## 2022-11-11 DIAGNOSIS — R10.9 ABDOMINAL PAIN, ACUTE: Primary | ICD-10-CM

## 2022-11-11 LAB
ALBUMIN SERPL-MCNC: 4.3 G/DL (ref 3.4–5)
ALP LIVER SERPL-CCNC: 89 U/L
ALT SERPL-CCNC: 34 U/L
ANION GAP SERPL CALC-SCNC: 8 MMOL/L (ref 0–18)
AST SERPL-CCNC: 15 U/L (ref 15–37)
BASOPHILS # BLD AUTO: 0.03 X10(3) UL (ref 0–0.2)
BASOPHILS NFR BLD AUTO: 0.5 %
BILIRUB DIRECT SERPL-MCNC: <0.1 MG/DL (ref 0–0.2)
BILIRUB SERPL-MCNC: 0.6 MG/DL (ref 0.1–2)
BILIRUB UR QL: NEGATIVE
BUN BLD-MCNC: 10 MG/DL (ref 7–18)
BUN/CREAT SERPL: 11.2 (ref 10–20)
CALCIUM BLD-MCNC: 8.9 MG/DL (ref 8.5–10.1)
CHLORIDE SERPL-SCNC: 103 MMOL/L (ref 98–112)
CLARITY UR: CLEAR
CO2 SERPL-SCNC: 30 MMOL/L (ref 21–32)
COLOR UR: YELLOW
CREAT BLD-MCNC: 0.89 MG/DL
DEPRECATED RDW RBC AUTO: 38.5 FL (ref 35.1–46.3)
EOSINOPHIL # BLD AUTO: 0.12 X10(3) UL (ref 0–0.7)
EOSINOPHIL NFR BLD AUTO: 1.8 %
ERYTHROCYTE [DISTWIDTH] IN BLOOD BY AUTOMATED COUNT: 12.1 % (ref 11–15)
GFR SERPLBLD BASED ON 1.73 SQ M-ARVRAT: 120 ML/MIN/1.73M2 (ref 60–?)
GLUCOSE BLD-MCNC: 93 MG/DL (ref 70–99)
GLUCOSE UR-MCNC: NEGATIVE MG/DL
HCT VFR BLD AUTO: 49.3 %
HGB BLD-MCNC: 16.5 G/DL
IMM GRANULOCYTES # BLD AUTO: 0.03 X10(3) UL (ref 0–1)
IMM GRANULOCYTES NFR BLD: 0.5 %
KETONES UR-MCNC: NEGATIVE MG/DL
LIPASE SERPL-CCNC: 91 U/L (ref 73–393)
LYMPHOCYTES # BLD AUTO: 2.09 X10(3) UL (ref 1–4)
LYMPHOCYTES NFR BLD AUTO: 31.7 %
MCH RBC QN AUTO: 29 PG (ref 26–34)
MCHC RBC AUTO-ENTMCNC: 33.5 G/DL (ref 31–37)
MCV RBC AUTO: 86.6 FL
MONOCYTES # BLD AUTO: 0.46 X10(3) UL (ref 0.1–1)
MONOCYTES NFR BLD AUTO: 7 %
NEUTROPHILS # BLD AUTO: 3.87 X10 (3) UL (ref 1.5–7.7)
NEUTROPHILS # BLD AUTO: 3.87 X10(3) UL (ref 1.5–7.7)
NEUTROPHILS NFR BLD AUTO: 58.5 %
NITRITE UR QL STRIP.AUTO: NEGATIVE
OSMOLALITY SERPL CALC.SUM OF ELEC: 291 MOSM/KG (ref 275–295)
PH UR: 8 [PH] (ref 5–8)
PLATELET # BLD AUTO: 252 10(3)UL (ref 150–450)
POTASSIUM SERPL-SCNC: 3.9 MMOL/L (ref 3.5–5.1)
PROT SERPL-MCNC: 7.7 G/DL (ref 6.4–8.2)
PROT UR-MCNC: NEGATIVE MG/DL
RBC # BLD AUTO: 5.69 X10(6)UL
SODIUM SERPL-SCNC: 141 MMOL/L (ref 136–145)
SP GR UR STRIP: 1 (ref 1–1.03)
UROBILINOGEN UR STRIP-ACNC: <2
VIT C UR-MCNC: NEGATIVE MG/DL
WBC # BLD AUTO: 6.6 X10(3) UL (ref 4–11)

## 2022-11-11 PROCEDURE — 74176 CT ABD & PELVIS W/O CONTRAST: CPT | Performed by: EMERGENCY MEDICINE

## 2022-11-11 PROCEDURE — 99284 EMERGENCY DEPT VISIT MOD MDM: CPT

## 2022-11-11 PROCEDURE — 80076 HEPATIC FUNCTION PANEL: CPT | Performed by: EMERGENCY MEDICINE

## 2022-11-11 PROCEDURE — 81001 URINALYSIS AUTO W/SCOPE: CPT | Performed by: EMERGENCY MEDICINE

## 2022-11-11 PROCEDURE — 83690 ASSAY OF LIPASE: CPT | Performed by: EMERGENCY MEDICINE

## 2022-11-11 PROCEDURE — 87077 CULTURE AEROBIC IDENTIFY: CPT | Performed by: EMERGENCY MEDICINE

## 2022-11-11 PROCEDURE — 36415 COLL VENOUS BLD VENIPUNCTURE: CPT

## 2022-11-11 PROCEDURE — 87186 SC STD MICRODIL/AGAR DIL: CPT | Performed by: EMERGENCY MEDICINE

## 2022-11-11 PROCEDURE — 80048 BASIC METABOLIC PNL TOTAL CA: CPT | Performed by: EMERGENCY MEDICINE

## 2022-11-11 PROCEDURE — 99214 OFFICE O/P EST MOD 30 MIN: CPT | Performed by: NURSE PRACTITIONER

## 2022-11-11 PROCEDURE — 87086 URINE CULTURE/COLONY COUNT: CPT | Performed by: EMERGENCY MEDICINE

## 2022-11-11 PROCEDURE — 85025 COMPLETE CBC W/AUTO DIFF WBC: CPT | Performed by: EMERGENCY MEDICINE

## 2022-11-11 NOTE — ED INITIAL ASSESSMENT (HPI)
Pt reports abdominal pain, burping, and distention. Rates pain 7/10. Pt c/o dizziness. Denies nausea and vomiting. Large Bm yesterday and Wednesday. Denies fevers.  History of spina bifida

## 2022-11-11 NOTE — ED INITIAL ASSESSMENT (HPI)
Pt. Present to ED via EMS with a complaint of Abdominal pain since yesterday. Pt rating pain 7/10 to EMS. Upon arrival to ED rating his pain 5/10 stating he is getting relief from burping. Pt has had this pain before due to constipation but states he had a big bowel movement last night. Pain is located in [facility]. Denies N/D/V.

## 2022-11-13 RX ORDER — NITROFURANTOIN 25; 75 MG/1; MG/1
100 CAPSULE ORAL 2 TIMES DAILY
Qty: 10 CAPSULE | Refills: 0 | Status: SHIPPED | OUTPATIENT
Start: 2022-11-13 | End: 2022-11-18

## 2022-11-18 ENCOUNTER — OFFICE VISIT (OUTPATIENT)
Dept: FAMILY MEDICINE CLINIC | Facility: CLINIC | Age: 27
End: 2022-11-18
Payer: MEDICARE

## 2022-11-18 VITALS
DIASTOLIC BLOOD PRESSURE: 90 MMHG | WEIGHT: 142 LBS | TEMPERATURE: 98 F | BODY MASS INDEX: 24.24 KG/M2 | SYSTOLIC BLOOD PRESSURE: 144 MMHG | HEIGHT: 64 IN | HEART RATE: 81 BPM

## 2022-11-18 DIAGNOSIS — K80.20 CALCULUS OF GALLBLADDER WITHOUT CHOLECYSTITIS WITHOUT OBSTRUCTION: ICD-10-CM

## 2022-11-18 DIAGNOSIS — I10 ESSENTIAL HYPERTENSION: ICD-10-CM

## 2022-11-18 DIAGNOSIS — R10.84 GENERALIZED ABDOMINAL PAIN: Primary | ICD-10-CM

## 2022-11-18 DIAGNOSIS — R11.0 NAUSEA: ICD-10-CM

## 2022-11-18 DIAGNOSIS — N20.0 KIDNEY STONES: ICD-10-CM

## 2022-11-18 PROCEDURE — 99214 OFFICE O/P EST MOD 30 MIN: CPT | Performed by: FAMILY MEDICINE

## 2022-11-18 RX ORDER — ONDANSETRON HYDROCHLORIDE 8 MG/1
8 TABLET, FILM COATED ORAL EVERY 8 HOURS PRN
Qty: 20 TABLET | Refills: 0 | Status: SHIPPED | OUTPATIENT
Start: 2022-11-18 | End: 2022-11-25

## 2022-11-20 NOTE — TELEPHONE ENCOUNTER
Please review Protocol Failed/No Protocol        Requested Prescriptions   Pending Prescriptions Disp Refills    CHOLECALCIFEROL 25 MCG (1000 UT) Oral Tab [Pharmacy Med Name: Vitamin D3 25 Mcg Tab Rug] 180 tablet 0     Sig: TAKE TWO TABLETS BY MOUTH DAILY       There is no refill protocol information for this order          Future Appointments         Provider Department Appt Notes    In 1 month Heavenly Bhandari MD [address], [facility] 6 months    In 2 months [facility], [address] [place] [address], [address], [place] 2 month follow up             Recent Outpatient Visits              2 days ago Generalized abdominal pain    [address], [address], [address], [place], [place]    Office Visit    1 month ago Urination frequency    [address], [address], [address], [place], [place]    Office Visit    2 months ago Seborrheic dermatitis    [address] [place] [facility] Debbie Thompson MD    Office Visit    2 months ago Neurogenic bladder    [address] [place], [address], [address], [place], [place]    Office Visit    3 months ago Generalized abdominal pain    [address], [place], [place]    Office Visit

## 2022-11-21 ENCOUNTER — HOSPITAL ENCOUNTER (EMERGENCY)
Facility: HOSPITAL | Age: 27
Discharge: HOME | End: 2022-11-21
Attending: EMERGENCY MEDICINE
Payer: MEDICARE

## 2022-11-21 ENCOUNTER — TELEPHONE (OUTPATIENT)
Dept: CASE MANAGEMENT | Age: 27
End: 2022-11-21

## 2022-11-21 VITALS
BODY MASS INDEX: 23.05 KG/M2 | SYSTOLIC BLOOD PRESSURE: 143 MMHG | RESPIRATION RATE: 18 BRPM | WEIGHT: 135 LBS | HEART RATE: 98 BPM | HEIGHT: 64 IN | OXYGEN SATURATION: 98 % | TEMPERATURE: 99 F | DIASTOLIC BLOOD PRESSURE: 108 MMHG

## 2022-11-21 DIAGNOSIS — R31.9 HEMATURIA, UNSPECIFIED TYPE: Primary | ICD-10-CM

## 2022-11-21 DIAGNOSIS — N30.01 ACUTE CYSTITIS WITH HEMATURIA: ICD-10-CM

## 2022-11-21 DIAGNOSIS — Q05.9 CONGENITAL MENINGOCELE (HCC): Primary | ICD-10-CM

## 2022-11-21 LAB
BILIRUB UR QL: NEGATIVE
CLARITY UR: CLEAR
COLOR UR: YELLOW
GLUCOSE UR-MCNC: NEGATIVE MG/DL
KETONES UR-MCNC: NEGATIVE MG/DL
NITRITE UR QL STRIP.AUTO: POSITIVE
PH UR: 7 [PH] (ref 5–8)
PROT UR-MCNC: NEGATIVE MG/DL
SP GR UR STRIP: 1.01 (ref 1–1.03)
UROBILINOGEN UR STRIP-ACNC: 0.2
WBC #/AREA URNS AUTO: >50 /HPF
WBC #/AREA URNS AUTO: >50 /HPF

## 2022-11-21 PROCEDURE — 87086 URINE CULTURE/COLONY COUNT: CPT | Performed by: EMERGENCY MEDICINE

## 2022-11-21 PROCEDURE — 99284 EMERGENCY DEPT VISIT MOD MDM: CPT

## 2022-11-21 PROCEDURE — 51702 INSERT TEMP BLADDER CATH: CPT

## 2022-11-21 PROCEDURE — 81001 URINALYSIS AUTO W/SCOPE: CPT | Performed by: EMERGENCY MEDICINE

## 2022-11-21 PROCEDURE — 87186 SC STD MICRODIL/AGAR DIL: CPT | Performed by: EMERGENCY MEDICINE

## 2022-11-21 PROCEDURE — 99283 EMERGENCY DEPT VISIT LOW MDM: CPT

## 2022-11-21 PROCEDURE — 87077 CULTURE AEROBIC IDENTIFY: CPT | Performed by: EMERGENCY MEDICINE

## 2022-11-21 PROCEDURE — 81015 MICROSCOPIC EXAM OF URINE: CPT | Performed by: EMERGENCY MEDICINE

## 2022-11-21 RX ORDER — MELATONIN
Qty: 180 TABLET | Refills: 0 | Status: SHIPPED | OUTPATIENT
Start: 2022-11-21

## 2022-11-21 RX ORDER — CIPROFLOXACIN 500 MG/1
500 TABLET, FILM COATED ORAL ONCE
Status: COMPLETED | OUTPATIENT
Start: 2022-11-21 | End: 2022-11-21

## 2022-11-21 RX ORDER — CIPROFLOXACIN 500 MG/1
500 TABLET, FILM COATED ORAL 2 TIMES DAILY
Qty: 14 TABLET | Refills: 0 | Status: SHIPPED | OUTPATIENT
Start: 2022-11-21 | End: 2022-11-28

## 2022-11-21 NOTE — ED QUICK NOTES
Clear yellow urine noted in bag of orta catheter. Orta catheter removed per MD orders. Patient awake, alert, skin WNL. Discharge paperwork, prescription, andd follow-up discussed with pt, pt verbally understands them. Pt discharged ambulatory with steady gait - no distress noted.

## 2022-11-21 NOTE — ED INITIAL ASSESSMENT (HPI)
Pt presents to the ER after seeing blood in urinary cath this morning. Pt self catheterizes d/t spina bifida    Pt just completed PO ABT for UTI last week.
CHEST PAIN

## 2022-11-21 NOTE — ED QUICK NOTES
Patient verbalized understanding discharge teaching. Patient in no acute distress. Patient will continue to straight cath at home per original plan of care prior to current hospital visit.

## 2022-11-21 NOTE — ED QUICK NOTES
Double lumen catheter placed, patient states he had bloody output when he attempted to self catheterize this morning at 0700. Orta placed, yellow clear urine drainage noted, RN irrigated orta catheter with 40 mls of water and irrigated with no difficulties.

## 2022-11-21 NOTE — TELEPHONE ENCOUNTER
GOod Afternoon Dr Daniel Ch and staff,    Fax was received from [facility] for DME supplies.     Please review pended DME order and confirm DX codes if you agree with plan of care    [facility] [phone]  Fax# [phone]    NPI# [id number]  TIN# [id number]    CPT codes:    - 1   - 660   - 2   - 180

## 2022-11-22 ENCOUNTER — TELEPHONE (OUTPATIENT)
Dept: NEUROSURGERY | Age: 27
End: 2022-11-22

## 2022-11-22 ENCOUNTER — OFFICE VISIT (OUTPATIENT)
Dept: NEUROSURGERY | Age: 27
End: 2022-11-22

## 2022-11-22 VITALS
DIASTOLIC BLOOD PRESSURE: 95 MMHG | WEIGHT: 141.09 LBS | HEART RATE: 103 BPM | BODY MASS INDEX: 24.09 KG/M2 | HEIGHT: 64 IN | SYSTOLIC BLOOD PRESSURE: 148 MMHG

## 2022-11-22 DIAGNOSIS — Q05.4: Primary | ICD-10-CM

## 2022-11-22 PROCEDURE — 99213 OFFICE O/P EST LOW 20 MIN: CPT | Performed by: NEUROLOGICAL SURGERY

## 2022-11-22 ASSESSMENT — ENCOUNTER SYMPTOMS
HEMATOLOGIC/LYMPHATIC NEGATIVE: 1
GASTROINTESTINAL NEGATIVE: 1
EYES NEGATIVE: 1
PSYCHIATRIC NEGATIVE: 1
CONSTITUTIONAL NEGATIVE: 1
RESPIRATORY NEGATIVE: 1
ENDOCRINE NEGATIVE: 1
ALLERGIC/IMMUNOLOGIC NEGATIVE: 1

## 2022-11-25 ENCOUNTER — OFFICE VISIT (OUTPATIENT)
Dept: FAMILY MEDICINE CLINIC | Facility: CLINIC | Age: 27
End: 2022-11-25
Payer: MEDICARE

## 2022-11-25 VITALS
WEIGHT: 142 LBS | HEIGHT: 64 IN | HEART RATE: 99 BPM | TEMPERATURE: 99 F | BODY MASS INDEX: 24.24 KG/M2 | DIASTOLIC BLOOD PRESSURE: 86 MMHG | SYSTOLIC BLOOD PRESSURE: 128 MMHG

## 2022-11-25 DIAGNOSIS — Q05.9 SPINA BIFIDA, UNSPECIFIED HYDROCEPHALUS PRESENCE, UNSPECIFIED SPINAL REGION (HCC): ICD-10-CM

## 2022-11-25 DIAGNOSIS — L98.9 SKIN LESION OF CHEST WALL: ICD-10-CM

## 2022-11-25 DIAGNOSIS — L21.9 SEBORRHEIC DERMATITIS: ICD-10-CM

## 2022-11-25 DIAGNOSIS — R31.0 GROSS HEMATURIA: ICD-10-CM

## 2022-11-25 DIAGNOSIS — K59.00 CONSTIPATION, UNSPECIFIED CONSTIPATION TYPE: ICD-10-CM

## 2022-11-25 DIAGNOSIS — N30.90 CYSTITIS: Primary | ICD-10-CM

## 2022-11-25 PROCEDURE — 99214 OFFICE O/P EST MOD 30 MIN: CPT | Performed by: FAMILY MEDICINE

## 2022-12-14 ENCOUNTER — TELEPHONE (OUTPATIENT)
Dept: FAMILY MEDICINE CLINIC | Facility: CLINIC | Age: 27
End: 2022-12-14

## 2022-12-14 DIAGNOSIS — Q05.9 SPINA BIFIDA, UNSPECIFIED HYDROCEPHALUS PRESENCE, UNSPECIFIED SPINAL REGION (HCC): Primary | ICD-10-CM

## 2022-12-14 NOTE — TELEPHONE ENCOUNTER
Pt requesting referral , at office to see Dr Fidelina Worthy , Hx  Spina bifida- received new leg braces , will need a muscle test  But need referral    LOV 11/25/22  Dr Silverio Tilley out of office today

## 2022-12-28 NOTE — TELEPHONE ENCOUNTER
Requesting RX for:       0.9% Sodium Chloride Irrigation USP   250 ML  Brand:  [employer]  Item:  [id number]      (took information off the the bottle mom brought in)

## 2022-12-29 RX ORDER — MAGNESIUM HYDROXIDE 1200 MG/15ML
3000 LIQUID ORAL
Qty: 250 ML | Refills: 0 | OUTPATIENT
Start: 2022-12-29

## 2022-12-29 NOTE — TELEPHONE ENCOUNTER
Called patient (name and  verified) to clarify Rx request as there medication is not previously listed in the medication history. Patient asked if this Rn could call back in 10 minutes as he was not able to talk.      Will call patient back to obtain more information regarding request.

## 2022-12-29 NOTE — TELEPHONE ENCOUNTER
Patient called back stated uses medication for self cath, also thinks it was prescribed by his urologist    Advised him to reach out to urology office for refill    Patient is asking if Dr. Maya Tejada can refill     Pend for review/approval

## 2023-01-09 ENCOUNTER — OFFICE VISIT (OUTPATIENT)
Dept: NEPHROLOGY | Facility: CLINIC | Age: 28
End: 2023-01-09
Payer: MEDICARE

## 2023-01-09 VITALS
SYSTOLIC BLOOD PRESSURE: 152 MMHG | WEIGHT: 141 LBS | HEIGHT: 64 IN | BODY MASS INDEX: 24.07 KG/M2 | DIASTOLIC BLOOD PRESSURE: 90 MMHG | HEART RATE: 106 BPM

## 2023-01-09 DIAGNOSIS — I70.1 RENAL ARTERY STENOSIS (HCC): ICD-10-CM

## 2023-01-09 DIAGNOSIS — I10 HYPERTENSION, UNSPECIFIED TYPE: Primary | ICD-10-CM

## 2023-01-09 PROCEDURE — 99214 OFFICE O/P EST MOD 30 MIN: CPT | Performed by: INTERNAL MEDICINE

## 2023-01-09 RX ORDER — CARVEDILOL 3.12 MG/1
3.12 TABLET ORAL 2 TIMES DAILY WITH MEALS
Qty: 180 TABLET | Refills: 1 | Status: SHIPPED | OUTPATIENT
Start: 2023-01-09

## 2023-01-09 NOTE — PATIENT INSTRUCTIONS
Stop lisinopril 10 mg daily and start on carvedilol 3.125 mg BID   Monitor blood pressure at home 3-4 times a week  Follow up in 6 months

## 2023-01-19 ENCOUNTER — OFFICE VISIT (OUTPATIENT)
Dept: FAMILY MEDICINE CLINIC | Facility: CLINIC | Age: 28
End: 2023-01-19

## 2023-01-19 VITALS
HEIGHT: 64 IN | WEIGHT: 140 LBS | HEART RATE: 78 BPM | SYSTOLIC BLOOD PRESSURE: 153 MMHG | DIASTOLIC BLOOD PRESSURE: 95 MMHG | BODY MASS INDEX: 23.9 KG/M2

## 2023-01-19 DIAGNOSIS — I70.1 RIGHT RENAL ARTERY STENOSIS (HCC): ICD-10-CM

## 2023-01-19 DIAGNOSIS — L98.9 SKIN LESION OF CHEST WALL: ICD-10-CM

## 2023-01-19 DIAGNOSIS — N31.9 NEUROGENIC BLADDER: ICD-10-CM

## 2023-01-19 DIAGNOSIS — L70.0 ACNE VULGARIS: ICD-10-CM

## 2023-01-19 DIAGNOSIS — R51.9 RIGHT-SIDED HEADACHE: ICD-10-CM

## 2023-01-19 DIAGNOSIS — Q05.9 SPINA BIFIDA, UNSPECIFIED HYDROCEPHALUS PRESENCE, UNSPECIFIED SPINAL REGION (HCC): ICD-10-CM

## 2023-01-19 DIAGNOSIS — H53.8 BLURRED VISION, RIGHT EYE: ICD-10-CM

## 2023-01-19 DIAGNOSIS — I10 ESSENTIAL HYPERTENSION: Primary | ICD-10-CM

## 2023-01-19 DIAGNOSIS — L21.9 SEBORRHEIC DERMATITIS: ICD-10-CM

## 2023-01-19 PROCEDURE — 99214 OFFICE O/P EST MOD 30 MIN: CPT | Performed by: FAMILY MEDICINE

## 2023-01-23 ENCOUNTER — TELEPHONE (OUTPATIENT)
Dept: FAMILY MEDICINE CLINIC | Facility: CLINIC | Age: 28
End: 2023-01-23

## 2023-01-23 NOTE — TELEPHONE ENCOUNTER
Referral requested from [facility] for     -Syringe 20CC(48/BX)  - Lubricting jelly 5MG sterile packet  - Cath Int 15 FR Male 16\"    Successfully faxed to [facility]

## 2023-01-26 NOTE — TELEPHONE ENCOUNTER
Form received from [facility] for items below was signed by MD and faxed successfully along with progress notes to [phone].

## 2023-02-03 ENCOUNTER — LAB ENCOUNTER (OUTPATIENT)
Dept: LAB | Age: 28
End: 2023-02-03
Attending: FAMILY MEDICINE
Payer: MEDICARE

## 2023-02-03 ENCOUNTER — OFFICE VISIT (OUTPATIENT)
Dept: FAMILY MEDICINE CLINIC | Facility: CLINIC | Age: 28
End: 2023-02-03

## 2023-02-03 VITALS
HEART RATE: 99 BPM | DIASTOLIC BLOOD PRESSURE: 82 MMHG | BODY MASS INDEX: 23.56 KG/M2 | TEMPERATURE: 97 F | HEIGHT: 64 IN | WEIGHT: 138 LBS | SYSTOLIC BLOOD PRESSURE: 130 MMHG

## 2023-02-03 DIAGNOSIS — I10 ESSENTIAL HYPERTENSION: Primary | ICD-10-CM

## 2023-02-03 DIAGNOSIS — I10 ESSENTIAL HYPERTENSION: ICD-10-CM

## 2023-02-03 DIAGNOSIS — M21.371 FOOT DROP, BILATERAL: ICD-10-CM

## 2023-02-03 DIAGNOSIS — R29.898 WEAKNESS OF BOTH LEGS: ICD-10-CM

## 2023-02-03 DIAGNOSIS — M21.372 FOOT DROP, BILATERAL: ICD-10-CM

## 2023-02-03 DIAGNOSIS — Q05.9 SPINA BIFIDA, UNSPECIFIED HYDROCEPHALUS PRESENCE, UNSPECIFIED SPINAL REGION (HCC): ICD-10-CM

## 2023-02-03 DIAGNOSIS — Z23 NEED FOR VACCINATION: ICD-10-CM

## 2023-02-03 LAB — VIT D+METAB SERPL-MCNC: 17.2 NG/ML (ref 30–100)

## 2023-02-03 PROCEDURE — 36415 COLL VENOUS BLD VENIPUNCTURE: CPT

## 2023-02-03 PROCEDURE — 82306 VITAMIN D 25 HYDROXY: CPT

## 2023-02-03 RX ORDER — MAGNESIUM HYDROXIDE 1200 MG/15ML
LIQUID ORAL
COMMUNITY
Start: 2023-01-06

## 2023-02-17 ENCOUNTER — APPOINTMENT (OUTPATIENT)
Dept: GENERAL RADIOLOGY | Age: 28
End: 2023-02-17
Attending: NURSE PRACTITIONER
Payer: MEDICARE

## 2023-02-17 ENCOUNTER — HOSPITAL ENCOUNTER (OUTPATIENT)
Age: 28
Discharge: HOME | End: 2023-02-17
Payer: MEDICARE

## 2023-02-17 VITALS
WEIGHT: 140 LBS | OXYGEN SATURATION: 98 % | TEMPERATURE: 99 F | RESPIRATION RATE: 18 BRPM | HEART RATE: 88 BPM | HEIGHT: 64 IN | BODY MASS INDEX: 23.9 KG/M2 | DIASTOLIC BLOOD PRESSURE: 97 MMHG | SYSTOLIC BLOOD PRESSURE: 155 MMHG

## 2023-02-17 DIAGNOSIS — M25.532 ACUTE PAIN OF LEFT WRIST: Primary | ICD-10-CM

## 2023-02-17 DIAGNOSIS — G56.02 CARPAL TUNNEL SYNDROME OF LEFT WRIST: ICD-10-CM

## 2023-02-17 PROCEDURE — 99213 OFFICE O/P EST LOW 20 MIN: CPT | Performed by: NURSE PRACTITIONER

## 2023-02-17 PROCEDURE — 73110 X-RAY EXAM OF WRIST: CPT | Performed by: NURSE PRACTITIONER

## 2023-02-17 RX ORDER — IBUPROFEN 600 MG/1
600 TABLET ORAL ONCE
Status: COMPLETED | OUTPATIENT
Start: 2023-02-17 | End: 2023-02-17

## 2023-02-17 RX ORDER — NAPROXEN 500 MG/1
500 TABLET ORAL 2 TIMES DAILY PRN
Qty: 20 TABLET | Refills: 0 | Status: SHIPPED | OUTPATIENT
Start: 2023-02-17 | End: 2023-02-24

## 2023-02-17 NOTE — DISCHARGE INSTRUCTIONS
Buy an over the counter cock up wrist splint and wear it each night. Follow up with your PCP in 1 week. Take naproxen starting 6 hours after you are discharged from the clinic. May additionally ice and take extra strength tylenol for relief. Read attached information regarding carpal tunnel syndrome.

## 2023-02-27 ENCOUNTER — TELEPHONE (OUTPATIENT)
Dept: FAMILY MEDICINE CLINIC | Facility: CLINIC | Age: 28
End: 2023-02-27

## 2023-02-27 DIAGNOSIS — Q05.9 SPINA BIFIDA, UNSPECIFIED HYDROCEPHALUS PRESENCE, UNSPECIFIED SPINAL REGION (HCC): Primary | ICD-10-CM

## 2023-02-27 DIAGNOSIS — N31.9 NEUROGENIC BLADDER: ICD-10-CM

## 2023-02-27 NOTE — TELEPHONE ENCOUNTER
Referral request received from [facility] for Authorization on the following:    - SYRINGE 20CC LUER LOK (48/BX)  - LUBRICATING JELLY 5GM STERILE PACKET 1 LF  -OSTOMY POWDER 1OZ PUFF BOTTLE ADAPT 1LF  -CATH INT 12FR MALE 16\" PVC GENTLECATH 1LF     Faxed successfully to [facility]

## 2023-02-27 NOTE — TELEPHONE ENCOUNTER
Dr. Heather Mora requesting referral for durable medical equipment. Pended referral please review diagnosis and sign off if you agree. Thank you.   Rony Teersa

## 2023-03-03 ENCOUNTER — TELEPHONE (OUTPATIENT)
Dept: FAMILY MEDICINE CLINIC | Facility: CLINIC | Age: 28
End: 2023-03-03

## 2023-03-03 NOTE — TELEPHONE ENCOUNTER
Please inform him because this is due for the [place] we need to do a visit to fill this out. Set something up for next week.

## 2023-03-03 NOTE — TELEPHONE ENCOUNTER
Spoke, with the patient and informed him of the message below. Pt did schedule an appointment to see Ham Lockwood on 3-7-23 a res 24 was used. This was approved per the message below.

## 2023-03-07 ENCOUNTER — OFFICE VISIT (OUTPATIENT)
Dept: FAMILY MEDICINE CLINIC | Facility: CLINIC | Age: 28
End: 2023-03-07

## 2023-03-07 VITALS
BODY MASS INDEX: 23.39 KG/M2 | TEMPERATURE: 98 F | SYSTOLIC BLOOD PRESSURE: 142 MMHG | HEART RATE: 105 BPM | HEIGHT: 64 IN | DIASTOLIC BLOOD PRESSURE: 81 MMHG | WEIGHT: 137 LBS

## 2023-03-07 DIAGNOSIS — M21.372 FOOT DROP, BILATERAL: ICD-10-CM

## 2023-03-07 DIAGNOSIS — Z02.89 ENCOUNTER FOR COMPLETION OF FORM WITH PATIENT: ICD-10-CM

## 2023-03-07 DIAGNOSIS — M21.371 FOOT DROP, BILATERAL: ICD-10-CM

## 2023-03-07 DIAGNOSIS — Q05.9 SPINA BIFIDA, UNSPECIFIED HYDROCEPHALUS PRESENCE, UNSPECIFIED SPINAL REGION (HCC): Primary | ICD-10-CM

## 2023-03-07 DIAGNOSIS — R19.7 DIARRHEA, UNSPECIFIED TYPE: ICD-10-CM

## 2023-03-07 DIAGNOSIS — R10.84 GENERALIZED ABDOMINAL PAIN: ICD-10-CM

## 2023-03-07 PROCEDURE — 99214 OFFICE O/P EST MOD 30 MIN: CPT | Performed by: FAMILY MEDICINE

## 2023-03-09 ENCOUNTER — NURSE TRIAGE (OUTPATIENT)
Dept: FAMILY MEDICINE CLINIC | Facility: CLINIC | Age: 28
End: 2023-03-09

## 2023-03-14 ENCOUNTER — TELEPHONE (OUTPATIENT)
Dept: CASE MANAGEMENT | Age: 28
End: 2023-03-14

## 2023-03-14 DIAGNOSIS — Q05.9 SPINA BIFIDA, UNSPECIFIED HYDROCEPHALUS PRESENCE, UNSPECIFIED SPINAL REGION (HCC): Primary | ICD-10-CM

## 2023-03-14 DIAGNOSIS — N31.9 NEUROGENIC BLADDER: ICD-10-CM

## 2023-03-14 NOTE — TELEPHONE ENCOUNTER
Dr. Ramón Lynch,     Received [facility] request for DME supplies. Pended referral please review diagnosis and sign off if you agree. Thank you.   Rony Teresa

## 2023-04-27 ENCOUNTER — OFFICE VISIT (OUTPATIENT)
Dept: FAMILY MEDICINE CLINIC | Facility: CLINIC | Age: 28
End: 2023-04-27

## 2023-04-27 VITALS
SYSTOLIC BLOOD PRESSURE: 143 MMHG | HEIGHT: 64 IN | DIASTOLIC BLOOD PRESSURE: 86 MMHG | HEART RATE: 87 BPM | WEIGHT: 140 LBS | BODY MASS INDEX: 23.9 KG/M2

## 2023-04-27 DIAGNOSIS — K52.9 GASTROENTERITIS: Primary | ICD-10-CM

## 2023-04-27 DIAGNOSIS — I10 ESSENTIAL HYPERTENSION: ICD-10-CM

## 2023-04-27 DIAGNOSIS — Q05.9 SPINA BIFIDA, UNSPECIFIED HYDROCEPHALUS PRESENCE, UNSPECIFIED SPINAL REGION (HCC): ICD-10-CM

## 2023-04-27 DIAGNOSIS — N31.9 NEUROGENIC BLADDER: ICD-10-CM

## 2023-04-27 DIAGNOSIS — I10 PRIMARY HYPERTENSION: ICD-10-CM

## 2023-04-27 PROCEDURE — 99214 OFFICE O/P EST MOD 30 MIN: CPT | Performed by: NURSE PRACTITIONER

## 2023-04-27 NOTE — PATIENT INSTRUCTIONS
Diarrhea/Gastroenteritis    -avoid fried, greasy or highly spiced foods  -bland diet- no fried foods, spicy foods-encourage protein intake such as eggs, chicken, lactose free milk  -Encourage fluids-clear liquids are best; ice chips by teaspoonful if unable to tolerate drinking fluids  -pedialyte for children for fluid replacement  -dilute regular Gatorade/powerade with water, 50/50 for children and adults  -to ER if diarrhea contains blood or mucus  -to ER if unable to tolerate fluids, ie-vomitting ( after 6 hours for children or elderly, 8-12 hours for healthy adults)  -to ER if patient becomes lethargic  -to ER if patient is a child and has no urine output in 6 hours  -call if symptoms do not improve within 2-3 days or if symptoms worsen

## 2023-04-30 PROBLEM — M25.571 ACUTE RIGHT ANKLE PAIN: Status: RESOLVED | Noted: 2021-01-18 | Resolved: 2023-04-30

## 2023-04-30 PROBLEM — K80.20 CALCULUS OF GALLBLADDER WITHOUT CHOLECYSTITIS WITHOUT OBSTRUCTION: Status: RESOLVED | Noted: 2022-04-04 | Resolved: 2023-04-30

## 2023-05-01 NOTE — ASSESSMENT & PLAN NOTE
Check blood pressure twice per day with arm cuff bp monitor for 2 weeks  Record date, time, blood pressure and pulse reading  Send in copy of bp log to me via [employer] or you may call into nurse triage staff  Call if bp consistently > 140/86  Go to ER if any severe headache, chest pain, shortness of breath, visual changes  Please let me know if you have any questions or concerns.      Continue coreg twice a day  Follow up 2 weeks prior to leaving for [facility]

## 2023-05-01 NOTE — ASSESSMENT & PLAN NOTE
Supportive care discussed to help alleviate symptoms  Please call if symptoms worsen or are not resolving.   Not given for work

## 2023-05-09 ENCOUNTER — TELEPHONE (OUTPATIENT)
Dept: FAMILY MEDICINE CLINIC | Facility: CLINIC | Age: 28
End: 2023-05-09

## 2023-05-10 ENCOUNTER — OFFICE VISIT (OUTPATIENT)
Dept: OPHTHALMOLOGY | Facility: CLINIC | Age: 28
End: 2023-05-10

## 2023-05-10 DIAGNOSIS — H52.01 HYPEROPIA OF RIGHT EYE: Primary | ICD-10-CM

## 2023-05-10 DIAGNOSIS — H52.12 MYOPIA OF LEFT EYE: ICD-10-CM

## 2023-05-10 DIAGNOSIS — H50.10 EXOTROPIA: ICD-10-CM

## 2023-05-10 PROCEDURE — 92015 DETERMINE REFRACTIVE STATE: CPT | Performed by: OPHTHALMOLOGY

## 2023-05-10 PROCEDURE — 92004 COMPRE OPH EXAM NEW PT 1/>: CPT | Performed by: OPHTHALMOLOGY

## 2023-05-10 NOTE — PATIENT INSTRUCTIONS
Hyperopia of right eye  Glasses Rx given today, update as needed    Myopia of left eye  New glasses Rx given today    Exotropia  No treatment    Will see patient as needed

## 2023-07-13 ENCOUNTER — OFFICE VISIT (OUTPATIENT)
Dept: NEPHROLOGY | Facility: CLINIC | Age: 28
End: 2023-07-13

## 2023-07-13 VITALS
DIASTOLIC BLOOD PRESSURE: 83 MMHG | BODY MASS INDEX: 23.39 KG/M2 | RESPIRATION RATE: 16 BRPM | HEIGHT: 64 IN | HEART RATE: 77 BPM | WEIGHT: 137 LBS | SYSTOLIC BLOOD PRESSURE: 132 MMHG

## 2023-07-13 DIAGNOSIS — I10 HYPERTENSION, UNSPECIFIED TYPE: ICD-10-CM

## 2023-07-13 DIAGNOSIS — I70.1 RENAL ARTERY STENOSIS (HCC): Primary | ICD-10-CM

## 2023-07-13 PROCEDURE — 99214 OFFICE O/P EST MOD 30 MIN: CPT | Performed by: INTERNAL MEDICINE

## 2023-07-13 NOTE — PROGRESS NOTES
HPI:     Patient is a 33 yrs old male with pmh of HTN, gall stones, spina bifida, hydrocephalous s/p  shunt, right renal artery stenosis who presented for follow up     Lab test showed BUN/Cr 0.9 mg/dl with ane  ml/min - creatinine stable atleast since 2017. Serum potassium and bicarb in normal range. UA +Ve RBC with no protein     US kidney with doppler showed moderate to severe right renal artery stenosis with left renal artery doppler normal. 2 non obstructing lower pole stone in left kidneys. Focal bladder wall thickening     Non smoker, no alcohol. No leg swelling. Wears bilateral knee down braces as doesn't have much sensation. occ NSAIDs or tylenol     BP Stable at home. Drinks lots of water. On low sodium diet and low animal protein     HISTORY:  Past Medical History:   Diagnosis Date    Depression     Other ill-defined conditions(799.89)     shunt from hydrocephalus    PONV (postoperative nausea and vomiting)     if masked used    S/P  shunt     Spina bifida ([address]     Management:  closure    Strabismus 1998    Done at [facility]      Past Surgical History:   Procedure Laterality Date    OTHER SURGICAL HISTORY  2010    bladder augmentation/catheterizable channel    OTHER SURGICAL HISTORY  07/06/2020    fistulotomy    SPINE SURGERY PROCEDURE UNLISTED  1994    STRABISMUS SURGERY Left 1999 or 2000      Family History   Problem Relation Age of Onset    No Known Problems Father     No Known Problems Mother     Diabetes Neg     Glaucoma Neg       Social History:   Social History     Socioeconomic History    Marital status: Single   Tobacco Use    Smoking status: Never    Smokeless tobacco: Never   Vaping Use    Vaping Use: Never used   Substance and Sexual Activity    Alcohol use: Yes    Drug use: No   Other Topics Concern    Caffeine Concern Yes     Comment: 1 cup a day.     Exercise No    Pt has a pacemaker No    Pt has a defibrillator No    Reaction to local anesthetic No Social History Narrative    The patient uses the following assistive device(s):  Splint on R leg . The patient does live in a home with stairs. Medications (Active prior to today's visit):  Current Outpatient Medications   Medication Sig Dispense Refill    Oxybutynin Chloride ER 15 MG Oral Tablet 24 Hr Take 1 tablet (15 mg total) by mouth daily. 90 tablet 1    sodium chloride 0.9 % Irrigation Solution IRRIGATE BLADDER AS DIRECTED      carvedilol 3.125 MG Oral Tab Take 1 tablet (3.125 mg total) by mouth 2 (two) times daily with meals. 180 tablet 1    CHOLECALCIFEROL 25 MCG (1000 UT) Oral Tab TAKE TWO TABLETS BY MOUTH DAILY 180 tablet 0    hydrocortisone 2.5 % External Cream Apply to the affected areas twice daily Monday-Friday. Take weekends off. Stop when rash resolved. 453 g 1    clindamycin 1 % External Lotion Apply twice daily with flares 60 mL 11    ketoconazole 2 % External Shampoo You can apply this not only to the scalp 2 or 3 times a week in the shower but also to your beard area and then also the chest where the rash is. Can also use on the face. 120 mL 11    VITAMIN D, ERGOCALCIFEROL, OR Take by mouth daily.            Allergies:    Latex                   RASH      ROS:     Constitutional:  Negative for decreased activity, fever, irritability and lethargy  ENMT:  Negative for ear drainage, hearing loss and nasal drainage  Eyes:  Negative for eye discharge and vision loss  Cardiovascular:  Negative for chest pain, sobs  Respiratory:  Negative for cough, dyspnea and wheezing  Gastrointestinal:  Negative for abdominal pain, constipation  Genitourinary:  Negative for dysuria and hematuria  Endocrine:  Negative for abnormal sleep patterns, increased activity  Hema/Lymph:  Negative for easy bleeding and easy bruising  Integumentary:  Negative for pruritus and rash  Musculoskeletal:  Negative for bone/joint symptoms  Neurological:  Negative for gait disturbance       07/13/23  1305   BP: 132/83 Pulse: 77   Resp: 16     Wt Readings from Last 6 Encounters:  07/13/23 : 137 lb (62.1 kg)  04/27/23 : 140 lb (63.5 kg)  03/07/23 : 137 lb (62.1 kg)  02/17/23 : 140 lb (63.5 kg)  02/03/23 : 138 lb (62.6 kg)  01/19/23 : 140 lb (63.5 kg)      PHYSICAL EXAM:     Constitutional: appears well hydrated alert and responsive no acute distress noted  Head/Face: normocephalic  Eyes/Vision: normal extraocular motion is intact  Nose/Mouth/Throat:mucous membranes are moist   Neck/Thyroid: neck is supple  Skin/Hair: no unusual rashes present  Back/Spine: no abnormalities noted  Musculoskeletal:  no deformities  Extremities: no edema  Neurological:  Grossly normal       ASSESSMENT/PLAN:     1. Renal artery stenosis: HTN:  -BUN/Cr 0.9 mg/dl with ane  ml/min - creatinine stable atleast since 2017.- stable   - off of amlodipine for leg swelling and now on lisinopril 10 mg daily - not taking it daily and hasn't taken for a while   - given LYNDON though unilateral will discontinue lisinopril and start on carvedilol 3.125 mg BID   - Monitor blood pressure at home 3-4 times a week and take readings at upcoming visit to Dr. Rafaela Reddy  - UA no protein   - no h/o of HF or sudden pulmonary edema   - US kidney with doppler showed moderate to severe right renal artery stenosis with left renal artery doppler normal. 2 non obstructing lower pole stone in left kidneys. Focal bladder wall thickening - was done for high serum renin level   - per ACC/AHA guidelines and CORAL trial ; given stable kidney function, no refractory HTN (on one BP meds), no HF , will defer any further studies - CTA or any intervention   - cont. to monitor for above symptoms   - monitor BP at home   - off of  NSAIDs    Follow up as needed     Discussed with mother        Orders This Visit:  No orders of the defined types were placed in this encounter.       Meds This Visit:  Requested Prescriptions      No prescriptions requested or ordered in this encounter       Imaging & Referrals:  None     7/13/2023  Jose Luis Gannon MD

## 2023-07-17 NOTE — TELEPHONE ENCOUNTER
Please review. Protocol failed/ No protocol.       Requested Prescriptions   Pending Prescriptions Disp Refills    ERGOCALCIFEROL 1.25 MG (03282 UT) Oral Cap [Pharmacy Med Name: Vitamin D 50,000 Unit Cap Bion] 12 capsule 0     Sig: TAKE ONE CAPSULE BY MOUTH ONCE A WEEK       There is no refill protocol information for this order          Recent Outpatient Visits              4 days ago Renal artery stenosis [facility])    Nils Scheuermann, MD    Office Visit    2 months ago Hyperopia of right eye    [address], [address], [place], Zacarias Vicente MD    Office Visit    2 months ago [address], [address], APRN    Office Visit    4 months ago Spina bifida, unspecified hydrocephalus presence, unspecified spinal region [facility])    [address], [address], [address], [place], DO    Office Visit    5 months ago Essential hypertension    [address], [address], [address], [place], [place]    Office Visit

## 2023-07-18 RX ORDER — ERGOCALCIFEROL 1.25 MG/1
50000 CAPSULE ORAL WEEKLY
Qty: 12 CAPSULE | Refills: 0 | Status: SHIPPED | OUTPATIENT
Start: 2023-07-18 | End: 2023-07-24

## 2023-07-19 ENCOUNTER — NURSE TRIAGE (OUTPATIENT)
Dept: FAMILY MEDICINE CLINIC | Facility: CLINIC | Age: 28
End: 2023-07-19

## 2023-07-19 NOTE — TELEPHONE ENCOUNTER
Please reply to pool: EM RN TRIAGE  Action Requested: Summary for Provider     []  Critical Lab, Recommendations Needed  [] Need Additional Advice  [x]   FYI    []   Need Orders  [] Need Medications Sent to Pharmacy  []  Other     SUMMARY: Patient contacts clinic reporting LUQ pain and diarrhea that began last night. Denies chest pain, shortness of breath, arm or neck pain, nausea or vomiting. Pain is to the left in the rib cage. Denies fever, body aches or chills. Acute visit offered today and tomorrow, patient declines to see anyone but Dr. Eartha Shone. He is hydrating and eating soup/tea. His symptoms are slowly resolving. Acute visit booked 7/24, advised to report back for sooner appt for any progression. He verbalized understanding and compliance.     Reason for call: Acute  Onset: Data Unavailable                       Reason for Disposition   Abdominal pain (Exception: Pain clears completely with each passage of diarrhea stool.)    Protocols used: Diarrhea-A-OH

## 2023-07-21 ENCOUNTER — HOSPITAL ENCOUNTER (OUTPATIENT)
Age: 28
Discharge: HOME | End: 2023-07-21
Payer: MEDICARE

## 2023-07-21 VITALS
RESPIRATION RATE: 18 BRPM | BODY MASS INDEX: 23.9 KG/M2 | SYSTOLIC BLOOD PRESSURE: 144 MMHG | DIASTOLIC BLOOD PRESSURE: 93 MMHG | OXYGEN SATURATION: 98 % | TEMPERATURE: 97 F | HEART RATE: 93 BPM | WEIGHT: 140 LBS | HEIGHT: 64 IN

## 2023-07-21 DIAGNOSIS — K52.9 GASTROENTERITIS: Primary | ICD-10-CM

## 2023-07-21 PROCEDURE — 99213 OFFICE O/P EST LOW 20 MIN: CPT | Performed by: NURSE PRACTITIONER

## 2023-07-21 NOTE — DISCHARGE INSTRUCTIONS
You're declining ER transfer today  Push fluids  SAFIA diet for now then slowly advance.   (Bananas, rice, applesauce, toast, tea)  If you develop abdominal pain, vomiting that will not stop, or worsening symptoms, please go to the emergency room  Follow up with your doctor in 2 days if no improvement

## 2023-07-24 ENCOUNTER — OFFICE VISIT (OUTPATIENT)
Dept: FAMILY MEDICINE CLINIC | Facility: CLINIC | Age: 28
End: 2023-07-24

## 2023-07-24 ENCOUNTER — LAB ENCOUNTER (OUTPATIENT)
Dept: LAB | Age: 28
End: 2023-07-24
Attending: FAMILY MEDICINE
Payer: MEDICARE

## 2023-07-24 VITALS
SYSTOLIC BLOOD PRESSURE: 140 MMHG | BODY MASS INDEX: 22.53 KG/M2 | WEIGHT: 132 LBS | DIASTOLIC BLOOD PRESSURE: 93 MMHG | HEIGHT: 64 IN | HEART RATE: 89 BPM | TEMPERATURE: 98 F

## 2023-07-24 DIAGNOSIS — L65.9 HAIR THINNING: ICD-10-CM

## 2023-07-24 DIAGNOSIS — R19.7 DIARRHEA, UNSPECIFIED TYPE: Primary | ICD-10-CM

## 2023-07-24 DIAGNOSIS — E55.9 VITAMIN D DEFICIENCY: ICD-10-CM

## 2023-07-24 DIAGNOSIS — I10 ESSENTIAL HYPERTENSION: ICD-10-CM

## 2023-07-24 DIAGNOSIS — L30.9 DERMATITIS: ICD-10-CM

## 2023-07-24 LAB
ANION GAP SERPL CALC-SCNC: 4 MMOL/L (ref 0–18)
BASOPHILS # BLD AUTO: 0.06 X10(3) UL (ref 0–0.2)
BASOPHILS NFR BLD AUTO: 0.8 %
BUN BLD-MCNC: 9 MG/DL (ref 7–18)
CALCIUM BLD-MCNC: 9.7 MG/DL (ref 8.5–10.1)
CHLORIDE SERPL-SCNC: 106 MMOL/L (ref 98–112)
CO2 SERPL-SCNC: 28 MMOL/L (ref 21–32)
CREAT BLD-MCNC: 0.99 MG/DL
DEPRECATED HBV CORE AB SER IA-ACNC: 135.9 NG/ML
EGFRCR SERPLBLD CKD-EPI 2021: 106 ML/MIN/1.73M2 (ref 60–?)
EOSINOPHIL # BLD AUTO: 0.11 X10(3) UL (ref 0–0.7)
EOSINOPHIL NFR BLD AUTO: 1.6 %
ERYTHROCYTE [DISTWIDTH] IN BLOOD BY AUTOMATED COUNT: 12.4 %
FASTING STATUS PATIENT QL REPORTED: NO
GLUCOSE BLD-MCNC: 95 MG/DL (ref 70–99)
HCT VFR BLD AUTO: 49.4 %
HGB BLD-MCNC: 16.5 G/DL
IMM GRANULOCYTES # BLD AUTO: 0.03 X10(3) UL (ref 0–1)
IMM GRANULOCYTES NFR BLD: 0.4 %
IRON SATN MFR SERPL: 19 %
IRON SERPL-MCNC: 69 UG/DL
LYMPHOCYTES # BLD AUTO: 1.72 X10(3) UL (ref 1–4)
LYMPHOCYTES NFR BLD AUTO: 24.3 %
MCH RBC QN AUTO: 29.6 PG (ref 26–34)
MCHC RBC AUTO-ENTMCNC: 33.4 G/DL (ref 31–37)
MCV RBC AUTO: 88.5 FL
MONOCYTES # BLD AUTO: 0.57 X10(3) UL (ref 0.1–1)
MONOCYTES NFR BLD AUTO: 8.1 %
NEUTROPHILS # BLD AUTO: 4.58 X10 (3) UL (ref 1.5–7.7)
NEUTROPHILS # BLD AUTO: 4.58 X10(3) UL (ref 1.5–7.7)
NEUTROPHILS NFR BLD AUTO: 64.8 %
OSMOLALITY SERPL CALC.SUM OF ELEC: 284 MOSM/KG (ref 275–295)
PLATELET # BLD AUTO: 267 10(3)UL (ref 150–450)
POTASSIUM SERPL-SCNC: 4.7 MMOL/L (ref 3.5–5.1)
RBC # BLD AUTO: 5.58 X10(6)UL
SODIUM SERPL-SCNC: 138 MMOL/L (ref 136–145)
TIBC SERPL-MCNC: 365 UG/DL (ref 240–450)
TRANSFERRIN SERPL-MCNC: 245 MG/DL (ref 200–360)
TSI SER-ACNC: 1.29 MIU/ML (ref 0.36–3.74)
VIT B12 SERPL-MCNC: 340 PG/ML (ref 193–986)
VIT D+METAB SERPL-MCNC: 89.8 NG/ML (ref 30–100)
WBC # BLD AUTO: 7.1 X10(3) UL (ref 4–11)

## 2023-07-24 PROCEDURE — 82607 VITAMIN B-12: CPT

## 2023-07-24 PROCEDURE — 36415 COLL VENOUS BLD VENIPUNCTURE: CPT

## 2023-07-24 PROCEDURE — 82728 ASSAY OF FERRITIN: CPT

## 2023-07-24 PROCEDURE — 99214 OFFICE O/P EST MOD 30 MIN: CPT | Performed by: FAMILY MEDICINE

## 2023-07-24 PROCEDURE — 83540 ASSAY OF IRON: CPT

## 2023-07-24 PROCEDURE — 85025 COMPLETE CBC W/AUTO DIFF WBC: CPT

## 2023-07-24 PROCEDURE — 83550 IRON BINDING TEST: CPT

## 2023-07-24 PROCEDURE — 84443 ASSAY THYROID STIM HORMONE: CPT

## 2023-07-24 PROCEDURE — 80048 BASIC METABOLIC PNL TOTAL CA: CPT

## 2023-07-24 PROCEDURE — 82306 VITAMIN D 25 HYDROXY: CPT

## 2023-07-24 RX ORDER — CHOLECALCIFEROL (VITAMIN D3) 125 MCG
2000 CAPSULE ORAL DAILY
Qty: 90 TABLET | Refills: 3 | Status: SHIPPED | OUTPATIENT
Start: 2023-07-24

## 2023-07-28 ENCOUNTER — HOSPITAL ENCOUNTER (EMERGENCY)
Facility: HOSPITAL | Age: 28
Discharge: HOME | End: 2023-07-28
Attending: EMERGENCY MEDICINE
Payer: MEDICARE

## 2023-07-28 ENCOUNTER — TELEPHONE (OUTPATIENT)
Dept: FAMILY MEDICINE CLINIC | Facility: CLINIC | Age: 28
End: 2023-07-28

## 2023-07-28 VITALS
SYSTOLIC BLOOD PRESSURE: 106 MMHG | HEART RATE: 72 BPM | BODY MASS INDEX: 23.9 KG/M2 | OXYGEN SATURATION: 99 % | HEIGHT: 64 IN | TEMPERATURE: 98 F | DIASTOLIC BLOOD PRESSURE: 76 MMHG | RESPIRATION RATE: 18 BRPM | WEIGHT: 140 LBS

## 2023-07-28 DIAGNOSIS — R19.7 DIARRHEA, UNSPECIFIED TYPE: Primary | ICD-10-CM

## 2023-07-28 LAB
ALBUMIN SERPL-MCNC: 4.3 G/DL (ref 3.4–5)
ALP LIVER SERPL-CCNC: 74 U/L
ALT SERPL-CCNC: 34 U/L
ANION GAP SERPL CALC-SCNC: 6 MMOL/L (ref 0–18)
AST SERPL-CCNC: 19 U/L (ref 15–37)
BASOPHILS # BLD AUTO: 0.04 X10(3) UL (ref 0–0.2)
BASOPHILS NFR BLD AUTO: 0.5 %
BILIRUB DIRECT SERPL-MCNC: 0.2 MG/DL (ref 0–0.2)
BILIRUB SERPL-MCNC: 1.2 MG/DL (ref 0.1–2)
BUN BLD-MCNC: 14 MG/DL (ref 7–18)
BUN/CREAT SERPL: 12.7 (ref 10–20)
CALCIUM BLD-MCNC: 9.4 MG/DL (ref 8.5–10.1)
CHLORIDE SERPL-SCNC: 107 MMOL/L (ref 98–112)
CO2 SERPL-SCNC: 28 MMOL/L (ref 21–32)
CREAT BLD-MCNC: 1.1 MG/DL
DEPRECATED RDW RBC AUTO: 38.4 FL (ref 35.1–46.3)
EGFRCR SERPLBLD CKD-EPI 2021: 93 ML/MIN/1.73M2 (ref 60–?)
EOSINOPHIL # BLD AUTO: 0.13 X10(3) UL (ref 0–0.7)
EOSINOPHIL NFR BLD AUTO: 1.6 %
ERYTHROCYTE [DISTWIDTH] IN BLOOD BY AUTOMATED COUNT: 12.2 % (ref 11–15)
GLUCOSE BLD-MCNC: 90 MG/DL (ref 70–99)
HCT VFR BLD AUTO: 48.8 %
HGB BLD-MCNC: 16.9 G/DL
IMM GRANULOCYTES # BLD AUTO: 0.05 X10(3) UL (ref 0–1)
IMM GRANULOCYTES NFR BLD: 0.6 %
LIPASE SERPL-CCNC: 29 U/L (ref 13–75)
LYMPHOCYTES # BLD AUTO: 2.46 X10(3) UL (ref 1–4)
LYMPHOCYTES NFR BLD AUTO: 30.5 %
MCH RBC QN AUTO: 29.7 PG (ref 26–34)
MCHC RBC AUTO-ENTMCNC: 34.6 G/DL (ref 31–37)
MCV RBC AUTO: 85.8 FL
MONOCYTES # BLD AUTO: 0.59 X10(3) UL (ref 0.1–1)
MONOCYTES NFR BLD AUTO: 7.3 %
NEUTROPHILS # BLD AUTO: 4.8 X10 (3) UL (ref 1.5–7.7)
NEUTROPHILS # BLD AUTO: 4.8 X10(3) UL (ref 1.5–7.7)
NEUTROPHILS NFR BLD AUTO: 59.5 %
OSMOLALITY SERPL CALC.SUM OF ELEC: 292 MOSM/KG (ref 275–295)
PLATELET # BLD AUTO: 270 10(3)UL (ref 150–450)
POTASSIUM SERPL-SCNC: 4.7 MMOL/L (ref 3.5–5.1)
PROT SERPL-MCNC: 8 G/DL (ref 6.4–8.2)
RBC # BLD AUTO: 5.69 X10(6)UL
SODIUM SERPL-SCNC: 141 MMOL/L (ref 136–145)
WBC # BLD AUTO: 8.1 X10(3) UL (ref 4–11)

## 2023-07-28 PROCEDURE — 83690 ASSAY OF LIPASE: CPT | Performed by: EMERGENCY MEDICINE

## 2023-07-28 PROCEDURE — 80048 BASIC METABOLIC PNL TOTAL CA: CPT | Performed by: EMERGENCY MEDICINE

## 2023-07-28 PROCEDURE — 80076 HEPATIC FUNCTION PANEL: CPT | Performed by: EMERGENCY MEDICINE

## 2023-07-28 PROCEDURE — 85025 COMPLETE CBC W/AUTO DIFF WBC: CPT | Performed by: EMERGENCY MEDICINE

## 2023-07-28 PROCEDURE — 96374 THER/PROPH/DIAG INJ IV PUSH: CPT

## 2023-07-28 PROCEDURE — 99284 EMERGENCY DEPT VISIT MOD MDM: CPT

## 2023-07-28 PROCEDURE — 96361 HYDRATE IV INFUSION ADD-ON: CPT

## 2023-07-28 RX ORDER — SODIUM CHLORIDE 9 MG/ML
INJECTION, SOLUTION INTRAVENOUS CONTINUOUS
Status: DISCONTINUED | OUTPATIENT
Start: 2023-07-28 | End: 2023-07-28

## 2023-07-28 RX ORDER — ONDANSETRON 2 MG/ML
4 INJECTION INTRAMUSCULAR; INTRAVENOUS ONCE
Status: COMPLETED | OUTPATIENT
Start: 2023-07-28 | End: 2023-07-28

## 2023-07-28 RX ORDER — ONDANSETRON 4 MG/1
4 TABLET, ORALLY DISINTEGRATING ORAL EVERY 4 HOURS PRN
Qty: 10 TABLET | Refills: 0 | Status: SHIPPED | OUTPATIENT
Start: 2023-07-28 | End: 2023-08-04

## 2023-07-28 NOTE — TELEPHONE ENCOUNTER
Pt calling back and states GI appointment is 9/5/23    Routing to GI to request sooner OV based on pt symptoms. Thank you. Dr Daniel Ch pt asking in the mean time if he should have any testing done to check for bacteria/parasites.

## 2023-07-28 NOTE — ED INITIAL ASSESSMENT (HPI)
Reports he had food poisoning for the past week with abd pain & diarrhea. Reports pain/pressure to upper abd, continued diarrhea, nausea but no emesis.

## 2023-07-28 NOTE — DISCHARGE INSTRUCTIONS
Follow a clear liquid diet for the next 1 to 2 days and then advance to full liquids for 1 to 2 days. After this you can go to a soft, bland diet. Push fluids. Take Tylenol as needed for pain. See gastroenterology for follow-up. If you are not able to see gastroenterology in the next week please see your primary care doctor for follow-up appointment. Return to the ER if you develop worsening symptoms, inability to tolerate fluids, or any emergent concerns.

## 2023-07-28 NOTE — TELEPHONE ENCOUNTER
Patient contacted. Accepted below appointment and given office directions.     Your Appointments      Tuesday August 01, 2023  9:00 AM  Consult with Maxim Valentin MD  [address], [address] [facility],[address], [facility]) [address] [place] [address],[address]  [phone]

## 2023-07-31 ENCOUNTER — TELEPHONE (OUTPATIENT)
Dept: CASE MANAGEMENT | Age: 28
End: 2023-07-31

## 2023-07-31 DIAGNOSIS — Q05.9 SPINA BIFIDA, UNSPECIFIED HYDROCEPHALUS PRESENCE, UNSPECIFIED SPINAL REGION (HCC): ICD-10-CM

## 2023-07-31 DIAGNOSIS — N31.9 NEUROGENIC BLADDER: Primary | ICD-10-CM

## 2023-07-31 NOTE — TELEPHONE ENCOUNTER
Good Afternoon Dr Aleyda Lawton    Fax received from [facility] for DME supplies. Please review pended referral for catheter supplies for patient.     Thank you for your help    [facility]

## 2023-08-01 ENCOUNTER — TELEPHONE (OUTPATIENT)
Facility: CLINIC | Age: 28
End: 2023-08-01

## 2023-08-01 ENCOUNTER — LAB ENCOUNTER (OUTPATIENT)
Dept: LAB | Facility: HOSPITAL | Age: 28
End: 2023-08-01
Attending: STUDENT IN AN ORGANIZED HEALTH CARE EDUCATION/TRAINING PROGRAM
Payer: MEDICARE

## 2023-08-01 ENCOUNTER — IMAGING SERVICES (OUTPATIENT)
Dept: OTHER | Age: 28
End: 2023-08-01

## 2023-08-01 ENCOUNTER — OFFICE VISIT (OUTPATIENT)
Facility: CLINIC | Age: 28
End: 2023-08-01

## 2023-08-01 ENCOUNTER — HOSPITAL ENCOUNTER (OUTPATIENT)
Dept: GENERAL RADIOLOGY | Facility: HOSPITAL | Age: 28
Discharge: HOME | End: 2023-08-01
Attending: STUDENT IN AN ORGANIZED HEALTH CARE EDUCATION/TRAINING PROGRAM
Payer: MEDICARE

## 2023-08-01 VITALS
HEART RATE: 77 BPM | HEIGHT: 64 IN | WEIGHT: 130 LBS | SYSTOLIC BLOOD PRESSURE: 132 MMHG | DIASTOLIC BLOOD PRESSURE: 79 MMHG | BODY MASS INDEX: 22.2 KG/M2

## 2023-08-01 DIAGNOSIS — R14.0 ABDOMINAL BLOATING: ICD-10-CM

## 2023-08-01 DIAGNOSIS — R19.7 DIARRHEA, UNSPECIFIED TYPE: Primary | ICD-10-CM

## 2023-08-01 PROCEDURE — 83013 H PYLORI (C-13) BREATH: CPT

## 2023-08-01 PROCEDURE — 99204 OFFICE O/P NEW MOD 45 MIN: CPT | Performed by: STUDENT IN AN ORGANIZED HEALTH CARE EDUCATION/TRAINING PROGRAM

## 2023-08-01 PROCEDURE — 74018 RADEX ABDOMEN 1 VIEW: CPT | Performed by: STUDENT IN AN ORGANIZED HEALTH CARE EDUCATION/TRAINING PROGRAM

## 2023-08-01 NOTE — TELEPHONE ENCOUNTER
I spoke to the pt    He states he just ate pancakes and had orange juice and he seemed to tolerated this breakfast

## 2023-08-01 NOTE — H&P
[facility], [facility] - Gastroenterology                                                                                                               Reason for consult: diarrhea    Requesting physician or provider: Rox Osborne DO    Patient presents with:  ER F/U      HPI:   Alden Grant is a 33year old year-old man with history of spina bifida s/p closure, hydrocephalus s/p  shunt, s/p bladder surgery who presents with diarrhea and abdominal bloating. Was seen in urgent care 7/21 and ER 7/28 with complaint of loose stools and abdominal bloating. ER VISIT 7/28 for diarrhea  *BMP with potassium of 4.7  *CBC unremarkable (hgb 16.7)  *TSH normal    He reports chronic issues with constipation in the setting of spina bifida and prior surgeries that seem to have altered his pelvis floor muscles. He reports previously straining with bowel movements and passing smaller stools. He reports chronic abdominal bloating that does improve with a bowel movement. Approximately 3 weeks ago he was out eating dinner with family and that night had a loose stools. Since then, he reports passing smaller/loose stools with associated abdominal bloating. He has the sensation of incomplete evacuation. Over the last week or so symptoms have been improving. He had one formed stool yesterday. He has been consuming a blander diet over the past week which seems to be helping. No nausea. No vomiting. No blood in stool. No sick contacts. No one else he ate with had symptoms. No new abdominal pain other than chronic bloating. Has had several scans of the A/p as outlined below. CT AP 11/2022  CONCLUSION:      1. Small amount of air within the bladder may indicate infection in the absence of recent instrumentation. Irregular bladder contour again noted raising the possibility of neurogenic bladder. 2. Cholelithiasis.   The gallbladder is otherwise normal.      3. Ventriculoperitoneal shunt catheter. 4. L4 spina bifida with probable associated meningocele again noted. 5. Sub 5 mm nonobstructing caliceal calculi in the lower pole of the left kidney. HIDA SCAN 4/2022  CONCLUSION:      Normal hepatic biliary scan with no evidence for cholecystitis. US Gallbladder 4/2022  CONCLUSION:      Cholelithiasis without sonographic evidence of acute cholecystitis. Prior endoscopies:  None    Soc:  -No smoking  -Yes Etoh    Wt Readings from Last 6 Encounters:  08/01/23 : 130 lb (59 kg)  07/28/23 : 140 lb (63.5 kg)  07/24/23 : 132 lb (59.9 kg)  07/21/23 : 140 lb (63.5 kg)  07/13/23 : 137 lb (62.1 kg)  04/27/23 : 140 lb (63.5 kg)       History, Medications, Allergies, ROS:      Past Medical History:   Diagnosis Date    Depression     Other ill-defined conditions(799.89)     shunt from hydrocephalus    PONV (postoperative nausea and vomiting)     if masked used    S/P  shunt     Spina bifida ([facility] [address]     Management:  closure    Strabismus 1998    Done at [facility]      Past Surgical History:   Procedure Laterality Date    OTHER SURGICAL HISTORY  2010    bladder augmentation/catheterizable channel    OTHER SURGICAL HISTORY  07/06/2020    fistulotomy    SPINE SURGERY PROCEDURE UNLISTED  1994    STRABISMUS SURGERY Left 1999 or 2000      Family Hx:   Family History   Problem Relation Age of Onset    No Known Problems Father     No Known Problems Mother     Diabetes Neg     Glaucoma Neg       Social History:   Social History     Socioeconomic History    Marital status: Single   Tobacco Use    Smoking status: Never    Smokeless tobacco: Never   Vaping Use    Vaping Use: Never used   Substance and Sexual Activity    Alcohol use: Yes    Drug use: No   Other Topics Concern    Caffeine Concern Yes     Comment: 1 cup a day.     Exercise No    Pt has a pacemaker No    Pt has a defibrillator No    Reaction to local anesthetic No   Social History Narrative    The patient uses the following assistive device(s):  Splint on R leg . The patient does live in a home with stairs. Medications (Active prior to today's visit):  Current Outpatient Medications   Medication Sig Dispense Refill    ondansetron 4 MG Oral Tablet Dispersible Take 1 tablet (4 mg total) by mouth every 4 (four) hours as needed for Nausea. 10 tablet 0    cholecalciferol 50 MCG (2000 UT) Oral Tab Take 1 tablet (2,000 Units total) by mouth daily. 90 tablet 3    Oxybutynin Chloride ER 15 MG Oral Tablet 24 Hr Take 1 tablet (15 mg total) by mouth daily. 90 tablet 1    sodium chloride 0.9 % Irrigation Solution IRRIGATE BLADDER AS DIRECTED      carvedilol 3.125 MG Oral Tab Take 1 tablet (3.125 mg total) by mouth 2 (two) times daily with meals. 180 tablet 1    hydrocortisone 2.5 % External Cream Apply to the affected areas twice daily Monday-Friday. Take weekends off. Stop when rash resolved. 453 g 1    clindamycin 1 % External Lotion Apply twice daily with flares 60 mL 11    ketoconazole 2 % External Shampoo You can apply this not only to the scalp 2 or 3 times a week in the shower but also to your beard area and then also the chest where the rash is. Can also use on the face. 120 mL 11    VITAMIN D, ERGOCALCIFEROL, OR Take by mouth daily.            Allergies:    Latex                   RASH    ROS:   CONSTITUTIONAL:  negative for fevers, rigors  EYES:  negative for diplopia   RESPIRATORY:  negative for severe shortness of breath  CARDIOVASCULAR:  negative for crushing sub-sternal chest pain  GASTROINTESTINAL:  see HPI  GENITOURINARY:  negative for dysuria or gross hematuria  INTEGUMENT/BREAST:  SKIN:  negative for jaundice   ALLERGIC/IMMUNOLOGIC:  negative for hay fever  ENDOCRINE:  negative for cold intolerance and heat intolerance  MUSCULOSKELETAL:  negative for joint effusion/severe erythema  BEHAVIOR/PSYCH:  negative for psychotic behavior      PHYSICAL EXAM:   Blood pressure 132/79, pulse 77, height 5' 4\" (1.626 m), weight 130 lb (59 kg). Gen- Patient appears comfortable and in no acute discomfort  HEENT: the sclera appears anicteric, oropharynx clear, mucus membranes appear moist  CV- regular rate and rhythm, the extremities are warm and well perfused   Lung- Moves air well; No labored breathing  Abdomen- soft, non-tender exam in all quadrants without rigidity or guarding, non-distended  Skin- No jaundice  Ext: no edema is evident. Neuro- Alert and interactive, and gross movements of extremities normal  Psych - appropriate, non-agitated    Labs/Imaging:     Patient's pertinent labs and imaging were reviewed and discussed with patient today. ASSESSMENT/PLAN:   Ephraim Wood is a 33year old year-old man with history of spina bifida s/p closure, hydrocephalus s/p  shunt, s/p bladder surgery who presents with diarrhea and abdominal bloating. #Altered bowel habits, abdominal bloating  -Suspect symptoms to be multifactorial in etiology given hx of chronic constipation likely due to pelvic floor dysfunction, irritable bowel syndrome mixed and possible SIBO. He has no red flag symptoms at this time -- no blood in stool, no nocturnal symptoms, no vomiting. Several abdomen/pelvis images done in 2022 were unremarkable. Recent labs showed a potassium of 4.7 which speaks against large volume diarrhea. Over the last 48 hours has only had one bowel movement. Given that his abdominal bloating improves with a bowel movement I suspect he may have constipation/overflow diarrhea that does seem to be improving. Will obtain cross-sectional image to assess stool burden. If has large stool burden will start on laxative. For severe cramps will give hyoscyamine PRN. I believe the benefit of hyoscyamine outweighs the possibility of constipation with as needed dosing. Recent TSH was unremarkable. Should symptoms persist despite bowel regimen/anti-spasmodic will consider course of rifaximin. Improve posture with bowel movements discussed today (squatty potty). Rule out H. Pylori. Orders This Visit:  Orders Placed This Encounter      Helicobacter Pylori Breath Test, Adult      Meds This Visit:  Requested Prescriptions      No prescriptions requested or ordered in this encounter       Imaging & Referrals:  None     Ángel Oneal MD  [address] [place] [address] Gastroenterology  8/1/2023      This note was partially prepared using [facility] 3[employer]og[employer] voice recognition dictation software. As a result, errors may occur. When identified, these errors have been corrected.  While every attempt is made to correct errors during dictation, discrepancies may still exist.

## 2023-08-01 NOTE — PATIENT INSTRUCTIONS
1) Get Xray of the abdomen today. 2) Get h. Pylori breath test done. 3) Next steps will depend on the above.

## 2023-08-01 NOTE — TELEPHONE ENCOUNTER
Patient states he just had his xrays done and wanted to know if he is ok to eat. Patient states he hasn't eaten breakfast yet. Wants to know what type of diet he should follow. Please call. Thank you.

## 2023-08-02 ENCOUNTER — TELEPHONE (OUTPATIENT)
Facility: CLINIC | Age: 28
End: 2023-08-02

## 2023-08-02 DIAGNOSIS — A04.8 H. PYLORI INFECTION: Primary | ICD-10-CM

## 2023-08-02 LAB — H PYLORI BREATH TEST: POSITIVE

## 2023-08-02 RX ORDER — OMEPRAZOLE 20 MG/1
20 CAPSULE, DELAYED RELEASE ORAL
Qty: 28 CAPSULE | Refills: 0 | Status: SHIPPED | OUTPATIENT
Start: 2023-08-02 | End: 2023-08-16

## 2023-08-02 RX ORDER — CLARITHROMYCIN 500 MG/1
500 TABLET, COATED ORAL 2 TIMES DAILY
Qty: 28 TABLET | Refills: 0 | Status: SHIPPED | OUTPATIENT
Start: 2023-08-02 | End: 2023-08-16

## 2023-08-02 RX ORDER — AMOXICILLIN 500 MG/1
1000 TABLET, FILM COATED ORAL 2 TIMES DAILY
Qty: 56 TABLET | Refills: 0 | Status: SHIPPED | OUTPATIENT
Start: 2023-08-02 | End: 2023-08-16

## 2023-08-02 NOTE — PROGRESS NOTES
I called the patient, he did not answer. I left a voicemail. He has right sided stool burden on imaging. He should start miralax. Continue as needed hyoscyamine. Use squatty potty. Should the patient call back please relay the above message. We did discuss this after clinic visit.

## 2023-08-02 NOTE — TELEPHONE ENCOUNTER
I attempted to call the patient with H. Pylori diagnosis, he did not answer. I have ordered triple therapy with omeprazole, amoxicillin, clarithromycin -- all medications are BID x 14 days. If the patient calls back please relay the above information to him. GI Staff:    Can you please place recall for H. Pylori breath test in 10 weeks. Thank you.

## 2023-08-02 NOTE — TELEPHONE ENCOUNTER
RN received call back from pt. Discussed recommendations per Dr. Gibran Golden note, start miralax, use squatty potty, and take hyoscyamine as needed. Pt picked up treatment for h pylori, will start treatment as recommended. Pt scheduled for clinic on 9/5/23.

## 2023-08-02 NOTE — TELEPHONE ENCOUNTER
----- Message from Lavern Thomson MD sent at 8/2/2023 12:37 PM CDT -----  I called the patient, he did not answer. I left a voicemail. He has right sided stool burden on imaging. He should start miralax. Continue as needed hyoscyamine. Use squatty potty. Should the patient call back please relay the above message. We did discuss this after clinic visit.

## 2023-08-06 DIAGNOSIS — N31.9 NEUROGENIC BLADDER: ICD-10-CM

## 2023-08-06 DIAGNOSIS — Q05.9 SPINA BIFIDA, UNSPECIFIED HYDROCEPHALUS PRESENCE, UNSPECIFIED SPINAL REGION (HCC): ICD-10-CM

## 2023-08-07 ENCOUNTER — TELEPHONE (OUTPATIENT)
Facility: CLINIC | Age: 28
End: 2023-08-07

## 2023-08-07 RX ORDER — OXYBUTYNIN CHLORIDE 15 MG/1
15 TABLET, EXTENDED RELEASE ORAL DAILY
Qty: 90 TABLET | Refills: 3 | Status: SHIPPED | OUTPATIENT
Start: 2023-08-07

## 2023-08-07 NOTE — TELEPHONE ENCOUNTER
Refill passed per [place] [employer] [place], [facility] protocol. Requested Prescriptions   Pending Prescriptions Disp Refills    OXYBUTYNIN CHLORIDE ER 15 MG Oral Tablet 24 Hr [Pharmacy Med Name: Oxybutynin Chloride Er 24hr 15 Mg Tab Zydu] 90 tablet 0     Sig: Take 1 tablet (15 mg total) by mouth daily.        Genitourinary Medications Passed - 8/6/2023  9:06 AM        Passed - Patient does not have pulmonary hypertension on problem list        Passed - In person appointment or virtual visit in the past 12 mos or appointment in next 3 mos     Recent Outpatient Visits              6 days ago Diarrhea, unspecified type    [address], [address], Sho Donaldson MD    Office Visit    2 weeks ago Diarrhea, unspecified type    [address], [address], [address], [facility]    Office Visit    3 weeks ago Renal artery stenosis [facility])    Rhys Correa True Feast, MD    Office Visit    2 months ago Hyperopia of right eye    [address], [address], Suleiman Barrios MD    Office Visit    3 months ago [facility]    Office Visit          Future Appointments         Provider Department Appt Notes    In 1 week Bryson Caceres MD [address], [address], [place] [facility]    In 4 weeks Jose Narvaez MD [address], [address], [address] f/u, ok per Dr. Coleman Muniz    In 4 weeks Carvel Osler, [address], [place], [place] 6 wk f/u blood pressure                  Recent Outpatient Visits              6 days ago Diarrhea, unspecified type    [address], [address], Sho Donaldson MD    Office Visit    2 weeks ago Diarrhea, unspecified type    [facility], [address], [address], [place],     Office Visit    3 weeks ago Renal artery stenosis [facility])    Rosina Siddiqui Mayank Yell, Dana Calhoun MD    Office Visit    2 months ago Hyperopia of right eye    [employer], [address], [place], Carmelita Cee MD    Office Visit    3 months ago [address], [place] Patient, APRN    Office Visit          Future Appointments         Provider Department Appt Notes    In 1 week MD Kourtney Chavarria [place] [facility]    In 4 weeks Altagracia Clarke MD [employer], [address], [facility] f/u, ok per Dr. Bigg Neville    In 4 weeks [place], [address] [facility], [place] 6 wk f/u blood pressure

## 2023-08-07 NOTE — TELEPHONE ENCOUNTER
Patient indicates he has been taking rx Murelax, but has no output for one week and stomach feels weird. Patient asking if the 3 medications he takes for his stomach is causing him not to go. Please [phone],[facility].

## 2023-08-08 ENCOUNTER — NURSE TRIAGE (OUTPATIENT)
Dept: FAMILY MEDICINE CLINIC | Facility: CLINIC | Age: 28
End: 2023-08-08

## 2023-08-08 NOTE — TELEPHONE ENCOUNTER
Dr Rizwana Barrios    I spoke to [facility]    He states he has been taking MiraLax    He had a hard stool last night and this morning    I told him to keep taking the MiraLax but he can add Colace stool softener to his regimen, 2-6 pills/day in divided doses    Yesterday he had some stomach pain and he is still feeling pressure in his upper abdomen    He thinks the antibiotics he is taking for the h pylori are giving him a headache.  I told him it was ok to take tylenol for that

## 2023-08-08 NOTE — TELEPHONE ENCOUNTER
Action Requested: Summary for Provider     []  Critical Lab, Recommendations Needed  [] Need Additional Advice  []   FYI    []   Need Orders  [] Need Medications Sent to Pharmacy  []  Other     SUMMARY: Per protocol advised : .Go  to IC         Reason for call: Abdominal Pain  Onset: Data Unavailable  Patient calling ( identified name and  ) states has been having abdominal pain and chest pain, only worked for 3 hours yesterday and did not go to work today     Having hard stool, has been taking Miralax to help with constipation but not getting much relief     Denies chest pain today but remains with abdominal pain above the umbilical area , center of abdomen, also had headache , Tylenol did not relieve his headache     Reports  currently being treated for H.Pylori, is about half way thru the treatment     No FM appointments available at any location that meet hi time frame     Advised to go to IC    Provided locations/ hours for [place] [facility]     Patient verbalizes understanding and agrees with plan.        Reason for Disposition   Patient wants to be seen    Protocols used: Abdominal Pain - Upper-A-[place]

## 2023-08-11 ENCOUNTER — HOSPITAL ENCOUNTER (EMERGENCY)
Facility: HOSPITAL | Age: 28
Discharge: HOME | End: 2023-08-11
Attending: EMERGENCY MEDICINE
Payer: MEDICARE

## 2023-08-11 ENCOUNTER — APPOINTMENT (OUTPATIENT)
Dept: CT IMAGING | Facility: HOSPITAL | Age: 28
End: 2023-08-11
Attending: EMERGENCY MEDICINE
Payer: MEDICARE

## 2023-08-11 ENCOUNTER — IMAGING SERVICES (OUTPATIENT)
Dept: OTHER | Age: 28
End: 2023-08-11

## 2023-08-11 VITALS
TEMPERATURE: 98 F | WEIGHT: 140 LBS | BODY MASS INDEX: 23.9 KG/M2 | OXYGEN SATURATION: 97 % | DIASTOLIC BLOOD PRESSURE: 90 MMHG | HEART RATE: 107 BPM | RESPIRATION RATE: 26 BRPM | HEIGHT: 64 IN | SYSTOLIC BLOOD PRESSURE: 136 MMHG

## 2023-08-11 DIAGNOSIS — R51.9 NONINTRACTABLE EPISODIC HEADACHE, UNSPECIFIED HEADACHE TYPE: Primary | ICD-10-CM

## 2023-08-11 PROCEDURE — 96361 HYDRATE IV INFUSION ADD-ON: CPT

## 2023-08-11 PROCEDURE — 99284 EMERGENCY DEPT VISIT MOD MDM: CPT

## 2023-08-11 PROCEDURE — 96375 TX/PRO/DX INJ NEW DRUG ADDON: CPT

## 2023-08-11 PROCEDURE — 96374 THER/PROPH/DIAG INJ IV PUSH: CPT

## 2023-08-11 PROCEDURE — 70450 CT HEAD/BRAIN W/O DYE: CPT | Performed by: EMERGENCY MEDICINE

## 2023-08-11 RX ORDER — DEXAMETHASONE SODIUM PHOSPHATE 10 MG/ML
4 INJECTION, SOLUTION INTRAMUSCULAR; INTRAVENOUS ONCE
Status: COMPLETED | OUTPATIENT
Start: 2023-08-11 | End: 2023-08-11

## 2023-08-11 RX ORDER — DIPHENHYDRAMINE HYDROCHLORIDE 50 MG/ML
12.5 INJECTION INTRAMUSCULAR; INTRAVENOUS ONCE
Status: COMPLETED | OUTPATIENT
Start: 2023-08-11 | End: 2023-08-11

## 2023-08-11 RX ORDER — METOCLOPRAMIDE HYDROCHLORIDE 5 MG/ML
10 INJECTION INTRAMUSCULAR; INTRAVENOUS ONCE
Status: COMPLETED | OUTPATIENT
Start: 2023-08-11 | End: 2023-08-11

## 2023-08-11 NOTE — ED INITIAL ASSESSMENT (HPI)
Pt to ED A&O x 4 w/ c/o: R sided headache intermittently x 4 days, progressively worsening since last night & significantly worse this AM.  Pt w/ hx of spina bifida and has  shunt. Pt also reporting R sided eye pressure.

## 2023-08-15 ENCOUNTER — TELEPHONE (OUTPATIENT)
Dept: FAMILY MEDICINE CLINIC | Facility: CLINIC | Age: 28
End: 2023-08-15

## 2023-08-15 NOTE — TELEPHONE ENCOUNTER
Patient (name and  verified) calling with headache, body aches with chills. Patient went to the ER for Headache on 23. Patient is also taking antibiotics for the H Pylori infection prescribed by Frank Soni. Patient is calling to schedule an earlier follow up visit as patient is still having headache symptoms and stomach issues. Patient reports that he is finishing up the antibiotics and is having stomach upset with soft stool. Assisted patient with appt scheduling, verbalized understanding and agrees to plan.    Future Appointments   Date Time Provider Arti Kong   2023  1:40 PM Gracia Henderson MD [facility]   2023 11:00 AM DO PRATIMA Islas EC JOHN   2023  8:30 AM Chelsea Black MD [facility]   2023 11:45 AM Nitin Mejía DO [facility] ADO

## 2023-08-16 ENCOUNTER — OFFICE VISIT (OUTPATIENT)
Dept: DERMATOLOGY CLINIC | Facility: CLINIC | Age: 28
End: 2023-08-16

## 2023-08-16 DIAGNOSIS — L21.9 SEBORRHEIC DERMATITIS: Primary | ICD-10-CM

## 2023-08-16 PROCEDURE — 99214 OFFICE O/P EST MOD 30 MIN: CPT | Performed by: STUDENT IN AN ORGANIZED HEALTH CARE EDUCATION/TRAINING PROGRAM

## 2023-08-16 RX ORDER — TRIAMCINOLONE ACETONIDE 1 MG/G
CREAM TOPICAL
Qty: 80 G | Refills: 2 | Status: SHIPPED | OUTPATIENT
Start: 2023-08-16

## 2023-08-16 NOTE — PROGRESS NOTES
August 16, 2023    Established patient     Referred by:   No referring provider defined for this encounter. CHIEF COMPLAINT: Seborrheic Dermatitis F/U    HISTORY OF PRESENT ILLNESS:    Location: Chest  Duration: x 1 year  Signs and symptoms: Itching and burning  Current treatment: Ketoconazole 2% shampoo, hydrocortisone 2.5% twice daily Monday-Friday     Personal Dermatologic History  History of chronic skin disease: No    Family History  History of chronic skin disease: No  History autoimmune diseases:  No    Past Medical History  Past Medical History:   Diagnosis Date    Depression     Other ill-defined conditions(799.89)     shunt from hydrocephalus    PONV (postoperative nausea and vomiting)     if masked used    S/P  shunt     Spina bifida ([facility] [place] [address]     Management:  closure    Strabismus 1998    Done at [facility] [address]:  Constitutional: Denies fever, chills, unintentional weight loss. Skin as per HPI    Medications  Current Outpatient Medications   Medication Sig Dispense Refill    Oxybutynin Chloride ER 15 MG Oral Tablet 24 Hr Take 1 tablet (15 mg total) by mouth daily. 90 tablet 3    amoxicillin 500 MG Oral Tab Take 2 tablets (1,000 mg total) by mouth 2 (two) times daily for 14 days. 56 tablet 0    clarithromycin 500 MG Oral Tab Take 1 tablet (500 mg total) by mouth 2 (two) times daily for 14 days. 28 tablet 0    omeprazole 20 MG Oral Capsule Delayed Release Take 1 capsule (20 mg total) by mouth 2 (two) times daily before meals for 14 days. 28 capsule 0    cholecalciferol 50 MCG (2000 UT) Oral Tab Take 1 tablet (2,000 Units total) by mouth daily. 90 tablet 3    sodium chloride 0.9 % Irrigation Solution IRRIGATE BLADDER AS DIRECTED      carvedilol 3.125 MG Oral Tab Take 1 tablet (3.125 mg total) by mouth 2 (two) times daily with meals. 180 tablet 1    hydrocortisone 2.5 % External Cream Apply to the affected areas twice daily Monday-Friday. Take weekends off.  Stop when rash resolved. 453 g 1    clindamycin 1 % External Lotion Apply twice daily with flares 60 mL 11    ketoconazole 2 % External Shampoo You can apply this not only to the scalp 2 or 3 times a week in the shower but also to your beard area and then also the chest where the rash is. Can also use on the face. 120 mL 11    VITAMIN D, ERGOCALCIFEROL, OR Take by mouth daily. PHYSICAL EXAM:  General: awake, alert, no acute distress  Skin: Skin exam was performed today including the following: head and face, scalp, neck, chest (including breasts and axillae), abdomen, groin, buttocks, back, bilateral upper extremities, bilateral lower extremities, hands, feet, digits, nails. Pertinent findings include:   - Eyebrows, nasolabial folds with pink plaques with yellow scaling   - Back with numerous erythematous papules and nodules    ASSESSMENT & PLAN:    Seborrheic dermatitis  - Ketoconazole 2% shampoo to use as a body wash and face wash daily for two weeks, then 3 times weekly long term. Lather in and leave on for 5 minutes before washing off.   - hydrocortisone 2.5%twice daily Monday-Friday. Take weekends off. Stop when rash resolves  - Triamcinolone 0.1% twice daily to affected areas Monday-Friday. Take weekends off. Avoid use on face, breasts, groin, or axiillae.      Jose Woods MD

## 2023-08-17 ENCOUNTER — OFFICE VISIT (OUTPATIENT)
Dept: FAMILY MEDICINE CLINIC | Facility: CLINIC | Age: 28
End: 2023-08-17

## 2023-08-17 VITALS
HEIGHT: 64 IN | WEIGHT: 137 LBS | HEART RATE: 111 BPM | SYSTOLIC BLOOD PRESSURE: 140 MMHG | DIASTOLIC BLOOD PRESSURE: 94 MMHG | TEMPERATURE: 97 F | BODY MASS INDEX: 23.39 KG/M2

## 2023-08-17 DIAGNOSIS — H53.149 PHOTOPHOBIA: ICD-10-CM

## 2023-08-17 DIAGNOSIS — L21.9 SEBORRHEIC DERMATITIS: ICD-10-CM

## 2023-08-17 DIAGNOSIS — E55.9 VITAMIN D DEFICIENCY: ICD-10-CM

## 2023-08-17 DIAGNOSIS — Q07.01 CHIARI MALFORMATION TYPE II (HCC): ICD-10-CM

## 2023-08-17 DIAGNOSIS — R51.9 HEADACHE, UNSPECIFIED HEADACHE TYPE: Primary | ICD-10-CM

## 2023-08-17 DIAGNOSIS — H55.09 HORIZONTAL NYSTAGMUS: ICD-10-CM

## 2023-08-17 DIAGNOSIS — A04.8 H. PYLORI INFECTION: ICD-10-CM

## 2023-08-17 DIAGNOSIS — R14.2 ERUCTATION: ICD-10-CM

## 2023-08-17 PROCEDURE — 99214 OFFICE O/P EST MOD 30 MIN: CPT | Performed by: FAMILY MEDICINE

## 2023-08-17 RX ORDER — OMEPRAZOLE 20 MG/1
20 CAPSULE, DELAYED RELEASE ORAL
COMMUNITY

## 2023-08-18 ENCOUNTER — OFFICE VISIT (OUTPATIENT)
Dept: NEUROSURGERY | Age: 28
End: 2023-08-18

## 2023-08-18 VITALS
DIASTOLIC BLOOD PRESSURE: 89 MMHG | BODY MASS INDEX: 22.85 KG/M2 | WEIGHT: 137.13 LBS | SYSTOLIC BLOOD PRESSURE: 145 MMHG | HEART RATE: 87 BPM | HEIGHT: 65 IN

## 2023-08-18 DIAGNOSIS — Q05.4: Primary | ICD-10-CM

## 2023-08-18 PROCEDURE — 99213 OFFICE O/P EST LOW 20 MIN: CPT | Performed by: NEUROLOGICAL SURGERY

## 2023-08-18 ASSESSMENT — ENCOUNTER SYMPTOMS
RESPIRATORY NEGATIVE: 1
EYES NEGATIVE: 1
ENDOCRINE NEGATIVE: 1
HEMATOLOGIC/LYMPHATIC NEGATIVE: 1
HEADACHES: 1
FATIGUE: 1
PSYCHIATRIC NEGATIVE: 1
ALLERGIC/IMMUNOLOGIC NEGATIVE: 1
GASTROINTESTINAL NEGATIVE: 1

## 2023-08-29 ENCOUNTER — TELEPHONE (OUTPATIENT)
Dept: FAMILY MEDICINE CLINIC | Facility: CLINIC | Age: 28
End: 2023-08-29

## 2023-09-05 ENCOUNTER — OFFICE VISIT (OUTPATIENT)
Facility: CLINIC | Age: 28
End: 2023-09-05

## 2023-09-05 ENCOUNTER — OFFICE VISIT (OUTPATIENT)
Dept: FAMILY MEDICINE CLINIC | Facility: CLINIC | Age: 28
End: 2023-09-05

## 2023-09-05 VITALS — HEART RATE: 94 BPM | DIASTOLIC BLOOD PRESSURE: 87 MMHG | SYSTOLIC BLOOD PRESSURE: 150 MMHG

## 2023-09-05 VITALS
WEIGHT: 131 LBS | HEIGHT: 64 IN | SYSTOLIC BLOOD PRESSURE: 128 MMHG | BODY MASS INDEX: 22.36 KG/M2 | DIASTOLIC BLOOD PRESSURE: 78 MMHG | HEART RATE: 100 BPM | TEMPERATURE: 97 F

## 2023-09-05 DIAGNOSIS — I10 ESSENTIAL HYPERTENSION: Primary | ICD-10-CM

## 2023-09-05 DIAGNOSIS — I70.1 RENAL ARTERY STENOSIS (HCC): ICD-10-CM

## 2023-09-05 DIAGNOSIS — N31.9 NEUROGENIC BLADDER: ICD-10-CM

## 2023-09-05 DIAGNOSIS — A04.8 H. PYLORI INFECTION: Primary | ICD-10-CM

## 2023-09-05 DIAGNOSIS — R76.8 HELICOBACTER PYLORI ANTIBODY POSITIVE: ICD-10-CM

## 2023-09-05 PROCEDURE — 99214 OFFICE O/P EST MOD 30 MIN: CPT | Performed by: STUDENT IN AN ORGANIZED HEALTH CARE EDUCATION/TRAINING PROGRAM

## 2023-09-05 PROCEDURE — 99214 OFFICE O/P EST MOD 30 MIN: CPT | Performed by: FAMILY MEDICINE

## 2023-09-05 RX ORDER — MAGNESIUM HYDROXIDE 1200 MG/15ML
1000 LIQUID ORAL 2 TIMES DAILY PRN
Qty: 3 EACH | Refills: 11 | Status: SHIPPED | OUTPATIENT
Start: 2023-09-05

## 2023-09-05 RX ORDER — CARVEDILOL 3.12 MG/1
3.12 TABLET ORAL 2 TIMES DAILY WITH MEALS
Qty: 180 TABLET | Refills: 3 | Status: SHIPPED | OUTPATIENT
Start: 2023-09-05

## 2023-09-05 NOTE — PATIENT INSTRUCTIONS
1) repeat H. Pylori stool test in 2 months. 2) Continue to use Miralax for short courses if you feel constipated. 3) Return to clinic as needed.

## 2023-09-06 ENCOUNTER — TELEPHONE (OUTPATIENT)
Facility: CLINIC | Age: 28
End: 2023-09-06

## 2023-09-06 NOTE — PROGRESS NOTES
[address] Gastroenterology                                                                                                      Clinic Follow-up Visit    Patient presents with: Follow - Up    Subjective/HPI:   Juan Nguyen is a 33year old year-old man with history of spina bifida s/p closure, hydrocephalus s/p  shunt, s/p bladder surgery who presented to GI clinic initially on 8/1/2023 with altered bowel habits, abdominal bloating and lack of appetite. Labs were unremarkable, KUB with moderate right sided stool burden, H. Pylori breath test was positive for H. Pylori. He completed treatment for h. Pylori with amoxicillin, clarithromycin and PPI. This was very difficult for him and caused many symptoms. However, over the last 2 weeks he reports feeling much better. He has a good appetite and no longer feels bloated. Mom is with him today who also reports significant improvement in his symptoms.       Wt Readings from Last 6 Encounters:  09/05/23 : 131 lb (59.4 kg)  08/17/23 : 137 lb (62.1 kg)  08/11/23 : 140 lb (63.5 kg)  08/01/23 : 130 lb (59 kg)  07/28/23 : 140 lb (63.5 kg)  07/24/23 : 132 lb (59.9 kg)       History, Medications, Allergies, ROS:      Past Medical History:   Diagnosis Date    Depression     Other ill-defined conditions(799.84)     shunt from hydrocephalus    PONV (postoperative nausea and vomiting)     if masked used    S/P  shunt     Spina bifida ([facility] [address]     Management:  closure    Strabismus 1998    Done at [facility]      Past Surgical History:   Procedure Laterality Date    OTHER SURGICAL HISTORY  2010    bladder augmentation/catheterizable channel    OTHER SURGICAL HISTORY  07/06/2020    fistulotomy    SPINE SURGERY PROCEDURE UNLISTED  1994    STRABISMUS SURGERY Left 1999 or 2000      Family History   Problem Relation Age of Onset    No Known Problems Father     No Known Problems Mother     Diabetes Neg     Glaucoma Neg       Social History:   Social History     Socioeconomic History    Marital status: Single   Tobacco Use    Smoking status: Never    Smokeless tobacco: Never   Vaping Use    Vaping Use: Never used   Substance and Sexual Activity    Alcohol use: Not Currently    Drug use: No   Other Topics Concern    Caffeine Concern Yes     Comment: 1 cup a day. Exercise No    Pt has a pacemaker No    Pt has a defibrillator No    Reaction to local anesthetic No   Social History Narrative    The patient uses the following assistive device(s):  Splint on R leg . The patient does live in a home with stairs. Medications (Active prior to today's visit):  Current Outpatient Medications   Medication Sig Dispense Refill    omeprazole 20 MG Oral Capsule Delayed Release Take 1 capsule (20 mg total) by mouth every morning before breakfast.      triamcinolone 0.1 % External Cream Use twice daily  with flares to rash on chets 80 g 2    Oxybutynin Chloride ER 15 MG Oral Tablet 24 Hr Take 1 tablet (15 mg total) by mouth daily. 90 tablet 3    cholecalciferol 50 MCG (2000 UT) Oral Tab Take 1 tablet (2,000 Units total) by mouth daily. 90 tablet 3    sodium chloride 0.9 % Irrigation Solution       hydrocortisone 2.5 % External Cream Apply to the affected areas twice daily Monday-Friday. Take weekends off. Stop when rash resolved. 453 g 1    clindamycin 1 % External Lotion Apply twice daily with flares 60 mL 11    ketoconazole 2 % External Shampoo You can apply this not only to the scalp 2 or 3 times a week in the shower but also to your beard area and then also the chest where the rash is. Can also use on the face. 120 mL 11    VITAMIN D, ERGOCALCIFEROL, OR Take by mouth daily. carvedilol 3.125 MG Oral Tab Take 1 tablet (3.125 mg total) by mouth 2 (two) times daily with meals.  180 tablet 3    Water For Irrigation, Sterile (STERILE WATER FOR IRRIGATION) Irrigation Solution Irrigate with 1,000 mL as directed 2 (two) times daily as needed (Irrigates bladder twice daily due to catheterization. ). 3 each 11       Allergies:    Latex                   RASH    ROS:   CONSTITUTIONAL:  negative for fevers, chills  EYES:  negative for change in vision  RESPIRATORY:  negative for  shortness of breath  CARDIOVASCULAR:  negative for  chest pain  GASTROINTESTINAL:  see HPI  GENITOURINARY:  negative for dysuria  INTEGUMENT/BREAST:  SKIN:  negative for  rash  ALLERGIC/IMMUNOLOGIC:  negative for hay fever  ENDOCRINE:  negative for cold intolerance and heat intolerance  MUSCULOSKELETAL:  negative for  joint stiffness and joint swelling  BEHAVIOR/PSYCH:  negative for depressed mood    PHYSICAL EXAM:   Blood pressure 150/87, pulse 94. Gen- Patient appears comfortable and in no acute discomfort  HEENT: the sclera appears anicteric, oropharynx clear, mucus membranes appear moist  CV- regular rate and rhythm, the extremities are warm and well perfused   Lung- Moves air well; No labored breathing  Abdomen- soft, non-tender exam in all quadrants without rigidity or guarding, non-distended, no masses are palpated  Skin- No jaundice  Ext: no edema is evident. Neuro- Alert and oriented x4, and patient is having movement of all 4 extremities   Psych - appropriate, non-agitated    Labs/Imaging:     Patient's labs and imaging were reviewed and discussed with patient today. ASSESSMENT/PLAN:   Jessica Zuniga is a 33year old year-old man with history of spina bifida s/p closure, hydrocephalus s/p  shunt, s/p bladder surgery who presented to GI clinic initially on 8/1/2023 with altered bowel habits, abdominal bloating and lack of appetite. Labs were unremarkable, KUB with moderate right sided stool burden, H. Pylori breath test was positive for H. Pylori. He completed treatment for h. Pylori with amoxicillin, clarithromycin and PPI. This was very difficult for him and caused many symptoms.     However, over the last 2 weeks he reports feeling much better. He has a good appetite and no longer feels bloated. Hopefully H. Pylori was the cause of his symptoms and it was successfully eradicated with triple therapy. Plan:  -Repeat H. Pylori via stool test in 8 weeks. -Return to clinic as needed. -Use miralax as needed if feeling constipated.     Orders This Visit:  Orders Placed This Encounter      H. Pylori Stool Ag, EIA      Meds This Visit:  Requested Prescriptions      No prescriptions requested or ordered in this encounter       Imaging & Referrals:  None     Ayala Camacho MD  [facility], [facility]  9/5/2023

## 2023-09-06 NOTE — TELEPHONE ENCOUNTER
GI Staff:    Can you please recall patient to have h. Pylori stool test done in 8 weeks.     Thank you

## 2023-09-15 ENCOUNTER — APPOINTMENT (OUTPATIENT)
Dept: CT IMAGING | Facility: HOSPITAL | Age: 28
End: 2023-09-15
Attending: EMERGENCY MEDICINE
Payer: MEDICARE

## 2023-09-15 ENCOUNTER — HOSPITAL ENCOUNTER (EMERGENCY)
Facility: HOSPITAL | Age: 28
Discharge: HOME | End: 2023-09-15
Attending: EMERGENCY MEDICINE
Payer: MEDICARE

## 2023-09-15 VITALS
DIASTOLIC BLOOD PRESSURE: 96 MMHG | BODY MASS INDEX: 23.9 KG/M2 | RESPIRATION RATE: 22 BRPM | SYSTOLIC BLOOD PRESSURE: 136 MMHG | HEIGHT: 64 IN | HEART RATE: 80 BPM | WEIGHT: 140 LBS | TEMPERATURE: 98 F | OXYGEN SATURATION: 98 %

## 2023-09-15 DIAGNOSIS — M54.50 LOW BACK PAIN, NON-SPECIFIC: Primary | ICD-10-CM

## 2023-09-15 LAB
ANION GAP SERPL CALC-SCNC: 7 MMOL/L (ref 0–18)
BASOPHILS # BLD AUTO: 0.04 X10(3) UL (ref 0–0.2)
BASOPHILS NFR BLD AUTO: 0.5 %
BUN BLD-MCNC: 11 MG/DL (ref 7–18)
BUN/CREAT SERPL: 9.9 (ref 10–20)
CALCIUM BLD-MCNC: 8.7 MG/DL (ref 8.5–10.1)
CHLORIDE SERPL-SCNC: 109 MMOL/L (ref 98–112)
CO2 SERPL-SCNC: 26 MMOL/L (ref 21–32)
CREAT BLD-MCNC: 1.11 MG/DL
DEPRECATED RDW RBC AUTO: 35.9 FL (ref 35.1–46.3)
EGFRCR SERPLBLD CKD-EPI 2021: 92 ML/MIN/1.73M2 (ref 60–?)
EOSINOPHIL # BLD AUTO: 0.12 X10(3) UL (ref 0–0.7)
EOSINOPHIL NFR BLD AUTO: 1.6 %
ERYTHROCYTE [DISTWIDTH] IN BLOOD BY AUTOMATED COUNT: 11.9 % (ref 11–15)
GLUCOSE BLD-MCNC: 100 MG/DL (ref 70–99)
HCT VFR BLD AUTO: 45.5 %
HGB BLD-MCNC: 16 G/DL
IMM GRANULOCYTES # BLD AUTO: 0.06 X10(3) UL (ref 0–1)
IMM GRANULOCYTES NFR BLD: 0.8 %
LYMPHOCYTES # BLD AUTO: 2.89 X10(3) UL (ref 1–4)
LYMPHOCYTES NFR BLD AUTO: 38.5 %
MCH RBC QN AUTO: 29.7 PG (ref 26–34)
MCHC RBC AUTO-ENTMCNC: 35.2 G/DL (ref 31–37)
MCV RBC AUTO: 84.4 FL
MONOCYTES # BLD AUTO: 0.63 X10(3) UL (ref 0.1–1)
MONOCYTES NFR BLD AUTO: 8.4 %
NEUTROPHILS # BLD AUTO: 3.77 X10 (3) UL (ref 1.5–7.7)
NEUTROPHILS # BLD AUTO: 3.77 X10(3) UL (ref 1.5–7.7)
NEUTROPHILS NFR BLD AUTO: 50.2 %
OSMOLALITY SERPL CALC.SUM OF ELEC: 293 MOSM/KG (ref 275–295)
PLATELET # BLD AUTO: 267 10(3)UL (ref 150–450)
POTASSIUM SERPL-SCNC: 4.1 MMOL/L (ref 3.5–5.1)
RBC # BLD AUTO: 5.39 X10(6)UL
SODIUM SERPL-SCNC: 142 MMOL/L (ref 136–145)
WBC # BLD AUTO: 7.5 X10(3) UL (ref 4–11)

## 2023-09-15 PROCEDURE — 96375 TX/PRO/DX INJ NEW DRUG ADDON: CPT

## 2023-09-15 PROCEDURE — 80048 BASIC METABOLIC PNL TOTAL CA: CPT | Performed by: EMERGENCY MEDICINE

## 2023-09-15 PROCEDURE — 72128 CT CHEST SPINE W/O DYE: CPT | Performed by: EMERGENCY MEDICINE

## 2023-09-15 PROCEDURE — 99285 EMERGENCY DEPT VISIT HI MDM: CPT

## 2023-09-15 PROCEDURE — 72125 CT NECK SPINE W/O DYE: CPT | Performed by: EMERGENCY MEDICINE

## 2023-09-15 PROCEDURE — 96376 TX/PRO/DX INJ SAME DRUG ADON: CPT

## 2023-09-15 PROCEDURE — 96374 THER/PROPH/DIAG INJ IV PUSH: CPT

## 2023-09-15 PROCEDURE — 72131 CT LUMBAR SPINE W/O DYE: CPT | Performed by: EMERGENCY MEDICINE

## 2023-09-15 PROCEDURE — 85025 COMPLETE CBC W/AUTO DIFF WBC: CPT | Performed by: EMERGENCY MEDICINE

## 2023-09-15 RX ORDER — MORPHINE SULFATE 4 MG/ML
4 INJECTION, SOLUTION INTRAMUSCULAR; INTRAVENOUS ONCE
Status: COMPLETED | OUTPATIENT
Start: 2023-09-15 | End: 2023-09-15

## 2023-09-15 RX ORDER — KETOROLAC TROMETHAMINE 15 MG/ML
15 INJECTION, SOLUTION INTRAMUSCULAR; INTRAVENOUS ONCE
Status: COMPLETED | OUTPATIENT
Start: 2023-09-15 | End: 2023-09-15

## 2023-09-15 RX ORDER — DIAZEPAM 5 MG/1
5 TABLET ORAL EVERY 12 HOURS PRN
Qty: 5 TABLET | Refills: 0 | Status: SHIPPED | OUTPATIENT
Start: 2023-09-15 | End: 2023-09-25

## 2023-09-15 RX ORDER — HYDROCODONE BITARTRATE AND ACETAMINOPHEN 5; 325 MG/1; MG/1
1 TABLET ORAL EVERY 6 HOURS PRN
Qty: 10 TABLET | Refills: 0 | Status: SHIPPED | OUTPATIENT
Start: 2023-09-15 | End: 2023-09-20

## 2023-09-15 RX ORDER — DIAZEPAM 5 MG/1
5 TABLET ORAL ONCE
Status: COMPLETED | OUTPATIENT
Start: 2023-09-15 | End: 2023-09-15

## 2023-09-15 NOTE — ED INITIAL ASSESSMENT (HPI)
Pt log rolled while maintaining c-spine immobilization, back board removed by MD. Normal rectal tone per MD.

## 2023-09-15 NOTE — ED INITIAL ASSESSMENT (HPI)
Pt to ED via EMS s/p fall in c-collar on back board, pt with a hx of spina bifida states he went to get up and felt an intense sharp pain in the middle of his back which caused him to fall, he denies head injury. He reported numbness and tingling to bilateral legs and upper arms which comes and goes. He does reports mid cervical neck pain at this time. He is able to move his arms.

## 2023-09-16 ENCOUNTER — TELEPHONE (OUTPATIENT)
Dept: FAMILY MEDICINE CLINIC | Facility: CLINIC | Age: 28
End: 2023-09-16

## 2023-09-21 ENCOUNTER — OFFICE VISIT (OUTPATIENT)
Dept: INTERNAL MEDICINE CLINIC | Facility: CLINIC | Age: 28
End: 2023-09-21

## 2023-09-21 VITALS
DIASTOLIC BLOOD PRESSURE: 85 MMHG | WEIGHT: 135 LBS | SYSTOLIC BLOOD PRESSURE: 123 MMHG | BODY MASS INDEX: 23.05 KG/M2 | HEIGHT: 64 IN | HEART RATE: 94 BPM

## 2023-09-21 DIAGNOSIS — M62.830 MUSCLE SPASM OF BACK: ICD-10-CM

## 2023-09-21 DIAGNOSIS — M54.10 RADICULITIS: Primary | ICD-10-CM

## 2023-09-21 DIAGNOSIS — Q05.9 SPINA BIFIDA, UNSPECIFIED HYDROCEPHALUS PRESENCE, UNSPECIFIED SPINAL REGION (HCC): ICD-10-CM

## 2023-09-21 PROCEDURE — 99204 OFFICE O/P NEW MOD 45 MIN: CPT | Performed by: INTERNAL MEDICINE

## 2023-09-21 RX ORDER — PREDNISONE 10 MG/1
TABLET ORAL
Qty: 18 TABLET | Refills: 0 | Status: SHIPPED | OUTPATIENT
Start: 2023-09-21 | End: 2023-09-29

## 2023-09-21 RX ORDER — CYCLOBENZAPRINE HCL 5 MG
TABLET ORAL 3 TIMES DAILY PRN
Qty: 90 TABLET | Refills: 1 | Status: SHIPPED | OUTPATIENT
Start: 2023-09-21 | End: 2023-09-25

## 2023-09-21 RX ORDER — HYDROCODONE BITARTRATE AND ACETAMINOPHEN 5; 325 MG/1; MG/1
1 TABLET ORAL 2 TIMES DAILY PRN
Qty: 12 TABLET | Refills: 0 | Status: SHIPPED | OUTPATIENT
Start: 2023-09-21 | End: 2023-09-25

## 2023-09-25 ENCOUNTER — OFFICE VISIT (OUTPATIENT)
Dept: FAMILY MEDICINE CLINIC | Facility: CLINIC | Age: 28
End: 2023-09-25

## 2023-09-25 ENCOUNTER — TELEPHONE (OUTPATIENT)
Dept: FAMILY MEDICINE CLINIC | Facility: CLINIC | Age: 28
End: 2023-09-25

## 2023-09-25 VITALS
BODY MASS INDEX: 23.9 KG/M2 | HEART RATE: 90 BPM | SYSTOLIC BLOOD PRESSURE: 132 MMHG | DIASTOLIC BLOOD PRESSURE: 90 MMHG | WEIGHT: 140 LBS | TEMPERATURE: 97 F | HEIGHT: 64 IN

## 2023-09-25 DIAGNOSIS — M54.6 ACUTE RIGHT-SIDED THORACIC BACK PAIN: Primary | ICD-10-CM

## 2023-09-25 DIAGNOSIS — R20.2 PARESTHESIA OF BOTH HANDS: ICD-10-CM

## 2023-09-25 DIAGNOSIS — M62.830 SPASM OF THORACIC BACK MUSCLE: ICD-10-CM

## 2023-09-25 DIAGNOSIS — N31.9 BLADDER DYSFUNCTION: ICD-10-CM

## 2023-09-25 DIAGNOSIS — Q05.9 SPINA BIFIDA, UNSPECIFIED HYDROCEPHALUS PRESENCE, UNSPECIFIED SPINAL REGION (HCC): ICD-10-CM

## 2023-09-25 DIAGNOSIS — Q05.9 SPINA BIFIDA WITHOUT HYDROCEPHALUS, UNSPECIFIED SPINAL REGION (HCC): Primary | ICD-10-CM

## 2023-09-25 DIAGNOSIS — M21.372 FOOT DROP, BILATERAL: ICD-10-CM

## 2023-09-25 DIAGNOSIS — M21.371 FOOT DROP, BILATERAL: ICD-10-CM

## 2023-09-25 PROCEDURE — 99214 OFFICE O/P EST MOD 30 MIN: CPT | Performed by: FAMILY MEDICINE

## 2023-09-25 RX ORDER — TIZANIDINE 4 MG/1
4 TABLET ORAL EVERY 8 HOURS PRN
Qty: 90 TABLET | Refills: 1 | Status: SHIPPED | OUTPATIENT
Start: 2023-09-25

## 2023-09-25 NOTE — TELEPHONE ENCOUNTER
Received DME referral request. Pended referral. Please review diagnosis and sign off if you agree.     Thank you,  [facility] [phone]

## 2023-10-02 ENCOUNTER — TELEPHONE (OUTPATIENT)
Dept: FAMILY MEDICINE CLINIC | Facility: CLINIC | Age: 28
End: 2023-10-02

## 2023-10-02 ENCOUNTER — ORDER TRANSCRIPTION (OUTPATIENT)
Dept: PHYSICAL THERAPY | Facility: HOSPITAL | Age: 28
End: 2023-10-02

## 2023-10-02 DIAGNOSIS — M54.6 ACUTE RIGHT-SIDED THORACIC BACK PAIN: Primary | ICD-10-CM

## 2023-10-02 DIAGNOSIS — Q05.9 SPINA BIFIDA, UNSPECIFIED HYDROCEPHALUS PRESENCE, UNSPECIFIED SPINAL REGION (HCC): ICD-10-CM

## 2023-10-02 DIAGNOSIS — M62.830 SPASM OF THORACIC BACK MUSCLE: ICD-10-CM

## 2023-10-02 NOTE — TELEPHONE ENCOUNTER
Patient was seen during office visit with Dr. Dayami Milton on 9/25/23 for acute right-sided thoracic back pain (ER follow up). Patient states he had side effects of dizziness while taking prescribed Hydrocodone, Cyclobenzaprine, and Tizanidine so he stopped taking the medications, however, that he continues to have back pain which is causing him to need to use a walker and miss work    He is requesting a follow up appointment with Dr. Dayami Milton.     Appointment scheduled:  Future Appointments   Date Time Provider Arti Kong   10/3/2023 11:00 AM Carvel Osler, DO [facility] ADO   10/12/2023 11:30 AM Alexandra Bennett DO PM[facility]

## 2023-10-03 ENCOUNTER — OFFICE VISIT (OUTPATIENT)
Dept: FAMILY MEDICINE CLINIC | Facility: CLINIC | Age: 28
End: 2023-10-03

## 2023-10-03 VITALS
DIASTOLIC BLOOD PRESSURE: 99 MMHG | BODY MASS INDEX: 24 KG/M2 | TEMPERATURE: 98 F | HEART RATE: 89 BPM | HEIGHT: 64 IN | SYSTOLIC BLOOD PRESSURE: 151 MMHG

## 2023-10-03 DIAGNOSIS — M54.50 RIGHT LUMBAR PAIN: Primary | ICD-10-CM

## 2023-10-03 DIAGNOSIS — R11.0 NAUSEA: ICD-10-CM

## 2023-10-03 DIAGNOSIS — M48.061 FORAMINAL STENOSIS OF LUMBAR REGION: ICD-10-CM

## 2023-10-03 DIAGNOSIS — R20.2 PARESTHESIA OF ARM: ICD-10-CM

## 2023-10-03 PROCEDURE — 96372 THER/PROPH/DIAG INJ SC/IM: CPT | Performed by: FAMILY MEDICINE

## 2023-10-03 PROCEDURE — 99214 OFFICE O/P EST MOD 30 MIN: CPT | Performed by: FAMILY MEDICINE

## 2023-10-03 RX ORDER — ONDANSETRON HYDROCHLORIDE 8 MG/1
8 TABLET, FILM COATED ORAL EVERY 8 HOURS PRN
Qty: 20 TABLET | Refills: 0 | Status: SHIPPED | OUTPATIENT
Start: 2023-10-03 | End: 2023-10-10

## 2023-10-03 RX ORDER — ORPHENADRINE CITRATE 100 MG/1
100 TABLET, EXTENDED RELEASE ORAL 2 TIMES DAILY PRN
Qty: 60 EACH | Refills: 0 | Status: SHIPPED | OUTPATIENT
Start: 2023-10-03

## 2023-10-03 RX ORDER — KETOROLAC TROMETHAMINE 30 MG/ML
30 INJECTION, SOLUTION INTRAMUSCULAR; INTRAVENOUS ONCE
Status: COMPLETED | OUTPATIENT
Start: 2023-10-03 | End: 2023-10-03

## 2023-10-03 RX ORDER — HYDROCODONE BITARTRATE AND ACETAMINOPHEN 5; 325 MG/1; MG/1
1 TABLET ORAL EVERY 6 HOURS PRN
Qty: 28 TABLET | Refills: 0 | Status: SHIPPED | OUTPATIENT
Start: 2023-10-03 | End: 2023-10-10

## 2023-10-03 RX ADMIN — KETOROLAC TROMETHAMINE 30 MG: 30 INJECTION, SOLUTION INTRAMUSCULAR; INTRAVENOUS at 11:32:00

## 2023-10-04 ENCOUNTER — TELEPHONE (OUTPATIENT)
Dept: FAMILY MEDICINE CLINIC | Facility: CLINIC | Age: 28
End: 2023-10-04

## 2023-10-04 ENCOUNTER — TELEPHONE (OUTPATIENT)
Dept: PHYSICAL THERAPY | Facility: HOSPITAL | Age: 28
End: 2023-10-04

## 2023-10-04 NOTE — TELEPHONE ENCOUNTER
Patient is requesting the physical therapy referral be faxed to:    [facility]  FAX # [phone]    Patient mentions he has a scheduled appointment this week either or Thursday or Friday but is not sure.    Please advise.

## 2023-10-10 ENCOUNTER — TELEPHONE (OUTPATIENT)
Dept: NEUROSURGERY | Age: 28
End: 2023-10-10

## 2023-10-10 ENCOUNTER — OFFICE VISIT (OUTPATIENT)
Dept: FAMILY MEDICINE CLINIC | Facility: CLINIC | Age: 28
End: 2023-10-10

## 2023-10-10 VITALS
TEMPERATURE: 97 F | WEIGHT: 136 LBS | HEIGHT: 64 IN | DIASTOLIC BLOOD PRESSURE: 90 MMHG | BODY MASS INDEX: 23.22 KG/M2 | SYSTOLIC BLOOD PRESSURE: 128 MMHG | HEART RATE: 97 BPM

## 2023-10-10 DIAGNOSIS — R29.898 WEAKNESS OF BOTH LEGS: Primary | ICD-10-CM

## 2023-10-10 DIAGNOSIS — M54.42 ACUTE BILATERAL LOW BACK PAIN WITH BILATERAL SCIATICA: ICD-10-CM

## 2023-10-10 DIAGNOSIS — R51.9 ACUTE NONINTRACTABLE HEADACHE, UNSPECIFIED HEADACHE TYPE: ICD-10-CM

## 2023-10-10 DIAGNOSIS — M54.41 ACUTE BILATERAL LOW BACK PAIN WITH BILATERAL SCIATICA: ICD-10-CM

## 2023-10-10 DIAGNOSIS — Q05.9 SPINA BIFIDA, UNSPECIFIED HYDROCEPHALUS PRESENCE, UNSPECIFIED SPINAL REGION (HCC): ICD-10-CM

## 2023-10-10 PROCEDURE — 99214 OFFICE O/P EST MOD 30 MIN: CPT | Performed by: FAMILY MEDICINE

## 2023-10-10 RX ORDER — PREDNISONE 20 MG/1
TABLET ORAL
Qty: 10 TABLET | Refills: 0 | Status: SHIPPED | OUTPATIENT
Start: 2023-10-10

## 2023-10-12 ENCOUNTER — OFFICE VISIT (OUTPATIENT)
Dept: PHYSICAL MEDICINE AND REHAB | Facility: CLINIC | Age: 28
End: 2023-10-12
Payer: MEDICARE

## 2023-10-12 ENCOUNTER — LAB ENCOUNTER (OUTPATIENT)
Dept: LAB | Facility: HOSPITAL | Age: 28
End: 2023-10-12
Attending: PHYSICAL MEDICINE & REHABILITATION
Payer: MEDICARE

## 2023-10-12 VITALS — BODY MASS INDEX: 23.22 KG/M2 | HEART RATE: 86 BPM | HEIGHT: 64 IN | WEIGHT: 136 LBS | OXYGEN SATURATION: 99 %

## 2023-10-12 DIAGNOSIS — R68.83 CHILLS (WITHOUT FEVER): ICD-10-CM

## 2023-10-12 DIAGNOSIS — M54.16 BILATERAL LUMBAR RADICULOPATHY: ICD-10-CM

## 2023-10-12 DIAGNOSIS — R29.898 LEG WEAKNESS, BILATERAL: Primary | ICD-10-CM

## 2023-10-12 LAB
ALBUMIN SERPL-MCNC: 4.3 G/DL (ref 3.4–5)
ALBUMIN/GLOB SERPL: 1.2 {RATIO} (ref 1–2)
ALP LIVER SERPL-CCNC: 67 U/L
ALT SERPL-CCNC: 36 U/L
ANION GAP SERPL CALC-SCNC: 8 MMOL/L (ref 0–18)
AST SERPL-CCNC: 15 U/L (ref 15–37)
BASOPHILS # BLD AUTO: 0.04 X10(3) UL (ref 0–0.2)
BASOPHILS NFR BLD AUTO: 0.6 %
BILIRUB SERPL-MCNC: 0.7 MG/DL (ref 0.1–2)
BUN BLD-MCNC: 11 MG/DL (ref 7–18)
BUN/CREAT SERPL: 12.5 (ref 10–20)
CALCIUM BLD-MCNC: 9.3 MG/DL (ref 8.5–10.1)
CHLORIDE SERPL-SCNC: 107 MMOL/L (ref 98–112)
CO2 SERPL-SCNC: 27 MMOL/L (ref 21–32)
CREAT BLD-MCNC: 0.88 MG/DL
DEPRECATED RDW RBC AUTO: 37.1 FL (ref 35.1–46.3)
EGFRCR SERPLBLD CKD-EPI 2021: 119 ML/MIN/1.73M2 (ref 60–?)
EOSINOPHIL # BLD AUTO: 0.11 X10(3) UL (ref 0–0.7)
EOSINOPHIL NFR BLD AUTO: 1.5 %
ERYTHROCYTE [DISTWIDTH] IN BLOOD BY AUTOMATED COUNT: 11.9 % (ref 11–15)
ERYTHROCYTE [SEDIMENTATION RATE] IN BLOOD: 18 MM/HR
FASTING STATUS PATIENT QL REPORTED: NO
GLOBULIN PLAS-MCNC: 3.7 G/DL (ref 2.8–4.4)
GLUCOSE BLD-MCNC: 137 MG/DL (ref 70–99)
HCT VFR BLD AUTO: 46.5 %
HGB BLD-MCNC: 16.1 G/DL
IMM GRANULOCYTES # BLD AUTO: 0.01 X10(3) UL (ref 0–1)
IMM GRANULOCYTES NFR BLD: 0.1 %
LYMPHOCYTES # BLD AUTO: 1.73 X10(3) UL (ref 1–4)
LYMPHOCYTES NFR BLD AUTO: 23.9 %
MCH RBC QN AUTO: 29.5 PG (ref 26–34)
MCHC RBC AUTO-ENTMCNC: 34.6 G/DL (ref 31–37)
MCV RBC AUTO: 85.3 FL
MONOCYTES # BLD AUTO: 0.44 X10(3) UL (ref 0.1–1)
MONOCYTES NFR BLD AUTO: 6.1 %
NEUTROPHILS # BLD AUTO: 4.9 X10 (3) UL (ref 1.5–7.7)
NEUTROPHILS # BLD AUTO: 4.9 X10(3) UL (ref 1.5–7.7)
NEUTROPHILS NFR BLD AUTO: 67.8 %
OSMOLALITY SERPL CALC.SUM OF ELEC: 296 MOSM/KG (ref 275–295)
PLATELET # BLD AUTO: 220 10(3)UL (ref 150–450)
POTASSIUM SERPL-SCNC: 3.6 MMOL/L (ref 3.5–5.1)
PROT SERPL-MCNC: 8 G/DL (ref 6.4–8.2)
RBC # BLD AUTO: 5.45 X10(6)UL
SODIUM SERPL-SCNC: 142 MMOL/L (ref 136–145)
WBC # BLD AUTO: 7.2 X10(3) UL (ref 4–11)

## 2023-10-12 PROCEDURE — 85652 RBC SED RATE AUTOMATED: CPT

## 2023-10-12 PROCEDURE — 85025 COMPLETE CBC W/AUTO DIFF WBC: CPT

## 2023-10-12 PROCEDURE — 87186 SC STD MICRODIL/AGAR DIL: CPT | Performed by: PHYSICAL MEDICINE & REHABILITATION

## 2023-10-12 PROCEDURE — 87040 BLOOD CULTURE FOR BACTERIA: CPT | Performed by: PHYSICAL MEDICINE & REHABILITATION

## 2023-10-12 PROCEDURE — 36415 COLL VENOUS BLD VENIPUNCTURE: CPT | Performed by: PHYSICAL MEDICINE & REHABILITATION

## 2023-10-12 PROCEDURE — 87077 CULTURE AEROBIC IDENTIFY: CPT | Performed by: PHYSICAL MEDICINE & REHABILITATION

## 2023-10-12 PROCEDURE — 87086 URINE CULTURE/COLONY COUNT: CPT | Performed by: PHYSICAL MEDICINE & REHABILITATION

## 2023-10-12 PROCEDURE — 99215 OFFICE O/P EST HI 40 MIN: CPT | Performed by: PHYSICAL MEDICINE & REHABILITATION

## 2023-10-12 PROCEDURE — 80053 COMPREHEN METABOLIC PANEL: CPT

## 2023-10-14 ENCOUNTER — TELEPHONE (OUTPATIENT)
Dept: FAMILY MEDICINE CLINIC | Facility: CLINIC | Age: 28
End: 2023-10-14

## 2023-10-14 RX ORDER — LEVOFLOXACIN 250 MG/1
250 TABLET ORAL DAILY
Qty: 5 TABLET | Refills: 0 | Status: SHIPPED | OUTPATIENT
Start: 2023-10-14 | End: 2023-10-19

## 2023-10-14 NOTE — TELEPHONE ENCOUNTER
Patient indicated that he had labs done through Dr Ross Young the physiatrist on 10/12/2023. Stated has been having low back pain. Patient saw results on MyChart but did not know what they meant. Does not look like Dr Ross Young reviewed the results but the urine culture shows a urinary tract infection. Please review and advise.      Collected 10/12/2023 12:56 PM       Status: Final result       Dx: Chills (without fever)    Specimen Information: Urine, clean catch     URINE CULTURE >100,000 CFU/ML Klebsiella pneumoniae Abnormal            Resulting Agency: [facility])     Susceptibility     Klebsiella pneumoniae     Not Specified    Ampicillin >=32 Resistant    Ampicillin + Sulbactam 4 Sensitive    Cefazolin <=4 Sensitive    Ciprofloxacin <=0.25 Sensitive    Gentamicin <=1 Sensitive    Levofloxacin <=0.12 Sensitive    Meropenem <=0.25 Sensitive    Nitrofurantoin 64 Intermediate    Piperacillin + Tazobactam <=4 Sensitive    Trimethoprim/Sulfa <=20 Sensitive              Linear View         Specimen Collected: 10/12/23 12:56 PM Last Resulted: 10/14/23  7:30 AM

## 2023-10-16 ENCOUNTER — APPOINTMENT (OUTPATIENT)
Dept: CT IMAGING | Facility: HOSPITAL | Age: 28
DRG: 092 | End: 2023-10-16
Attending: EMERGENCY MEDICINE

## 2023-10-16 ENCOUNTER — HOSPITAL ENCOUNTER (INPATIENT)
Facility: HOSPITAL | Age: 28
LOS: 1 days | Discharge: HOME | DRG: 092 | End: 2023-10-18
Attending: EMERGENCY MEDICINE | Admitting: INTERNAL MEDICINE

## 2023-10-16 ENCOUNTER — IMAGING SERVICES (OUTPATIENT)
Dept: OTHER | Age: 28
End: 2023-10-16

## 2023-10-16 DIAGNOSIS — G91.9 HYDROCEPHALUS, UNSPECIFIED TYPE (HCC): Primary | ICD-10-CM

## 2023-10-16 LAB
ANION GAP SERPL CALC-SCNC: 7 MMOL/L (ref 0–18)
APTT PPP: 33.7 SECONDS (ref 23.3–35.6)
BUN BLD-MCNC: 11 MG/DL (ref 7–18)
BUN/CREAT SERPL: 12.5 (ref 10–20)
CALCIUM BLD-MCNC: 8.4 MG/DL (ref 8.5–10.1)
CHLORIDE SERPL-SCNC: 108 MMOL/L (ref 98–112)
CO2 SERPL-SCNC: 27 MMOL/L (ref 21–32)
CREAT BLD-MCNC: 0.88 MG/DL
EGFRCR SERPLBLD CKD-EPI 2021: 119 ML/MIN/1.73M2 (ref 60–?)
GLUCOSE BLD-MCNC: 98 MG/DL (ref 70–99)
INR BLD: 1.09 (ref 0.8–1.2)
OSMOLALITY SERPL CALC.SUM OF ELEC: 293 MOSM/KG (ref 275–295)
POTASSIUM SERPL-SCNC: 4.1 MMOL/L (ref 3.5–5.1)
PROTHROMBIN TIME: 14.8 SECONDS (ref 11.6–14.8)
SODIUM SERPL-SCNC: 142 MMOL/L (ref 136–145)

## 2023-10-16 PROCEDURE — 70450 CT HEAD/BRAIN W/O DYE: CPT | Performed by: EMERGENCY MEDICINE

## 2023-10-16 RX ORDER — PROCHLORPERAZINE EDISYLATE 5 MG/ML
10 INJECTION INTRAMUSCULAR; INTRAVENOUS ONCE
Status: COMPLETED | OUTPATIENT
Start: 2023-10-16 | End: 2023-10-16

## 2023-10-16 RX ORDER — DIPHENHYDRAMINE HYDROCHLORIDE 50 MG/ML
25 INJECTION INTRAMUSCULAR; INTRAVENOUS ONCE
Status: COMPLETED | OUTPATIENT
Start: 2023-10-16 | End: 2023-10-16

## 2023-10-17 ENCOUNTER — APPOINTMENT (OUTPATIENT)
Dept: CT IMAGING | Facility: HOSPITAL | Age: 28
DRG: 092 | End: 2023-10-17
Attending: STUDENT IN AN ORGANIZED HEALTH CARE EDUCATION/TRAINING PROGRAM

## 2023-10-17 ENCOUNTER — IMAGING SERVICES (OUTPATIENT)
Dept: OTHER | Age: 28
End: 2023-10-17

## 2023-10-17 ENCOUNTER — TELEPHONE (OUTPATIENT)
Dept: NEUROSURGERY | Age: 28
End: 2023-10-17

## 2023-10-17 PROBLEM — M79.605 PAIN IN BOTH LOWER EXTREMITIES: Status: ACTIVE | Noted: 2023-10-17

## 2023-10-17 PROBLEM — M79.604 PAIN IN BOTH LOWER EXTREMITIES: Status: ACTIVE | Noted: 2023-10-17

## 2023-10-17 PROBLEM — Z98.2 S/P VP SHUNT: Status: ACTIVE | Noted: 2023-10-17

## 2023-10-17 LAB
BASOPHILS # BLD AUTO: 0.03 X10(3) UL (ref 0–0.2)
BASOPHILS NFR BLD AUTO: 0.5 %
DEPRECATED RDW RBC AUTO: 35.7 FL (ref 35.1–46.3)
EOSINOPHIL # BLD AUTO: 0.15 X10(3) UL (ref 0–0.7)
EOSINOPHIL NFR BLD AUTO: 2.4 %
ERYTHROCYTE [DISTWIDTH] IN BLOOD BY AUTOMATED COUNT: 11.9 % (ref 11–15)
HCT VFR BLD AUTO: 39.9 %
HGB BLD-MCNC: 14.1 G/DL
IMM GRANULOCYTES # BLD AUTO: 0.03 X10(3) UL (ref 0–1)
IMM GRANULOCYTES NFR BLD: 0.5 %
LYMPHOCYTES # BLD AUTO: 2.4 X10(3) UL (ref 1–4)
LYMPHOCYTES NFR BLD AUTO: 38 %
MCH RBC QN AUTO: 29.6 PG (ref 26–34)
MCHC RBC AUTO-ENTMCNC: 35.3 G/DL (ref 31–37)
MCV RBC AUTO: 83.8 FL
MONOCYTES # BLD AUTO: 0.56 X10(3) UL (ref 0.1–1)
MONOCYTES NFR BLD AUTO: 8.9 %
NEUTROPHILS # BLD AUTO: 3.14 X10 (3) UL (ref 1.5–7.7)
NEUTROPHILS # BLD AUTO: 3.14 X10(3) UL (ref 1.5–7.7)
NEUTROPHILS NFR BLD AUTO: 49.7 %
PLATELET # BLD AUTO: 220 10(3)UL (ref 150–450)
RBC # BLD AUTO: 4.76 X10(6)UL
WBC # BLD AUTO: 6.3 X10(3) UL (ref 4–11)

## 2023-10-17 PROCEDURE — 70450 CT HEAD/BRAIN W/O DYE: CPT | Performed by: STUDENT IN AN ORGANIZED HEALTH CARE EDUCATION/TRAINING PROGRAM

## 2023-10-17 PROCEDURE — 99233 SBSQ HOSP IP/OBS HIGH 50: CPT | Performed by: HOSPITALIST

## 2023-10-17 PROCEDURE — 99222 1ST HOSP IP/OBS MODERATE 55: CPT | Performed by: STUDENT IN AN ORGANIZED HEALTH CARE EDUCATION/TRAINING PROGRAM

## 2023-10-17 RX ORDER — MORPHINE SULFATE 2 MG/ML
2 INJECTION, SOLUTION INTRAMUSCULAR; INTRAVENOUS EVERY 2 HOUR PRN
Status: DISCONTINUED | OUTPATIENT
Start: 2023-10-17 | End: 2023-10-18

## 2023-10-17 RX ORDER — CARVEDILOL 3.12 MG/1
3.12 TABLET ORAL 2 TIMES DAILY WITH MEALS
Status: DISCONTINUED | OUTPATIENT
Start: 2023-10-17 | End: 2023-10-18

## 2023-10-17 RX ORDER — MORPHINE SULFATE 2 MG/ML
1 INJECTION, SOLUTION INTRAMUSCULAR; INTRAVENOUS EVERY 2 HOUR PRN
Status: DISCONTINUED | OUTPATIENT
Start: 2023-10-17 | End: 2023-10-18

## 2023-10-17 RX ORDER — HYDROCODONE BITARTRATE AND ACETAMINOPHEN 5; 325 MG/1; MG/1
1 TABLET ORAL EVERY 6 HOURS PRN
Status: DISCONTINUED | OUTPATIENT
Start: 2023-10-17 | End: 2023-10-18

## 2023-10-17 RX ORDER — LEVOFLOXACIN 250 MG/1
250 TABLET ORAL DAILY
Qty: 3 TABLET | Refills: 0 | Status: DISCONTINUED | OUTPATIENT
Start: 2023-10-17 | End: 2023-10-18

## 2023-10-17 RX ORDER — PROCHLORPERAZINE EDISYLATE 5 MG/ML
5 INJECTION INTRAMUSCULAR; INTRAVENOUS EVERY 8 HOURS PRN
Status: DISCONTINUED | OUTPATIENT
Start: 2023-10-17 | End: 2023-10-18

## 2023-10-17 RX ORDER — MORPHINE SULFATE 4 MG/ML
4 INJECTION, SOLUTION INTRAMUSCULAR; INTRAVENOUS EVERY 2 HOUR PRN
Status: DISCONTINUED | OUTPATIENT
Start: 2023-10-17 | End: 2023-10-18

## 2023-10-17 RX ORDER — ONDANSETRON 2 MG/ML
4 INJECTION INTRAMUSCULAR; INTRAVENOUS EVERY 6 HOURS PRN
Status: DISCONTINUED | OUTPATIENT
Start: 2023-10-17 | End: 2023-10-18

## 2023-10-17 RX ORDER — MELATONIN
2000 DAILY
Status: DISCONTINUED | OUTPATIENT
Start: 2023-10-17 | End: 2023-10-18

## 2023-10-17 RX ORDER — SODIUM CHLORIDE 9 MG/ML
INJECTION, SOLUTION INTRAVENOUS CONTINUOUS
Status: DISCONTINUED | OUTPATIENT
Start: 2023-10-17 | End: 2023-10-17

## 2023-10-17 RX ORDER — ACETAMINOPHEN 500 MG
500 TABLET ORAL EVERY 4 HOURS PRN
Status: DISCONTINUED | OUTPATIENT
Start: 2023-10-17 | End: 2023-10-18

## 2023-10-17 NOTE — PLAN OF CARE
Pt admitted to floor. Alert and oriented x 4 and on room air. Monitoring vital signs- stable at this time. Receiving 0.9% Nacl IV fluids per MD order. NPO for possible procedure. All pt questions and needs met. Fall precautions maintained- bed alarm on, bed locked in lowest position, call light and personal belongings within reach, non-skid socks in place to bilateral feet. Frequent rounding by nursing staff.     Problem: Patient Centered Care  Goal: Patient preferences are identified and integrated in the patient's plan of care  Description: Interventions:  - What would you like us to know as we care for you?   - Provide timely, complete, and accurate information to patient/family  - Incorporate patient and family knowledge, values, beliefs, and cultural backgrounds into the planning and delivery of care  - Encourage patient/family to participate in care and decision-making at the level they choose  - Honor patient and family perspectives and choices  Outcome: Progressing

## 2023-10-17 NOTE — ED INITIAL ASSESSMENT (HPI)
Pt presents from home for c/o headache and right eye pain that started 12 days ago. Pt also being seen already for a pinched never causing weakness in both legs causing him to walk with a walker.

## 2023-10-17 NOTE — ED QUICK NOTES
Orders for admission, patient is aware of plan and ready to go upstairs. Any questions, please call ED RN Beatriz Jimenez at [address].      Patient Covid vaccination status: Fully vaccinated     COVID Test Ordered in ED: None    COVID Suspicion at Admission: N/A    Running Infusions:      Mental Status/LOC at time of transport: A & 0 X 4    Other pertinent information:   CIWA score: N/A   NIH score:  N/A

## 2023-10-17 NOTE — PLAN OF CARE
Problem: PAIN - ADULT  Goal: Verbalizes/displays adequate comfort level or patient's stated pain goal  Description: INTERVENTIONS:  - Encourage pt to monitor pain and request assistance  - Assess pain using appropriate pain scale  - Administer analgesics based on type and severity of pain and evaluate response  - Implement non-pharmacological measures as appropriate and evaluate response  - Consider cultural and social influences on pain and pain management  - Manage/alleviate anxiety  - Utilize distraction and/or relaxation techniques  - Monitor for opioid side effects  - Notify MD/LIP if interventions unsuccessful or patient reports new pain  - Anticipate increased pain with activity and pre-medicate as appropriate  Outcome: Progressing     Problem: RISK FOR INFECTION - ADULT  Goal: Absence of fever/infection during anticipated neutropenic period  Description: INTERVENTIONS  - Monitor WBC  - Administer growth factors as ordered  - Implement neutropenic guidelines  Outcome: Progressing     Problem: SAFETY ADULT - FALL  Goal: Free from fall injury  Description: INTERVENTIONS:  - Assess pt frequently for physical needs  - Identify cognitive and physical deficits and behaviors that affect risk of falls. - [place] fall precautions as indicated by assessment.  - Educate pt/family on patient safety including physical limitations  - Instruct pt to call for assistance with activity based on assessment  - Modify environment to reduce risk of injury  - Provide assistive devices as appropriate  - Consider OT/PT consult to assist with strengthening/mobility  - Encourage toileting schedule  Outcome: Progressing     Went to CT scan today. Tolerating regular diet. Safety precautions in place, frequent nursing rounding completed, call light within reach. All questions answered and needs met.

## 2023-10-18 ENCOUNTER — APPOINTMENT (OUTPATIENT)
Dept: CT IMAGING | Facility: HOSPITAL | Age: 28
DRG: 092 | End: 2023-10-18
Attending: STUDENT IN AN ORGANIZED HEALTH CARE EDUCATION/TRAINING PROGRAM

## 2023-10-18 ENCOUNTER — IMAGING SERVICES (OUTPATIENT)
Dept: OTHER | Age: 28
End: 2023-10-18

## 2023-10-18 VITALS
SYSTOLIC BLOOD PRESSURE: 132 MMHG | OXYGEN SATURATION: 96 % | RESPIRATION RATE: 18 BRPM | BODY MASS INDEX: 23 KG/M2 | WEIGHT: 134.69 LBS | TEMPERATURE: 98 F | HEART RATE: 81 BPM | DIASTOLIC BLOOD PRESSURE: 94 MMHG

## 2023-10-18 LAB
ANION GAP SERPL CALC-SCNC: 9 MMOL/L (ref 0–18)
BUN BLD-MCNC: 9 MG/DL (ref 7–18)
BUN/CREAT SERPL: 9.1 (ref 10–20)
CALCIUM BLD-MCNC: 9.7 MG/DL (ref 8.5–10.1)
CHLORIDE SERPL-SCNC: 105 MMOL/L (ref 98–112)
CO2 SERPL-SCNC: 26 MMOL/L (ref 21–32)
CREAT BLD-MCNC: 0.99 MG/DL
DEPRECATED RDW RBC AUTO: 35.8 FL (ref 35.1–46.3)
EGFRCR SERPLBLD CKD-EPI 2021: 106 ML/MIN/1.73M2 (ref 60–?)
ERYTHROCYTE [DISTWIDTH] IN BLOOD BY AUTOMATED COUNT: 11.9 % (ref 11–15)
GLUCOSE BLD-MCNC: 99 MG/DL (ref 70–99)
HCT VFR BLD AUTO: 43.7 %
HGB BLD-MCNC: 15.8 G/DL
MCH RBC QN AUTO: 30.3 PG (ref 26–34)
MCHC RBC AUTO-ENTMCNC: 36.2 G/DL (ref 31–37)
MCV RBC AUTO: 83.7 FL
OSMOLALITY SERPL CALC.SUM OF ELEC: 289 MOSM/KG (ref 275–295)
PLATELET # BLD AUTO: 272 10(3)UL (ref 150–450)
POTASSIUM SERPL-SCNC: 4.4 MMOL/L (ref 3.5–5.1)
RBC # BLD AUTO: 5.22 X10(6)UL
SODIUM SERPL-SCNC: 140 MMOL/L (ref 136–145)
WBC # BLD AUTO: 8.6 X10(3) UL (ref 4–11)

## 2023-10-18 PROCEDURE — 70450 CT HEAD/BRAIN W/O DYE: CPT | Performed by: STUDENT IN AN ORGANIZED HEALTH CARE EDUCATION/TRAINING PROGRAM

## 2023-10-18 PROCEDURE — 99232 SBSQ HOSP IP/OBS MODERATE 35: CPT | Performed by: PHYSICIAN ASSISTANT

## 2023-10-18 PROCEDURE — 99233 SBSQ HOSP IP/OBS HIGH 50: CPT | Performed by: STUDENT IN AN ORGANIZED HEALTH CARE EDUCATION/TRAINING PROGRAM

## 2023-10-18 PROCEDURE — 99239 HOSP IP/OBS DSCHRG MGMT >30: CPT | Performed by: HOSPITALIST

## 2023-10-18 RX ORDER — LEVOFLOXACIN 250 MG/1
250 TABLET ORAL DAILY
Qty: 3 TABLET | Refills: 0 | Status: SHIPPED | OUTPATIENT
Start: 2023-10-19

## 2023-10-18 NOTE — DISCHARGE INSTRUCTIONS
Follow up with your surgeon in one week. Discuss obtaining a new wheelchair with your primary provider.

## 2023-10-18 NOTE — PHYSICAL THERAPY NOTE
PHYSICAL THERAPY EVALUATION - INPATIENT     Room Number: 540/540-A  Evaluation Date: 10/18/2023  Type of Evaluation: Initial   Physician Order: PT Eval and Treat    Presenting Problem: hydrocephalus  Co-Morbidities : hx spina bifida  Reason for Therapy: Mobility Dysfunction and Discharge Planning    PHYSICAL THERAPY ASSESSMENT     Patient is a 33year old male admitted 10/16/2023 for hydrocephalus. Patient's current functional deficits include impaired gait requiring assistive device, which is below the patient's pre-admission status. However pt is close to baseline and demonstrated all functional mobility at supervision level and is safe to return home with continued 24 hour supervision and OP PT as scheduled. RN approved participation with physical therapy. Pt was received seated on bench in hallway, taking rest break after walking laps with mother, and agreeable to activity. Educated on role of IP PT, use of RW, goals for this session. Pt verbalized understanding. Transfers: supervision for STS with RW from bench  Gait: ambulated 400 ft with RW and supervision, bilateral AFOs donned, slow pace, no unsteady gait or LOB. Baseline gait deviations noted only (see below). Pt was provided with rw adjusted for his height. Per discussion, pt has no further questions or concerns about DC home. Pt was left sitting at EOB with needs within reach, family present, handoff to RN complete. The patient's Approx Degree of Impairment: 46.58% has been calculated based on documentation in the [facility] '6 clicks' Inpatient Basic Mobility Short Form. Research supports that patients with this level of impairment may benefit from home with initial 24 hour supervision, continue current OP PT schedule. DISCHARGE RECOMMENDATIONS  PT Discharge Recommendations: 24 hour care/supervision;Home;Outpatient PT    PLAN    Patient has been evaluated and presents with no skilled Physical Therapy needs at this time.   Patient will be discharged from Physical Therapy services. Please re-order if a new functional limitation presents during this admission. PHYSICAL THERAPY MEDICAL/SOCIAL HISTORY     Problem List  Principal Problem:    Hydrocephalus, unspecified type ([address]  Active Problems:    Weakness of both lower extremities    S/P  shunt    Pain in both lower extremities    HOME SITUATION  Home Situation  Type of Home: Apartment  Home Layout: One level  Stairs to Enter : 1  Lives With: Parent(s)  Drives: Yes  Patient Owned Equipment: AFO (bilateral AFOs)  Patient Regularly Uses: None     Prior Level of [place]: independent with most ADLs and mobility without assistive device; parents assist with ADLs as needed    SUBJECTIVE  Agreeable to activity. PHYSICAL THERAPY EXAMINATION     OBJECTIVE  Precautions: None  Fall Risk: Standard fall risk    WEIGHT BEARING RESTRICTION  none    PAIN ASSESSMENT  [place]    COGNITION  Overall Cognitive Status:  WFL - within functional limits    RANGE OF MOTION AND STRENGTH ASSESSMENT  Upper extremity ROM and strength are within functional limits. Lower extremity ROM is within functional limits. Lower extremity strength is within functional limits. BALANCE  Static Sitting: Good  Dynamic Sitting: Fair +  Static Standing: Fair  Dynamic [facility] '6-Clicks' INPATIENT SHORT FORM - BASIC MOBILITY  How much difficulty does the patient currently have. .. Patient Difficulty: Turning over in bed (including adjusting bedclothes, sheets and blankets)?: A Little   Patient Difficulty: Sitting down on and standing up from a chair with arms (e.g., wheelchair, bedside commode, etc.): A Little   Patient Difficulty: Moving from lying on back to sitting on the side of the bed?: A Little   How much help from another person does the patient currently need. ..    Help from Another: Moving to and from a bed to a chair (including a wheelchair)?: A Little   Help from Another: Need to walk in hospital room?: A Little   Help from Another: Climbing 3-5 steps with a railing?: A Little     AM-PAC Score:  Raw Score: 18   Approx Degree of Impairment: 46.58%   Standardized Score (AM-PAC Scale): 43.63   CMS Modifier (G-Code): CK    FUNCTIONAL ABILITY STATUS  Functional Mobility/Gait Assessment  Gait Assistance: Supervision  Distance (ft): 400  Assistive Device: Rolling walker  Pattern: L Foot drop;R Foot drop; Shuffle;R Hip hike;L Hip hike    Bed Mobility: NT    Transfers: supervision    Exercise/Education Provided: Body mechanics  Energy conservation  Functional activity tolerated  Gait training  Transfer training    Patient End of Session: In bed;Needs met;Call light within reach;RN aware of session/findings; All patient questions and concerns addressed; Family present    Patient was able to achieve the following . ..    Patient able to transfer   Safely and with supervision     Patient able to ambulate on level surfaces   Safely and with supervision      Patient Evaluation Complexity Level:  History Low - no personal factors and/or co-morbidities   Examination of body systems Low - addressing 1-2 elements   Clinical Presentation Low - Stable   Clinical Decision Making Low Complexity       Therapeutic Activity: 10 minutes

## 2023-10-18 NOTE — PLAN OF CARE
Problem: Patient Centered Care  Goal: Patient preferences are identified and integrated in the patient's plan of care  Description: Interventions:  - What would you like us to know as we care for you? From home with parents  - Provide timely, complete, and accurate information to patient/family  - Incorporate patient and family knowledge, values, beliefs, and cultural backgrounds into the planning and delivery of care  - Encourage patient/family to participate in care and decision-making at the level they choose  - Honor patient and family perspectives and choices  Outcome: Progressing     Problem: PAIN - ADULT  Goal: Verbalizes/displays adequate comfort level or patient's stated pain goal  Description: INTERVENTIONS:  - Encourage pt to monitor pain and request assistance  - Assess pain using appropriate pain scale  - Administer analgesics based on type and severity of pain and evaluate response  - Implement non-pharmacological measures as appropriate and evaluate response  - Consider cultural and social influences on pain and pain management  - Manage/alleviate anxiety  - Utilize distraction and/or relaxation techniques  - Monitor for opioid side effects  - Notify MD/LIP if interventions unsuccessful or patient reports new pain  - Anticipate increased pain with activity and pre-medicate as appropriate  Outcome: Progressing     Problem: RISK FOR INFECTION - ADULT  Goal: Absence of fever/infection during anticipated neutropenic period  Description: INTERVENTIONS  - Monitor WBC  - Administer growth factors as ordered  - Implement neutropenic guidelines  Outcome: Progressing     Problem: SAFETY ADULT - FALL  Goal: Free from fall injury  Description: INTERVENTIONS:  - Assess pt frequently for physical needs  - Identify cognitive and physical deficits and behaviors that affect risk of falls.   - [place] fall precautions as indicated by assessment.  - Educate pt/family on patient safety including physical limitations  - Instruct pt to call for assistance with activity based on assessment  - Modify environment to reduce risk of injury  - Provide assistive devices as appropriate  - Consider OT/PT consult to assist with strengthening/mobility  - Encourage toileting schedule  Outcome: Progressing     Problem: DISCHARGE PLANNING  Goal: Discharge to home or other facility with appropriate resources  Description: INTERVENTIONS:  - Identify barriers to discharge w/pt and caregiver  - Include patient/family/discharge partner in discharge planning  - Arrange for needed discharge resources and transportation as appropriate  - Identify discharge learning needs (meds, wound care, etc)  - Arrange for interpreters to assist at discharge as needed  - Consider post-discharge preferences of patient/family/discharge partner  - Complete POLST form as appropriate  - Assess patient's ability to be responsible for managing their own health  - Refer to Case Management Department for coordinating discharge planning if the patient needs post-hospital services based on physician/LIP order or complex needs related to functional status, cognitive ability or social support system  Outcome: Progressing     Problem: SKIN/TISSUE INTEGRITY - ADULT  Goal: Skin integrity remains intact  Description: INTERVENTIONS  - Assess and document risk factors for pressure ulcer development  - Assess and document skin integrity  - Monitor for areas of redness and/or skin breakdown  - Initiate interventions, skin care algorithm/standards of care as needed  10/18/2023 1133 by Coleman Ellsworth, RN  Outcome: Progressing  10/18/2023 1133 by Coleman Ellsworth, RN  Outcome: Progressing     Problem: NEUROLOGICAL - ADULT  Goal: Achieves stable or improved neurological status  Description: INTERVENTIONS  - Assess for and report changes in neurological status  - Initiate measures to prevent increased intracranial pressure  - Maintain blood pressure and fluid volume within ordered parameters to optimize cerebral perfusion and minimize risk of hemorrhage  - Monitor temperature, glucose, and sodium. Initiate appropriate interventions as ordered  10/18/2023 1133 by Jane Pettit RN  Outcome: Progressing  10/18/2023 1133 by Jane Pettit RN  Outcome: Progressing     Problem: Impaired Functional Mobility  Goal: Achieve highest/safest level of mobility/gait  Description: Interventions:  - Assess patient's functional ability and stability  - Promote increasing activity/tolerance for mobility and gait  - Educate and engage patient/family in tolerated activity level and precautions.   Outcome: Progressing     Problem: Patient/Family Goals  Goal: Patient/Family Long Term Goal  Description: Patient's Long Term Goal: Discharge from the hospital    Interventions:  - Monitor vital signs  - Monitor appropriate labs  - Pain management  - Administer medications per order  - Follow MD orders  - Diagnostics per order  - Update / inform patient and family on plan of care  - Discharge planning  - See additional Care Plan goals for specific interventions  Outcome: Progressing  Goal: Patient/Family Short Term Goal  Description: Patient's Short Term Goal: Improve headache    Interventions:   - Monitor vital signs  - Monitor appropriate labs  - Pain management  - Administer medications per order  - Follow MD orders  - Diagnostics per order  - Update / inform patient and family on plan of care  - See additional Care Plan goals for specific interventions  Outcome: Progressing

## 2023-10-18 NOTE — PLAN OF CARE
Per MD patient cleared for discharge. Discharge education and AVS provided to patient and family. Patient discharged home. Problem: Patient Centered Care  Goal: Patient preferences are identified and integrated in the patient's plan of care  Description: Interventions:  - What would you like us to know as we care for you?  From home with parents  - Provide timely, complete, and accurate information to patient/family  - Incorporate patient and family knowledge, values, beliefs, and cultural backgrounds into the planning and delivery of care  - Encourage patient/family to participate in care and decision-making at the level they choose  - Honor patient and family perspectives and choices  10/18/2023 1511 by Fitz Fowler RN  Outcome: Adequate for Discharge  10/18/2023 1133 by Fitz Fowler RN  Outcome: Progressing     Problem: PAIN - ADULT  Goal: Verbalizes/displays adequate comfort level or patient's stated pain goal  Description: INTERVENTIONS:  - Encourage pt to monitor pain and request assistance  - Assess pain using appropriate pain scale  - Administer analgesics based on type and severity of pain and evaluate response  - Implement non-pharmacological measures as appropriate and evaluate response  - Consider cultural and social influences on pain and pain management  - Manage/alleviate anxiety  - Utilize distraction and/or relaxation techniques  - Monitor for opioid side effects  - Notify MD/LIP if interventions unsuccessful or patient reports new pain  - Anticipate increased pain with activity and pre-medicate as appropriate  10/18/2023 1511 by Fitz Fowler RN  Outcome: Adequate for Discharge  10/18/2023 1133 by Fitz Fowler RN  Outcome: Progressing     Problem: RISK FOR INFECTION - ADULT  Goal: Absence of fever/infection during anticipated neutropenic period  Description: INTERVENTIONS  - Monitor WBC  - Administer growth factors as ordered  - Implement neutropenic guidelines  10/18/2023 1511 by Betty Xie RN  Outcome: Adequate for Discharge  10/18/2023 1133 by Betty Xie RN  Outcome: Progressing     Problem: SAFETY ADULT - FALL  Goal: Free from fall injury  Description: INTERVENTIONS:  - Assess pt frequently for physical needs  - Identify cognitive and physical deficits and behaviors that affect risk of falls.   - [place] fall precautions as indicated by assessment.  - Educate pt/family on patient safety including physical limitations  - Instruct pt to call for assistance with activity based on assessment  - Modify environment to reduce risk of injury  - Provide assistive devices as appropriate  - Consider OT/PT consult to assist with strengthening/mobility  - Encourage toileting schedule  10/18/2023 1511 by Betty Xie RN  Outcome: Adequate for Discharge  10/18/2023 1133 by Betty Xie RN  Outcome: Progressing     Problem: DISCHARGE PLANNING  Goal: Discharge to home or other facility with appropriate resources  Description: INTERVENTIONS:  - Identify barriers to discharge w/pt and caregiver  - Include patient/family/discharge partner in discharge planning  - Arrange for needed discharge resources and transportation as appropriate  - Identify discharge learning needs (meds, wound care, etc)  - Arrange for interpreters to assist at discharge as needed  - Consider post-discharge preferences of patient/family/discharge partner  - Complete POLST form as appropriate  - Assess patient's ability to be responsible for managing their own health  - Refer to Case Management Department for coordinating discharge planning if the patient needs post-hospital services based on physician/LIP order or complex needs related to functional status, cognitive ability or social support system  10/18/2023 1511 by Betty Xie RN  Outcome: Adequate for Discharge  10/18/2023 1133 by Betty Xie RN  Outcome: Progressing     Problem: SKIN/TISSUE INTEGRITY - ADULT  Goal: Skin integrity remains intact  Description: INTERVENTIONS  - Assess and document risk factors for pressure ulcer development  - Assess and document skin integrity  - Monitor for areas of redness and/or skin breakdown  - Initiate interventions, skin care algorithm/standards of care as needed  10/18/2023 1511 by Coleman Ellsworth RN  Outcome: Adequate for Discharge  10/18/2023 1133 by Coleman Ellsworth RN  Outcome: Progressing  10/18/2023 1133 by Coleman Ellsworth RN  Outcome: Progressing     Problem: NEUROLOGICAL - ADULT  Goal: Achieves stable or improved neurological status  Description: INTERVENTIONS  - Assess for and report changes in neurological status  - Initiate measures to prevent increased intracranial pressure  - Maintain blood pressure and fluid volume within ordered parameters to optimize cerebral perfusion and minimize risk of hemorrhage  - Monitor temperature, glucose, and sodium.  Initiate appropriate interventions as ordered  10/18/2023 1511 by Coleman Ellsworth RN  Outcome: Adequate for Discharge  10/18/2023 1133 by Coleman Ellsworth RN  Outcome: Progressing  10/18/2023 1133 by Coleman Ellsworth RN  Outcome: Progressing     Problem: Impaired Functional Mobility  Goal: Achieve highest/safest level of mobility/gait  Description: Interventions:  - Assess patient's functional ability and stability  - Promote increasing activity/tolerance for mobility and gait  - Educate and engage patient/family in tolerated activity level and precautions  10/18/2023 1511 by Coleman Ellsworth RN  Outcome: Adequate for Discharge  10/18/2023 1133 by Coleman Ellsworth RN  Outcome: Progressing     Problem: Patient/Family Goals  Goal: Patient/Family Long Term Goal  Description: Patient's Long Term Goal: Discharge from the hospital    Interventions:  - Monitor vital signs  - Monitor appropriate labs  - Pain management  - Administer medications per order  - Follow MD orders  - Diagnostics per order  - Update / inform patient and family on plan of care  - Discharge planning  - See additional Care Plan goals for specific interventions  10/18/2023 1511 by Darya Puente RN  Outcome: Adequate for Discharge  10/18/2023 1133 by Darya Puente RN  Outcome: Progressing  Goal: Patient/Family Short Term Goal  Description: Patient's Short Term Goal: Improve headache    Interventions:   - Monitor vital signs  - Monitor appropriate labs  - Pain management  - Administer medications per order  - Follow MD orders  - Diagnostics per order  - Update / inform patient and family on plan of care  - See additional Care Plan goals for specific interventions  10/18/2023 1511 by Darya Puente RN  Outcome: Adequate for Discharge  10/18/2023 1133 by Darya Puente RN  Outcome: Progressing

## 2023-10-18 NOTE — PLAN OF CARE
Problem: Patient Centered Care  Goal: Patient preferences are identified and integrated in the patient's plan of care  Description: Interventions:  - What would you like us to know as we care for you? From home with parents  - Provide timely, complete, and accurate information to patient/family  - Incorporate patient and family knowledge, values, beliefs, and cultural backgrounds into the planning and delivery of care  - Encourage patient/family to participate in care and decision-making at the level they choose  - Honor patient and family perspectives and choices  Outcome: Progressing     Problem: PAIN - ADULT  Goal: Verbalizes/displays adequate comfort level or patient's stated pain goal  Description: INTERVENTIONS:  - Encourage pt to monitor pain and request assistance  - Assess pain using appropriate pain scale  - Administer analgesics based on type and severity of pain and evaluate response  - Implement non-pharmacological measures as appropriate and evaluate response  - Consider cultural and social influences on pain and pain management  - Manage/alleviate anxiety  - Utilize distraction and/or relaxation techniques  - Monitor for opioid side effects  - Notify MD/LIP if interventions unsuccessful or patient reports new pain  - Anticipate increased pain with activity and pre-medicate as appropriate  Outcome: Progressing     Problem: RISK FOR INFECTION - ADULT  Goal: Absence of fever/infection during anticipated neutropenic period  Description: INTERVENTIONS  - Monitor WBC  - Administer growth factors as ordered  - Implement neutropenic guidelines  Outcome: Progressing     Problem: SAFETY ADULT - FALL  Goal: Free from fall injury  Description: INTERVENTIONS:  - Assess pt frequently for physical needs  - Identify cognitive and physical deficits and behaviors that affect risk of falls.   - [place] fall precautions as indicated by assessment.  - Educate pt/family on patient safety including physical limitations  - Instruct pt to call for assistance with activity based on assessment  - Modify environment to reduce risk of injury  - Provide assistive devices as appropriate  - Consider OT/PT consult to assist with strengthening/mobility  - Encourage toileting schedule  Outcome: Progressing     Problem: DISCHARGE PLANNING  Goal: Discharge to home or other facility with appropriate resources  Description: INTERVENTIONS:  - Identify barriers to discharge w/pt and caregiver  - Include patient/family/discharge partner in discharge planning  - Arrange for needed discharge resources and transportation as appropriate  - Identify discharge learning needs (meds, wound care, etc)  - Arrange for interpreters to assist at discharge as needed  - Consider post-discharge preferences of patient/family/discharge partner  - Complete POLST form as appropriate  - Assess patient's ability to be responsible for managing their own health  - Refer to Case Management Department for coordinating discharge planning if the patient needs post-hospital services based on physician/LIP order or complex needs related to functional status, cognitive ability or social support system  Outcome: Progressing     Problem: NEUROLOGICAL - ADULT  Goal: Achieves stable or improved neurological status  Description: INTERVENTIONS  - Assess for and report changes in neurological status  - Initiate measures to prevent increased intracranial pressure  - Maintain blood pressure and fluid volume within ordered parameters to optimize cerebral perfusion and minimize risk of hemorrhage  - Monitor temperature, glucose, and sodium.  Initiate appropriate interventions as ordered  Outcome: Progressing     Problem: Impaired Functional Mobility  Goal: Achieve highest/safest level of mobility/gait  Description: Interventions:  - Assess patient's functional ability and stability  - Promote increasing activity/tolerance for mobility and gait  - Educate and engage patient/family in tolerated activity level and precautions  - Recommend use of  RW for transfers and ambulation  - When transferring patient, block weaker knee for safety  - Recommend use of chair position in bed 3 times per day  Outcome: Progressing     Problem: Patient/Family Goals  Goal: Patient/Family Long Term Goal  Description: Patient's Long Term Goal: Discharge from the hospital    Interventions:  - Monitor vital signs  - Monitor appropriate labs  - Pain management  - Administer medications per order  - Follow MD orders  - Diagnostics per order  - Update / inform patient and family on plan of care  - Discharge planning  - See additional Care Plan goals for specific interventions  Outcome: Progressing  Goal: Patient/Family Short Term Goal  Description: Patient's Short Term Goal: Improve headache    Interventions:   - Monitor vital signs  - Monitor appropriate labs  - Pain management  - Administer medications per order  - Follow MD orders  - Diagnostics per order  - Update / inform patient and family on plan of care  - See additional Care Plan goals for specific interventions  Outcome: Progressing      Monitoring vital signs- stable at this time. Mother at bedside. Neurochecks per order. No acute changes noted at this moment. Safety and fall precautions maintained- bed alarm on, bed locked in lowest position, call light within reach. Frequent rounding by nursing staff.

## 2023-10-19 ENCOUNTER — PATIENT OUTREACH (OUTPATIENT)
Dept: CASE MANAGEMENT | Age: 28
End: 2023-10-19

## 2023-10-19 ENCOUNTER — TELEPHONE (OUTPATIENT)
Dept: FAMILY MEDICINE CLINIC | Facility: CLINIC | Age: 28
End: 2023-10-19

## 2023-10-19 DIAGNOSIS — R51.9 HEADACHE, UNSPECIFIED HEADACHE TYPE: ICD-10-CM

## 2023-10-19 DIAGNOSIS — G91.9 HYDROCEPHALUS, UNSPECIFIED TYPE (HCC): ICD-10-CM

## 2023-10-19 DIAGNOSIS — Q05.4 SPINA BIFIDA WITH HYDROCEPHALUS, UNSPECIFIED SPINAL REGION (HCC): ICD-10-CM

## 2023-10-19 DIAGNOSIS — M79.604 PAIN IN BOTH LOWER EXTREMITIES: ICD-10-CM

## 2023-10-19 DIAGNOSIS — Z02.9 ENCOUNTERS FOR UNSPECIFIED ADMINISTRATIVE PURPOSE: Primary | ICD-10-CM

## 2023-10-19 DIAGNOSIS — M79.605 PAIN IN BOTH LOWER EXTREMITIES: ICD-10-CM

## 2023-10-19 DIAGNOSIS — Z98.2 S/P VP SHUNT: ICD-10-CM

## 2023-10-19 PROCEDURE — 1111F DSCHRG MED/CURRENT MED MERGE: CPT

## 2023-10-19 NOTE — PROGRESS NOTES
NCM placed call to patient for TCM, LM requesting a call back to [phone] with a condition update. Discharge Dx:     Hydrocephalus, unspecified type  S/P  shunt  Weakness in bother lower extremities  Pain in both lower extremities  HX: of spina bifida   myelomeningocele and hydrocephalus   s/p  shunt   Last  shunt revision was in 2004.

## 2023-10-19 NOTE — PROGRESS NOTES
Initial Post Discharge Follow Up   Discharge Date: 10/18/23  Contact Date: 10/19/2023    Consent Verification:  Assessment Completed With: Patient  HIPAA Verified? Yes    Discharge Dx:     Hydrocephalus, unspecified type  S/P  shunt  Weakness in bother lower extremities  Pain in both lower extremities  HX: of spina bifida   myelomeningocele and hydrocephalus   s/p  shunt   Last  shunt revision was in 2004. Was TCC ordered: no          Did Pt Receive Their Flu Shot (Sept-March):  no    General:   How have you been since your discharge from the hospital? Patient states he went to physical therapy today, and feels better a little stronger, denies pain in lower extremities. . Patient denies any further pain and pressure behind right eye, states the headache is almost completely gone. Patient denies fever/chills, no visual disturbances, no shortness of breath, no cough, no chest pain, no pain radiating from chest to neck, jaw, shoulders, arms or upper back. Patient reports he is currently able to walk with his braces and his walker. Patient reports he is able to perform his own ADL's. NCM instructed patient to change positions frequently, walk as tolerated, rest when needed, stay hydrated and continue to take up to ten deep breaths an hour while awake, or if watching television take a deep breath with every commercial. NCM reviewed discharge instructions, medications, S&S of infection/blood clots with patient, he verbalized understanding of these. Patient denies any further questions or needs at this time. Do you have any pain since discharge?  no   When you were leaving the hospital were your discharge instructions reviewed with you? yes  How well were your discharge instructions explained to you? On a scale of 1-5   1- Very Poor and 5- Very well   Very well  Do you have any questions about your discharge instructions? No  Before leaving the hospital was your diagnoses explained to you?  Yes  Do you have any questions about your diagnoses? No  Are you able to perform normal daily activities of living as you have prior to your hospital stay (dressing, bathing, ambulating to the bathroom, etc)? yes  (NCM) Was patient given a different diet per AVS? no      Medications:   Current Outpatient Medications   Medication Sig Dispense Refill    levoFLOXacin (LEVAQUIN) 250 MG Oral Tab Take 1 tablet (250 mg total) by mouth daily. 3 tablet 0    levoFLOXacin (LEVAQUIN) 250 MG Oral Tab Take 1 tablet (250 mg total) by mouth daily for 5 days. 5 tablet 0    carvedilol 3.125 MG Oral Tab Take 1 tablet (3.125 mg total) by mouth 2 (two) times daily with meals. 180 tablet 3    Water For Irrigation, Sterile (STERILE WATER FOR IRRIGATION) Irrigation Solution Irrigate with 1,000 mL as directed 2 (two) times daily as needed (Irrigates bladder twice daily due to catheterization. ). 3 each 11    triamcinolone 0.1 % External Cream Use twice daily  with flares to rash on chets (Patient taking differently: Apply topically 2 (two) times daily as needed. Use twice daily  with flares to rash on chets) 80 g 2    Oxybutynin Chloride ER 15 MG Oral Tablet 24 Hr Take 1 tablet (15 mg total) by mouth daily. 90 tablet 3    cholecalciferol 50 MCG (2000 UT) Oral Tab Take 1 tablet (2,000 Units total) by mouth daily. 90 tablet 3    sodium chloride 0.9 % Irrigation Solution       hydrocortisone 2.5 % External Cream Apply to the affected areas twice daily Monday-Friday. Take weekends off. Stop when rash resolved. (Patient taking differently: Apply topically 2 (two) times daily as needed. Apply to the affected areas twice daily Monday-Friday. Take weekends off. Stop when rash resolved.) 453 g 1    ketoconazole 2 % External Shampoo You can apply this not only to the scalp 2 or 3 times a week in the shower but also to your beard area and then also the chest where the rash is. Can also use on the face. 120 mL 11    VITAMIN D, ERGOCALCIFEROL, OR Take by mouth daily. (NCM)  Were there any medication changes noted on AVS?  yes  If so, were these medication changes discussed with you prior to leaving the hospital? yes  May I go over your medications with you to make sure we are not missing anything?yes  Are there any reasons that keep you from taking your medication as prescribed? No  Are you having any concerns with constipation? No    Referrals/orders at D/C:  Home Health/Services ordered at D/C? No     DME ordered at D/C? No, patient has a walker, and braces at home already. SDOH:  Transportation: Transportation Needs: No Transportation Needs (10/17/2023)      Transportation Needs          Lack of Transportation: No    Financial Strain: Financial Resource Strain: Low Risk  (10/19/2023)      Financial Resource Strain          Difficulty of Paying Living Expenses: Not hard at all          Med Affordability: No     DX: specifics:  Having a  shunt   shunting is a very safe procedure. However, complications can occur such as: Infection  Bleeding  Too much or too little drainage of CSF  A blockage in the tube  Shunt malfunction  Seizures    Call your healthcare provider right away if you have any of the following: An unexplained fever, especially if linked to a stiff neck or confusion  New seizures or change in seizure pattern  Unusual headache  Sluggishness (lethargy)  Visual changes  Redness, swelling, bleeding, or discharge from the area where the shunt valve was placed       Needs post D/C:   Now that you are home, are there any needs or concerns you need addressed before your next visit with your PCP?  (DME, meds, disease concerns, Etc): No     Follow up appointments:      Your appointments       Date & Time Appointment Department [facility])    Nov 09, 2023  9:30 AM CST MRI THORACIC  with [address] [facility] [address] MR RM4 (3T WIDE) [facility] MRI ([facility] [address])    Please arrive 30 minutes prior to your scheduled appointment time.   You will need time to change your clothes, fill out screening forms, use the restroom, and may need an IV if your exam requires contrast.  If you arrive too late, your appointment may need to be rescheduled. IF YOU REQUIRE ORAL SEDATION FOR YOUR MRI: Your physician is responsible for giving you a prescription for oral medication which you would fill at your local pharmacy. If you will be taking oral sedation, you must bring a  who will drive you home (the  does not necessarily have to stay throughout the procedure). Nov 09, 2023 10:15 AM CST MRI LUMBAR  with [facility] MR RM4 (3T WIDE) [facility] MRI ([facility])    Please arrive 30 minutes prior to your scheduled appointment time. You will need time to change your clothes, fill out screening forms, use the restroom, and may need an IV if your exam requires contrast.  If you arrive too late, your appointment may need to be rescheduled. IF YOU REQUIRE ORAL SEDATION FOR YOUR MRI: Your physician is responsible for giving you a prescription for oral medication which you would fill at your local pharmacy. If you will be taking oral sedation, you must bring a  who will drive you home (the  does not necessarily have to stay throughout the procedure). Nov 09, 2023 11:00 AM CST MRI CERVICAL SPINE with [facility] MR RM4 (3T WIDE) [facility] MRI ([facility])    Please arrive 30 minutes prior to your scheduled appointment time. You will need time to change your clothes, fill out screening forms, use the restroom, and may need an IV if your exam requires contrast.  If you arrive too late, your appointment may need to be rescheduled. IF YOU REQUIRE ORAL SEDATION FOR YOUR MRI: Your physician is responsible for giving you a prescription for oral medication which you would fill at your local pharmacy.  If you will be taking oral sedation, you must bring a  who will drive you home (the  does not necessarily have to stay throughout the procedure).   [address]  [address]  [phone]            PCP TCM or HFU appointment: scheduled at D/C within 7-14 days  no     NCM Reviewed/scheduled/rescheduled PCP TCM or HFU appointment with pt:  Yes, NCM scheduled a TCM appointment on 10/23/2023 at 11:00 am.       Have you made all of your follow up appointments? no    Is there any reason as to why you cannot make your appointments? No     NCM Reviewed upcoming Specialist Appt with patient     Yes, NCM reviewed specialist appointments with patient, patient reports he has not scheduled a HFU with Dr Francine Carballo, neuro surgeon as he is going to schedule with Dr Ada Read, neuro surgeon who has preformed his past surgeries       Interventions by NCM: NCM reviewed medications, discharge instructions, S&S of infection/blood clots. Patient instructed to report any new or worsening symptoms, when to call the doctor and when to call 911. Patient verbalized understanding of these. NCM instructed patient to call PCP with any questions or needs, he states he will. CCM referral placed:  Not Applicable    BOOK BY DATE: 11/1/2023    [x]  Discharge Summary, Discharge medications reviewed/discussed/and reconciled against outpatient medications with patient,  and orders reviewed and discussed. Any changes or updates to medications and or orders sent to PCP.

## 2023-10-19 NOTE — TELEPHONE ENCOUNTER
Jason Maza I received a call from [facility] calling from [facility] and she wanted to make sure Dr. Isa Matos was aware pt was in the hospital on  10/16/2023 due to his stunt malfunctioning and he has been discharged on 10/18. Jackeline Longoria RN Pt has a f/u appt with Dr. Isa Matos on 10/23/23 and I would let Dr. sIa Matos know. She verbalized understanding.      Future Appointments   Date Time Provider Arti Kong   10/23/2023 11:00 AM [facility]

## 2023-10-22 NOTE — DISCHARGE SUMMARY
[facility]    Discharge Summary    Luann Reese Patient Status:  Inpatient    3/18/1995 MRN [id number]   Location [facility] 5SW/SE Attending No att. providers found   Hosp Day # 1 PCP Marina Dwyer DO     Date of Admission: 10/16/2023 Disposition: Home or Self Care     Date of Discharge: 10/22/2023      Admitting Diagnosis: Hydrocephalus, unspecified type [facility]) [G91.9]    Hospital Discharge Diagnoses:  hydrocephalus    Hospital Discharge Diagnoses:  hydrocephalus    Lace+ Score: 41  59-90 High Risk  29-58 Medium Risk  0-28   Low Risk. TCM Follow-Up Recommendation:  LACE 29-58:  Moderate Risk of readmission after discharge from the hospital.          Lace+ Score: 41  59-90 High Risk  29-58 Medium Risk  0-28   Low Risk    Risk of readmission: Kb Sesay has Moderate Risk of readmission after discharge from the hospital.    Problem List: Patient Active Problem List:     Spina bifida ([address]     Weakness of both lower extremities     Foot drop, bilateral     Acne vulgaris     Neurogenic bladder     Spina bifida, unspecified hydrocephalus presence, unspecified spinal region ([address]     Tachycardia     Strabismus     Regular astigmatism of both eyes     Right arm numbness     Constipation     Gastroenteritis     Gait disturbance     Chiari malformation type II (HCC)     Right foot pain     Right posterior tibial strain     Myalgia     Essential hypertension     Right renal artery stenosis (HCC)     Lesion of urinary bladder     Hyperopia of right eye     Myopia of left eye     Exotropia     Foraminal stenosis of lumbar region     Right lumbar pain     Hydrocephalus, unspecified type (HCC)     S/P  shunt     Pain in both lower extremities      Reason for Admission:     Physical Exam:   General appearance: alert, appears stated age and cooperative  Pulmonary:  clear to auscultation bilaterally  Cardiovascular: S1, S2 normal, no murmur, click, rub or gallop, regular rate and rhythm  Abdominal: soft, non-tender; bowel sounds normal; no masses,  no organomegaly  Extremities: extremities normal, atraumatic, no cyanosis or edema  Psychiatric: calm        History of Present Illness:     Ricky Ascencio is a 33year old male with history of spina bifida, myelomeningocele and hydrocephalus s/p  shunt, depression who presented to the ER with 12 days of headache and right eye pain. Last  shunt revision was in 2004. No nausea, vomiting, fever or chills. No numbness or tingling. He also has chronic bilateral lower extremity weakness and uses AFO braces at baseline. He is currently using a walker due to back pain from a pinched nerve. Mother at bedside. Vitals stable except for hypertension  Labs are normal     Head CT the ED interpretation is suggestive of hydrocephalus. Neurosurgery was consulted and advised admission for further evaluation and treatment. Hospital Course:      Hydrocephalus, unspecified type ([address]    Weakness of both lower extremities    History of spina bifida     S/P  shunt  - Tylenol for headache  - Neurosurgery on board  - Follows up with his Neurosurgeon at another facility   - last shunt revision was at [facility] in 2004  - CT of the brain showed ventricular dilatation concerning for shunt malfunction   - repeat CT of the brain today 10/17 pending      Recent UTI  - finishing up antibiotics   - levaquin 250 mg daily      History of neurogenic bladder  - continue chronic straight cath 4X a day      History of transverse myelitis and radicular pain  - Continue PT/OT                  Quality:  DVT Prophylaxis: SCDs  CODE status: Full     >55 min spent with patient. > 50% time was spent counseling patient, discussing plan of care, discussing labs and imaging findings. Spoke with consultant. All questions answered.           Consultations: Dr Kendra Alonzo     Procedures: none    Complications: none    Discharge Condition: Good    Discharge Medications:      Discharge Medications START taking these medications        Instructions Prescription details   levoFLOXacin 250 MG Tabs  Commonly known as: Levaquin  If you were already taking this medication, take it this way instead. Take 1 tablet (250 mg total) by mouth daily. Quantity: 3 tablet  Refills: 0            CHANGE how you take these medications        Instructions Prescription details   hydrocortisone 2.5 % Crea  What changed:   how to take this  when to take this  reasons to take this      Apply to the affected areas twice daily Monday-Friday. Take weekends off. Stop when rash resolved. Quantity: 453 g  Refills: 1     triamcinolone 0.1 % Crea  Commonly known as: Kenalog  What changed:   how to take this  when to take this  reasons to take this      Use twice daily  with flares to rash on chets   Quantity: 80 g  Refills: 2            CONTINUE taking these medications        Instructions Prescription details   carvedilol 3.125 MG Tabs  Commonly known as: Coreg      Take 1 tablet (3.125 mg total) by mouth 2 (two) times daily with meals. Quantity: 180 tablet  Refills: 3     cholecalciferol 50 MCG (2000 UT) Tabs      Take 1 tablet (2,000 Units total) by mouth daily. Quantity: 90 tablet  Refills: 3     ketoconazole 2 % Sham  Commonly known as: Nizoral      You can apply this not only to the scalp 2 or 3 times a week in the shower but also to your beard area and then also the chest where the rash is. Can also use on the face. Quantity: 120 mL  Refills: 11     oxyBUTYnin Chloride ER 15 MG Tb24  Commonly known as: DITROPAN XL      Take 1 tablet (15 mg total) by mouth daily. Quantity: 90 tablet  Refills: 3     sodium chloride 0.9 % Soln       Refills: 0     Sterile Water for Irrigation Soln      Irrigate with 1,000 mL as directed 2 (two) times daily as needed (Irrigates bladder twice daily due to catheterization. ). Quantity: 3 each  Refills: 11     VITAMIN D (ERGOCALCIFEROL) OR      Take by mouth daily.    Refills: 0 STOP taking these medications      clarithromycin 500 MG Tabs  Commonly known as: Biaxin               ASK your doctor about these medications        Instructions Prescription details   levoFLOXacin 250 MG Tabs  Commonly known as: Levbraydon  Ask about: Should I take this medication? Take 1 tablet (250 mg total) by mouth daily for 5 days. Stop taking on: October 19, 2023  Quantity: 5 tablet  Refills: 0               Where to Get Your Medications        These medications were sent to [place] [address] [address] [phone], [phone]  [address], [address] [place] [address]      Phone: [phone]   levoFLOXacin 250 MG Tabs         Follow up Visits:  Follow-up with NS  in 1 week    Follow up Labs: none     Other Discharge Instructions: none    Sharon Raymundo DO  10/22/2023  1:58 PM    > 35 min

## 2023-10-23 ENCOUNTER — OFFICE VISIT (OUTPATIENT)
Dept: FAMILY MEDICINE CLINIC | Facility: CLINIC | Age: 28
End: 2023-10-23
Payer: MEDICARE

## 2023-10-23 VITALS
HEART RATE: 81 BPM | TEMPERATURE: 98 F | WEIGHT: 137.38 LBS | SYSTOLIC BLOOD PRESSURE: 145 MMHG | DIASTOLIC BLOOD PRESSURE: 81 MMHG | HEIGHT: 64 IN | BODY MASS INDEX: 23.45 KG/M2

## 2023-10-23 DIAGNOSIS — Z98.2 S/P VP SHUNT: ICD-10-CM

## 2023-10-23 DIAGNOSIS — G91.9 HYDROCEPHALUS, UNSPECIFIED TYPE (HCC): Primary | ICD-10-CM

## 2023-10-23 DIAGNOSIS — M54.41 ACUTE BILATERAL LOW BACK PAIN WITH BILATERAL SCIATICA: ICD-10-CM

## 2023-10-23 DIAGNOSIS — Q05.9 SPINA BIFIDA WITHOUT HYDROCEPHALUS, UNSPECIFIED SPINAL REGION (HCC): ICD-10-CM

## 2023-10-23 DIAGNOSIS — N30.90 CYSTITIS: ICD-10-CM

## 2023-10-23 DIAGNOSIS — M54.42 ACUTE BILATERAL LOW BACK PAIN WITH BILATERAL SCIATICA: ICD-10-CM

## 2023-10-23 DIAGNOSIS — R51.9 HEADACHE, UNSPECIFIED HEADACHE TYPE: ICD-10-CM

## 2023-10-23 PROCEDURE — 99496 TRANSJ CARE MGMT HIGH F2F 7D: CPT | Performed by: FAMILY MEDICINE

## 2023-10-25 ENCOUNTER — APPOINTMENT (OUTPATIENT)
Dept: PHYSICAL THERAPY | Age: 28
End: 2023-10-25
Attending: FAMILY MEDICINE
Payer: MEDICARE

## 2023-10-31 ENCOUNTER — APPOINTMENT (OUTPATIENT)
Dept: NEUROSURGERY | Age: 28
End: 2023-10-31

## 2023-10-31 ENCOUNTER — APPOINTMENT (OUTPATIENT)
Dept: PHYSICAL THERAPY | Age: 28
End: 2023-10-31
Attending: FAMILY MEDICINE
Payer: MEDICARE

## 2023-11-01 ENCOUNTER — TELEPHONE (OUTPATIENT)
Dept: FAMILY MEDICINE CLINIC | Facility: CLINIC | Age: 28
End: 2023-11-01

## 2023-11-01 ENCOUNTER — PREP FOR CASE (OUTPATIENT)
Dept: SURGERY | Age: 28
End: 2023-11-01

## 2023-11-01 ENCOUNTER — OFFICE VISIT (OUTPATIENT)
Dept: NEUROSURGERY | Age: 28
End: 2023-11-01

## 2023-11-01 ENCOUNTER — LAB SERVICES (OUTPATIENT)
Dept: LAB | Age: 28
End: 2023-11-01

## 2023-11-01 ENCOUNTER — HOSPITAL ENCOUNTER (OUTPATIENT)
Dept: CT IMAGING | Age: 28
Discharge: HOME | DRG: 032 | End: 2023-11-01

## 2023-11-01 ENCOUNTER — TELEPHONE (OUTPATIENT)
Dept: SURGERY | Age: 28
End: 2023-11-01

## 2023-11-01 ENCOUNTER — HOSPITAL ENCOUNTER (OUTPATIENT)
Dept: GENERAL RADIOLOGY | Age: 28
Discharge: HOME | DRG: 032 | End: 2023-11-01

## 2023-11-01 VITALS
DIASTOLIC BLOOD PRESSURE: 95 MMHG | WEIGHT: 136.57 LBS | BODY MASS INDEX: 23.03 KG/M2 | HEART RATE: 85 BPM | SYSTOLIC BLOOD PRESSURE: 143 MMHG

## 2023-11-01 DIAGNOSIS — Z98.2 VP (VENTRICULOPERITONEAL) SHUNT STATUS: ICD-10-CM

## 2023-11-01 DIAGNOSIS — G91.9 HYDROCEPHALUS, UNSPECIFIED TYPE (HCC): Primary | ICD-10-CM

## 2023-11-01 DIAGNOSIS — Z01.818 PRE-OP TESTING: ICD-10-CM

## 2023-11-01 DIAGNOSIS — Z98.2 S/P VP SHUNT: Primary | ICD-10-CM

## 2023-11-01 DIAGNOSIS — Z01.818 PRE-OP EXAM: ICD-10-CM

## 2023-11-01 DIAGNOSIS — G91.9 HYDROCEPHALUS (CMD): Primary | ICD-10-CM

## 2023-11-01 DIAGNOSIS — Z98.2 S/P VP SHUNT: ICD-10-CM

## 2023-11-01 LAB
BASOPHILS # BLD: 0 K/MCL (ref 0–0.3)
BASOPHILS NFR BLD: 1 %
DEPRECATED RDW RBC: 38.4 FL (ref 39–50)
EOSINOPHIL # BLD: 0.1 K/MCL (ref 0–0.5)
EOSINOPHIL NFR BLD: 2 %
ERYTHROCYTE [DISTWIDTH] IN BLOOD: 12.1 % (ref 11–15)
HCT VFR BLD CALC: 45.3 % (ref 39–51)
HGB BLD-MCNC: 15.5 G/DL (ref 13–17)
IMM GRANULOCYTES # BLD AUTO: 0 K/MCL (ref 0–0.2)
IMM GRANULOCYTES # BLD: 1 %
LYMPHOCYTES # BLD: 2.1 K/MCL (ref 1–4.8)
LYMPHOCYTES NFR BLD: 35 %
MCH RBC QN AUTO: 29.7 PG (ref 26–34)
MCHC RBC AUTO-ENTMCNC: 34.2 G/DL (ref 32–36.5)
MCV RBC AUTO: 86.8 FL (ref 78–100)
MONOCYTES # BLD: 0.6 K/MCL (ref 0.3–0.9)
MONOCYTES NFR BLD: 9 %
NEUTROPHILS # BLD: 3.1 K/MCL (ref 1.8–7.7)
NEUTROPHILS NFR BLD: 52 %
NRBC BLD MANUAL-RTO: 0 /100 WBC
PLATELET # BLD AUTO: 251 K/MCL (ref 140–450)
RBC # BLD: 5.22 MIL/MCL (ref 4.5–5.9)
WBC # BLD: 6 K/MCL (ref 4.2–11)

## 2023-11-01 PROCEDURE — 70450 CT HEAD/BRAIN W/O DYE: CPT

## 2023-11-01 PROCEDURE — 85025 COMPLETE CBC W/AUTO DIFF WBC: CPT | Performed by: INTERNAL MEDICINE

## 2023-11-01 PROCEDURE — 99215 OFFICE O/P EST HI 40 MIN: CPT | Performed by: NEUROLOGICAL SURGERY

## 2023-11-01 PROCEDURE — 36415 COLL VENOUS BLD VENIPUNCTURE: CPT

## 2023-11-01 PROCEDURE — 70250 X-RAY EXAM OF SKULL: CPT

## 2023-11-01 RX ORDER — 0.9 % SODIUM CHLORIDE 0.9 %
2 VIAL (ML) INJECTION EVERY 12 HOURS SCHEDULED
Status: CANCELLED | OUTPATIENT
Start: 2023-11-01

## 2023-11-01 ASSESSMENT — ENCOUNTER SYMPTOMS
CONSTITUTIONAL NEGATIVE: 1
HEADACHES: 1
PSYCHIATRIC NEGATIVE: 1
ENDOCRINE NEGATIVE: 1
RESPIRATORY NEGATIVE: 1
ALLERGIC/IMMUNOLOGIC NEGATIVE: 1
GASTROINTESTINAL NEGATIVE: 1
EYE PAIN: 1
HEMATOLOGIC/LYMPHATIC NEGATIVE: 1

## 2023-11-01 NOTE — TELEPHONE ENCOUNTER
I spoke with patient and scheduled him for 11/2/23 at 9:30. Patient states he was told he needs a referral he is scheduled for emergency surgery 11/3/23 for  ventriculoperitoneal () shunt    Bull Isidro MD  Neurosurgeon in [place], [place]  [facility]  Address: [address] [place] [id number]

## 2023-11-01 NOTE — TELEPHONE ENCOUNTER
Good Afternoon    Please review pended referral for Neuro, Dr Isidro and add DX codes needed for consult.    Thank you    Amarilis  [facility]

## 2023-11-01 NOTE — TELEPHONE ENCOUNTER
Susan from Dr. Isidro's office is requesting a medical clearance for patient scheduled for surgery 11/3/23 and also approval for referral submitted, Reference on Epic [id number] already submitted by Dr. Isidro's office      Bull Isidro MD  Neurosurgeon in [facility]  Address: [address] [place] [address], [place] [address]    FAX # [phone]    Phone # [phone]

## 2023-11-01 NOTE — TELEPHONE ENCOUNTER
I attempted to call patient to retrieve more information regarding message below.     Who is he having surgery with and where? We need clearance paper work form surgeon's office

## 2023-11-01 NOTE — TELEPHONE ENCOUNTER
Call returned form Dr. Bull Isidro's office who confirmed pt is indeed having surgery 11/3/23 please schedule pt for tomorrow with Dr. Mejía may use res24/peds or double book I did try to call pt to assist but no answer    Dr. Mejía - I was informed pt did CBC today at their office

## 2023-11-02 ENCOUNTER — TELEPHONE (OUTPATIENT)
Dept: FAMILY MEDICINE CLINIC | Facility: CLINIC | Age: 28
End: 2023-11-02

## 2023-11-02 ENCOUNTER — APPOINTMENT (OUTPATIENT)
Dept: PHYSICAL THERAPY | Age: 28
End: 2023-11-02
Attending: FAMILY MEDICINE
Payer: MEDICARE

## 2023-11-02 ENCOUNTER — OFFICE VISIT (OUTPATIENT)
Dept: FAMILY MEDICINE CLINIC | Facility: CLINIC | Age: 28
End: 2023-11-02
Payer: MEDICARE

## 2023-11-02 VITALS
HEART RATE: 98 BPM | HEIGHT: 64 IN | SYSTOLIC BLOOD PRESSURE: 130 MMHG | BODY MASS INDEX: 24 KG/M2 | TEMPERATURE: 98 F | DIASTOLIC BLOOD PRESSURE: 72 MMHG

## 2023-11-02 DIAGNOSIS — M21.372 BILATERAL FOOT-DROP: ICD-10-CM

## 2023-11-02 DIAGNOSIS — R26.81 GAIT INSTABILITY: ICD-10-CM

## 2023-11-02 DIAGNOSIS — Z98.2 S/P VP SHUNT: ICD-10-CM

## 2023-11-02 DIAGNOSIS — M21.371 BILATERAL FOOT-DROP: ICD-10-CM

## 2023-11-02 DIAGNOSIS — Q05.9 SPINA BIFIDA, UNSPECIFIED HYDROCEPHALUS PRESENCE, UNSPECIFIED SPINAL REGION (HCC): ICD-10-CM

## 2023-11-02 DIAGNOSIS — G91.9 HYDROCEPHALUS, UNSPECIFIED TYPE (HCC): Primary | ICD-10-CM

## 2023-11-02 DIAGNOSIS — R51.9 HEADACHE, OCCIPITAL: ICD-10-CM

## 2023-11-02 DIAGNOSIS — Z01.818 PREOP EXAMINATION: ICD-10-CM

## 2023-11-02 PROCEDURE — 99214 OFFICE O/P EST MOD 30 MIN: CPT | Performed by: FAMILY MEDICINE

## 2023-11-02 RX ORDER — CARVEDILOL 3.12 MG/1
TABLET ORAL
COMMUNITY
Start: 2023-09-05

## 2023-11-02 ASSESSMENT — ACTIVITIES OF DAILY LIVING (ADL)
TRANSFERRING: INDEPENDENT
TOILETING: INDEPENDENT
HISTORY OF FALLING IN THE LAST YEAR (PRIOR TO ADMISSION): NO
ADL_BEFORE_ADMISSION: NEEDS/REQUIRES ASSISTANCE
ADL_SCORE: 11
FEEDING: INDEPENDENT
CONTINENCE: INDEPENDENT
BATHING: NEEDS ASSISTANCE
DRESSING: INDEPENDENT
MOBILITY_ASSIST_DEVICES: WHEELCHAIR;FOUR WHEEL WALKER;OTHER (COMMENT)

## 2023-11-02 ASSESSMENT — COGNITIVE AND FUNCTIONAL STATUS - GENERAL
ARE YOU DEAF OR DO YOU HAVE SERIOUS DIFFICULTY  HEARING: NO
ARE YOU BLIND OR DO YOU HAVE SERIOUS DIFFICULTY SEEING, EVEN WHEN WEARING GLASSES: NO

## 2023-11-02 NOTE — TELEPHONE ENCOUNTER
I called number below and informed we have faxed clearance notes along with referral for Surgeon that was requested.However, they are asking also for a referral for the surgery to be approved with CPT code 82596 for  Shunt right. Manage care is this something that needs to be authorized by [facility] care department

## 2023-11-02 NOTE — TELEPHONE ENCOUNTER
I spoke with Leticia from [facility] she will contact faviola at [facility]'s office to inform of message below   Note:  ADVISED WE DO NOT OBTAIN AUTHORIZATION FOR SURGERY PERFORMED WITH AN OUT OF NETWORK PROVIDER   ATTEMPTED AUTHORIZATION FOR OFFICE VISIT PATIENT ALREADY HAD WITH DR. VIVAS PER [facility] ON 10/1 AND 10/16   PER JAQUAN VIVAS IS OUT OF NETWORK - AUTHORIZATION REQUEST IS PENDING

## 2023-11-02 NOTE — TELEPHONE ENCOUNTER
Medical records include documentation of a face-to-face encounter between the beneficiary and the ordering practitioner that occurred within 6 months prior to completion of the order.    The records document that all of the following basic criteria are met:    The beneficiary has a mobility limitation that significantly impairs his/her ability to participate in one or more mobility-related activities of daily living such as toileting, feeding, dressing, grooming, and bathing in customary locations in the home; AND    The mobility limitation cannot be sufficiently resolved by the use of an appropriately fitted cane or walker; AND    Use of a manual wheelchair will significantly improve the beneficiary's ability to participate in mobility-related activities of daily living and the beneficiary will use it on a regular basis in the home; AND    The beneficiary has not expressed an unwillingness to use the manual wheelchair that is provided in the home; AND    The beneficiary has sufficient upper extremity function and other physical and mental capabilities needed to safely self-propel the manual wheelchair that is provided in the home during a typical day OR the beneficiary has a caregiver who is available, willing and able to provide assistance with the wheelchair

## 2023-11-02 NOTE — TELEPHONE ENCOUNTER
Riana from [facility] calling to state need wheelchair criteria and face to face notes, to be faxed to [phone]. Will also be faxing request to office.

## 2023-11-02 NOTE — TELEPHONE ENCOUNTER
Referral signed and auth. Waiting for clearance will be faxed today     Electronically Signed By: Nitin Mejía DO    Order Date: Nov 2, 2023 at 9:35 AM

## 2023-11-03 ENCOUNTER — ANESTHESIA EVENT (OUTPATIENT)
Dept: SURGERY | Age: 28
DRG: 032 | End: 2023-11-03

## 2023-11-03 ENCOUNTER — HOSPITAL ENCOUNTER (INPATIENT)
Age: 28
LOS: 3 days | Discharge: HOME | DRG: 032 | End: 2023-11-06
Attending: NEUROLOGICAL SURGERY | Admitting: NEUROLOGICAL SURGERY

## 2023-11-03 ENCOUNTER — ANESTHESIA (OUTPATIENT)
Dept: SURGERY | Age: 28
DRG: 032 | End: 2023-11-03

## 2023-11-03 DIAGNOSIS — Z98.2 VP (VENTRICULOPERITONEAL) SHUNT STATUS: ICD-10-CM

## 2023-11-03 DIAGNOSIS — G91.9 HYDROCEPHALUS (CMD): ICD-10-CM

## 2023-11-03 LAB
# SPEC OBTAINED: 1
CLARITY CSF: CLEAR
COLOR CSF: COLORLESS
GLUCOSE BLDC GLUCOMTR-MCNC: 91 MG/DL (ref 70–99)
GLUCOSE CSF-MCNC: 63 MG/DL (ref 40–70)
NUC CELL # CSF: 2 /MCL (ref 0–10)
PROT CSF-MCNC: 11 MG/DL (ref 15–45)
RBC # CSF: 63 /MCL
TOTAL CELLS COUNTED BLD: NORMAL
TUBE # CSF: 1
XANTHOCHROMIA CSF QL: ABNORMAL

## 2023-11-03 PROCEDURE — 10002803 HB RX 637: Performed by: NURSE PRACTITIONER

## 2023-11-03 PROCEDURE — 13000109 HB NEURO BASIC CASE EA ADD MINUTE: Performed by: NEUROLOGICAL SURGERY

## 2023-11-03 PROCEDURE — 87015 SPECIMEN INFECT AGNT CONCNTJ: CPT | Performed by: NEUROLOGICAL SURGERY

## 2023-11-03 PROCEDURE — 84157 ASSAY OF PROTEIN OTHER: CPT | Performed by: NEUROLOGICAL SURGERY

## 2023-11-03 PROCEDURE — 10002807 HB RX 258: Performed by: NURSE ANESTHETIST, CERTIFIED REGISTERED

## 2023-11-03 PROCEDURE — 10002800 HB RX 250 W HCPCS: Performed by: NURSE PRACTITIONER

## 2023-11-03 PROCEDURE — 10000008 HB ROOM CHARGE ICU OR CCU

## 2023-11-03 PROCEDURE — 00W63JZ REVISION OF SYNTHETIC SUBSTITUTE IN CEREBRAL VENTRICLE, PERCUTANEOUS APPROACH: ICD-10-PCS | Performed by: NEUROLOGICAL SURGERY

## 2023-11-03 PROCEDURE — 10002800 HB RX 250 W HCPCS: Performed by: EMERGENCY MEDICINE

## 2023-11-03 PROCEDURE — A62225 ANES REPLAC/IRRIGA VENTRICULAR CATH: Performed by: NURSE ANESTHETIST, CERTIFIED REGISTERED

## 2023-11-03 PROCEDURE — 10002803 HB RX 637: Performed by: EMERGENCY MEDICINE

## 2023-11-03 PROCEDURE — 10002800 HB RX 250 W HCPCS: Performed by: NEUROLOGICAL SURGERY

## 2023-11-03 PROCEDURE — 13000002 HB ANESTHESIA  GENERAL  S/U + 1ST 15 MIN: Performed by: NEUROLOGICAL SURGERY

## 2023-11-03 PROCEDURE — 87102 FUNGUS ISOLATION CULTURE: CPT | Performed by: NEUROLOGICAL SURGERY

## 2023-11-03 PROCEDURE — 10006027 HB SUPPLY 278: Performed by: NEUROLOGICAL SURGERY

## 2023-11-03 PROCEDURE — 13000003 HB ANESTHESIA  GENERAL EA ADD MINUTE: Performed by: NEUROLOGICAL SURGERY

## 2023-11-03 PROCEDURE — 82945 GLUCOSE OTHER FLUID: CPT | Performed by: NEUROLOGICAL SURGERY

## 2023-11-03 PROCEDURE — 10002801 HB RX 250 W/O HCPCS: Performed by: NURSE ANESTHETIST, CERTIFIED REGISTERED

## 2023-11-03 PROCEDURE — 10004651 HB RX, NO CHARGE ITEM: Performed by: EMERGENCY MEDICINE

## 2023-11-03 PROCEDURE — 10006023 HB SUPPLY 272: Performed by: NEUROLOGICAL SURGERY

## 2023-11-03 PROCEDURE — 10002800 HB RX 250 W HCPCS: Performed by: ANESTHESIOLOGY

## 2023-11-03 PROCEDURE — 10002803 HB RX 637: Performed by: NEUROLOGICAL SURGERY

## 2023-11-03 PROCEDURE — 10002800 HB RX 250 W HCPCS: Performed by: NURSE ANESTHETIST, CERTIFIED REGISTERED

## 2023-11-03 PROCEDURE — 89051 BODY FLUID CELL COUNT: CPT | Performed by: NEUROLOGICAL SURGERY

## 2023-11-03 PROCEDURE — 13000108 HB NEURO BASIC CASE S/U + 1ST 15 MIN: Performed by: NEUROLOGICAL SURGERY

## 2023-11-03 PROCEDURE — A62225 ANES REPLAC/IRRIGA VENTRICULAR CATH: Performed by: ANESTHESIOLOGY

## 2023-11-03 PROCEDURE — 62225 REPLACE/IRRIGATE CATHETER: CPT | Performed by: NEUROLOGICAL SURGERY

## 2023-11-03 PROCEDURE — C1894 INTRO/SHEATH, NON-LASER: HCPCS | Performed by: NEUROLOGICAL SURGERY

## 2023-11-03 PROCEDURE — 87206 SMEAR FLUORESCENT/ACID STAI: CPT | Performed by: NEUROLOGICAL SURGERY

## 2023-11-03 DEVICE — IMPLANTABLE DEVICE: Type: IMPLANTABLE DEVICE | Site: HEAD | Status: FUNCTIONAL

## 2023-11-03 RX ORDER — MIDAZOLAM HYDROCHLORIDE 1 MG/ML
2 INJECTION, SOLUTION INTRAMUSCULAR; INTRAVENOUS
Status: DISCONTINUED | OUTPATIENT
Start: 2023-11-03 | End: 2023-11-03 | Stop reason: HOSPADM

## 2023-11-03 RX ORDER — LIDOCAINE HYDROCHLORIDE 10 MG/ML
5-10 INJECTION, SOLUTION INFILTRATION; PERINEURAL PRN
Status: DISCONTINUED | OUTPATIENT
Start: 2023-11-03 | End: 2023-11-03

## 2023-11-03 RX ORDER — DEXAMETHASONE SODIUM PHOSPHATE 4 MG/ML
4 INJECTION, SOLUTION INTRA-ARTICULAR; INTRALESIONAL; INTRAMUSCULAR; INTRAVENOUS; SOFT TISSUE
Status: DISCONTINUED | OUTPATIENT
Start: 2023-11-03 | End: 2023-11-03 | Stop reason: HOSPADM

## 2023-11-03 RX ORDER — 0.9 % SODIUM CHLORIDE 0.9 %
2 VIAL (ML) INJECTION EVERY 12 HOURS SCHEDULED
Status: DISCONTINUED | OUTPATIENT
Start: 2023-11-03 | End: 2023-11-03 | Stop reason: HOSPADM

## 2023-11-03 RX ORDER — 0.9 % SODIUM CHLORIDE 0.9 %
2 VIAL (ML) INJECTION EVERY 12 HOURS SCHEDULED
Status: DISCONTINUED | OUTPATIENT
Start: 2023-11-03 | End: 2023-11-06 | Stop reason: HOSPADM

## 2023-11-03 RX ORDER — METOCLOPRAMIDE HYDROCHLORIDE 5 MG/ML
5 INJECTION INTRAMUSCULAR; INTRAVENOUS
Status: ACTIVE | OUTPATIENT
Start: 2023-11-03 | End: 2023-11-03

## 2023-11-03 RX ORDER — OXYBUTYNIN CHLORIDE 5 MG/1
15 TABLET, EXTENDED RELEASE ORAL DAILY
Status: DISCONTINUED | OUTPATIENT
Start: 2023-11-03 | End: 2023-11-06 | Stop reason: HOSPADM

## 2023-11-03 RX ORDER — DOCUSATE SODIUM 100 MG/1
100 CAPSULE, LIQUID FILLED ORAL DAILY
Status: DISCONTINUED | OUTPATIENT
Start: 2023-11-03 | End: 2023-11-06 | Stop reason: HOSPADM

## 2023-11-03 RX ORDER — ACETAMINOPHEN 325 MG/1
650 TABLET ORAL EVERY 4 HOURS PRN
Status: DISCONTINUED | OUTPATIENT
Start: 2023-11-03 | End: 2023-11-03 | Stop reason: HOSPADM

## 2023-11-03 RX ORDER — ONDANSETRON 2 MG/ML
4 INJECTION INTRAMUSCULAR; INTRAVENOUS 2 TIMES DAILY PRN
Status: DISCONTINUED | OUTPATIENT
Start: 2023-11-03 | End: 2023-11-03 | Stop reason: HOSPADM

## 2023-11-03 RX ORDER — ONDANSETRON 2 MG/ML
4 INJECTION INTRAMUSCULAR; INTRAVENOUS
Status: ACTIVE | OUTPATIENT
Start: 2023-11-03 | End: 2023-11-03

## 2023-11-03 RX ORDER — HYDROCODONE BITARTRATE AND ACETAMINOPHEN 5; 325 MG/1; MG/1
1 TABLET ORAL
Status: DISCONTINUED | OUTPATIENT
Start: 2023-11-03 | End: 2023-11-03 | Stop reason: HOSPADM

## 2023-11-03 RX ORDER — SODIUM CHLORIDE, SODIUM LACTATE, POTASSIUM CHLORIDE, CALCIUM CHLORIDE 600; 310; 30; 20 MG/100ML; MG/100ML; MG/100ML; MG/100ML
INJECTION, SOLUTION INTRAVENOUS CONTINUOUS
Status: DISCONTINUED | OUTPATIENT
Start: 2023-11-03 | End: 2023-11-04

## 2023-11-03 RX ORDER — PROPOFOL 10 MG/ML
INJECTION, EMULSION INTRAVENOUS PRN
Status: DISCONTINUED | OUTPATIENT
Start: 2023-11-03 | End: 2023-11-03

## 2023-11-03 RX ORDER — SODIUM CHLORIDE, SODIUM LACTATE, POTASSIUM CHLORIDE, CALCIUM CHLORIDE 600; 310; 30; 20 MG/100ML; MG/100ML; MG/100ML; MG/100ML
INJECTION, SOLUTION INTRAVENOUS CONTINUOUS PRN
Status: DISCONTINUED | OUTPATIENT
Start: 2023-11-03 | End: 2023-11-03

## 2023-11-03 RX ORDER — ACETAMINOPHEN 500 MG
1000 TABLET ORAL EVERY 6 HOURS
Status: DISPENSED | OUTPATIENT
Start: 2023-11-03 | End: 2023-11-05

## 2023-11-03 RX ORDER — SODIUM CHLORIDE 9 MG/ML
INJECTION, SOLUTION INTRAVENOUS CONTINUOUS PRN
Status: DISCONTINUED | OUTPATIENT
Start: 2023-11-03 | End: 2023-11-03

## 2023-11-03 RX ORDER — LIDOCAINE HYDROCHLORIDE 10 MG/ML
INJECTION, SOLUTION INFILTRATION; PERINEURAL PRN
Status: DISCONTINUED | OUTPATIENT
Start: 2023-11-03 | End: 2023-11-03

## 2023-11-03 RX ORDER — ROCURONIUM BROMIDE 10 MG/ML
INJECTION, SOLUTION INTRAVENOUS PRN
Status: DISCONTINUED | OUTPATIENT
Start: 2023-11-03 | End: 2023-11-03

## 2023-11-03 RX ORDER — AMOXICILLIN 250 MG
1 CAPSULE ORAL 2 TIMES DAILY
Status: DISCONTINUED | OUTPATIENT
Start: 2023-11-03 | End: 2023-11-06 | Stop reason: HOSPADM

## 2023-11-03 RX ORDER — NALOXONE HCL 0.4 MG/ML
0.2 VIAL (ML) INJECTION EVERY 5 MIN PRN
Status: DISCONTINUED | OUTPATIENT
Start: 2023-11-03 | End: 2023-11-03 | Stop reason: HOSPADM

## 2023-11-03 RX ORDER — CEFAZOLIN SODIUM 1 G/3ML
INJECTION, POWDER, FOR SOLUTION INTRAMUSCULAR; INTRAVENOUS PRN
Status: DISCONTINUED | OUTPATIENT
Start: 2023-11-03 | End: 2023-11-03 | Stop reason: HOSPADM

## 2023-11-03 RX ORDER — AMOXICILLIN 250 MG
1 CAPSULE ORAL NIGHTLY
Status: DISCONTINUED | OUTPATIENT
Start: 2023-11-03 | End: 2023-11-03

## 2023-11-03 RX ORDER — METOCLOPRAMIDE HYDROCHLORIDE 5 MG/ML
5 INJECTION INTRAMUSCULAR; INTRAVENOUS EVERY 6 HOURS PRN
Status: DISCONTINUED | OUTPATIENT
Start: 2023-11-03 | End: 2023-11-03 | Stop reason: HOSPADM

## 2023-11-03 RX ORDER — ACETAMINOPHEN 650 MG/1
650 SUPPOSITORY RECTAL EVERY 4 HOURS PRN
Status: DISCONTINUED | OUTPATIENT
Start: 2023-11-03 | End: 2023-11-03 | Stop reason: HOSPADM

## 2023-11-03 RX ORDER — CARVEDILOL 6.25 MG/1
3.12 TABLET ORAL EVERY 12 HOURS SCHEDULED
Status: DISCONTINUED | OUTPATIENT
Start: 2023-11-03 | End: 2023-11-06 | Stop reason: HOSPADM

## 2023-11-03 RX ORDER — POLYETHYLENE GLYCOL 3350 17 G/17G
17 POWDER, FOR SOLUTION ORAL 2 TIMES DAILY
Status: DISCONTINUED | OUTPATIENT
Start: 2023-11-03 | End: 2023-11-06 | Stop reason: HOSPADM

## 2023-11-03 RX ORDER — SODIUM CHLORIDE 9 MG/ML
INJECTION, SOLUTION INTRAVENOUS CONTINUOUS
Status: DISCONTINUED | OUTPATIENT
Start: 2023-11-03 | End: 2023-11-04

## 2023-11-03 RX ORDER — OXYCODONE HYDROCHLORIDE 5 MG/1
5 TABLET ORAL EVERY 4 HOURS PRN
Status: DISCONTINUED | OUTPATIENT
Start: 2023-11-03 | End: 2023-11-06 | Stop reason: HOSPADM

## 2023-11-03 RX ORDER — DEXAMETHASONE SODIUM PHOSPHATE 4 MG/ML
INJECTION, SOLUTION INTRA-ARTICULAR; INTRALESIONAL; INTRAMUSCULAR; INTRAVENOUS; SOFT TISSUE PRN
Status: DISCONTINUED | OUTPATIENT
Start: 2023-11-03 | End: 2023-11-03

## 2023-11-03 RX ORDER — MIDAZOLAM HYDROCHLORIDE 1 MG/ML
2 INJECTION, SOLUTION INTRAMUSCULAR; INTRAVENOUS
Status: COMPLETED | OUTPATIENT
Start: 2023-11-03 | End: 2023-11-03

## 2023-11-03 RX ORDER — HYDRALAZINE HYDROCHLORIDE 20 MG/ML
5 INJECTION INTRAMUSCULAR; INTRAVENOUS EVERY 10 MIN PRN
Status: DISCONTINUED | OUTPATIENT
Start: 2023-11-03 | End: 2023-11-03 | Stop reason: HOSPADM

## 2023-11-03 RX ORDER — LIDOCAINE HYDROCHLORIDE 10 MG/ML
5-10 INJECTION, SOLUTION INFILTRATION; PERINEURAL PRN
Status: DISCONTINUED | OUTPATIENT
Start: 2023-11-03 | End: 2023-11-03 | Stop reason: HOSPADM

## 2023-11-03 RX ORDER — VANCOMYCIN HYDROCHLORIDE 1 G/20ML
INJECTION, POWDER, LYOPHILIZED, FOR SOLUTION INTRAVENOUS PRN
Status: DISCONTINUED | OUTPATIENT
Start: 2023-11-03 | End: 2023-11-03 | Stop reason: HOSPADM

## 2023-11-03 RX ORDER — ONDANSETRON 2 MG/ML
INJECTION INTRAMUSCULAR; INTRAVENOUS PRN
Status: DISCONTINUED | OUTPATIENT
Start: 2023-11-03 | End: 2023-11-03

## 2023-11-03 RX ADMIN — CARVEDILOL 3.12 MG: 6.25 TABLET, FILM COATED ORAL at 20:04

## 2023-11-03 RX ADMIN — PROPOFOL 150 MG: 10 INJECTION, EMULSION INTRAVENOUS at 07:51

## 2023-11-03 RX ADMIN — PROPOFOL 100 MCG/KG/MIN: 10 INJECTION, EMULSION INTRAVENOUS at 07:57

## 2023-11-03 RX ADMIN — CEFAZOLIN SODIUM 2000 MG: 300 INJECTION, POWDER, LYOPHILIZED, FOR SOLUTION INTRAVENOUS at 15:45

## 2023-11-03 RX ADMIN — CEFAZOLIN SODIUM 2000 MG: 300 INJECTION, POWDER, LYOPHILIZED, FOR SOLUTION INTRAVENOUS at 22:52

## 2023-11-03 RX ADMIN — ROCURONIUM BROMIDE 50 MG: 10 INJECTION INTRAVENOUS at 07:51

## 2023-11-03 RX ADMIN — SODIUM CHLORIDE, POTASSIUM CHLORIDE, SODIUM LACTATE AND CALCIUM CHLORIDE: 600; 310; 30; 20 INJECTION, SOLUTION INTRAVENOUS at 07:43

## 2023-11-03 RX ADMIN — SUGAMMADEX 200 MG: 100 INJECTION, SOLUTION INTRAVENOUS at 08:51

## 2023-11-03 RX ADMIN — FENTANYL CITRATE 50 MCG: 50 INJECTION INTRAMUSCULAR; INTRAVENOUS at 07:55

## 2023-11-03 RX ADMIN — POLYETHYLENE GLYCOL (3350) 17 G: 17 POWDER, FOR SOLUTION ORAL at 11:15

## 2023-11-03 RX ADMIN — PROPOFOL 50 MG: 10 INJECTION, EMULSION INTRAVENOUS at 07:50

## 2023-11-03 RX ADMIN — CARVEDILOL 3.12 MG: 6.25 TABLET, FILM COATED ORAL at 12:31

## 2023-11-03 RX ADMIN — ACETAMINOPHEN 1000 MG: 500 TABLET ORAL at 23:00

## 2023-11-03 RX ADMIN — DOCUSATE SODIUM 100 MG: 100 CAPSULE, LIQUID FILLED ORAL at 11:15

## 2023-11-03 RX ADMIN — SENNOSIDES AND DOCUSATE SODIUM 1 TABLET: 8.6; 5 TABLET ORAL at 11:07

## 2023-11-03 RX ADMIN — LIDOCAINE HYDROCHLORIDE 50 MG: 10 INJECTION, SOLUTION INFILTRATION; PERINEURAL at 07:49

## 2023-11-03 RX ADMIN — POLYETHYLENE GLYCOL (3350) 17 G: 17 POWDER, FOR SOLUTION ORAL at 20:06

## 2023-11-03 RX ADMIN — CEFAZOLIN SODIUM 2000 MG: 300 INJECTION, POWDER, LYOPHILIZED, FOR SOLUTION INTRAVENOUS at 07:56

## 2023-11-03 RX ADMIN — SODIUM CHLORIDE, PRESERVATIVE FREE 2 ML: 5 INJECTION INTRAVENOUS at 10:40

## 2023-11-03 RX ADMIN — ONDANSETRON 4 MG: 2 INJECTION INTRAMUSCULAR; INTRAVENOUS at 08:37

## 2023-11-03 RX ADMIN — FENTANYL CITRATE 50 MCG: 50 INJECTION INTRAMUSCULAR; INTRAVENOUS at 08:24

## 2023-11-03 RX ADMIN — OXYBUTYNIN CHLORIDE 15 MG: 5 TABLET, EXTENDED RELEASE ORAL at 12:31

## 2023-11-03 RX ADMIN — OXYCODONE HYDROCHLORIDE 5 MG: 5 TABLET ORAL at 10:39

## 2023-11-03 RX ADMIN — SODIUM CHLORIDE, PRESERVATIVE FREE 2 ML: 5 INJECTION INTRAVENOUS at 20:08

## 2023-11-03 RX ADMIN — LABETALOL HYDROCHLORIDE 10 MG: 5 INJECTION, SOLUTION INTRAVENOUS at 13:19

## 2023-11-03 RX ADMIN — FENTANYL CITRATE 50 MCG: 50 INJECTION INTRAMUSCULAR; INTRAVENOUS at 09:30

## 2023-11-03 RX ADMIN — SODIUM CHLORIDE: 900 INJECTION INTRAVENOUS at 08:10

## 2023-11-03 RX ADMIN — DEXAMETHASONE SODIUM PHOSPHATE 4 MG: 4 INJECTION INTRA-ARTICULAR; INTRALESIONAL; INTRAMUSCULAR; INTRAVENOUS; SOFT TISSUE at 07:56

## 2023-11-03 RX ADMIN — ACETAMINOPHEN 1000 MG: 500 TABLET ORAL at 18:12

## 2023-11-03 RX ADMIN — ACETAMINOPHEN 1000 MG: 500 TABLET ORAL at 11:07

## 2023-11-03 RX ADMIN — MIDAZOLAM HYDROCHLORIDE 2 MG: 1 INJECTION, SOLUTION INTRAMUSCULAR; INTRAVENOUS at 07:40

## 2023-11-03 RX ADMIN — SENNOSIDES AND DOCUSATE SODIUM 1 TABLET: 8.6; 5 TABLET ORAL at 20:04

## 2023-11-03 RX ADMIN — FENTANYL CITRATE 50 MCG: 50 INJECTION INTRAMUSCULAR; INTRAVENOUS at 07:49

## 2023-11-03 SDOH — ECONOMIC STABILITY: TRANSPORTATION INSECURITY
IN THE PAST 12 MONTHS, HAS THE LACK OF TRANSPORTATION KEPT YOU FROM MEDICAL APPOINTMENTS OR FROM GETTING MEDICATIONS?: NO

## 2023-11-03 SDOH — HEALTH STABILITY: GENERAL
BECAUSE OF A PHYSICAL, MENTAL, OR EMOTIONAL CONDITION, DO YOU HAVE SERIOUS DIFFICULTY CONCENTRATING, REMEMBERING OR MAKING DECISIONS?: NO

## 2023-11-03 SDOH — ECONOMIC STABILITY: HOUSING INSECURITY: WHAT IS YOUR LIVING SITUATION TODAY?: HOUSE

## 2023-11-03 SDOH — ECONOMIC STABILITY: HOUSING INSECURITY: ARE YOU WORRIED ABOUT LOSING YOUR HOUSING?: NO

## 2023-11-03 SDOH — HEALTH STABILITY: GENERAL: BECAUSE OF A PHYSICAL, MENTAL, OR EMOTIONAL CONDITION, DO YOU HAVE DIFFICULTY DOING ERRANDS ALONE?: NO

## 2023-11-03 SDOH — ECONOMIC STABILITY: TRANSPORTATION INSECURITY
IN THE PAST 12 MONTHS, HAS LACK OF TRANSPORTATION KEPT YOU FROM MEETINGS, WORK, OR FROM GETTING THINGS NEEDED FOR DAILY LIVING?: NO

## 2023-11-03 SDOH — ECONOMIC STABILITY: FOOD INSECURITY: HOW OFTEN IN THE PAST 12 MONTHS WERE YOU WORRIED OR STRESSED ABOUT HAVING ENOUGH MONEY TO BUY NUTRITIOUS MEALS?: NEVER

## 2023-11-03 SDOH — HEALTH STABILITY: PHYSICAL HEALTH: DO YOU HAVE SERIOUS DIFFICULTY WALKING OR CLIMBING STAIRS?: NO

## 2023-11-03 SDOH — SOCIAL STABILITY: SOCIAL NETWORK
HOW OFTEN DO YOU SEE OR TALK TO PEOPLE THAT YOU CARE ABOUT AND FEEL CLOSE TO? (FOR EXAMPLE: TALKING TO FRIENDS ON THE PHONE, VISITING FRIENDS OR FAMILY, GOING TO CHURCH OR CLUB MEETINGS): 5 OR MORE TIMES A WEEK

## 2023-11-03 SDOH — ECONOMIC STABILITY: HOUSING INSECURITY: WHAT IS YOUR LIVING SITUATION TODAY?: PARENT;SIBLINGS

## 2023-11-03 SDOH — HEALTH STABILITY: PHYSICAL HEALTH: DO YOU HAVE DIFFICULTY DRESSING OR BATHING?: NO

## 2023-11-03 SDOH — SOCIAL STABILITY: SOCIAL NETWORK: SUPPORT SYSTEMS: FAMILY MEMBERS;PARENT;SIBLINGS

## 2023-11-03 SDOH — ECONOMIC STABILITY: GENERAL

## 2023-11-03 ASSESSMENT — PATIENT HEALTH QUESTIONNAIRE - PHQ9
SUM OF ALL RESPONSES TO PHQ9 QUESTIONS 1 AND 2: 0
SUM OF ALL RESPONSES TO PHQ9 QUESTIONS 1 AND 2: 0
CLINICAL INTERPRETATION OF PHQ2 SCORE: NO FURTHER SCREENING NEEDED
2. FEELING DOWN, DEPRESSED OR HOPELESS: NOT AT ALL
1. LITTLE INTEREST OR PLEASURE IN DOING THINGS: NOT AT ALL
IS PATIENT ABLE TO COMPLETE PHQ2 OR PHQ9: YES

## 2023-11-03 ASSESSMENT — PAIN SCALES - GENERAL
PAINLEVEL_OUTOF10: 0
PAINLEVEL_OUTOF10: 6
PAINLEVEL_OUTOF10: 0
PAINLEVEL_OUTOF10: 7

## 2023-11-03 ASSESSMENT — LIFESTYLE VARIABLES
HOW OFTEN DO YOU HAVE 6 OR MORE DRINKS ON ONE OCCASION: NEVER
HOW MANY STANDARD DRINKS CONTAINING ALCOHOL DO YOU HAVE ON A TYPICAL DAY: 0,1 OR 2
ALCOHOL_USE_STATUS: NO OR LOW RISK WITH VALIDATED TOOL
AUDIT-C TOTAL SCORE: 0
HOW OFTEN DO YOU HAVE A DRINK CONTAINING ALCOHOL: NEVER

## 2023-11-03 ASSESSMENT — ACTIVITIES OF DAILY LIVING (ADL)
DRESSING: NEEDS ASSISTANCE
ADL_BEFORE_ADMISSION: NEEDS/REQUIRES ASSISTANCE
ADL_SHORT_OF_BREATH: NO
RECENT_DECLINE_ADL: NO
TOILETING: NEEDS ASSISTANCE
FEEDING: INDEPENDENT
BATHING: NEEDS ASSISTANCE
ADL_SCORE: 8

## 2023-11-03 ASSESSMENT — COLUMBIA-SUICIDE SEVERITY RATING SCALE - C-SSRS
IS THE PATIENT ABLE TO COMPLETE C-SSRS: YES
1. WITHIN THE PAST MONTH, HAVE YOU WISHED YOU WERE DEAD OR WISHED YOU COULD GO TO SLEEP AND NOT WAKE UP?: NO
6. HAVE YOU EVER DONE ANYTHING, STARTED TO DO ANYTHING, OR PREPARED TO DO ANYTHING TO END YOUR LIFE?: NO
2. HAVE YOU ACTUALLY HAD ANY THOUGHTS OF KILLING YOURSELF?: NO

## 2023-11-04 ENCOUNTER — APPOINTMENT (OUTPATIENT)
Dept: CT IMAGING | Age: 28
DRG: 032 | End: 2023-11-04
Attending: NURSE PRACTITIONER

## 2023-11-04 LAB
ALBUMIN SERPL-MCNC: 3.9 G/DL (ref 3.6–5.1)
ALBUMIN/GLOB SERPL: 1.3 {RATIO} (ref 1–2.4)
ALP SERPL-CCNC: 57 UNITS/L (ref 45–117)
ALT SERPL-CCNC: 29 UNITS/L
ANION GAP SERPL CALC-SCNC: 7 MMOL/L (ref 7–19)
AST SERPL-CCNC: 15 UNITS/L
BILIRUB SERPL-MCNC: 1.4 MG/DL (ref 0.2–1)
BUN SERPL-MCNC: 12 MG/DL (ref 6–20)
BUN/CREAT SERPL: 15 (ref 7–25)
CALCIUM SERPL-MCNC: 8.8 MG/DL (ref 8.4–10.2)
CHLORIDE SERPL-SCNC: 105 MMOL/L (ref 97–110)
CO2 SERPL-SCNC: 30 MMOL/L (ref 21–32)
CREAT SERPL-MCNC: 0.8 MG/DL (ref 0.67–1.17)
DEPRECATED RDW RBC: 38.8 FL (ref 39–50)
EGFRCR SERPLBLD CKD-EPI 2021: >90 ML/MIN/{1.73_M2}
ERYTHROCYTE [DISTWIDTH] IN BLOOD: 12.5 % (ref 11–15)
FASTING DURATION TIME PATIENT: ABNORMAL H
GLOBULIN SER-MCNC: 3.1 G/DL (ref 2–4)
GLUCOSE SERPL-MCNC: 116 MG/DL (ref 70–99)
HCT VFR BLD CALC: 42 % (ref 39–51)
HGB BLD-MCNC: 14.7 G/DL (ref 13–17)
MAGNESIUM SERPL-MCNC: 2.2 MG/DL (ref 1.7–2.4)
MCH RBC QN AUTO: 30.1 PG (ref 26–34)
MCHC RBC AUTO-ENTMCNC: 35 G/DL (ref 32–36.5)
MCV RBC AUTO: 86.1 FL (ref 78–100)
NRBC BLD MANUAL-RTO: 0 /100 WBC
PHOSPHATE SERPL-MCNC: 3.6 MG/DL (ref 2.4–4.7)
PLATELET # BLD AUTO: 259 K/MCL (ref 140–450)
POTASSIUM SERPL-SCNC: 4 MMOL/L (ref 3.4–5.1)
PROT SERPL-MCNC: 7 G/DL (ref 6.4–8.2)
RBC # BLD: 4.88 MIL/MCL (ref 4.5–5.9)
SODIUM SERPL-SCNC: 138 MMOL/L (ref 135–145)
WBC # BLD: 13 K/MCL (ref 4.2–11)

## 2023-11-04 PROCEDURE — 99223 1ST HOSP IP/OBS HIGH 75: CPT | Performed by: HOSPITALIST

## 2023-11-04 PROCEDURE — 80053 COMPREHEN METABOLIC PANEL: CPT | Performed by: EMERGENCY MEDICINE

## 2023-11-04 PROCEDURE — 10002803 HB RX 637: Performed by: EMERGENCY MEDICINE

## 2023-11-04 PROCEDURE — 97165 OT EVAL LOW COMPLEX 30 MIN: CPT

## 2023-11-04 PROCEDURE — 10002803 HB RX 637: Performed by: STUDENT IN AN ORGANIZED HEALTH CARE EDUCATION/TRAINING PROGRAM

## 2023-11-04 PROCEDURE — 10002803 HB RX 637: Performed by: NEUROLOGICAL SURGERY

## 2023-11-04 PROCEDURE — 97535 SELF CARE MNGMENT TRAINING: CPT

## 2023-11-04 PROCEDURE — 70450 CT HEAD/BRAIN W/O DYE: CPT

## 2023-11-04 PROCEDURE — 10006031 HB ROOM CHARGE TELEMETRY

## 2023-11-04 PROCEDURE — 85027 COMPLETE CBC AUTOMATED: CPT | Performed by: EMERGENCY MEDICINE

## 2023-11-04 PROCEDURE — 10002800 HB RX 250 W HCPCS: Performed by: NURSE PRACTITIONER

## 2023-11-04 PROCEDURE — 10004651 HB RX, NO CHARGE ITEM: Performed by: EMERGENCY MEDICINE

## 2023-11-04 PROCEDURE — 10002803 HB RX 637: Performed by: NURSE PRACTITIONER

## 2023-11-04 PROCEDURE — 83735 ASSAY OF MAGNESIUM: CPT | Performed by: EMERGENCY MEDICINE

## 2023-11-04 PROCEDURE — 97161 PT EVAL LOW COMPLEX 20 MIN: CPT

## 2023-11-04 PROCEDURE — 97116 GAIT TRAINING THERAPY: CPT

## 2023-11-04 PROCEDURE — 84100 ASSAY OF PHOSPHORUS: CPT | Performed by: EMERGENCY MEDICINE

## 2023-11-04 RX ORDER — BISACODYL 10 MG
10 SUPPOSITORY, RECTAL RECTAL DAILY PRN
Status: DISCONTINUED | OUTPATIENT
Start: 2023-11-04 | End: 2023-11-06 | Stop reason: HOSPADM

## 2023-11-04 RX ORDER — POTASSIUM CHLORIDE 20 MEQ/1
40 TABLET, EXTENDED RELEASE ORAL ONCE
Status: COMPLETED | OUTPATIENT
Start: 2023-11-04 | End: 2023-11-04

## 2023-11-04 RX ADMIN — POLYETHYLENE GLYCOL (3350) 17 G: 17 POWDER, FOR SOLUTION ORAL at 20:19

## 2023-11-04 RX ADMIN — CARVEDILOL 3.12 MG: 6.25 TABLET, FILM COATED ORAL at 20:19

## 2023-11-04 RX ADMIN — POTASSIUM CHLORIDE 40 MEQ: 1500 TABLET, EXTENDED RELEASE ORAL at 12:31

## 2023-11-04 RX ADMIN — SENNOSIDES AND DOCUSATE SODIUM 1 TABLET: 8.6; 5 TABLET ORAL at 20:19

## 2023-11-04 RX ADMIN — ACETAMINOPHEN 1000 MG: 500 TABLET ORAL at 12:31

## 2023-11-04 RX ADMIN — ACETAMINOPHEN 1000 MG: 500 TABLET ORAL at 06:25

## 2023-11-04 RX ADMIN — DOCUSATE SODIUM 100 MG: 100 CAPSULE, LIQUID FILLED ORAL at 08:12

## 2023-11-04 RX ADMIN — ACETAMINOPHEN 1000 MG: 500 TABLET ORAL at 17:30

## 2023-11-04 RX ADMIN — POLYETHYLENE GLYCOL (3350) 17 G: 17 POWDER, FOR SOLUTION ORAL at 08:12

## 2023-11-04 RX ADMIN — OXYBUTYNIN CHLORIDE 15 MG: 5 TABLET, EXTENDED RELEASE ORAL at 08:19

## 2023-11-04 RX ADMIN — CARVEDILOL 3.12 MG: 6.25 TABLET, FILM COATED ORAL at 08:11

## 2023-11-04 RX ADMIN — BISACODYL 10 MG: 10 SUPPOSITORY RECTAL at 11:22

## 2023-11-04 RX ADMIN — SODIUM CHLORIDE, PRESERVATIVE FREE 2 ML: 5 INJECTION INTRAVENOUS at 08:10

## 2023-11-04 RX ADMIN — CEFAZOLIN SODIUM 2000 MG: 300 INJECTION, POWDER, LYOPHILIZED, FOR SOLUTION INTRAVENOUS at 06:24

## 2023-11-04 RX ADMIN — SENNOSIDES AND DOCUSATE SODIUM 1 TABLET: 8.6; 5 TABLET ORAL at 08:12

## 2023-11-04 RX ADMIN — SODIUM CHLORIDE, PRESERVATIVE FREE 2 ML: 5 INJECTION INTRAVENOUS at 20:22

## 2023-11-04 ASSESSMENT — COGNITIVE AND FUNCTIONAL STATUS - GENERAL
DAILY_ACTIVITY_CONVERTED_SCORE: 44.27
BASIC_MOBILITY_CONVERTED_SCORE: 43.99
BASIC_MOBILITY_RAW_SCORE: 20
HELP NEEDED FOR TOILETING: A LITTLE
HELP NEEDED FOR BATHING: A LITTLE
DAILY_ACTIVITY_RAW_SCORE: 21
HELP NEEDED DRESSING REGULAR LOWER BODY CLOTHING: A LITTLE

## 2023-11-04 ASSESSMENT — PAIN SCALES - GENERAL
PAINLEVEL_OUTOF10: 0

## 2023-11-04 ASSESSMENT — ACTIVITIES OF DAILY LIVING (ADL)
PRIOR_ADL: MODIFIED INDEPENDENT
HOME_MANAGEMENT_TIME_ENTRY: 12

## 2023-11-05 LAB
ALBUMIN SERPL-MCNC: 3.6 G/DL (ref 3.6–5.1)
ALBUMIN/GLOB SERPL: 1.2 {RATIO} (ref 1–2.4)
ALP SERPL-CCNC: 51 UNITS/L (ref 45–117)
ALT SERPL-CCNC: 18 UNITS/L
ANION GAP SERPL CALC-SCNC: 9 MMOL/L (ref 7–19)
AST SERPL-CCNC: 9 UNITS/L
BASOPHILS # BLD: 0.1 K/MCL (ref 0–0.3)
BASOPHILS NFR BLD: 1 %
BILIRUB SERPL-MCNC: 0.8 MG/DL (ref 0.2–1)
BUN SERPL-MCNC: 17 MG/DL (ref 6–20)
BUN/CREAT SERPL: 22 (ref 7–25)
CALCIUM SERPL-MCNC: 8.6 MG/DL (ref 8.4–10.2)
CHLORIDE SERPL-SCNC: 108 MMOL/L (ref 97–110)
CO2 SERPL-SCNC: 28 MMOL/L (ref 21–32)
CREAT SERPL-MCNC: 0.77 MG/DL (ref 0.67–1.17)
DEPRECATED RDW RBC: 42.5 FL (ref 39–50)
EGFRCR SERPLBLD CKD-EPI 2021: >90 ML/MIN/{1.73_M2}
EOSINOPHIL # BLD: 0.1 K/MCL (ref 0–0.5)
EOSINOPHIL NFR BLD: 2 %
ERYTHROCYTE [DISTWIDTH] IN BLOOD: 13.1 % (ref 11–15)
FASTING DURATION TIME PATIENT: NORMAL H
GLOBULIN SER-MCNC: 3.1 G/DL (ref 2–4)
GLUCOSE SERPL-MCNC: 94 MG/DL (ref 70–99)
HCT VFR BLD CALC: 43 % (ref 39–51)
HGB BLD-MCNC: 14.4 G/DL (ref 13–17)
IMM GRANULOCYTES # BLD AUTO: 0.1 K/MCL (ref 0–0.2)
IMM GRANULOCYTES # BLD: 1 %
LYMPHOCYTES # BLD: 3.1 K/MCL (ref 1–4.8)
LYMPHOCYTES NFR BLD: 42 %
MAGNESIUM SERPL-MCNC: 2.4 MG/DL (ref 1.7–2.4)
MCH RBC QN AUTO: 29.6 PG (ref 26–34)
MCHC RBC AUTO-ENTMCNC: 33.5 G/DL (ref 32–36.5)
MCV RBC AUTO: 88.5 FL (ref 78–100)
MONOCYTES # BLD: 0.7 K/MCL (ref 0.3–0.9)
MONOCYTES NFR BLD: 9 %
NEUTROPHILS # BLD: 3.3 K/MCL (ref 1.8–7.7)
NEUTROPHILS NFR BLD: 45 %
NRBC BLD MANUAL-RTO: 0 /100 WBC
PHOSPHATE SERPL-MCNC: 3.8 MG/DL (ref 2.4–4.7)
PLATELET # BLD AUTO: 204 K/MCL (ref 140–450)
POTASSIUM SERPL-SCNC: 4.2 MMOL/L (ref 3.4–5.1)
PROT SERPL-MCNC: 6.7 G/DL (ref 6.4–8.2)
RBC # BLD: 4.86 MIL/MCL (ref 4.5–5.9)
SODIUM SERPL-SCNC: 141 MMOL/L (ref 135–145)
WBC # BLD: 7.4 K/MCL (ref 4.2–11)

## 2023-11-05 PROCEDURE — 84100 ASSAY OF PHOSPHORUS: CPT | Performed by: EMERGENCY MEDICINE

## 2023-11-05 PROCEDURE — 10004651 HB RX, NO CHARGE ITEM: Performed by: EMERGENCY MEDICINE

## 2023-11-05 PROCEDURE — 36415 COLL VENOUS BLD VENIPUNCTURE: CPT | Performed by: EMERGENCY MEDICINE

## 2023-11-05 PROCEDURE — 10002803 HB RX 637: Performed by: NEUROLOGICAL SURGERY

## 2023-11-05 PROCEDURE — 85025 COMPLETE CBC W/AUTO DIFF WBC: CPT | Performed by: HOSPITALIST

## 2023-11-05 PROCEDURE — 10000002 HB ROOM CHARGE MED SURG

## 2023-11-05 PROCEDURE — 80053 COMPREHEN METABOLIC PANEL: CPT | Performed by: EMERGENCY MEDICINE

## 2023-11-05 PROCEDURE — 99024 POSTOP FOLLOW-UP VISIT: CPT | Performed by: NEUROLOGICAL SURGERY

## 2023-11-05 PROCEDURE — 10002803 HB RX 637: Performed by: NURSE PRACTITIONER

## 2023-11-05 PROCEDURE — 10002803 HB RX 637: Performed by: EMERGENCY MEDICINE

## 2023-11-05 PROCEDURE — 83735 ASSAY OF MAGNESIUM: CPT | Performed by: EMERGENCY MEDICINE

## 2023-11-05 PROCEDURE — 99233 SBSQ HOSP IP/OBS HIGH 50: CPT | Performed by: HOSPITALIST

## 2023-11-05 RX ORDER — MINERAL OIL 100 G/100G
133 OIL RECTAL
Status: COMPLETED | OUTPATIENT
Start: 2023-11-05 | End: 2023-11-06

## 2023-11-05 RX ADMIN — ACETAMINOPHEN 1000 MG: 500 TABLET ORAL at 05:45

## 2023-11-05 RX ADMIN — SODIUM CHLORIDE, PRESERVATIVE FREE 2 ML: 5 INJECTION INTRAVENOUS at 20:42

## 2023-11-05 RX ADMIN — CARVEDILOL 3.12 MG: 6.25 TABLET, FILM COATED ORAL at 20:40

## 2023-11-05 RX ADMIN — CARVEDILOL 3.12 MG: 6.25 TABLET, FILM COATED ORAL at 10:08

## 2023-11-05 RX ADMIN — OXYBUTYNIN CHLORIDE 15 MG: 5 TABLET, EXTENDED RELEASE ORAL at 10:08

## 2023-11-05 RX ADMIN — DOCUSATE SODIUM 100 MG: 100 CAPSULE, LIQUID FILLED ORAL at 10:08

## 2023-11-05 RX ADMIN — POLYETHYLENE GLYCOL (3350) 17 G: 17 POWDER, FOR SOLUTION ORAL at 10:14

## 2023-11-05 RX ADMIN — SENNOSIDES AND DOCUSATE SODIUM 1 TABLET: 8.6; 5 TABLET ORAL at 10:08

## 2023-11-05 RX ADMIN — SODIUM CHLORIDE, PRESERVATIVE FREE 2 ML: 5 INJECTION INTRAVENOUS at 09:00

## 2023-11-05 ASSESSMENT — PAIN SCALES - GENERAL
PAINLEVEL_OUTOF10: 0
PAINLEVEL_OUTOF10: 0
PAINLEVEL_OUTOF10: 3

## 2023-11-06 VITALS
OXYGEN SATURATION: 97 % | BODY MASS INDEX: 22.96 KG/M2 | RESPIRATION RATE: 16 BRPM | HEIGHT: 64 IN | DIASTOLIC BLOOD PRESSURE: 100 MMHG | HEART RATE: 98 BPM | WEIGHT: 134.48 LBS | TEMPERATURE: 98.1 F | SYSTOLIC BLOOD PRESSURE: 140 MMHG

## 2023-11-06 LAB
ALBUMIN SERPL-MCNC: 3.9 G/DL (ref 3.6–5.1)
ALBUMIN/GLOB SERPL: 1.2 {RATIO} (ref 1–2.4)
ALP SERPL-CCNC: 56 UNITS/L (ref 45–117)
ALT SERPL-CCNC: 19 UNITS/L
ANION GAP SERPL CALC-SCNC: 10 MMOL/L (ref 7–19)
AST SERPL-CCNC: 12 UNITS/L
BILIRUB SERPL-MCNC: 1 MG/DL (ref 0.2–1)
BUN SERPL-MCNC: 15 MG/DL (ref 6–20)
BUN/CREAT SERPL: 20 (ref 7–25)
CALCIUM SERPL-MCNC: 9.1 MG/DL (ref 8.4–10.2)
CHLORIDE SERPL-SCNC: 105 MMOL/L (ref 97–110)
CO2 SERPL-SCNC: 27 MMOL/L (ref 21–32)
CREAT SERPL-MCNC: 0.74 MG/DL (ref 0.67–1.17)
DEPRECATED RDW RBC: 41.3 FL (ref 39–50)
EGFRCR SERPLBLD CKD-EPI 2021: >90 ML/MIN/{1.73_M2}
ERYTHROCYTE [DISTWIDTH] IN BLOOD: 12.6 % (ref 11–15)
FASTING DURATION TIME PATIENT: ABNORMAL H
GLOBULIN SER-MCNC: 3.2 G/DL (ref 2–4)
GLUCOSE SERPL-MCNC: 101 MG/DL (ref 70–99)
HCT VFR BLD CALC: 45.3 % (ref 39–51)
HGB BLD-MCNC: 15.4 G/DL (ref 13–17)
MAGNESIUM SERPL-MCNC: 2.5 MG/DL (ref 1.7–2.4)
MCH RBC QN AUTO: 30 PG (ref 26–34)
MCHC RBC AUTO-ENTMCNC: 34 G/DL (ref 32–36.5)
MCV RBC AUTO: 88.3 FL (ref 78–100)
NRBC BLD MANUAL-RTO: 0 /100 WBC
PATH REV BLD -IMP: YES
PATH REV: ABNORMAL
PHOSPHATE SERPL-MCNC: 4.1 MG/DL (ref 2.4–4.7)
PLATELET # BLD AUTO: 211 K/MCL (ref 140–450)
POTASSIUM SERPL-SCNC: 4.1 MMOL/L (ref 3.4–5.1)
PROT SERPL-MCNC: 7.1 G/DL (ref 6.4–8.2)
RBC # BLD: 5.13 MIL/MCL (ref 4.5–5.9)
SODIUM SERPL-SCNC: 138 MMOL/L (ref 135–145)
WBC # BLD: 7.7 K/MCL (ref 4.2–11)

## 2023-11-06 PROCEDURE — 36415 COLL VENOUS BLD VENIPUNCTURE: CPT | Performed by: EMERGENCY MEDICINE

## 2023-11-06 PROCEDURE — 85027 COMPLETE CBC AUTOMATED: CPT | Performed by: EMERGENCY MEDICINE

## 2023-11-06 PROCEDURE — 10002803 HB RX 637: Performed by: EMERGENCY MEDICINE

## 2023-11-06 PROCEDURE — 10004651 HB RX, NO CHARGE ITEM: Performed by: EMERGENCY MEDICINE

## 2023-11-06 PROCEDURE — 80053 COMPREHEN METABOLIC PANEL: CPT | Performed by: EMERGENCY MEDICINE

## 2023-11-06 PROCEDURE — 99024 POSTOP FOLLOW-UP VISIT: CPT | Performed by: NEUROLOGICAL SURGERY

## 2023-11-06 PROCEDURE — 10002803 HB RX 637: Performed by: NEUROLOGICAL SURGERY

## 2023-11-06 PROCEDURE — 84100 ASSAY OF PHOSPHORUS: CPT | Performed by: EMERGENCY MEDICINE

## 2023-11-06 PROCEDURE — 83735 ASSAY OF MAGNESIUM: CPT | Performed by: EMERGENCY MEDICINE

## 2023-11-06 PROCEDURE — 99233 SBSQ HOSP IP/OBS HIGH 50: CPT | Performed by: HOSPITALIST

## 2023-11-06 PROCEDURE — 10002803 HB RX 637: Performed by: NURSE PRACTITIONER

## 2023-11-06 RX ADMIN — DOCUSATE SODIUM 100 MG: 100 CAPSULE, LIQUID FILLED ORAL at 09:56

## 2023-11-06 RX ADMIN — MINERAL OIL 133 ML: 118 ENEMA RECTAL at 14:20

## 2023-11-06 RX ADMIN — OXYBUTYNIN CHLORIDE 15 MG: 5 TABLET, EXTENDED RELEASE ORAL at 09:56

## 2023-11-06 RX ADMIN — SODIUM CHLORIDE, PRESERVATIVE FREE 2 ML: 5 INJECTION INTRAVENOUS at 09:57

## 2023-11-06 RX ADMIN — SENNOSIDES AND DOCUSATE SODIUM 1 TABLET: 8.6; 5 TABLET ORAL at 09:56

## 2023-11-06 RX ADMIN — POLYETHYLENE GLYCOL (3350) 17 G: 17 POWDER, FOR SOLUTION ORAL at 09:56

## 2023-11-06 RX ADMIN — CARVEDILOL 3.12 MG: 6.25 TABLET, FILM COATED ORAL at 09:56

## 2023-11-06 ASSESSMENT — PAIN SCALES - GENERAL: PAINLEVEL_OUTOF10: 0

## 2023-11-07 ENCOUNTER — APPOINTMENT (OUTPATIENT)
Dept: PHYSICAL THERAPY | Age: 28
End: 2023-11-07
Attending: FAMILY MEDICINE
Payer: MEDICARE

## 2023-11-07 NOTE — TELEPHONE ENCOUNTER
E called on this again.  States criteria was faxed to the office (example) and needs to be included in Dr. Mejía's office note.  Routed to on site staff to locate fax.

## 2023-11-07 NOTE — TELEPHONE ENCOUNTER
[facility] calling to inform that they still need the wheelchair criteria in the notes, this is the 3rd attempt at receiving this information  Fax# [phone]

## 2023-11-09 ENCOUNTER — TELEPHONE (OUTPATIENT)
Dept: FAMILY MEDICINE CLINIC | Facility: CLINIC | Age: 28
End: 2023-11-09

## 2023-11-09 ENCOUNTER — APPOINTMENT (OUTPATIENT)
Dept: PHYSICAL THERAPY | Age: 28
End: 2023-11-09
Attending: FAMILY MEDICINE
Payer: MEDICARE

## 2023-11-09 NOTE — TELEPHONE ENCOUNTER
Sophia from [employer] is calling to inform  that patient was discharged from the hospital on 11/06/2023.

## 2023-11-09 NOTE — TELEPHONE ENCOUNTER
I spoke with Deandre from [facility] she confirmed receiving the notes, but states she will look in to them and see if they are sufficient. I did inform deandre in case these notes are not acceptable I will try to have Dr pérez speak with them directly so they can specify what is missing. She agrees

## 2023-11-09 NOTE — TELEPHONE ENCOUNTER
The updates notes were already faxed on Tuesday with the criteria as Dr. Mejía completed criteria notes

## 2023-11-13 ENCOUNTER — OFFICE VISIT (OUTPATIENT)
Dept: FAMILY MEDICINE CLINIC | Facility: CLINIC | Age: 28
End: 2023-11-13
Payer: MEDICARE

## 2023-11-13 ENCOUNTER — TELEPHONE (OUTPATIENT)
Facility: CLINIC | Age: 28
End: 2023-11-13

## 2023-11-13 VITALS — TEMPERATURE: 98 F | DIASTOLIC BLOOD PRESSURE: 67 MMHG | HEART RATE: 83 BPM | SYSTOLIC BLOOD PRESSURE: 118 MMHG

## 2023-11-13 DIAGNOSIS — Z98.2 S/P VP SHUNT: ICD-10-CM

## 2023-11-13 DIAGNOSIS — T85.09XD OBSTRUCTED VP SHUNT, SUBSEQUENT ENCOUNTER: Primary | ICD-10-CM

## 2023-11-13 DIAGNOSIS — A04.8 H. PYLORI INFECTION: ICD-10-CM

## 2023-11-13 DIAGNOSIS — K59.00 CONSTIPATION, UNSPECIFIED CONSTIPATION TYPE: ICD-10-CM

## 2023-11-13 DIAGNOSIS — I10 ESSENTIAL HYPERTENSION: ICD-10-CM

## 2023-11-13 RX ORDER — DOCUSATE SODIUM 100 MG/1
100 CAPSULE, LIQUID FILLED ORAL 2 TIMES DAILY PRN
Qty: 60 CAPSULE | Refills: 0 | Status: SHIPPED | OUTPATIENT
Start: 2023-11-13

## 2023-11-13 RX ORDER — PSEUDOEPHED/ACETAMINOPH/DIPHEN 30MG-500MG
2 TABLET ORAL EVERY 6 HOURS
COMMUNITY
Start: 2023-11-07

## 2023-11-13 NOTE — TELEPHONE ENCOUNTER
Dr Bennett Call,    Pt is due for h pylori testing    There is an active order in the computer for stool testing    Do you want the pt to have a stool test or a breath test?    Pt is recovering from a  shunt revision done on 11/03/2023

## 2023-11-14 NOTE — TELEPHONE ENCOUNTER
I spoke to the pt     He already has the stool kit    He states he will complete the stool test soon

## 2023-11-14 NOTE — TELEPHONE ENCOUNTER
Stool test.    He struggled with the breath test.    Thank you!     **Also - this is non urgent so should get done next year**

## 2023-11-16 ENCOUNTER — APPOINTMENT (OUTPATIENT)
Dept: NEUROSURGERY | Age: 28
End: 2023-11-16

## 2023-11-16 ENCOUNTER — OFFICE VISIT (OUTPATIENT)
Dept: NEUROSURGERY | Age: 28
End: 2023-11-16

## 2023-11-16 VITALS
SYSTOLIC BLOOD PRESSURE: 137 MMHG | HEART RATE: 79 BPM | BODY MASS INDEX: 23.33 KG/M2 | WEIGHT: 135.91 LBS | DIASTOLIC BLOOD PRESSURE: 89 MMHG

## 2023-11-16 DIAGNOSIS — Q05.4: ICD-10-CM

## 2023-11-16 DIAGNOSIS — Z98.2 VP (VENTRICULOPERITONEAL) SHUNT STATUS: Primary | ICD-10-CM

## 2023-11-16 PROCEDURE — 99024 POSTOP FOLLOW-UP VISIT: CPT | Performed by: NEUROLOGICAL SURGERY

## 2023-11-16 ASSESSMENT — ENCOUNTER SYMPTOMS
CONSTITUTIONAL NEGATIVE: 1
ENDOCRINE NEGATIVE: 1
PSYCHIATRIC NEGATIVE: 1
HEMATOLOGIC/LYMPHATIC NEGATIVE: 1
GASTROINTESTINAL NEGATIVE: 1
EYES NEGATIVE: 1
ALLERGIC/IMMUNOLOGIC NEGATIVE: 1
RESPIRATORY NEGATIVE: 1

## 2023-11-18 LAB
BACTERIA CSF CULT: NORMAL
GRAM STN SPEC: NORMAL

## 2023-12-01 LAB
FUNGUS SPEC CULT: NORMAL
FUNGUS SPEC FUNGUS STN: NORMAL

## 2023-12-09 LAB
ACID FAST STN SPEC: NORMAL
MYCOBACTERIUM SPEC CULT: NORMAL

## 2023-12-16 LAB
ACID FAST STN SPEC: NORMAL
MYCOBACTERIUM SPEC CULT: NORMAL

## 2023-12-21 ENCOUNTER — APPOINTMENT (OUTPATIENT)
Dept: NEUROSURGERY | Age: 28
End: 2023-12-21

## 2023-12-21 DIAGNOSIS — Q05.4: Primary | ICD-10-CM

## 2023-12-21 PROCEDURE — 99024 POSTOP FOLLOW-UP VISIT: CPT | Performed by: NEUROLOGICAL SURGERY

## 2023-12-21 ASSESSMENT — ENCOUNTER SYMPTOMS
HEMATOLOGIC/LYMPHATIC NEGATIVE: 1
ENDOCRINE NEGATIVE: 1
GASTROINTESTINAL NEGATIVE: 1
EYES NEGATIVE: 1
RESPIRATORY NEGATIVE: 1
CONSTITUTIONAL NEGATIVE: 1
PSYCHIATRIC NEGATIVE: 1
ALLERGIC/IMMUNOLOGIC NEGATIVE: 1

## 2023-12-27 ENCOUNTER — TELEPHONE (OUTPATIENT)
Facility: CLINIC | Age: 28
End: 2023-12-27

## 2023-12-27 NOTE — TELEPHONE ENCOUNTER
[facility]   H. Pylori Stool Ag, EIA (Order #[id number]) on 9/5/23

## 2024-01-05 ENCOUNTER — HOSPITAL ENCOUNTER (OUTPATIENT)
Age: 29
Discharge: HOME | End: 2024-01-05
Payer: MEDICARE

## 2024-01-05 VITALS
DIASTOLIC BLOOD PRESSURE: 77 MMHG | RESPIRATION RATE: 20 BRPM | OXYGEN SATURATION: 96 % | TEMPERATURE: 98 F | SYSTOLIC BLOOD PRESSURE: 140 MMHG | HEART RATE: 89 BPM

## 2024-01-05 DIAGNOSIS — R31.9 URINARY TRACT INFECTION WITH HEMATURIA, SITE UNSPECIFIED: Primary | ICD-10-CM

## 2024-01-05 DIAGNOSIS — N39.0 URINARY TRACT INFECTION WITH HEMATURIA, SITE UNSPECIFIED: Primary | ICD-10-CM

## 2024-01-05 LAB
BILIRUB UR QL STRIP: NEGATIVE
COLOR UR: YELLOW
GLUCOSE UR STRIP-MCNC: NEGATIVE MG/DL
KETONES UR STRIP-MCNC: NEGATIVE MG/DL
NITRITE UR QL STRIP: NEGATIVE
PH UR STRIP: 6.5 [PH]
PROT UR STRIP-MCNC: NEGATIVE MG/DL
SP GR UR STRIP: <=1.005
UROBILINOGEN UR STRIP-ACNC: <2 MG/DL

## 2024-01-05 PROCEDURE — 81002 URINALYSIS NONAUTO W/O SCOPE: CPT | Performed by: NURSE PRACTITIONER

## 2024-01-05 PROCEDURE — 99213 OFFICE O/P EST LOW 20 MIN: CPT | Performed by: NURSE PRACTITIONER

## 2024-01-05 RX ORDER — SULFAMETHOXAZOLE AND TRIMETHOPRIM 800; 160 MG/1; MG/1
1 TABLET ORAL 2 TIMES DAILY
Qty: 28 TABLET | Refills: 0 | Status: SHIPPED | OUTPATIENT
Start: 2024-01-05 | End: 2024-01-19

## 2024-01-05 NOTE — ED INITIAL ASSESSMENT (HPI)
Pt reports back pain \"I think its my kidneys\", which started today. Concerned for urinary tract infection. Denies fevers. Pt straight caths himself QID and PRN    Pt has  shunt surgery in November

## 2024-01-05 NOTE — DISCHARGE INSTRUCTIONS
Start the antibiotic as prescribed take it with your probiotic or eat yogurt as this antibiotic can cause upset stomach and diarrhea.  Make sure you are drinking lots of water straight cath yourself as normal and as needed as needed.  The urine culture is being sent out if it grows a bacteria showing resistance to the antibiotic prescribed you will be notified antibiotic will be changed.  If you develop worsening back pain abdominal pain body aches or chills or fever or nausea or vomiting or diarrhea go to the nearest emergency department otherwise follow-up with primary care provider.

## 2024-01-05 NOTE — ED PROVIDER NOTES
Patient Seen in: [facility]      History     Chief Complaint   Patient presents with    Back Pain     Stated Complaint: Back Pain    Subjective:   HPI    This is a 28-year-old male presenting with back pain.  Patient states that he straight caths 4 times a day and as needed as he has a history of spina bifida.  Patient states that he thinks that he might have a urine infection and wanted to be evaluated because he is having lower back pain.  Patient states he gets urinary infections that he has had then spread to the kidneys before.  No history of a kidney stone.  Denies any abdominal pain fever nausea or vomiting.    Objective:   Past Medical History:   Diagnosis Date    Bilateral leg weakness     chronic    Constipation     Depression     Foot drop, bilateral     Gait disturbance     High blood pressure     Neurogenic bladder     Pt self straight caths at home QID and irrigates BID PRN    Other ill-defined conditions(799.89)     shunt from hydrocephalus    PONV (postoperative nausea and vomiting)     if masked used    S/P  shunt     Spina bifida (HCC)     Management:  closure    Strabismus 1998    Done at [facility]    Strabismus               Past Surgical History:   Procedure Laterality Date    OTHER SURGICAL HISTORY  2010    bladder augmentation/catheterizable channel    OTHER SURGICAL HISTORY  07/06/2020    fistulotomy    SPINE SURGERY PROCEDURE UNLISTED  1994    STRABISMUS SURGERY Left 1999 or 2000                Social History     Socioeconomic History    Marital status: Single   Tobacco Use    Smoking status: Never     Passive exposure: Never    Smokeless tobacco: Never   Vaping Use    Vaping Use: Never used   Substance and Sexual Activity    Alcohol use: Not Currently    Drug use: No   Other Topics Concern    Caffeine Concern Yes     Comment: 1 cup a day.    Exercise No    Pt has a pacemaker No    Pt has a defibrillator No    Reaction to local anesthetic No   Social  History Narrative    The patient uses the following assistive device(s):  Splint on R leg .      The patient does live in a home with stairs.     Social Determinants of Health     Financial Resource Strain: Low Risk  (10/19/2023)    Financial Resource Strain     Difficulty of Paying Living Expenses: Not hard at all     Med Affordability: No   Food Insecurity: No Food Insecurity (10/17/2023)    Food Insecurity     Food Insecurity: Never true   Transportation Needs: No Transportation Needs (10/17/2023)    Transportation Needs     Lack of Transportation: No   Housing Stability: Low Risk  (10/17/2023)    Housing Stability     Housing Instability: No              Review of Systems    Positive for stated complaint: Back Pain  Other systems are as noted in HPI.  Constitutional and vital signs reviewed.      All other systems reviewed and negative except as noted above.    Physical Exam     ED Triage Vitals [01/05/24 1219]   /77   Pulse 89   Resp 20   Temp 97.6 °F (36.4 °C)   Temp src Temporal   SpO2 96 %   O2 Device None (Room air)       Current:/77   Pulse 89   Temp 97.6 °F (36.4 °C) (Temporal)   Resp 20   SpO2 96%         Physical Exam  Vitals and nursing note reviewed.   Constitutional:       Appearance: Normal appearance.   HENT:      Right Ear: External ear normal.      Left Ear: External ear normal.      Nose: Nose normal.      Mouth/Throat:      Mouth: Mucous membranes are moist.      Pharynx: Oropharynx is clear.   Eyes:      Conjunctiva/sclera: Conjunctivae normal.   Cardiovascular:      Rate and Rhythm: Normal rate.   Pulmonary:      Effort: Pulmonary effort is normal.   Abdominal:      General: Bowel sounds are normal.      Palpations: Abdomen is soft.      Tenderness: There is no abdominal tenderness. There is no right CVA tenderness or left CVA tenderness.   Musculoskeletal:         General: Normal range of motion.      Cervical back: Normal range of motion and neck supple.   Skin:     General:  Skin is warm.   Neurological:      General: No focal deficit present.      Mental Status: He is alert and oriented to person, place, and time.               ED Course     Labs Reviewed   [facility] POCT URINALYSIS DIPSTICK - Abnormal; Notable for the following components:       Result Value    Urine Clarity Slightly cloudy (*)     Blood, Urine Trace-lysed (*)     Leukocyte esterase urine Moderate (*)     All other components within normal limits   URINE CULTURE, ROUTINE                      [facility]             Medical Decision Making  28-year-old male nontoxic-appearing no abdominal tenderness no CVA tenderness no reproducible tenderness to his entire back presenting with back pain concern for UTI concern the patient may have a UTI as he does straight cath.  No clinical indication for labs or imaging but a urine dip and culture will be collected.  Patient agreeable with this plan of care.    Urine dip is resulted above noting trace blood and moderate leukocytes concerning for UTI urine culture will be sent out and patient will be started on Bactrim.  Discussed the results and this plan of care with the patient.  Discussed over-the-counter probiotic while taking antibiotic as some antibiotics cause upset stomach and diarrhea.  Discussed eating yogurt.  Discussed continue to drink plenty of fluids and straight cath as normal.  Discussed strict ER precautions and outpatient follow-up.  Patient feels comfortable to plan of care and acknowledges understanding discharge instructions.    Problems Addressed:  Urinary tract infection with hematuria, site unspecified: acute illness or injury    Amount and/or Complexity of Data Reviewed  Labs:  Decision-making details documented in ED Course.  Discussion of management or test interpretation with external provider(s): This patient was discussed with my attending Dr. KENNEDY Carbajal who agrees with this providers management and plan of care.    Risk  OTC drugs.  Prescription drug  management.        Disposition and Plan     Clinical Impression:  1. Urinary tract infection with hematuria, site unspecified         Disposition:  Discharge  1/5/2024 12:42 pm    Follow-up:  Nitin Mejía DO  [address] [place] [address]  [phone]    Schedule an appointment as soon as possible for a visit in 3 days            Medications Prescribed:  Discharge Medication List as of 1/5/2024 12:44 PM        START taking these medications    Details   sulfamethoxazole-trimethoprim -160 MG Oral Tab per tablet Take 1 tablet by mouth 2 (two) times daily for 14 days., Normal, Disp-28 tablet, R-0

## 2024-01-08 ENCOUNTER — OFFICE VISIT (OUTPATIENT)
Dept: FAMILY MEDICINE CLINIC | Facility: CLINIC | Age: 29
End: 2024-01-08
Payer: MEDICARE

## 2024-01-08 ENCOUNTER — NURSE TRIAGE (OUTPATIENT)
Dept: FAMILY MEDICINE CLINIC | Facility: CLINIC | Age: 29
End: 2024-01-08

## 2024-01-08 VITALS
HEIGHT: 64 IN | WEIGHT: 138 LBS | HEART RATE: 87 BPM | BODY MASS INDEX: 23.56 KG/M2 | DIASTOLIC BLOOD PRESSURE: 80 MMHG | SYSTOLIC BLOOD PRESSURE: 127 MMHG | TEMPERATURE: 98 F

## 2024-01-08 DIAGNOSIS — K03.89 TOOTH SENSITIVITY TO COLD: Primary | ICD-10-CM

## 2024-01-08 DIAGNOSIS — K05.10 GINGIVITIS: ICD-10-CM

## 2024-01-08 DIAGNOSIS — N30.90 CYSTITIS: ICD-10-CM

## 2024-01-08 PROCEDURE — 99214 OFFICE O/P EST MOD 30 MIN: CPT | Performed by: FAMILY MEDICINE

## 2024-01-08 NOTE — TELEPHONE ENCOUNTER
Laura SHAH helped out and scheduled:     Future Appointments   Date Time Provider Department Center   1/8/2024  4:45 PM [facility]

## 2024-01-08 NOTE — PROGRESS NOTES
Patient ID: Choa Reese is a 28 year old male.    HPI  Chief Complaint   Patient presents with    Other     Mouth/Lip Problem - bottom gums red and teeth sensitive. - x 3 days      Last seen by me on 11/13/2023.    Pt c/o a mouth problem that began Saturday morning.  States that his bottom gums feel sensitive and perhaps a little swollen.  He has not had any discharge from the gums.  States that his teeth are sensitive when drinking cold water. He had sub sandwiches today at work and also had some tooth sensitivity. Denies bleeding gums or fevers. States he has been drinking a lot of water as he was treated for a urinary tract infection in the immediate care.  He did some warm salt water rinses that did not cause him discomfort as the water was warm..     He went to  on 01/05/24 for back pain. He has been taking bactrim for his UTI.  He did not know if he had thrush and he looked on the Internet and saw the word gingivitis and started wearing himself.      Wt Readings from Last 6 Encounters:   01/08/24 138 lb   10/23/23 137 lb 6.4 oz   10/17/23 134 lb 11.2 oz   10/12/23 136 lb   10/10/23 136 lb   09/25/23 140 lb       BMI Readings from Last 6 Encounters:   01/08/24 23.69 kg/m²   11/02/23 23.58 kg/m²   10/23/23 23.58 kg/m²   10/17/23 23.12 kg/m²   10/12/23 23.34 kg/m²   10/10/23 23.34 kg/m²       BP Readings from Last 6 Encounters:   01/08/24 127/80   01/05/24 140/77   11/13/23 118/67   11/02/23 130/72   10/23/23 145/81   10/18/23 (!) 132/94         Review of Systems   Respiratory:  Negative for shortness of breath.    Cardiovascular:  Negative for chest pain.           Medical History:      Past Medical History:   Diagnosis Date    Bilateral leg weakness     chronic    Constipation     Depression     Foot drop, bilateral     Gait disturbance     High blood pressure     Neurogenic bladder     Pt self straight caths at home QID and irrigates BID PRN    Other ill-defined conditions(799.89)     shunt from  hydrocephalus    PONV (postoperative nausea and vomiting)     if masked used    S/P  shunt     Spina bifida (HCC)     Management:  closure    Strabismus 1998    Done at [facility]    Strabismus        Past Surgical History:   Procedure Laterality Date    OTHER SURGICAL HISTORY  2010    bladder augmentation/catheterizable channel    OTHER SURGICAL HISTORY  07/06/2020    fistulotomy    SPINE SURGERY PROCEDURE UNLISTED  1994    STRABISMUS SURGERY Left 1999 or 2000          Current Outpatient Medications   Medication Sig Dispense Refill    sulfamethoxazole-trimethoprim -160 MG Oral Tab per tablet Take 1 tablet by mouth 2 (two) times daily for 14 days. 28 tablet 0    Acetaminophen Extra Strength 500 MG Oral Tab Take 2 tablets (1,000 mg total) by mouth every 6 (six) hours.      docusate sodium (COLACE) 100 MG Oral Cap Take 1 capsule (100 mg total) by mouth 2 (two) times daily as needed for constipation. 60 capsule 0    carvedilol 3.125 MG Oral Tab Take 1 tablet (3.125 mg total) by mouth 2 (two) times daily with meals. 180 tablet 3    Water For Irrigation, Sterile (STERILE WATER FOR IRRIGATION) Irrigation Solution Irrigate with 1,000 mL as directed 2 (two) times daily as needed (Irrigates bladder twice daily due to catheterization.). 3 each 11    triamcinolone 0.1 % External Cream Use twice daily  with flares to rash on chets (Patient taking differently: Apply topically 2 (two) times daily as needed. Use twice daily  with flares to rash on chets) 80 g 2    Oxybutynin Chloride ER 15 MG Oral Tablet 24 Hr Take 1 tablet (15 mg total) by mouth daily. 90 tablet 3    cholecalciferol 50 MCG (2000 UT) Oral Tab Take 1 tablet (2,000 Units total) by mouth daily. 90 tablet 3    hydrocortisone 2.5 % External Cream Apply to the affected areas twice daily Monday-Friday. Take weekends off. Stop when rash resolved. 453 g 1    ketoconazole 2 % External Shampoo You can apply this not only to the scalp 2 or 3 times a  week in the shower but also to your beard area and then also the chest where the rash is.  Can also use on the face. 120 mL 11    levoFLOXacin (LEVAQUIN) 250 MG Oral Tab Take 1 tablet (250 mg total) by mouth daily. (Patient not taking: Reported on 1/8/2024) 3 tablet 0    sodium chloride 0.9 % Irrigation Solution  (Patient not taking: Reported on 1/8/2024)       Allergies:  Allergies   Allergen Reactions    Latex RASH        Physical Exam:       Physical Exam  Blood pressure 148/83, pulse 87, temperature 97.6 °F (36.4 °C), height 5' 4\" (1.626 m), weight 138 lb.    Vitals:    01/08/24 1619 01/08/24 1633   BP: 148/83 127/80   Pulse: 87    Temp: 97.6 °F (36.4 °C)    Weight: 138 lb    Height: 5' 4\" (1.626 m)        Physical Exam   Constitutional: Patient is oriented to person, place, and time. Patient appears well-developed and well-nourished. No distress.   Head: Normocephalic.   Throat: Oropharynx is clear without exudate   Eyes: Conjunctivae and EOM are normal.   Neck: Normal range of motion. No thyromegaly present.   Cardiovascular: Normal rate, regular rhythm and normal heart sounds.    Pulmonary/Chest: Effort normal and breath sounds normal. No respiratory distress.   Lymphadenopathy: Patient has no cervical adenopathy.  Neurological: Patient is alert and oriented to person, place, and time.   Skin: Skin is warm.   Psychiatry: Normal mood and affect.  Mouth: Gingiva on the bottom are not tender and there is no swelling or ulcerations.  No redness.  No thrush on the tongue or buccal mucosa.  Slightly inflamed gingiva where it meets the superior aspect of the tooth of the front top 8 teeth.  However this area is not tender.  Is not bleeding.  It is not swollen.    Vitals reviewed.           Assessment/Plan:      Diagnoses and all orders for this visit:    Tooth sensitivity to cold  He can continue the salt water gargles with warm water and that does not seem to bother him.  He knows he needs to make an appointment  with the dentist but they were already closed.  He really just wanted some reassurance that it was not due to the Bactrim which he has been continuing to take.  I do not think 1 day of Bactrim would have caused this to happen the next day.  I did let him know there is no thrush.  Gingivitis  As above  Cystitis  Continue the Bactrim twice daily.  Reviewed the immediate care notes.      Referrals (if applicable)  No orders of the defined types were placed in this encounter.      Follow up if symptoms persist.  Take medicine (if given) as prescribed.  Approach to treatment discussed and patient/family member understands and agrees to plan.     No follow-ups on file.    There are no Patient Instructions on file for this visit.    Colby Marquez    1/8/2024    By signing my name below, IColby,  attest that this documentation has been prepared under the direction and in the presence of Nitin Mejía DO.   Electronically Signed: Colby Marquez, 1/8/2024, 4:17 PM.    I, Nitin Mejía DO,  personally performed the services described in this documentation. All medical record entries made by the scribe were at my direction and in my presence.  I have reviewed the chart and discharge instructions (if applicable) and agree that the record reflects my personal performance and is accurate and complete.  Nitin Mejía DO, 1/8/2024, 5:11 PM

## 2024-01-08 NOTE — TELEPHONE ENCOUNTER
Patient called back, verified Name and . Agreed to be seen at 4:45 pm today for focused visit. Appointment for  canceled.    [facility] Staff kindly add patient to provider's schedule. Thank you.

## 2024-01-08 NOTE — TELEPHONE ENCOUNTER
Action Requested: Summary for Provider     []  Critical Lab, Recommendations Needed  [x] Need Additional Advice  []   FYI    []   Need Orders  [] Need Medications Sent to Pharmacy  []  Other     SUMMARY: Okay to add today? 4:30pm or any time after?   Only wants to see Dr. Mejía     Reason for call: Mouth/Lip Problem - bottom gums red and teeth sensitive.   Onset: After starting antibiotic form Urgent Care for UTI.     Patient called office. Date of birth and full name both confirmed.   Asking about antibiotic - if it is the correct antibiotic to be taking.   RN offered number for Urgent Care [phone], discuss with APRN at Urgent Care.   He verbalizes understanding of all information, and agreeable to plan.     Also asking if antibiotic can cause gum issues.   Ever since starting the antibiotic - gums on lower mouth appear more red.   No bleeding, no wounds, no sores.   Teeth hurt when drinking cold water.   Triaged per protocol and advised care advice per protocol.   More details regarding Symptoms under Additional Documentation > Protocols Used.     RN provided education on oral hygiene and gingivitis, but also advised evaluation to rule out yeast infection or other problems.       Evaluation advised today, offered appointment with other providers, as patient has work until 2pm today.     Declines - only wants to see Dr. Mejía.     Advised to monitor symptoms.  RN advised if symptoms get severely worse, patient should seek care at Emergency Room or Immediate Care.  RN also informed patient to seek immediate medical attention at ER if patient experiences severe/worsening symptoms, shortness of breath, chest pain, or severe pain.  Patient verbalizes understanding and is agreeable to instructions.     Future Appointments   Date Time Provider Department Center   1/11/2024 11:00 AM Nitin Mejía, DO [facility]         Care Advice             Patient/Caregiver understands and will follow care advice?:  Yes, plans to follow advice                        SEE IN OFFICE WITHIN 3 DAYS:   * You need to be examined.   * Let me give you an appointment.    DRINK LIQUIDS TO REHYDRATE:  * Drink a sports drink (e.g., Gatorade or Powerade). These drinks can help rehydrate you because they contain water, sugar and salt.  * OR drink water, and eat some salty foods (e.g., potato chips or pretzels).  * HOW MUCH: Approximately 1 cup (240 ml) every 15 minutes for the next 1 to 2 hours.  * Your urine (pee) color can help you tell if you have drunk enough liquids. Dark yellow urine suggests dehydration. Clear or light-yellow urine suggests that you are well hydrated.    CALL BACK IF:  * You become worse                           Reason for Disposition   Patient wants to be seen    Protocols used: Mouth Symptoms-A-[place]

## 2024-01-08 NOTE — TELEPHONE ENCOUNTER
RN communicated with Dr. Mejía:   4:45pm for focused visit today.     RN --- offer Cancel other visit , 1/11/24  Offer 4:45pm Dr. Mejía appointment, focused visit.       First Call attempt.   Left Voicemail to call back our office. Office phone number provided with office hours.

## 2024-01-16 ENCOUNTER — NURSE TRIAGE (OUTPATIENT)
Dept: FAMILY MEDICINE CLINIC | Facility: CLINIC | Age: 29
End: 2024-01-16

## 2024-01-16 ENCOUNTER — OFFICE VISIT (OUTPATIENT)
Dept: FAMILY MEDICINE CLINIC | Facility: CLINIC | Age: 29
End: 2024-01-16
Payer: MEDICARE

## 2024-01-16 VITALS
WEIGHT: 140 LBS | TEMPERATURE: 97 F | BODY MASS INDEX: 23.9 KG/M2 | SYSTOLIC BLOOD PRESSURE: 117 MMHG | HEIGHT: 64 IN | DIASTOLIC BLOOD PRESSURE: 63 MMHG | HEART RATE: 85 BPM

## 2024-01-16 DIAGNOSIS — R10.84 GENERALIZED ABDOMINAL PAIN: ICD-10-CM

## 2024-01-16 DIAGNOSIS — K59.00 CONSTIPATION, UNSPECIFIED CONSTIPATION TYPE: Primary | ICD-10-CM

## 2024-01-16 PROCEDURE — 99214 OFFICE O/P EST MOD 30 MIN: CPT | Performed by: FAMILY MEDICINE

## 2024-01-16 RX ORDER — LACTULOSE 20 G/30ML
20 SOLUTION ORAL 2 TIMES DAILY PRN
Qty: 300 ML | Refills: 0 | Status: SHIPPED | OUTPATIENT
Start: 2024-01-16 | End: 2024-01-21

## 2024-01-16 NOTE — TELEPHONE ENCOUNTER
Action Requested: Summary for Provider     []  Critical Lab, Recommendations Needed  [] Need Additional Advice  [x]   FYI    []   Need Orders  [] Need Medications Sent to Pharmacy  []  Other     SUMMARY: constipation with abdominal distention. Same day office visit offered --> agreeable and scheduled. Refer to system/assessment protocol for yes/no answers. [See below for more details]    Reason for call: Constipation  Onset: about 1 week    Reason for Disposition   Abdomen is more swollen than usual    Protocols used: Constipation-A-[place]      Patient called states he has been taking antibiotics and now has had some constipation. Last night about 7-8 pm he took an OTC medication Mineral oil as directed, he also took OTC docusate as directed. Last full BM was 1/9/24, one week ago, then had a small BM today --> appearance was hard and brown. Denies any blood in the stool. Denies any nausea. He is gassy. He has some abdominal distention. Denies any vomiting. He has been drinking about 1.5 L a day. Same day office visit offered --> agreeable and scheduled. Home Care Advice discussed, per protocol. Patient instructed any new or worsening symptoms [reviewed] seek immediate medical attention. Patient verbalized understanding. No further questions or concerns at this time.    Future Appointments   Date Time Provider Department Center   1/16/2024  9:30 AM Nitin Mejía DO [facility]

## 2024-02-02 DIAGNOSIS — N31.9 NEUROGENIC BLADDER: ICD-10-CM

## 2024-02-02 RX ORDER — MAGNESIUM HYDROXIDE 1200 MG/15ML
1000 LIQUID ORAL 2 TIMES DAILY PRN
Qty: 3 EACH | Refills: 11 | Status: SHIPPED | OUTPATIENT
Start: 2024-02-02

## 2024-02-02 NOTE — TELEPHONE ENCOUNTER
Review pended refill request as it does not fall under a protocol.    Last Rx: 9/5/23  LOV: 1/16/24

## 2024-02-07 ENCOUNTER — TELEPHONE (OUTPATIENT)
Dept: FAMILY MEDICINE CLINIC | Facility: CLINIC | Age: 29
End: 2024-02-07

## 2024-02-07 NOTE — TELEPHONE ENCOUNTER
Nuvia from [employer] is calling on the status of orders requested.   Fax number confirmed with Nuvia. States she will refax order of supplies requested.   Message routed to office staff.

## 2024-02-15 ENCOUNTER — HOSPITAL ENCOUNTER (OUTPATIENT)
Age: 29
Discharge: HOME | End: 2024-02-15
Payer: MEDICARE

## 2024-02-15 VITALS
TEMPERATURE: 98 F | HEART RATE: 87 BPM | SYSTOLIC BLOOD PRESSURE: 134 MMHG | RESPIRATION RATE: 20 BRPM | OXYGEN SATURATION: 96 % | DIASTOLIC BLOOD PRESSURE: 92 MMHG

## 2024-02-15 DIAGNOSIS — N30.01 ACUTE CYSTITIS WITH HEMATURIA: Primary | ICD-10-CM

## 2024-02-15 LAB
#MXD IC: 0.4 X10ˆ3/UL (ref 0.1–1)
BILIRUB UR QL STRIP: NEGATIVE
BUN BLD-MCNC: 14 MG/DL (ref 7–18)
CHLORIDE BLD-SCNC: 104 MMOL/L (ref 98–112)
CO2 BLD-SCNC: 26 MMOL/L (ref 21–32)
COLOR UR: YELLOW
CREAT BLD-MCNC: 0.9 MG/DL
EGFRCR SERPLBLD CKD-EPI 2021: 119 ML/MIN/1.73M2 (ref 60–?)
GLUCOSE BLD-MCNC: 96 MG/DL (ref 70–99)
GLUCOSE UR STRIP-MCNC: NEGATIVE MG/DL
HCT VFR BLD AUTO: 45.9 %
HCT VFR BLD CALC: 49 %
HGB BLD-MCNC: 15.8 G/DL
ISTAT IONIZED CALCIUM FOR CHEM 8: 1.16 MMOL/L (ref 1.12–1.32)
KETONES UR STRIP-MCNC: NEGATIVE MG/DL
LYMPHOCYTES # BLD AUTO: 2.2 X10ˆ3/UL (ref 1–4)
LYMPHOCYTES NFR BLD AUTO: 35.7 %
MCH RBC QN AUTO: 29.3 PG (ref 26–34)
MCHC RBC AUTO-ENTMCNC: 34.4 G/DL (ref 31–37)
MCV RBC AUTO: 85.2 FL (ref 80–100)
MIXED CELL %: 6 %
NEUTROPHILS # BLD AUTO: 3.6 X10ˆ3/UL (ref 1.5–7.7)
NEUTROPHILS NFR BLD AUTO: 58.3 %
NITRITE UR QL STRIP: NEGATIVE
PH UR STRIP: 6 [PH]
PLATELET # BLD AUTO: 206 X10ˆ3/UL (ref 150–450)
POTASSIUM BLD-SCNC: 3.8 MMOL/L (ref 3.6–5.1)
PROT UR STRIP-MCNC: NEGATIVE MG/DL
RBC # BLD AUTO: 5.39 X10ˆ6/UL
SODIUM BLD-SCNC: 139 MMOL/L (ref 136–145)
SP GR UR STRIP: 1.01
UROBILINOGEN UR STRIP-ACNC: <2 MG/DL
WBC # BLD AUTO: 6.2 X10ˆ3/UL (ref 4–11)

## 2024-02-15 PROCEDURE — 80047 BASIC METABLC PNL IONIZED CA: CPT | Performed by: NURSE PRACTITIONER

## 2024-02-15 PROCEDURE — 85025 COMPLETE CBC W/AUTO DIFF WBC: CPT | Performed by: NURSE PRACTITIONER

## 2024-02-15 PROCEDURE — 81002 URINALYSIS NONAUTO W/O SCOPE: CPT | Performed by: NURSE PRACTITIONER

## 2024-02-15 PROCEDURE — 99213 OFFICE O/P EST LOW 20 MIN: CPT | Performed by: NURSE PRACTITIONER

## 2024-02-15 RX ORDER — CIPROFLOXACIN 500 MG/1
500 TABLET, FILM COATED ORAL 2 TIMES DAILY
Qty: 14 TABLET | Refills: 0 | Status: SHIPPED | OUTPATIENT
Start: 2024-02-15 | End: 2024-02-22

## 2024-02-15 NOTE — DISCHARGE INSTRUCTIONS
Ciprofloxacin 1 tablet twice per day for 7 days   Please finish your medication as prescribed  Drink plenty of fluids, water and 100% cranberry juice  Please go to the emergency room if you develop fever, sweats, chills, abdominal pain, or back pain  Please see your primary care provider if no better after antibiotics

## 2024-02-15 NOTE — ED INITIAL ASSESSMENT (HPI)
Pt c/o headache (which has resolved), body aches, fatigue, which started last night. Denies fevers    Pt c/o urinary pressure. Pt straight caths self. Patient may be constipated, usually takes a liquid to help but he didn't take it.

## 2024-02-15 NOTE — ED PROVIDER NOTES
Chief Complaint   Patient presents with    Urinary Symptoms    Body ache and/or chills       HPI:     Chao Reese is a 28 year old male with spina bifida,  shunt, neurogenic bladder who self caths QID, who presents with a chief complaint of bodyaches, fatigue, suprapubic pressure since this.  Reports these are his typical symptoms when he gets a UTI.  Reports headache yesterday, that has since resolved. He he denies fever.  He denies chest pain or shortness of breath.  Denies back pain, or abdominal pain.  He denies nausea, vomiting, diarrhea.  He is eating and drinking normally.      PFSH  PFSH asessment screens reviewed and agree.  Nursing note reviewed and I agree with documentation.    Family History   Problem Relation Age of Onset    No Known Problems Father     No Known Problems Mother     Diabetes Neg     Glaucoma Neg      Family history reviewed with patient/caregiver and is not pertinent to presenting problem.  Social History     Socioeconomic History    Marital status: Single     Spouse name: Not on file    Number of children: Not on file    Years of education: Not on file    Highest education level: Not on file   Occupational History    Not on file   Tobacco Use    Smoking status: Never     Passive exposure: Never    Smokeless tobacco: Never   Vaping Use    Vaping Use: Never used   Substance and Sexual Activity    Alcohol use: Not Currently    Drug use: No    Sexual activity: Not on file   Other Topics Concern     Service Not Asked    Blood Transfusions Not Asked    Caffeine Concern Yes     Comment: 1 cup a day.    Occupational Exposure Not Asked    Hobby Hazards Not Asked    Sleep Concern Not Asked    Stress Concern Not Asked    Weight Concern Not Asked    Special Diet Not Asked    Back Care Not Asked    Exercise No    Bike Helmet Not Asked    Seat Belt Not Asked    Self-Exams Not Asked    Grew up on a farm Not Asked    History of tanning Not Asked    Outdoor occupation Not Asked    Pt has a  pacemaker No    Pt has a defibrillator No    Reaction to local anesthetic No   Social History Narrative    The patient uses the following assistive device(s):  Splint on R leg .      The patient does live in a home with stairs.     Social Determinants of Health     Financial Resource Strain: Low Risk  (10/19/2023)    Financial Resource Strain     Difficulty of Paying Living Expenses: Not hard at all     Med Affordability: No   Food Insecurity: No Food Insecurity (10/17/2023)    Food Insecurity     Food Insecurity: Never true   Transportation Needs: No Transportation Needs (10/17/2023)    Transportation Needs     Lack of Transportation: No   Physical Activity: Not on file   Stress: Not on file   Social Connections: Not on file   Housing Stability: Low Risk  (10/17/2023)    Housing Stability     Housing Instability: No     Housing Instability Emergency: Not on file           ROS:   Positive for stated complaint: suprapubic pressure  All other systems reviewed and negative except as noted above.  Constitutional and Vital Signs Reviewed.      Physical Exam:     BP (!) 134/92   Pulse 87   Temp 98.4 °F (36.9 °C) (Oral)   Resp 20   SpO2 96%   GENERAL: well developed, well nourished, well hydrated, no distress  HEENT:normocephalic, ears and throat are clear  NECK: supple, no adenopathy  LUNGS: clear to auscultation, no RRW  CARDIO: RRR without murmur  BACK: CVA tenderness: Bilaterally, No  GI: soft, non-tender, without masses or organomegaly  EXTREMITIES: no cyanosis or edema. HOPKINS without difficulty  SKIN: good skin turgor, no rashes      MDM/Assessment/Plan:   Orders for this encounter:    Orders Placed This Encounter    POCT CBC     Order Specific Question:   Release to patient     Answer:   Immediate    POCT Urinalysis Dipstick    POCT ISTAT chem8 cartridge    Urine Culture, Routine     Order Specific Question:   Specimen source:     Answer:   Urine, clean catch     Order Specific Question:   Documentation of  symptoms of UTI or sepsis:     Answer:   Documentation of symptoms of UTI or sepsis must be corroborated within the EMR     Order Specific Question:   Release to patient     Answer:   Immediate    iStat (Chem 8)    ciprofloxacin 500 MG Oral Tab     Sig: Take 1 tablet (500 mg total) by mouth 2 (two) times daily for 7 days.     Dispense:  14 tablet     Refill:  0       Labs performed this visit:  Recent Results (from the past 10 hour(s))   POCT Urinalysis Dipstick    Collection Time: 02/15/24  7:58 AM   Result Value Ref Range    Urine Color Yellow Yellow    Urine Clarity Slightly cloudy (A) Clear    Specific Gravity, Urine 1.015 1.005 - 1.030    PH, Urine 6.0 5.0 - 8.0    Protein urine Negative Negative mg/dL    Glucose, Urine Negative Negative mg/dL    Ketone, Urine Negative Negative mg/dL    Bilirubin, Urine Negative Negative    Blood, Urine Moderate (A) Negative    Nitrite Urine Negative Negative    Urobilinogen urine <2.0 <2.0 mg/dL    Leukocyte esterase urine Large (A) Negative   POCT CBC    Collection Time: 02/15/24  9:16 AM   Result Value Ref Range    WBC IC 6.2 4.0 - 11.0 x10ˆ3/uL    RBC IC 5.39 4.30 - 5.70 X10ˆ6/uL    HGB IC 15.8 13.0 - 17.5 g/dL    HCT IC 45.9 39.0 - 53.0 %    MCV IC 85.2 80.0 - 100.0 fL    MCH IC 29.3 26.0 - 34.0 pg    MCHC IC 34.4 31.0 - 37.0 g/dL    PLT .0 150.0 - 450.0 X10ˆ3/uL    # Neutrophil 3.6 1.5 - 7.7 X10ˆ3/uL    # Lymphocyte 2.2 1.0 - 4.0 X10ˆ3/uL    # Mixed Cells 0.4 0.1 - 1.0 X10ˆ3/uL    Neutrophil % 58.3 %    Lymphocyte % 35.7 %    Mixed Cell % 6.0 %   POCT ISTAT chem8 cartridge    Collection Time: 02/15/24  9:25 AM   Result Value Ref Range    ISTAT Sodium 139 136 - 145 mmol/L    ISTAT BUN 14 7 - 18 mg/dL    ISTAT Potassium 3.8 3.6 - 5.1 mmol/L    ISTAT Chloride 104 98 - 112 mmol/L    ISTAT Ionized Calcium 1.16 1.12 - 1.32 mmol/L    ISTAT Hematocrit 49 37 - 53 %    ISTAT Glucose 96 70 - 99 mg/dL    ISTAT TCO2 26 21 - 32 mmol/L    ISTAT Creatinine 0.90 0.70 - 1.30 mg/dL     eGFR-Cr 119 >=60 mL/min/1.73m2       MDM:   Medical Decision Making  Differentials: acute cystitis vs pyelonephritis vs obstructive stone vs STI vs vaginitis    HPI and exam consistent with acute cystitis.  Patient is afebrile, no CVAT on exam, no colicky flank pain, low suspicion for pyelonephritis versus obstructive stone. CBC and Chem 8 also within normal limits, no elevated WBC, normal renal function. No unusual vaginal discharge.ER precautions were discussed in detail.  Patient was advised to follow-up with primary care provider if no improvement after antibiotic.  Patient verbalized understanding and agreeable to plan of care.     Case discussed with Dr. David Mantilla       Amount and/or Complexity of Data Reviewed  Labs: ordered. Decision-making details documented in ED Course.     Details: Cbc, chem 8  Urine dip consistent with infection  Urine culture pending     Risk  Prescription drug management.        Diagnosis:    ICD-10-CM    1. Acute cystitis with hematuria  N30.01 Urine Culture, Routine     Urine Culture, Routine          All results reviewed and discussed with patient.  See AVS for detailed discharge instructions for your condition today.    Follow Up with:  No follow-up provider specified.

## 2024-02-23 ENCOUNTER — APPOINTMENT (OUTPATIENT)
Dept: CT IMAGING | Age: 29
End: 2024-02-23
Attending: PHYSICIAN ASSISTANT
Payer: MEDICARE

## 2024-02-23 ENCOUNTER — HOSPITAL ENCOUNTER (OUTPATIENT)
Age: 29
Discharge: HOME | End: 2024-02-23
Payer: MEDICARE

## 2024-02-23 VITALS
DIASTOLIC BLOOD PRESSURE: 98 MMHG | HEART RATE: 77 BPM | SYSTOLIC BLOOD PRESSURE: 149 MMHG | TEMPERATURE: 98 F | OXYGEN SATURATION: 97 % | RESPIRATION RATE: 18 BRPM

## 2024-02-23 DIAGNOSIS — R10.9 FLANK PAIN: Primary | ICD-10-CM

## 2024-02-23 DIAGNOSIS — N20.0 NEPHROLITHIASIS: ICD-10-CM

## 2024-02-23 DIAGNOSIS — N30.90 CYSTITIS: ICD-10-CM

## 2024-02-23 LAB
#MXD IC: 0.6 X10ˆ3/UL (ref 0.1–1)
BILIRUB UR QL STRIP: NEGATIVE
BUN BLD-MCNC: 11 MG/DL (ref 7–18)
CHLORIDE BLD-SCNC: 100 MMOL/L (ref 98–112)
CO2 BLD-SCNC: 28 MMOL/L (ref 21–32)
COLOR UR: YELLOW
CREAT BLD-MCNC: 0.8 MG/DL
EGFRCR SERPLBLD CKD-EPI 2021: 123 ML/MIN/1.73M2 (ref 60–?)
GLUCOSE BLD-MCNC: 88 MG/DL (ref 70–99)
GLUCOSE UR STRIP-MCNC: NEGATIVE MG/DL
HCT VFR BLD AUTO: 46.5 %
HCT VFR BLD CALC: 52 %
HGB BLD-MCNC: 16.3 G/DL
ISTAT IONIZED CALCIUM FOR CHEM 8: 1.22 MMOL/L (ref 1.12–1.32)
KETONES UR STRIP-MCNC: NEGATIVE MG/DL
LYMPHOCYTES # BLD AUTO: 3.1 X10ˆ3/UL (ref 1–4)
LYMPHOCYTES NFR BLD AUTO: 38.3 %
MCH RBC QN AUTO: 29.8 PG (ref 26–34)
MCHC RBC AUTO-ENTMCNC: 35.1 G/DL (ref 31–37)
MCV RBC AUTO: 85 FL (ref 80–100)
MIXED CELL %: 7.7 %
NEUTROPHILS # BLD AUTO: 4.3 X10ˆ3/UL (ref 1.5–7.7)
NEUTROPHILS NFR BLD AUTO: 54 %
NITRITE UR QL STRIP: NEGATIVE
PH UR STRIP: 6.5 [PH]
PLATELET # BLD AUTO: 253 X10ˆ3/UL (ref 150–450)
POTASSIUM BLD-SCNC: 3.9 MMOL/L (ref 3.6–5.1)
PROT UR STRIP-MCNC: NEGATIVE MG/DL
RBC # BLD AUTO: 5.47 X10ˆ6/UL
SODIUM BLD-SCNC: 139 MMOL/L (ref 136–145)
SP GR UR STRIP: 1.01
UROBILINOGEN UR STRIP-ACNC: <2 MG/DL
WBC # BLD AUTO: 8 X10ˆ3/UL (ref 4–11)

## 2024-02-23 PROCEDURE — 81002 URINALYSIS NONAUTO W/O SCOPE: CPT | Performed by: PHYSICIAN ASSISTANT

## 2024-02-23 PROCEDURE — 85025 COMPLETE CBC W/AUTO DIFF WBC: CPT | Performed by: PHYSICIAN ASSISTANT

## 2024-02-23 PROCEDURE — 99214 OFFICE O/P EST MOD 30 MIN: CPT | Performed by: PHYSICIAN ASSISTANT

## 2024-02-23 PROCEDURE — 80047 BASIC METABLC PNL IONIZED CA: CPT | Performed by: PHYSICIAN ASSISTANT

## 2024-02-23 PROCEDURE — 74177 CT ABD & PELVIS W/CONTRAST: CPT | Performed by: PHYSICIAN ASSISTANT

## 2024-02-23 RX ORDER — CEPHALEXIN 500 MG/1
500 CAPSULE ORAL 3 TIMES DAILY
Qty: 21 CAPSULE | Refills: 0 | Status: SHIPPED | OUTPATIENT
Start: 2024-02-23 | End: 2024-03-01

## 2024-02-23 NOTE — ED INITIAL ASSESSMENT (HPI)
Pt here with right side flank pain that started today after voiding. Reports just finished course of antibiotics for uti on Wednesday, denies any fevers.

## 2024-02-23 NOTE — ED PROVIDER NOTES
Chief Complaint   Patient presents with    Back Pain       HPI:     Chao Reese is a 28 year old male who presents for evaluation of left flank pain radiating to the left upper quadrant onset 1 hour prior to encounter.  Notes pain maximum at onset, 8 out of 10, improving upon arrival, 3 out of 10.  Denies any analgesic support prior to arrival.  Notes history of UTI evaluated last week with some vague lower abdominal pain prescribed Cipro with culture showing Klebsiella growth and susceptibility.  States finished course 2 days ago.  Denies recent or active fever.  Denies history of kidney stones self or family.  Denies headache dizziness cough congestion sore throat neck pain chest pain shortness of breath vomiting dysuria hematuria urinary retention incontinence upper extremity weakness numbness or swelling.  Patient with history of spina bifida self caths baseline with a neurogenic bladder.      PFSH    PFSH asessment screens reviewed and agree.  Nurses notes reviewed I agree with documentation.    Family History   Problem Relation Age of Onset    No Known Problems Father     No Known Problems Mother     Diabetes Neg     Glaucoma Neg      Family history reviewed with patient/caregiver and is not pertinent to presenting problem.  Social History     Socioeconomic History    Marital status: Single     Spouse name: Not on file    Number of children: Not on file    Years of education: Not on file    Highest education level: Not on file   Occupational History    Not on file   Tobacco Use    Smoking status: Never     Passive exposure: Never    Smokeless tobacco: Never   Vaping Use    Vaping Use: Never used   Substance and Sexual Activity    Alcohol use: Not Currently    Drug use: No    Sexual activity: Not on file   Other Topics Concern     Service Not Asked    Blood Transfusions Not Asked    Caffeine Concern Yes     Comment: 1 cup a day.    Occupational Exposure Not Asked    Hobby Hazards Not Asked    Sleep  Concern Not Asked    Stress Concern Not Asked    Weight Concern Not Asked    Special Diet Not Asked    Back Care Not Asked    Exercise No    Bike Helmet Not Asked    Seat Belt Not Asked    Self-Exams Not Asked    Grew up on a farm Not Asked    History of tanning Not Asked    Outdoor occupation Not Asked    Pt has a pacemaker No    Pt has a defibrillator No    Reaction to local anesthetic No   Social History Narrative    The patient uses the following assistive device(s):  Splint on R leg .      The patient does live in a home with stairs.     Social Determinants of Health     Financial Resource Strain: Low Risk  (10/19/2023)    Financial Resource Strain     Difficulty of Paying Living Expenses: Not hard at all     Med Affordability: No   Food Insecurity: No Food Insecurity (10/17/2023)    Food Insecurity     Food Insecurity: Never true   Transportation Needs: No Transportation Needs (10/17/2023)    Transportation Needs     Lack of Transportation: No   Physical Activity: Not on file   Stress: Not on file   Social Connections: Not on file   Housing Stability: Low Risk  (10/17/2023)    Housing Stability     Housing Instability: No     Housing Instability Emergency: Not on file         ROS:   Positive for stated complaint: flank pain  All other systems reviewed and negative except as noted above.  Constitutional and Vital Signs Reviewed.      Physical Exam:     Findings:    BP (!) 149/98   Pulse 77   Temp 97.8 °F (36.6 °C) (Temporal)   Resp 18   SpO2 97%   GENERAL: well developed, well nourished, well hydrated, no distress  SKIN: good skin turgor, no obvious rashes  NECK: supple, no adenopathy  CARDIO: RRR without murmur  EXTREMITIES: no cyanosis or edema. HOPKINS without difficulty  GI: Mild left CVA tenderness rebound.  No peritoneal skin changes to the abdominal wall.  Normal bowel sounds  HEAD: normocephalic, atraumatic  EYES: sclera non icteric bilateral, conjunctiva clear  NOSE: nasal turbinates: pink, normal  mucosa  LUNGS: clear to auscultation bilaterally; no rales, rhonchi, or wheezes  NEURO: No focal deficits  PSYCH: Alert and oriented x3.  Answering questions appropriately.  Mood appropriate.    MDM/Assessment/Plan:   Orders for this encounter:    Orders Placed This Encounter    CT ABDOMEN+PELVIS(CONTRAST ONLY)(CPT=74177)     Order Specific Question:   If clinically indicated, CT Protocol includes Oral Contrast. Can Oral Contrast be administered?     Answer:   Yes     Order Specific Question:   What is the Relevant Clinical Indication / Reason for Exam?     Answer:   Left flank pain, recent UTI, rule out pyelonephritis versus abscess versus obstructive uropathy.     Order Specific Question:   Release to patient     Answer:   Immediate    POCT CBC     Order Specific Question:   Release to patient     Answer:   Immediate    POCT Urinalysis Dipstick    POCT ISTAT chem8 cartridge    Urine Culture, Routine     Order Specific Question:   Specimen source:     Answer:   Urine, clean catch     Order Specific Question:   Documentation of symptoms of UTI or sepsis:     Answer:   Documentation of symptoms of UTI or sepsis must be corroborated within the EMR     Order Specific Question:   Release to patient     Answer:   Immediate    iStat (Chem 8)    POCT Urine Dip    Insert Peripheral IV    iopamidol 76% (ISOVUE-370) injection for power injector    cephalexin 500 MG Oral Cap     Sig: Take 1 capsule (500 mg total) by mouth 3 (three) times daily for 7 days.     Dispense:  21 capsule     Refill:  0       Labs performed this visit:  Recent Results (from the past 10 hour(s))   POCT Urinalysis Dipstick    Collection Time: 02/23/24 12:00 PM   Result Value Ref Range    Urine Color Yellow Yellow    Urine Clarity Slightly cloudy (A) Clear    Specific Gravity, Urine 1.015 1.005 - 1.030    PH, Urine 6.5 5.0 - 8.0    Protein urine Negative Negative mg/dL    Glucose, Urine Negative Negative mg/dL    Ketone, Urine Negative Negative mg/dL     Bilirubin, Urine Negative Negative    Blood, Urine Trace-Intact (A) Negative    Nitrite Urine Negative Negative    Urobilinogen urine <2.0 <2.0 mg/dL    Leukocyte esterase urine Trace (A) Negative   POCT CBC    Collection Time: 02/23/24 12:50 PM   Result Value Ref Range    WBC IC 8.0 4.0 - 11.0 x10ˆ3/uL    RBC IC 5.47 4.30 - 5.70 X10ˆ6/uL    HGB IC 16.3 13.0 - 17.5 g/dL    HCT IC 46.5 39.0 - 53.0 %    MCV IC 85.0 80.0 - 100.0 fL    MCH IC 29.8 26.0 - 34.0 pg    MCHC IC 35.1 31.0 - 37.0 g/dL    PLT .0 150.0 - 450.0 X10ˆ3/uL    # Neutrophil 4.3 1.5 - 7.7 X10ˆ3/uL    # Lymphocyte 3.1 1.0 - 4.0 X10ˆ3/uL    # Mixed Cells 0.6 0.1 - 1.0 X10ˆ3/uL    Neutrophil % 54.0 %    Lymphocyte % 38.3 %    Mixed Cell % 7.7 %   POCT ISTAT chem8 cartridge    Collection Time: 02/23/24  1:04 PM   Result Value Ref Range    ISTAT Sodium 139 136 - 145 mmol/L    ISTAT BUN 11 7 - 18 mg/dL    ISTAT Potassium 3.9 3.6 - 5.1 mmol/L    ISTAT Chloride 100 98 - 112 mmol/L    ISTAT Ionized Calcium 1.22 1.12 - 1.32 mmol/L    ISTAT Hematocrit 52 37 - 53 %    ISTAT Glucose 88 70 - 99 mg/dL    ISTAT TCO2 28 21 - 32 mmol/L    ISTAT Creatinine 0.80 0.70 - 1.30 mg/dL    eGFR-Cr 123 >=60 mL/min/1.73m2       MDM:  Initial workup shows no acute process.  No leukocytosis.  Chemistries and renal function unremarkable.  Patient urinalysis with trace blood and trace leukocytes.  Culture sent.  Patient agrees with CT modality prior to disposition through [facility]. Pain 0/10 @ 1315 upon discharge for CT. PERC neg; No chest pain or SOB by hx provided.       Patient CT shows constipation with radiographic cystitis AND nephrolithiasis without obstructive uropathy or perinephric stranding.  Patient pain and rediscussion after CT marginal, 1 out of 10.  Agrees with further antibiotics pending culture results sent.  Will readdress primary by Monday for further recommendation for consideration of continuation of antibiotic based on report.  Agrees to the ER for  breakthrough changes.  Dr Chowdhury notified.     Diagnosis:    ICD-10-CM    1. Flank pain  R10.9 Urine Culture, Routine     CT ABDOMEN+PELVIS(CONTRAST ONLY)(CPT=74177)     Urine Culture, Routine     CT ABDOMEN+PELVIS(CONTRAST ONLY)(CPT=74177)      2. Cystitis  N30.90       3. Nephrolithiasis  N20.0           All results reviewed and discussed with patient.  See AVS for detailed discharge instructions for your condition today.    Follow Up with:  Nitin Mejía DO  [address] [place] [address]  [phone]    Schedule an appointment as soon as possible for a visit in 3 days  FOLLOW UP WITH PRIMARY. READDRESS CULTURE RESULTS/ANTIBIOTIC CONTINUATION. GO TO ER FOR BREAKTHROUGH CHANGES AS INSTRUCTED.

## 2024-02-24 NOTE — PROGRESS NOTES
Subjective:   Chao Reese is a 28 year old male who presents for Urgent Care F/u (Complaints of kidney pain and shooting pain throughout back. Muscles feel \"tense\". Taking Tylenol and Ibuprofen for pain. )   Patient is a pleasant 28 year old male with past medical history consistent for spina bifida,  shunt, and neurogenic bladder who self caths QID, who presents as follow-up from urgent care visit on 2/23/24. Patient states he is doing well, he is a dispatcher for [employer]. He is trying to get back in gym, he wants this year to get healthy for 29th birthday .     Of note patient was seen in urgent care as well on 2/15/2024 with a chief complaint of bodyaches, fatigue, suprapubic pressure since this.  Reports these are his typical symptoms when he gets a UTI.  Workup included a urinalysis that showed moderate blood and large leukocyte esterase, urine culture was sent patient was discharged home on ciprofloxacin 500 mg twice daily x 7 days.  Upon receipt of the urine culture the bacteria was found Klebsiella pneumonia and found to be sensitive to the prescription prescribed.    On 2/23/2024 patient re-presented to the urgent care with complaint of left flank pain radiating to the left upper quadrant.  Workup included a repeat urinalysis that showed trace blood that was intact, and trace leukocyte esterase.  Culture was sent.  Decision was made to proceed with a CT of the abdomen to evaluate for nephrolithiasis.  CT found constipation with radiographic cystitis AND nephrolithiasis without obstructive uropathy or perinephric stranding, no signs of pyelonephritis.  Patient was started on Keflex 500 mg tid x7d.  Patient instructed to follow-up on Monday with primary care provider to discuss culture results and further management.  Culture reveals no growth at 48 hours. He states he is feeling well, almost back to baseline. No issues. Discussed CT findings and constipation as well. Takes miralax on occasion, and  lactulose as needed. Does not like the taste and graininess of miralax.        Past Medical History:   Diagnosis Date    Bilateral leg weakness     chronic    Constipation     Depression     Foot drop, bilateral     Gait disturbance     High blood pressure     Neurogenic bladder     Pt self straight caths at home QID and irrigates BID PRN    Other ill-defined conditions(799.89)     shunt from hydrocephalus    PONV (postoperative nausea and vomiting)     if masked used    S/P  shunt     Spina bifida (HCC)     Management:  closure    Strabismus 1998    Done at [facility]    Strabismus       Past Surgical History:   Procedure Laterality Date    Other surgical history  2010    bladder augmentation/catheterizable channel    Other surgical history  07/06/2020    fistulotomy    Spine surgery procedure unlisted  1994    Strabismus surgery Left 1999 or 2000        History/Other:    Chief Complaint Reviewed and Verified  Nursing Notes Reviewed and   Verified  Tobacco Reviewed  Allergies Reviewed  Medications Reviewed    Problem List Reviewed  Medical History Reviewed  Surgical History   Reviewed  Family History Reviewed         Tobacco:  He has never smoked tobacco.    Current Outpatient Medications   Medication Sig Dispense Refill    cephalexin 500 MG Oral Cap Take 1 capsule (500 mg total) by mouth 3 (three) times daily for 7 days. 21 capsule 0    Water For Irrigation, Sterile (STERILE WATER FOR IRRIGATION) Irrigation Solution Irrigate with 1,000 mL as directed 2 (two) times daily as needed (Irrigates bladder twice daily due to catheterization.). 3 each 11    Acetaminophen Extra Strength 500 MG Oral Tab Take 2 tablets (1,000 mg total) by mouth every 6 (six) hours.      docusate sodium (COLACE) 100 MG Oral Cap Take 1 capsule (100 mg total) by mouth 2 (two) times daily as needed for constipation. 60 capsule 0    carvedilol 3.125 MG Oral Tab Take 1 tablet (3.125 mg total) by mouth 2 (two) times  daily with meals. 180 tablet 3    Oxybutynin Chloride ER 15 MG Oral Tablet 24 Hr Take 1 tablet (15 mg total) by mouth daily. 90 tablet 3    sodium chloride 0.9 % Irrigation Solution       hydrocortisone 2.5 % External Cream Apply to the affected areas twice daily Monday-Friday. Take weekends off. Stop when rash resolved. 453 g 1    ketoconazole 2 % External Shampoo You can apply this not only to the scalp 2 or 3 times a week in the shower but also to your beard area and then also the chest where the rash is.  Can also use on the face. 120 mL 11    triamcinolone 0.1 % External Cream Use twice daily  with flares to rash on chets (Patient not taking: Reported on 2/26/2024) 80 g 2    cholecalciferol 50 MCG (2000 UT) Oral Tab Take 1 tablet (2,000 Units total) by mouth daily. 90 tablet 3         Review of Systems:  Review of Systems   Constitutional: Negative.  Negative for activity change, chills and fever.   HENT: Negative.  Negative for congestion, ear pain, postnasal drip, sinus pain, sore throat and trouble swallowing.    Respiratory: Negative.  Negative for cough, shortness of breath and wheezing.    Cardiovascular: Negative.  Negative for chest pain and leg swelling.   Gastrointestinal:  Positive for constipation. Negative for abdominal pain, blood in stool and diarrhea.   Endocrine: Negative.    Genitourinary:  Positive for flank pain. Negative for difficulty urinating and dysuria.   Musculoskeletal:  Positive for back pain. Negative for arthralgias and neck stiffness.   Skin: Negative.  Negative for color change and rash.   Neurological: Negative.  Negative for dizziness and headaches.   Hematological:  Negative for adenopathy.         Objective:   /67   Pulse 92   Ht 5' 3.5\" (1.613 m)   Wt 140 lb 12.8 oz (63.9 kg)   BMI 24.55 kg/m²  Estimated body mass index is 24.55 kg/m² as calculated from the following:    Height as of this encounter: 5' 3.5\" (1.613 m).    Weight as of this encounter: 140 lb 12.8 oz  (63.9 kg).  Physical Exam  Vitals and nursing note reviewed.   Constitutional:       Appearance: Normal appearance. He is normal weight.   HENT:      Head: Normocephalic.      Right Ear: Tympanic membrane and external ear normal.      Left Ear: External ear normal.      Nose: Nose normal.      Mouth/Throat:      Mouth: Mucous membranes are moist.   Eyes:      Extraocular Movements: Extraocular movements intact.      Pupils: Pupils are equal, round, and reactive to light.   Cardiovascular:      Rate and Rhythm: Normal rate and regular rhythm.      Pulses: Normal pulses.      Heart sounds: Normal heart sounds. No murmur heard.  Pulmonary:      Effort: Pulmonary effort is normal. No respiratory distress.      Breath sounds: Normal breath sounds. No wheezing.   Abdominal:      General: There is no distension.      Palpations: Abdomen is soft. There is no mass.      Tenderness: There is no abdominal tenderness. There is no right CVA tenderness, left CVA tenderness, guarding or rebound.      Hernia: No hernia is present.   Musculoskeletal:         General: Normal range of motion.      Cervical back: Normal range of motion and neck supple.      Right lower leg: No edema.      Left lower leg: No edema.      Comments: Orthotics bilateral feet/ankle.   Lymphadenopathy:      Cervical: No cervical adenopathy.   Skin:     General: Skin is warm and dry.      Capillary Refill: Capillary refill takes less than 2 seconds.      Findings: No rash.   Neurological:      General: No focal deficit present.      Mental Status: He is alert and oriented to person, place, and time.   Psychiatric:         Mood and Affect: Mood normal.         Behavior: Behavior normal.           Assessment & Plan:   1. Hospital discharge follow-up (Primary)  Assessment & Plan:  Feeling better, pain minimal. Continuing abx as prescribed.   2. Cystitis  Assessment & Plan:  Repeat urine culture negative, likely secondary to treatment with ciprofloxacin.  Continue  keflex as prescribed to completion.  Patient reports intermittent flank pain that wraps around to front on left.  Consider urology referral if remains, likely secondary to infection and known stone.     Patient aware of plan of care. All questions answered to satisfaction of the patient. Patient instructed to call office or reach out via [facility] if any issues arise. For urgent issues and/or reviewed red flags please proceed to the urgent care or ER.  Also, inform the nurse practitioner with any new symptoms or medication side effects.        3. Renal stone  Assessment & Plan:  A left lower pole punctate calculus.   Consider referral to uro if pain persists/or uti becomes more frequent.   Encouraged good po intake.   4. Vaccine counseling  Assessment & Plan:  Patient counseled on importance of vaccinations.  Declines at this time.    5. Constipation, unspecified constipation type  Assessment & Plan:  Pt states he has had an issue for some time. Reports hx of miralax RX. Does not like graininess of medication so does not take regularly.  Reports he does take lactulose as needed when he is constipated.  Offered pt recommendation of trying miralax daily with 8 oz of water to see if he likes this consistency better.   Pt agreeable to trying.        Return for Annual physical.    LOS Wu, 2/24/2024, 10:43 AM

## 2024-02-26 ENCOUNTER — OFFICE VISIT (OUTPATIENT)
Dept: FAMILY MEDICINE CLINIC | Facility: CLINIC | Age: 29
End: 2024-02-26
Payer: MEDICARE

## 2024-02-26 VITALS
HEIGHT: 63.5 IN | SYSTOLIC BLOOD PRESSURE: 124 MMHG | DIASTOLIC BLOOD PRESSURE: 67 MMHG | BODY MASS INDEX: 24.64 KG/M2 | HEART RATE: 92 BPM | WEIGHT: 140.81 LBS

## 2024-02-26 DIAGNOSIS — K59.00 CONSTIPATION, UNSPECIFIED CONSTIPATION TYPE: ICD-10-CM

## 2024-02-26 DIAGNOSIS — N20.0 RENAL STONE: ICD-10-CM

## 2024-02-26 DIAGNOSIS — Z71.85 VACCINE COUNSELING: ICD-10-CM

## 2024-02-26 DIAGNOSIS — N30.90 CYSTITIS: ICD-10-CM

## 2024-02-26 DIAGNOSIS — Z09 HOSPITAL DISCHARGE FOLLOW-UP: Primary | ICD-10-CM

## 2024-02-26 NOTE — ASSESSMENT & PLAN NOTE
Pt states he has had an issue for some time. Reports hx of miralax RX. Does not like graininess of medication so does not take regularly.  Reports he does take lactulose as needed when he is constipated.  Offered pt recommendation of trying miralax daily with 8 oz of water to see if he likes this consistency better.   Pt agreeable to trying.

## 2024-02-26 NOTE — PATIENT INSTRUCTIONS
Take your antibiotic as prescribed.  Please take the whole course. Do not stop the antibiotic because you start to feel better. Drink extra water and fluids to help flush out your system.  Avoid drinks that are carbonated, have alcohol, or have caffeine as they can irritate the bladder.  Try to urinate often and fully empty the bladder each time. To relieve pain you may take over-the-counter pain medication such as Tylenol 650 mg every 4-6 hours as needed. To prevent future UTIs, drink plenty of water each day, urinate when you needs to. If you are sexually active urinate right after you have sex. Avoid bubble baths.  Finally, for females, after you use a toilet wipe from front to back.    Try metamucil for constipation once daily with 8oz water.    Try naproxen (aleve) for aches and pain.    If pain persists, will refer to urology.

## 2024-02-26 NOTE — ASSESSMENT & PLAN NOTE
A left lower pole punctate calculus.   Consider referral to uro if pain persists/or uti becomes more frequent.   Encouraged good po intake.

## 2024-02-26 NOTE — ASSESSMENT & PLAN NOTE
Repeat urine culture negative, likely secondary to treatment with ciprofloxacin.  Continue keflex as prescribed to completion.  Patient reports intermittent flank pain that wraps around to front on left.  Consider urology referral if remains, likely secondary to infection and known stone.     Patient aware of plan of care. All questions answered to satisfaction of the patient. Patient instructed to call office or reach out via [facility] if any issues arise. For urgent issues and/or reviewed red flags please proceed to the urgent care or ER.  Also, inform the nurse practitioner with any new symptoms or medication side effects.

## 2024-03-07 ENCOUNTER — APPOINTMENT (OUTPATIENT)
Dept: CT IMAGING | Facility: HOSPITAL | Age: 29
End: 2024-03-07
Attending: EMERGENCY MEDICINE
Payer: MEDICARE

## 2024-03-07 ENCOUNTER — HOSPITAL ENCOUNTER (OUTPATIENT)
Facility: HOSPITAL | Age: 29
Setting detail: OBSERVATION
Discharge: HOME | End: 2024-03-13
Attending: EMERGENCY MEDICINE
Payer: MEDICARE

## 2024-03-07 ENCOUNTER — APPOINTMENT (OUTPATIENT)
Dept: GENERAL RADIOLOGY | Facility: HOSPITAL | Age: 29
End: 2024-03-07
Attending: EMERGENCY MEDICINE
Payer: MEDICARE

## 2024-03-07 DIAGNOSIS — K59.00 CONSTIPATION, UNSPECIFIED CONSTIPATION TYPE: ICD-10-CM

## 2024-03-07 DIAGNOSIS — N39.0 URINARY TRACT INFECTION WITHOUT HEMATURIA, SITE UNSPECIFIED: Primary | ICD-10-CM

## 2024-03-07 DIAGNOSIS — R19.7 DIARRHEA, UNSPECIFIED TYPE: ICD-10-CM

## 2024-03-07 DIAGNOSIS — M67.912 TENDINOPATHY OF LEFT ROTATOR CUFF: ICD-10-CM

## 2024-03-07 DIAGNOSIS — R10.9 ABDOMINAL PAIN OF UNKNOWN ETIOLOGY: ICD-10-CM

## 2024-03-07 LAB
ALBUMIN SERPL-MCNC: 5 G/DL (ref 3.2–4.8)
ALBUMIN/GLOB SERPL: 1.9 {RATIO} (ref 1–2)
ALP LIVER SERPL-CCNC: 69 U/L
ALT SERPL-CCNC: 33 U/L
ANION GAP SERPL CALC-SCNC: 4 MMOL/L (ref 0–18)
AST SERPL-CCNC: 20 U/L (ref ?–34)
BASOPHILS # BLD AUTO: 0.04 X10(3) UL (ref 0–0.2)
BASOPHILS NFR BLD AUTO: 0.4 %
BILIRUB SERPL-MCNC: 1.2 MG/DL (ref 0.3–1.2)
BILIRUB UR QL: NEGATIVE
BUN BLD-MCNC: 9 MG/DL (ref 9–23)
BUN/CREAT SERPL: 9.2 (ref 10–20)
CALCIUM BLD-MCNC: 9.9 MG/DL (ref 8.7–10.4)
CHLORIDE SERPL-SCNC: 107 MMOL/L (ref 98–112)
CK SERPL-CCNC: 72 U/L
CO2 SERPL-SCNC: 28 MMOL/L (ref 21–32)
COLOR UR: YELLOW
CREAT BLD-MCNC: 0.98 MG/DL
DEPRECATED RDW RBC AUTO: 37.7 FL (ref 35.1–46.3)
EGFRCR SERPLBLD CKD-EPI 2021: 107 ML/MIN/1.73M2 (ref 60–?)
EOSINOPHIL # BLD AUTO: 0.34 X10(3) UL (ref 0–0.7)
EOSINOPHIL NFR BLD AUTO: 3.4 %
ERYTHROCYTE [DISTWIDTH] IN BLOOD BY AUTOMATED COUNT: 12.3 % (ref 11–15)
GLOBULIN PLAS-MCNC: 2.7 G/DL (ref 2.8–4.4)
GLUCOSE BLD-MCNC: 93 MG/DL (ref 70–99)
GLUCOSE UR-MCNC: NORMAL MG/DL
HCT VFR BLD AUTO: 48.8 %
HGB BLD-MCNC: 16.6 G/DL
IMM GRANULOCYTES # BLD AUTO: 0.02 X10(3) UL (ref 0–1)
IMM GRANULOCYTES NFR BLD: 0.2 %
KETONES UR-MCNC: NEGATIVE MG/DL
LEUKOCYTE ESTERASE UR QL STRIP.AUTO: 500
LIPASE SERPL-CCNC: 32 U/L (ref 13–75)
LYMPHOCYTES # BLD AUTO: 2.6 X10(3) UL (ref 1–4)
LYMPHOCYTES NFR BLD AUTO: 26.3 %
MCH RBC QN AUTO: 29 PG (ref 26–34)
MCHC RBC AUTO-ENTMCNC: 34 G/DL (ref 31–37)
MCV RBC AUTO: 85.2 FL
MONOCYTES # BLD AUTO: 0.83 X10(3) UL (ref 0.1–1)
MONOCYTES NFR BLD AUTO: 8.4 %
NEUTROPHILS # BLD AUTO: 6.06 X10 (3) UL (ref 1.5–7.7)
NEUTROPHILS # BLD AUTO: 6.06 X10(3) UL (ref 1.5–7.7)
NEUTROPHILS NFR BLD AUTO: 61.3 %
NITRITE UR QL STRIP.AUTO: NEGATIVE
OSMOLALITY SERPL CALC.SUM OF ELEC: 286 MOSM/KG (ref 275–295)
PH UR: 6.5 [PH] (ref 5–8)
PLATELET # BLD AUTO: 248 10(3)UL (ref 150–450)
POTASSIUM SERPL-SCNC: 4.5 MMOL/L (ref 3.5–5.1)
PROT SERPL-MCNC: 7.7 G/DL (ref 5.7–8.2)
PROT UR-MCNC: 20 MG/DL
RBC # BLD AUTO: 5.73 X10(6)UL
RBC #/AREA URNS AUTO: >10 /HPF
SODIUM SERPL-SCNC: 139 MMOL/L (ref 136–145)
SP GR UR STRIP: 1.01 (ref 1–1.03)
UROBILINOGEN UR STRIP-ACNC: NORMAL
WBC # BLD AUTO: 9.9 X10(3) UL (ref 4–11)
WBC #/AREA URNS AUTO: >50 /HPF
WBC CLUMPS UR QL AUTO: PRESENT /HPF

## 2024-03-07 PROCEDURE — 71045 X-RAY EXAM CHEST 1 VIEW: CPT | Performed by: EMERGENCY MEDICINE

## 2024-03-07 PROCEDURE — 74176 CT ABD & PELVIS W/O CONTRAST: CPT | Performed by: EMERGENCY MEDICINE

## 2024-03-07 RX ORDER — ONDANSETRON 2 MG/ML
4 INJECTION INTRAMUSCULAR; INTRAVENOUS ONCE
Status: COMPLETED | OUTPATIENT
Start: 2024-03-07 | End: 2024-03-07

## 2024-03-07 RX ORDER — MORPHINE SULFATE 4 MG/ML
4 INJECTION, SOLUTION INTRAMUSCULAR; INTRAVENOUS ONCE
Status: DISCONTINUED | OUTPATIENT
Start: 2024-03-07 | End: 2024-03-07

## 2024-03-07 RX ORDER — MORPHINE SULFATE 4 MG/ML
4 INJECTION, SOLUTION INTRAMUSCULAR; INTRAVENOUS ONCE
Status: COMPLETED | OUTPATIENT
Start: 2024-03-07 | End: 2024-03-07

## 2024-03-07 RX ORDER — DIAZEPAM 5 MG/ML
5 INJECTION, SOLUTION INTRAMUSCULAR; INTRAVENOUS ONCE
Status: COMPLETED | OUTPATIENT
Start: 2024-03-07 | End: 2024-03-07

## 2024-03-08 ENCOUNTER — APPOINTMENT (OUTPATIENT)
Dept: CT IMAGING | Facility: HOSPITAL | Age: 29
End: 2024-03-08
Attending: Other
Payer: MEDICARE

## 2024-03-08 ENCOUNTER — APPOINTMENT (OUTPATIENT)
Dept: GENERAL RADIOLOGY | Facility: HOSPITAL | Age: 29
End: 2024-03-08
Attending: STUDENT IN AN ORGANIZED HEALTH CARE EDUCATION/TRAINING PROGRAM
Payer: MEDICARE

## 2024-03-08 ENCOUNTER — APPOINTMENT (OUTPATIENT)
Dept: GENERAL RADIOLOGY | Facility: HOSPITAL | Age: 29
End: 2024-03-08
Attending: Other
Payer: MEDICARE

## 2024-03-08 PROBLEM — R19.7 DIARRHEA, UNSPECIFIED TYPE: Status: ACTIVE | Noted: 2024-03-08

## 2024-03-08 PROBLEM — Q05.9 MYELOMENINGOCELE (HCC): Status: ACTIVE | Noted: 2017-11-07

## 2024-03-08 PROBLEM — R10.9 ABDOMINAL PAIN OF UNKNOWN ETIOLOGY: Status: ACTIVE | Noted: 2024-03-08

## 2024-03-08 PROBLEM — G91.0 COMMUNICATING HYDROCEPHALUS (HCC): Status: ACTIVE | Noted: 2023-10-16

## 2024-03-08 LAB
ANION GAP SERPL CALC-SCNC: 6 MMOL/L (ref 0–18)
ATRIAL RATE: 96 BPM
BASOPHILS # BLD AUTO: 0.03 X10(3) UL (ref 0–0.2)
BASOPHILS NFR BLD AUTO: 0.4 %
BUN BLD-MCNC: 9 MG/DL (ref 9–23)
BUN/CREAT SERPL: 9.6 (ref 10–20)
CA-I BLD-SCNC: 1.13 MMOL/L (ref 0.95–1.32)
CALCIUM BLD-MCNC: 9.2 MG/DL (ref 8.7–10.4)
CHLORIDE SERPL-SCNC: 108 MMOL/L (ref 98–112)
CO2 SERPL-SCNC: 27 MMOL/L (ref 21–32)
CREAT BLD-MCNC: 0.94 MG/DL
DEPRECATED RDW RBC AUTO: 37.6 FL (ref 35.1–46.3)
EGFRCR SERPLBLD CKD-EPI 2021: 113 ML/MIN/1.73M2 (ref 60–?)
EOSINOPHIL # BLD AUTO: 0.29 X10(3) UL (ref 0–0.7)
EOSINOPHIL NFR BLD AUTO: 3.9 %
ERYTHROCYTE [DISTWIDTH] IN BLOOD BY AUTOMATED COUNT: 12.1 % (ref 11–15)
GLUCOSE BLD-MCNC: 89 MG/DL (ref 70–99)
HCT VFR BLD AUTO: 43.3 %
HGB BLD-MCNC: 15.4 G/DL
IMM GRANULOCYTES # BLD AUTO: 0.01 X10(3) UL (ref 0–1)
IMM GRANULOCYTES NFR BLD: 0.1 %
LYMPHOCYTES # BLD AUTO: 2.52 X10(3) UL (ref 1–4)
LYMPHOCYTES NFR BLD AUTO: 34 %
MAGNESIUM SERPL-MCNC: 2.1 MG/DL (ref 1.6–2.6)
MCH RBC QN AUTO: 30.3 PG (ref 26–34)
MCHC RBC AUTO-ENTMCNC: 35.6 G/DL (ref 31–37)
MCV RBC AUTO: 85.1 FL
MONOCYTES # BLD AUTO: 0.71 X10(3) UL (ref 0.1–1)
MONOCYTES NFR BLD AUTO: 9.6 %
NEUTROPHILS # BLD AUTO: 3.85 X10 (3) UL (ref 1.5–7.7)
NEUTROPHILS # BLD AUTO: 3.85 X10(3) UL (ref 1.5–7.7)
NEUTROPHILS NFR BLD AUTO: 52 %
OSMOLALITY SERPL CALC.SUM OF ELEC: 290 MOSM/KG (ref 275–295)
P AXIS: 42 DEGREES
P-R INTERVAL: 128 MS
PLATELET # BLD AUTO: 205 10(3)UL (ref 150–450)
POTASSIUM SERPL-SCNC: 4 MMOL/L (ref 3.5–5.1)
PUNCTURE CHARGE: NO
Q-T INTERVAL: 324 MS
QRS DURATION: 76 MS
QTC CALCULATION (BEZET): 409 MS
R AXIS: 54 DEGREES
RBC # BLD AUTO: 5.09 X10(6)UL
SODIUM SERPL-SCNC: 141 MMOL/L (ref 136–145)
T AXIS: 29 DEGREES
TSI SER-ACNC: 3.31 MIU/ML (ref 0.55–4.78)
VENTRICULAR RATE: 96 BPM
WBC # BLD AUTO: 7.4 X10(3) UL (ref 4–11)

## 2024-03-08 PROCEDURE — 70450 CT HEAD/BRAIN W/O DYE: CPT | Performed by: OTHER

## 2024-03-08 PROCEDURE — 70360 X-RAY EXAM OF NECK: CPT | Performed by: OTHER

## 2024-03-08 PROCEDURE — 99223 1ST HOSP IP/OBS HIGH 75: CPT | Performed by: OTHER

## 2024-03-08 PROCEDURE — 72125 CT NECK SPINE W/O DYE: CPT | Performed by: OTHER

## 2024-03-08 PROCEDURE — 74018 RADEX ABDOMEN 1 VIEW: CPT | Performed by: OTHER

## 2024-03-08 PROCEDURE — 70250 X-RAY EXAM OF SKULL: CPT | Performed by: OTHER

## 2024-03-08 PROCEDURE — 73030 X-RAY EXAM OF SHOULDER: CPT | Performed by: STUDENT IN AN ORGANIZED HEALTH CARE EDUCATION/TRAINING PROGRAM

## 2024-03-08 PROCEDURE — 71045 X-RAY EXAM CHEST 1 VIEW: CPT | Performed by: OTHER

## 2024-03-08 PROCEDURE — 99223 1ST HOSP IP/OBS HIGH 75: CPT | Performed by: STUDENT IN AN ORGANIZED HEALTH CARE EDUCATION/TRAINING PROGRAM

## 2024-03-08 PROCEDURE — 99291 CRITICAL CARE FIRST HOUR: CPT | Performed by: STUDENT IN AN ORGANIZED HEALTH CARE EDUCATION/TRAINING PROGRAM

## 2024-03-08 RX ORDER — POLYETHYLENE GLYCOL 3350 17 G/17G
17 POWDER, FOR SOLUTION ORAL DAILY PRN
Status: DISCONTINUED | OUTPATIENT
Start: 2024-03-08 | End: 2024-03-13

## 2024-03-08 RX ORDER — HYDROCODONE BITARTRATE AND ACETAMINOPHEN 5; 325 MG/1; MG/1
2 TABLET ORAL EVERY 6 HOURS PRN
Status: DISCONTINUED | OUTPATIENT
Start: 2024-03-08 | End: 2024-03-09

## 2024-03-08 RX ORDER — MORPHINE SULFATE 4 MG/ML
4 INJECTION, SOLUTION INTRAMUSCULAR; INTRAVENOUS EVERY 2 HOUR PRN
Status: DISCONTINUED | OUTPATIENT
Start: 2024-03-08 | End: 2024-03-13

## 2024-03-08 RX ORDER — DOCUSATE SODIUM 100 MG/1
100 CAPSULE, LIQUID FILLED ORAL 2 TIMES DAILY PRN
Status: DISCONTINUED | OUTPATIENT
Start: 2024-03-08 | End: 2024-03-13

## 2024-03-08 RX ORDER — PROCHLORPERAZINE EDISYLATE 5 MG/ML
5 INJECTION INTRAMUSCULAR; INTRAVENOUS EVERY 8 HOURS PRN
Status: DISCONTINUED | OUTPATIENT
Start: 2024-03-08 | End: 2024-03-13

## 2024-03-08 RX ORDER — CARVEDILOL 3.12 MG/1
3.12 TABLET ORAL 2 TIMES DAILY WITH MEALS
Status: DISCONTINUED | OUTPATIENT
Start: 2024-03-08 | End: 2024-03-09

## 2024-03-08 RX ORDER — ENEMA 19; 7 G/133ML; G/133ML
1 ENEMA RECTAL ONCE AS NEEDED
Status: DISCONTINUED | OUTPATIENT
Start: 2024-03-08 | End: 2024-03-13

## 2024-03-08 RX ORDER — DIAZEPAM 5 MG/ML
5 INJECTION, SOLUTION INTRAMUSCULAR; INTRAVENOUS ONCE
Status: COMPLETED | OUTPATIENT
Start: 2024-03-08 | End: 2024-03-08

## 2024-03-08 RX ORDER — BISACODYL 10 MG
10 SUPPOSITORY, RECTAL RECTAL
Status: DISCONTINUED | OUTPATIENT
Start: 2024-03-08 | End: 2024-03-13

## 2024-03-08 RX ORDER — MORPHINE SULFATE 2 MG/ML
1 INJECTION, SOLUTION INTRAMUSCULAR; INTRAVENOUS EVERY 2 HOUR PRN
Status: DISCONTINUED | OUTPATIENT
Start: 2024-03-08 | End: 2024-03-13

## 2024-03-08 RX ORDER — BACLOFEN 10 MG/1
10 TABLET ORAL 3 TIMES DAILY
Status: DISCONTINUED | OUTPATIENT
Start: 2024-03-08 | End: 2024-03-08

## 2024-03-08 RX ORDER — BACLOFEN 10 MG/1
10 TABLET ORAL 3 TIMES DAILY
Status: DISCONTINUED | OUTPATIENT
Start: 2024-03-08 | End: 2024-03-13

## 2024-03-08 RX ORDER — GABAPENTIN 300 MG/1
300 CAPSULE ORAL 3 TIMES DAILY
Status: DISCONTINUED | OUTPATIENT
Start: 2024-03-08 | End: 2024-03-09

## 2024-03-08 RX ORDER — MORPHINE SULFATE 2 MG/ML
2 INJECTION, SOLUTION INTRAMUSCULAR; INTRAVENOUS EVERY 2 HOUR PRN
Status: DISCONTINUED | OUTPATIENT
Start: 2024-03-08 | End: 2024-03-13

## 2024-03-08 RX ORDER — HEPARIN SODIUM 5000 [USP'U]/ML
5000 INJECTION, SOLUTION INTRAVENOUS; SUBCUTANEOUS EVERY 12 HOURS SCHEDULED
Status: DISCONTINUED | OUTPATIENT
Start: 2024-03-08 | End: 2024-03-13

## 2024-03-08 RX ORDER — SENNOSIDES 8.6 MG
17.2 TABLET ORAL NIGHTLY PRN
Status: DISCONTINUED | OUTPATIENT
Start: 2024-03-08 | End: 2024-03-13

## 2024-03-08 RX ORDER — BACLOFEN 10 MG/1
5 TABLET ORAL 3 TIMES DAILY
Status: DISCONTINUED | OUTPATIENT
Start: 2024-03-08 | End: 2024-03-08

## 2024-03-08 RX ORDER — GABAPENTIN 600 MG/1
600 TABLET ORAL ONCE
Status: COMPLETED | OUTPATIENT
Start: 2024-03-08 | End: 2024-03-08

## 2024-03-08 RX ORDER — MELATONIN
2000 DAILY
Status: DISCONTINUED | OUTPATIENT
Start: 2024-03-08 | End: 2024-03-13

## 2024-03-08 RX ORDER — MORPHINE SULFATE 2 MG/ML
1 INJECTION, SOLUTION INTRAMUSCULAR; INTRAVENOUS ONCE
Status: COMPLETED | OUTPATIENT
Start: 2024-03-08 | End: 2024-03-08

## 2024-03-08 RX ORDER — ONDANSETRON 2 MG/ML
4 INJECTION INTRAMUSCULAR; INTRAVENOUS EVERY 6 HOURS PRN
Status: DISCONTINUED | OUTPATIENT
Start: 2024-03-08 | End: 2024-03-13

## 2024-03-08 NOTE — PLAN OF CARE
Problem: Patient Centered Care  Goal: Patient preferences are identified and integrated in the patient's plan of care  Description: Interventions:  - What would you like us to know as we care for you? Patient has history of spina bifida and lives with parents  - Provide timely, complete, and accurate information to patient/family  - Incorporate patient and family knowledge, values, beliefs, and cultural backgrounds into the planning and delivery of care  - Encourage patient/family to participate in care and decision-making at the level they choose  - Honor patient and family perspectives and choices  Outcome: Progressing     Problem: Patient/Family Goals  Goal: Patient/Family Long Term Goal  Description: Patient's Long Term Goal: To go home    Interventions:    - See additional Care Plan goals for specific interventions  Outcome: Progressing  Goal: Patient/Family Short Term Goal  Description: Patient's Short Term Goal: To feel better     Interventions:   - See additional Care Plan goals for specific interventions  Outcome: Progressing     Problem: CARDIOVASCULAR - ADULT  Goal: Maintains optimal cardiac output and hemodynamic stability  Description: INTERVENTIONS:  - Monitor vital signs, rhythm, and trends  - Monitor for bleeding, hypotension and signs of decreased cardiac output  - Evaluate effectiveness of vasoactive medications to optimize hemodynamic stability  - Monitor arterial and/or venous puncture sites for bleeding and/or hematoma  - Assess quality of pulses, skin color and temperature  - Assess for signs of decreased coronary artery perfusion - ex. Angina  - Evaluate fluid balance, assess for edema, trend weights  Outcome: Progressing     Problem: GENITOURINARY - ADULT  Goal: Absence of urinary retention  Description: INTERVENTIONS:  - Assess patient’s ability to void and empty bladder  - Monitor intake/output and perform bladder scan as needed  - Follow urinary retention protocol/standard of care  -  Consider collaborating with pharmacy to review patient's medication profile  - Implement strategies to promote bladder emptying  Outcome: Progressing     Problem: NEUROLOGICAL - ADULT  Goal: Achieves stable or improved neurological status  Description: INTERVENTIONS  - Assess for and report changes in neurological status  - Initiate measures to prevent increased intracranial pressure  - Maintain blood pressure and fluid volume within ordered parameters to optimize cerebral perfusion and minimize risk of hemorrhage  - Monitor temperature, glucose, and sodium. Initiate appropriate interventions as ordered  Outcome: Progressing     Problem: PAIN - ADULT  Goal: Verbalizes/displays adequate comfort level or patient's stated pain goal  Description: INTERVENTIONS:  - Encourage pt to monitor pain and request assistance  - Assess pain using appropriate pain scale  - Administer analgesics based on type and severity of pain and evaluate response  - Implement non-pharmacological measures as appropriate and evaluate response  - Consider cultural and social influences on pain and pain management  - Manage/alleviate anxiety  - Utilize distraction and/or relaxation techniques  - Monitor for opioid side effects  - Notify MD/LIP if interventions unsuccessful or patient reports new pain  - Anticipate increased pain with activity and pre-medicate as appropriate  Outcome: Progressing     Problem: RISK FOR INFECTION - ADULT  Goal: Absence of fever/infection during anticipated neutropenic period  Description: INTERVENTIONS  - Monitor WBC  - Administer growth factors as ordered  - Implement neutropenic guidelines  Outcome: Progressing     Problem: SAFETY ADULT - FALL  Goal: Free from fall injury  Description: INTERVENTIONS:  - Assess pt frequently for physical needs  - Identify cognitive and physical deficits and behaviors that affect risk of falls.  - [place] fall precautions as indicated by assessment.  - Educate pt/family on patient  safety including physical limitations  - Instruct pt to call for assistance with activity based on assessment  - Modify environment to reduce risk of injury  - Provide assistive devices as appropriate  - Consider OT/PT consult to assist with strengthening/mobility  - Encourage toileting schedule  Outcome: Progressing   Patient came to ED with lower back pain radiating to flanks. Patient had nausea and diarrhea at home, no fever noted per patient. Patient admitted with UTI. Urine culture pending. IV Rocephin started. Patient states he has been unable to move left arm since the ED. Dr. Yuan aware. X-ray of left shoulder ordered for morning. Morphine and baclofen for pain. Patient has history of neurogenic bladder and straight caths at home QID. VSS. Will continue to monitor patient.

## 2024-03-08 NOTE — ED INITIAL ASSESSMENT (HPI)
Pt to ED with c/o low back pain that radiates to flanks,and lower quadrants. Onset of pain this morning. Pt states nausea and states a few episodes of green diarrhea. Pain rated 9/10.  Pt describes the pain as squeezing pain.

## 2024-03-08 NOTE — CONSULTS
[facility]  [address] [place] [address], [address]  Health system [id number]  [phone]          NEUROLOGY CONSULTATION NOTE    [facility]  part of [facility]    Report of Consultation  Chao Reese Patient Status:  Observation     3/18/1995 MRN [id number]    Location [facility] 1W Attending Latricia Loya DO    Hosp Day # 0 PCP Nitin Mejía DO      Date of Admission:  3/7/2024  Date of Consult:  3/8/2024  Reason for Admission/Consultation:  28-year-old male with history of  shunt and spinal bifida. He self catheterizes. He presented for abdominal pain and diarrhea was radiating to his back. En route he said both of his upper extremities spasmed and he could not move them. By the time I saw him in the room he was still having some pain in the left arm but I can move everything. I gave him some morphine for his pain and he said when he got back from CAT scan his left arm began to spasm again and he is holding his left fist clenched and his arm is tight. Seems to be coming and going. I just added some Valium. He has no headache or vomiting or neck pain. He said he typically knows when something is going on with his  shunt because he gets a headache. Not sure what is causing his spasming in his arms. 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    Who presented for gastroenteritis/diarrhea and his symptoms began immediately after he had a CT scan.      Review and summation of prior records  Nsgy note:  Chao returns today for routine follow-up. He is a 28-year-old male with a history of shunted hydrocephalus and myelomeningocele. He underwent a right-sided ventriculoperitoneal shunt revision on November 3, 2023. He underwent a proximal revision. Distal runoff was slow but functional. He feels much better and has no particular complaints today. He is trying to do a better job managing his bowels. He presented with a month-long history of intermittent headaches and right-sided periorbital pain and back pain.    Back pain he had prior to surgery is completely resolved. He is encouraged.    He is unique with the fact that in the past he has dilated his ventricles and had shunt malfunction symptoms which were improved with a bowel program. April 11, 2018 he had expanded ventricles and this got much better spontaneously such that on April 13 the CT showed a decompressed ventricular system without surgery only due to bowel regimen for his severe constipation.Assessment/Plan:  Is my assessment that he is healing nicely from the recent surgery. I encouraged him to be super aggressive with his bowel regimen. He also knows to call me with any concerns otherwise we will see him back in 6 months.     OSH note:  myelomeningocele and hydrocephalus s/p   -s/p replacement of proximal ventricular catheter  -Constipation  -HTN   -Neurogenic bladder  -Leukocytosis resolved    Plan      Postop day 3, goal for systolic blood pressure less than 160  Home carvedilol restarted  Required 1 dose of IV labetalol for systolic blood pressure reading over 160  Continue with neurochecks,  Blood pressure now better controlled.  Postoperative ID given, received cefazolin x24 hours  Currently  neurochecks every 4 hour,  Continue with straight caths for neurogenic bladder has history of recurrent UTI  PT OT consult  Because the patient is now resolved, had bowel movement, discussed with primary team patient medically cleared to be discharged and outpatient follow-up with PCP. No additional medication prescribed from my end.     Op report:   Operative Indications: This is a 28-year-old male with a history of myelomeningocele and shunted hydrocephalus. Presents with over a month history of intermittent headaches. Imaging studies have demonstrated a dilated ventricular system with a suspicion of shunt malfunction. This morning he tells me he has not had a bowel movement for over 1 week. In the past the bowel movements have been correlated with hypofunction of the shunt. However given that this is relatively recent and his concerns have been for the last 4 to 6 weeks I recommend to continue to move forward with surgery. Risks and alternatives discussed with him and his family informed consent obtained the patient brought to the operating suite.    Operative Technique: Patient was placed supine the operating table in a latex free environment. General esthesia was induced. His head was turned to the left the right shunt tract area was shaved prepped and draped in sterile fashion.    Timeout was performed and antibiotics given. Incision was carried down on the right frontal region through the previous surgical scar and the scalp dissected to identify the insertion point to the cranium of the catheter and its connection with the valve.    Next the valve was disconnected from the ventricular catheter and there was no significant flow of spinal fluid from the ventricular catheter. The distal valve system was protected with a piece of tubing and tested with water manometry. This slowly drained to slightly increased pressure. Irrigation did not cause any backup and pressure and continue to slowly drain down. Clearly  there was a proximal shunt malfunction. This ventricular catheter was removed by placing a stylette into the catheter gentle cauterization and then retrieval of the catheter without intraventricular hemorrhage.    New antibiotic impregnated ventricular catheter was passed into the ventricular system a centimeter longer than the prior ventricular catheter such that it was placed at 7 cm depth. Brisk CSF return and a sample was sent for CSF analysis.    Catheter was then trimmed and connected to the existing valve system with 3-0 Nurolon tie.    This was then irrigated with antibiotic solution small mount of vancomycin powder placed prior to closure of the scalp with 3-0 Vicryl incorporating galea and 4-0 chromic at the skin edge. Antibiotic ointment is placed in sterile dressings. Then general esthesia was reversed the patient extubated and transferred to the recovery room with stable vital signs. He was placed on an aggressive bowel regimen postoperatively.    EBL less than 5 cc.    Complications: none    ROS:  Pertinent positive and negatives per HPI.  All others were reviewed and negative.    Past Medical History:   Diagnosis Date    Bilateral leg weakness     chronic    Constipation     Depression     Foot drop, bilateral     Gait disturbance     High blood pressure     Neurogenic bladder     Pt self straight caths at home QID and irrigates BID PRN    Other ill-defined conditions(799.89)     shunt from hydrocephalus    PONV (postoperative nausea and vomiting)     if masked used    S/P  shunt     Spina bifida (HCC)     Management:  closure    Strabismus 1998    Done at [facility]    Strabismus        Past Surgical History:   Procedure Laterality Date    OTHER SURGICAL HISTORY  2010    bladder augmentation/catheterizable channel    OTHER SURGICAL HISTORY  07/06/2020    fistulotomy    SPINE SURGERY PROCEDURE UNLISTED  1994    STRABISMUS SURGERY Left 1999 or 2000       Family History   Problem  Relation Age of Onset    No Known Problems Father     No Known Problems Mother     Diabetes Neg     Glaucoma Neg        Social History     Socioeconomic History    Marital status: Single     Spouse name: Not on file    Number of children: Not on file    Years of education: Not on file    Highest education level: Not on file   Occupational History    Not on file   Tobacco Use    Smoking status: Never     Passive exposure: Never    Smokeless tobacco: Never   Vaping Use    Vaping Use: Never used   Substance and Sexual Activity    Alcohol use: Not Currently    Drug use: No    Sexual activity: Not on file   Other Topics Concern     Service Not Asked    Blood Transfusions Not Asked    Caffeine Concern Yes     Comment: 1 cup a day.    Occupational Exposure Not Asked    Hobby Hazards Not Asked    Sleep Concern Not Asked    Stress Concern Not Asked    Weight Concern Not Asked    Special Diet Not Asked    Back Care Not Asked    Exercise No    Bike Helmet Not Asked    Seat Belt Not Asked    Self-Exams Not Asked    Grew up on a farm Not Asked    History of tanning Not Asked    Outdoor occupation Not Asked    Pt has a pacemaker No    Pt has a defibrillator No    Reaction to local anesthetic No   Social History Narrative    The patient uses the following assistive device(s):  Splint on R leg .      The patient does live in a home with stairs.     Social Determinants of Health     Financial Resource Strain: Low Risk  (10/19/2023)    Financial Resource Strain     Difficulty of Paying Living Expenses: Not hard at all     Med Affordability: No   Food Insecurity: No Food Insecurity (3/8/2024)    Food Insecurity     Food Insecurity: Never true   Transportation Needs: No Transportation Needs (3/8/2024)    Transportation Needs     Lack of Transportation: No   Physical Activity: Not on file   Stress: Not on file   Social Connections: Not on file   Housing Stability: Low Risk  (3/8/2024)    Housing Stability     Housing  Instability: No     Housing Instability Emergency: Not on file       Objective:    Last vitals and weight :  Vitals:    03/08/24 0742   BP: (!) 134/91   Pulse: 89   Resp: 20   Temp: 97.8 °F (36.6 °C)     @FLOWCHS(14)@    Exam:  - General: appears stated age and no distress  - CV: S1, S2 normal   Carotids:   - Pulmonary: no sx respiratory distress   Neurologic Exam  - Mental Status: Alert and attentive. Orjented x4.  Speech is spontaneous, fluent, and prosodic. Comprehension intact. Repetition intact. Phrase length and rate are normal. No paraphasic errors, neologisms, anomia, acalculia, apraxia, anosognosia, or R/L confusion. No neglect.   - Cranial Nerves: No gaze preference. Visual fields:normal Pupils are 5mm briskly constricting to 4mm and equally round and reactive to light  in a well lit room.  EOMI. B/l gaze evoked nystagmus.  Nystagmus. No ptosis. V1-V3 intact B/L to light touch.No pathological facial asymmetry. No flattening of the nasolabial fold. .  Hearing grossly intact.  Tongue midline. No atrophy or fasiculations of the tongue noted. Palate and uvula elevate symmetrically.  Shoulder shrug symmetric.  - Fundoscopic exam:normal w/o hemorrhages, exudates, or papilledema.No attenuation. No pallor.  - Motor:  normal tone, diffusely decrease bulk in LE. No interosseous wasting. No flattening of hypothenar eminences.        L UE is contracted, clenched in a fist    Right Left     Motor Strength   Deltoids 5   Triceps 5   Biceps 5   Wrist Extensors 5    5    Hip Flexors    Knee extensors 5 5  Knee flexors 5 5  Plantar flexion 1 1  Dorsiflexion 0 0             Reflexes:    C5 C6 C7  L4 S1   R 3+ 3+ 3+ 0 2+   L     1+ 2+   Adductor Spread: Spread of reflexes is noted.    Frontal release signs:Not assessed.    Jaw Jerk:    Michael's sign: questionable on right    Nonsustained clonus: Absent   Sustained clonus: Absent   - Sensory:   Light touch: intact  Temperature: intact  Pinprick:   Vibration: intact      - Cerebellum: No truncal ataxia. No titubations. No dysmetria, no dysdiadochokinesis. No overshoot.   - Gait/station: Normal gait and station. Symmetric arm swing.   - Toe walking:  - Heel walking:  - Plantar response: flexor bilaterally    Inpatient Medications   carvedilol  3.125 mg Oral BID with meals    cholecalciferol  2,000 Units Oral Daily    oxyBUTYnin Chloride ER  15 mg Oral Daily    heparin  5,000 Units Subcutaneous 2 times per day    cefTRIAXone  1 g Intravenous Q24H    baclofen  5 mg Oral TID        docusate sodium, polyethylene glycol (PEG 3350), sennosides, bisacodyl, fleet enema, ondansetron, prochlorperazine, morphINE **OR** morphINE **OR** morphINE, HYDROcodone-acetaminophen      Home Medications  Current Outpatient Medications   Medication Instructions    Acetaminophen Extra Strength 500 MG Oral Tab 2 tablets, Oral, Every 6 hours    carvedilol (COREG) 3.125 mg, Oral, 2 times daily with meals    cholecalciferol 2,000 Units, Oral, Daily    oxyBUTYnin Chloride ER (DITROPAN XL) 15 mg, Oral, Daily    sodium chloride 0.9 % Irrigation Solution     Water For Irrigation, Sterile (STERILE WATER FOR IRRIGATION) Irrigation Solution 1,000 mL, Irrigation, 2 times daily PRN        Data reviewed  Laboratory Data:  Lab Results   Component Value Date    WBC 7.4 03/08/2024    HGB 15.4 03/08/2024    HCT 43.3 03/08/2024    .0 03/08/2024    CREATSERUM 0.94 03/08/2024    BUN 9 03/08/2024     03/08/2024    K 4.0 03/08/2024     03/08/2024    CO2 27.0 03/08/2024    GLU 89 03/08/2024    CA 9.2 03/08/2024    ALB 5.0 (H) 03/07/2024    ALKPHO 69 03/07/2024    TP 7.7 03/07/2024    AST 20 03/07/2024    ALT 33 03/07/2024    PTT 33.7 10/16/2023    INR 1.09 10/16/2023    PTP 14.8 10/16/2023    TSH 3.309 03/07/2024    LIP 32 03/07/2024    DDIMER <0.27 04/02/2022    ESRML 18 (H) 10/12/2023    MG 2.1 03/07/2024    PHOS 3.1 03/22/2018    CK 72 03/07/2024    B12 340 07/24/2023     Recent Results (from the past 72  hour(s))   EKG    Collection Time: 03/07/24  9:00 PM   Result Value Ref Range    Ventricular rate 96 BPM    Atrial rate 96 BPM    P-R Interval 128 ms    QRS Duration 76 ms    Q-T Interval 324 ms    QTC Calculation (Bezet) 409 ms    P Axis 42 degrees    R Axis 54 degrees    T Axis 29 degrees   RAINBOW DRAW LAVENDER    Collection Time: 03/07/24  9:27 PM   Result Value Ref Range    Hold Lavender Auto Resulted    RAINBOW DRAW LIGHT GREEN    Collection Time: 03/07/24  9:27 PM   Result Value Ref Range    Hold Lt Green Auto Resulted    RAINBOW DRAW BLUE    Collection Time: 03/07/24  9:27 PM   Result Value Ref Range    Hold Blue Auto Resulted    Comp Metabolic Panel (14)    Collection Time: 03/07/24  9:27 PM   Result Value Ref Range    Glucose 93 70 - 99 mg/dL    Sodium 139 136 - 145 mmol/L    Potassium 4.5 3.5 - 5.1 mmol/L    Chloride 107 98 - 112 mmol/L    CO2 28.0 21.0 - 32.0 mmol/L    Anion Gap 4 0 - 18 mmol/L    BUN 9 9 - 23 mg/dL    Creatinine 0.98 0.70 - 1.30 mg/dL    BUN/CREA Ratio 9.2 (L) 10.0 - 20.0    Calcium, Total 9.9 8.7 - 10.4 mg/dL    Calculated Osmolality 286 275 - 295 mOsm/kg    eGFR-Cr 107 >=60 mL/min/1.73m2    ALT 33 10 - 49 U/L    AST 20 <=34 U/L    Alkaline Phosphatase 69 45 - 117 U/L    Bilirubin, Total 1.2 0.3 - 1.2 mg/dL    Total Protein 7.7 5.7 - 8.2 g/dL    Albumin 5.0 (H) 3.2 - 4.8 g/dL    Globulin  2.7 (L) 2.8 - 4.4 g/dL    A/G Ratio 1.9 1.0 - 2.0   Lipase    Collection Time: 03/07/24  9:27 PM   Result Value Ref Range    Lipase 32 13 - 75 U/L   CK (Creatine Kinase) (Not Creatinine)    Collection Time: 03/07/24  9:27 PM   Result Value Ref Range    CK 72 46 - 171 U/L   CBC W/ DIFFERENTIAL    Collection Time: 03/07/24  9:27 PM   Result Value Ref Range    WBC 9.9 4.0 - 11.0 x10(3) uL    RBC 5.73 (H) 4.30 - 5.70 x10(6)uL    HGB 16.6 13.0 - 17.5 g/dL    HCT 48.8 39.0 - 53.0 %    MCV 85.2 80.0 - 100.0 fL    MCH 29.0 26.0 - 34.0 pg    MCHC 34.0 31.0 - 37.0 g/dL    RDW-SD 37.7 35.1 - 46.3 fL    RDW 12.3  11.0 - 15.0 %    .0 150.0 - 450.0 10(3)uL    Neutrophil Absolute Prelim 6.06 1.50 - 7.70 x10 (3) uL    Neutrophil Absolute 6.06 1.50 - 7.70 x10(3) uL    Lymphocyte Absolute 2.60 1.00 - 4.00 x10(3) uL    Monocyte Absolute 0.83 0.10 - 1.00 x10(3) uL    Eosinophil Absolute 0.34 0.00 - 0.70 x10(3) uL    Basophil Absolute 0.04 0.00 - 0.20 x10(3) uL    Immature Granulocyte Absolute 0.02 0.00 - 1.00 x10(3) uL    Neutrophil % 61.3 %    Lymphocyte % 26.3 %    Monocyte % 8.4 %    Eosinophil % 3.4 %    Basophil % 0.4 %    Immature Granulocyte % 0.2 %   RAINBOW DRAW GOLD    Collection Time: 03/07/24  9:31 PM   Result Value Ref Range    Hold Gold Auto Resulted    Magnesium    Collection Time: 03/07/24  9:31 PM   Result Value Ref Range    Magnesium 2.1 1.6 - 2.6 mg/dL   TSH W Reflex To Free T4    Collection Time: 03/07/24  9:31 PM   Result Value Ref Range    TSH 3.309 0.550 - 4.780 mIU/mL   Urinalysis with Culture Reflex    Collection Time: 03/07/24 10:19 PM    Specimen: Urine, straight cath   Result Value Ref Range    Urine Color Yellow Yellow    Clarity Urine Turbid (A) Clear    Spec Gravity 1.012 1.005 - 1.030    Glucose Urine Normal Normal mg/dL    Bilirubin Urine Negative Negative    Ketones Urine Negative Negative mg/dL    Blood Urine 2+ (A) Negative    pH Urine 6.5 5.0 - 8.0    Protein Urine 20 (A) Negative mg/dL    Urobilinogen Urine Normal Normal    Nitrite Urine Negative Negative    Leukocyte Esterase Urine 500 (A) Negative    WBC Urine >50 (A) 0 - 5 /HPF    RBC Urine >10 (A) 0 - 2 /HPF    Bacteria Urine 3+ (A) None Seen /HPF    Squamous Epi. Cells Few (A) None Seen /HPF    Renal Tubular Epithelial Cells None Seen None Seen /HPF    Transitional Cells None Seen None Seen /HPF    Yeast Urine None Seen None Seen /HPF    WBC Clump Present (A) None Seen /HPF   Calcium, Ionized    Collection Time: 03/08/24  2:01 AM   Result Value Ref Range    Sample Type Venous     Ionized Calcium 1.13 0.95 - 1.32 mmol/L    Puncture  Charge No     Blood Gas Analyzer [id number] ED    Basic Metabolic Panel (8)    Collection Time: 03/08/24  7:24 AM   Result Value Ref Range    Glucose 89 70 - 99 mg/dL    Sodium 141 136 - 145 mmol/L    Potassium 4.0 3.5 - 5.1 mmol/L    Chloride 108 98 - 112 mmol/L    CO2 27.0 21.0 - 32.0 mmol/L    Anion Gap 6 0 - 18 mmol/L    BUN 9 9 - 23 mg/dL    Creatinine 0.94 0.70 - 1.30 mg/dL    BUN/CREA Ratio 9.6 (L) 10.0 - 20.0    Calcium, Total 9.2 8.7 - 10.4 mg/dL    Calculated Osmolality 290 275 - 295 mOsm/kg    eGFR-Cr 113 >=60 mL/min/1.73m2   CBC W/ DIFFERENTIAL    Collection Time: 03/08/24  7:24 AM   Result Value Ref Range    WBC 7.4 4.0 - 11.0 x10(3) uL    RBC 5.09 4.30 - 5.70 x10(6)uL    HGB 15.4 13.0 - 17.5 g/dL    HCT 43.3 39.0 - 53.0 %    MCV 85.1 80.0 - 100.0 fL    MCH 30.3 26.0 - 34.0 pg    MCHC 35.6 31.0 - 37.0 g/dL    RDW-SD 37.6 35.1 - 46.3 fL    RDW 12.1 11.0 - 15.0 %    .0 150.0 - 450.0 10(3)uL    Neutrophil Absolute Prelim 3.85 1.50 - 7.70 x10 (3) uL    Neutrophil Absolute 3.85 1.50 - 7.70 x10(3) uL    Lymphocyte Absolute 2.52 1.00 - 4.00 x10(3) uL    Monocyte Absolute 0.71 0.10 - 1.00 x10(3) uL    Eosinophil Absolute 0.29 0.00 - 0.70 x10(3) uL    Basophil Absolute 0.03 0.00 - 0.20 x10(3) uL    Immature Granulocyte Absolute 0.01 0.00 - 1.00 x10(3) uL    Neutrophil % 52.0 %    Lymphocyte % 34.0 %    Monocyte % 9.6 %    Eosinophil % 3.9 %    Basophil % 0.4 %    Immature Granulocyte % 0.1 %        HGBA1C: No results found for: \"A1C\", \"EAG\"     Test results/Imaging:   XR SHOULDER, COMPLETE (MIN 2 VIEWS), LEFT (CPT=73030)    Result Date: 3/8/2024   No acute fracture or dislocation.  No significant arthropathy.  Visualized portions of the lungs and soft tissues are unremarkable.      Dictated by (CST): Harry Rodríguez MD on 3/08/2024 at 7:55 AM     Finalized by (CST): Harry Rodríguez MD on 3/08/2024 at 7:56 AM          CT ABDOMEN+PELVIS(CPT=74176)    Result Date: 3/7/2024  CONCLUSION:   Nonobstructing left lower  pole caliceal calculus.  No hydronephrosis  Diffuse bladder wall thickening is unchanged and can be seen with cystitis.  Sequela of spina bifida is unchanged.     Dictated by (CST): López Weller MD on 3/07/2024 at 9:52 PM     Finalized by (CST): López Weller MD on 3/07/2024 at 9:56 PM         EKG    Result Date: 3/8/2024  Normal sinus rhythm Normal ECG No previous ECGs found in [facility]   No results found.    Performed an independent visualization of  heaD CT from 8/30 agree with read  Imaging revealed: Agree with read           Chao Reese is a 28 year old man w/ a Past medical history of type 2 chiari/myelomeningocele (walks independently with weakness at ankles) and 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    Suspect related to his UTI and infection.  Most likely his spasticity will improve as his infection resolves.  However will exclude any pathology in the C-spine given that he initially bilateral involvement then now only in l ue     Sudden onset L UE spasticity/pain in the setting of a know type ii chiari/ /myelomeningocele   and shunt-dependent hydrocephalus s/p replacement of proximal ventricular catheter in nov 2023, w/ L UE spasticity/UTI  Differential Diagnosis:   Secondary to UTI/diarrhea  Patient neuromyopathy  Less likely shunt dysfunction  Diagnostics:  Ct head  Shunt series  Unclear if he can have a mri of his spine  Nsgy consult  Therapeutics:    Head CT from 20  Gabapentin x 1  increase baclofen  Gabapentin maintenance 300 3 times daily                 This document is not intended to support charting by exception.  Sections left blank in a completed note should be presumed not to have been done.    Disclaimer:   This  record was dictated using Dragon software. There may be errors due to voice recognition problems that were not realized and corrected during the completion of the note.            Thank you.  Trip Mccoy D.O.   Vascular & General Neurology  3/8/2024  10:59 AM

## 2024-03-08 NOTE — ED PROVIDER NOTES
Patient Seen in: [facility] Emergency Department      History     Chief Complaint   Patient presents with    Abdominal Pain    Flank Pain    Nausea     Stated Complaint: \"Kidney pain\"    Subjective:   This is a 28-year-old male with history of spinal bifida,  shunt, hypertension here with abdominal pain that is aching and significant and radiates to his back.  It is throughout his lower abdomen.  It is associated with 2 episodes of green diarrhea today.  No vomiting.  No fever or chills.  He says his arms feel very tense as well.  His left arm is tighter than his right.  He does not normally get this.  He wears braces on his legs so he can ambulate.  Patient denies alcohol.  He has no headache or vision disturbances.            Objective:   No pertinent past medical history.            No pertinent past surgical history.              No pertinent social history.            Review of Systems    Positive for stated complaint: \"Kidney pain\"  Other systems are as noted in HPI.  Constitutional and vital signs reviewed.      All other systems reviewed and negative except as noted above.    Physical Exam     ED Triage Vitals [03/07/24 2047]   BP (!) 154/97   Pulse 93   Resp 22   Temp 97.9 °F (36.6 °C)   Temp src Temporal   SpO2 96 %   O2 Device None (Room air)       Current:/81   Pulse 88   Temp 97.9 °F (36.6 °C) (Temporal)   Resp 20   Ht 162.6 cm (5' 4\")   Wt 63.5 kg   SpO2 99%   BMI 24.03 kg/m²         Physical Exam  HENT:      Head: Normocephalic.      Right Ear: External ear normal.      Left Ear: External ear normal.      Mouth/Throat:      Mouth: Mucous membranes are moist.   Cardiovascular:      Rate and Rhythm: Normal rate and regular rhythm.      Heart sounds: Normal heart sounds.   Pulmonary:      Effort: Pulmonary effort is normal.      Breath sounds: Normal breath sounds.   Abdominal:      Palpations: Abdomen is soft.      Tenderness: There is no abdominal tenderness.      Comments:  Decreased breath sounds.   Musculoskeletal:         General: Normal range of motion.      Comments: I am able to passively move his upper extremities.  He has some discomfort in the left bicep with movement.  Muscle compartments are soft and nontender.  Patient has chronic lower extremity weakness.  No sensory deficits.   Neurological:      Mental Status: He is alert.               ED Course     Labs Reviewed   COMP METABOLIC PANEL (14) - Abnormal; Notable for the following components:       Result Value    BUN/CREA Ratio 9.2 (*)     Albumin 5.0 (*)     Globulin  2.7 (*)     All other components within normal limits   URINALYSIS WITH CULTURE REFLEX - Abnormal; Notable for the following components:    Clarity Urine Turbid (*)     Blood Urine 2+ (*)     Protein Urine 20 (*)     Leukocyte Esterase Urine 500 (*)     WBC Urine >50 (*)     RBC Urine >10 (*)     Bacteria Urine 3+ (*)     Squamous Epi. Cells Few (*)     WBC Clump Present (*)     All other components within normal limits   CBC W/ DIFFERENTIAL - Abnormal; Notable for the following components:    RBC 5.73 (*)     All other components within normal limits   LIPASE - Normal   CK CREATINE KINASE (NOT CREATININE) - Normal   CBC WITH DIFFERENTIAL WITH PLATELET    Narrative:     The following orders were created for panel order CBC With Differential With Platelet.  Procedure                               Abnormality         Status                     ---------                               -----------         ------                     CBC W/ DIFFERENTIAL[[id number]]          Abnormal            Final result                 Please view results for these tests on the individual orders.   MAGNESIUM   RAINBOW DRAW LAVENDER   RAINBOW DRAW LIGHT GREEN   RAINBOW DRAW BLUE   RAINBOW DRAW GOLD   URINE CULTURE, ROUTINE     EKG    Rate, intervals and axes as noted on EKG Report.  Rate: 96  Rhythm: Sinus Rhythm  Reading: No ST elevation.  Normal axis and intervals.  QTc 409.   Normal EKG read by myself              ED Course as of 03/08/24 0009  ------------------------------------------------------------  Time: 03/07 2324  Comment: Labs independent interpreted by me as well as CT abdomen.  CBC, CMP, CK and lipase normal.  CT abdomen pelvis independently interpreted by me shows no acute finding.  The patient told me after I left the room earlier his left arm started having spasms again.  It was tight with his fingers flexed.  He said it was happening to both arms earlier but now just his left arm.  I gave him Valium and it seemed to improve.  Will discuss with hospitalist once I see the .              [facility]      CT ABDOMEN+PELVIS(CPT=74176)    Result Date: 3/7/2024  CONCLUSION:   Nonobstructing left lower pole caliceal calculus.  No hydronephrosis  Diffuse bladder wall thickening is unchanged and can be seen with cystitis.  Sequela of spina bifida is unchanged.     Dictated by (CST): López Weller MD on 3/07/2024 at 9:52 PM     Finalized by (CST): López Weller MD on 3/07/2024 at 9:56 PM           Admission disposition: 3/8/2024 12:09 AM                                        Medical Decision Making  Patient with abdominal pain diarrhea and back pain.  Feels fatigued and said his arms were cramping.  Looks well now and looks comfortable but says he still in pain.  Differential is vast but could include enteritis, colitis, bowel obstruction, cholecystitis, pancreatitis, renal colic and other possibilities.  No signs of acute neurologic deficits.  Will give morphine fluids and Zofran.  CT and labs ordered.  Will do straight cath.  The patient was on antibiotics a few weeks ago for a UTI.  The culture never grew anything and it looks like patient was called and told to stop the antibiotics.  Triage did an EKG on him because he said he was a little dizzy as well and this looks normal    Amount and/or Complexity of Data Reviewed  External Data Reviewed: labs and notes.     Details: Patient had a  urine culture from 2 weeks ago that did not show any bacteria growth.    Urine culture from 4 weeks ago grew Klebsiella  Labs: ordered. Decision-making details documented in ED Course.     Details: Labs show UTI  Radiology: ordered and independent interpretation performed.  ECG/medicine tests: ordered and independent interpretation performed. Decision-making details documented in ED Course.     Details: No acute concerns  Discussion of management or test interpretation with external provider(s): Please see ED course.  Case was discussed with hospitalist Dr. Lucas.  She saw the patient in the ER.  We discussed and I then obtained a neurology consult.  He was given Valium for his muscle spasm in his arm.  adding a magnesium level.    Risk  Parenteral controlled substances.  Decision regarding hospitalization.  Risk Details: Spinal bifida,  shunt        Disposition and Plan     Clinical Impression:  1. Urinary tract infection without hematuria, site unspecified    2. Abdominal pain of unknown etiology    3. Diarrhea, unspecified type         Disposition:  Admit  3/8/2024 12:09 am    Follow-up:  No follow-up provider specified.        Medications Prescribed:  Current Discharge Medication List                            Hospital Problems       Present on Admission  Date Reviewed: 2/26/2024            ICD-10-CM Noted POA    * (Principal) Urinary tract infection without hematuria, site unspecified N39.0 3/7/2024 Unknown

## 2024-03-08 NOTE — ED QUICK NOTES
Orders for admission, patient is aware of plan and ready to go upstairs. Any questions, please call ED RN Shyla at extension [phone].     Patient Covid vaccination status: Fully vaccinated     COVID Test Ordered in ED: None    COVID Suspicion at Admission: N/A    Running Infusions:      Mental Status/LOC at time of transport: A+Ox4    Other pertinent information:   CIWA score: N/A   NIH score:  N/A

## 2024-03-08 NOTE — PLAN OF CARE
Severe pain to left arm, unable to move wrist. MD notified pain meds not helping. Ice and repositioning to left arm. Family at bedside updated on plan of care.     Problem: Patient Centered Care  Goal: Patient preferences are identified and integrated in the patient's plan of care  Description: Interventions:  - What would you like us to know as we care for you? My birthday is next week  - Provide timely, complete, and accurate information to patient/family  - Incorporate patient and family knowledge, values, beliefs, and cultural backgrounds into the planning and delivery of care  - Encourage patient/family to participate in care and decision-making at the level they choose  - Honor patient and family perspectives and choices  Outcome: Progressing       Problem: CARDIOVASCULAR - ADULT  Goal: Maintains optimal cardiac output and hemodynamic stability  Description: INTERVENTIONS:  - Monitor vital signs, rhythm, and trends  - Monitor for bleeding, hypotension and signs of decreased cardiac output  - Evaluate effectiveness of vasoactive medications to optimize hemodynamic stability  - Monitor arterial and/or venous puncture sites for bleeding and/or hematoma  - Assess quality of pulses, skin color and temperature  - Assess for signs of decreased coronary artery perfusion - ex. Angina  - Evaluate fluid balance, assess for edema, trend weights  Outcome: Progressing     Problem: GENITOURINARY - ADULT  Goal: Absence of urinary retention  Description: INTERVENTIONS:  - Assess patient’s ability to void and empty bladder  - Monitor intake/output and perform bladder scan as needed  - Follow urinary retention protocol/standard of care  - Consider collaborating with pharmacy to review patient's medication profile  - Implement strategies to promote bladder emptying  Outcome: Progressing     Problem: NEUROLOGICAL - ADULT  Goal: Achieves stable or improved neurological status  Description: INTERVENTIONS  - Assess for and report  changes in neurological status  - Initiate measures to prevent increased intracranial pressure  - Maintain blood pressure and fluid volume within ordered parameters to optimize cerebral perfusion and minimize risk of hemorrhage  - Monitor temperature, glucose, and sodium. Initiate appropriate interventions as ordered  Outcome: Progressing     Problem: PAIN - ADULT  Goal: Verbalizes/displays adequate comfort level or patient's stated pain goal  Description: INTERVENTIONS:  - Encourage pt to monitor pain and request assistance  - Assess pain using appropriate pain scale  - Administer analgesics based on type and severity of pain and evaluate response  - Implement non-pharmacological measures as appropriate and evaluate response  - Consider cultural and social influences on pain and pain management  - Manage/alleviate anxiety  - Utilize distraction and/or relaxation techniques  - Monitor for opioid side effects  - Notify MD/LIP if interventions unsuccessful or patient reports new pain  - Anticipate increased pain with activity and pre-medicate as appropriate  Outcome: Progressing     Problem: RISK FOR INFECTION - ADULT  Goal: Absence of fever/infection during anticipated neutropenic period  Description: INTERVENTIONS  - Monitor WBC  - Administer growth factors as ordered  - Implement neutropenic guidelines  Outcome: Progressing     Problem: SAFETY ADULT - FALL  Goal: Free from fall injury  Description: INTERVENTIONS:  - Assess pt frequently for physical needs  - Identify cognitive and physical deficits and behaviors that affect risk of falls.  - [place] fall precautions as indicated by assessment.  - Educate pt/family on patient safety including physical limitations  - Instruct pt to call for assistance with activity based on assessment  - Modify environment to reduce risk of injury  - Provide assistive devices as appropriate  - Consider OT/PT consult to assist with strengthening/mobility  - Encourage toileting  schedule  Outcome: Progressing

## 2024-03-08 NOTE — CONSULTS
Neurosurgery Consult Note    Chao Reese Patient Status:  Observation    3/18/1995 MRN [id number]   Location [facility] 1W Attending Latricia Loya DO   Hosp Day # 0 PCP Nitin Mejía DO     REASON FOR CONSULTATION: increased left upper extremity tone     HISTORY OF PRESENT ILLNESS:    Chao Reese is a(n) 28 year old male with a complex medical history.  Please see HPI below from previous neurosurgery consultation on 10/17/23.    \"Chao Reese is a(n) 28 year old male with a pertinent medical history of spina bifida and obstructive hydrocephalus s/p  shunt when he was 3 years old, followed by a revision surgery for adult shunt placement in  with Dr. Bull Acosta at [facility].  The patient reports that he presented to the ED due to complaints of headaches for the last 10 to 12 days.  He states that the pain was localized deep to his right eye.  Further work-up in the ED with a CT brain without contrast demonstrated ventricular dilation concerning for a shunt malfunction in comparison to a CT brain scan from 2023.  This morning, the patient reports resolution of his headaches.  He denies any changes in vision or speech, nausea/vomiting, dizziness/lightheadedness, or new weakness in his upper and lower extremities.  He states that at baseline, he is unable to dorsiflex or plantarflex his ankles and is unable to wiggle his toes.  He is not on anticoagulation or antiplatelet therapy.  He is currently on antibiotics for a recent UTI.  He states that he has 2 days left in this antibiotic course.  He recently followed up with his established neurosurgeon on 2023.  His office visit note is available through care everywhere.  Per his documentation, the patient has had concerns for shunt malfunctions in the past, particularly on 2018 when he presented to the ED with ventricular dilatation which then spontaneously improved on repeat CT brain scan on 2018.  Unrelated,  the patient follows with our physiatry team, Dr. Bai, for treatment of suspected radicular pain versus transverse myelitis.  The patient reports low back pain that radiates into his posterior lower extremities bilaterally, which makes it difficult for him to ambulate.  He states he is comfortable at rest.  He has MRIs of the cervical, thoracic, and lumbar spine scheduled for 11/9/2023.  The patient has baseline neurogenic bladder and self caths himself approximately 4 times a day.\"  His initial CT at that time demonstrated concern for ventriculomegaly, however, his symptoms had improved and his repeat CT scan demonstrated less dilation of the  shunt tract and slight reduction in ventricular size.  He was recommended to follow-up with his established neurosurgeon for continuity of care. He saw Dr. Bull Isidro on 11/1/23 he was recommended for a proximal shunt revision, which was done on 11/3/2023.  His last outpatient visit with his established neurosurgeon was on 12/21/2023.  He was recommended to be seen back in 6 months and to be aggressive with his bowel regimen.    He presented to the ED yesterday with complaints of abdominal pain radiating to his back with episodes of diarrhea.  He also noted acute onset of upper extremity pain and stiffness. He localizes his pain to his left trapezius muscles, most prominent in his biceps and triceps, extending into his hand. He reports allodynia. He notes that his left hand is contracted and he is unable to open it. He denies any neck pain. He reports an episode of \"spasm\" in his left lower extremity, otherwise his lower extremities are at baseline. He only has sensation from his knees, proximally. He denies any RUE symptoms.  No headaches, changes in vision or speech, nausea/vomiting, or dizziness/lightheadedness.  He states that this is the first time he has experienced episodes like this involving his upper extremity.  He states that he does not have any symptoms that  are consistent with prior shunt issues in the past.  He was positive for UTI.  He self caths himself regularly and is prone to UTIs.    PAST MEDICAL HISTORY:  Past Medical History:   Diagnosis Date    Bilateral leg weakness     chronic    Constipation     Depression     Foot drop, bilateral     Gait disturbance     High blood pressure     Neurogenic bladder     Pt self straight caths at home QID and irrigates BID PRN    Other ill-defined conditions(799.89)     shunt from hydrocephalus    PONV (postoperative nausea and vomiting)     if masked used    S/P  shunt     Spina bifida (HCC)     Management:  closure    Strabismus 1998    Done at [facility]    Strabismus        PAST SURGICAL HISTORY:  Past Surgical History:   Procedure Laterality Date    OTHER SURGICAL HISTORY  2010    bladder augmentation/catheterizable channel    OTHER SURGICAL HISTORY  07/06/2020    fistulotomy    SPINE SURGERY PROCEDURE UNLISTED  1994    STRABISMUS SURGERY Left 1999 or 2000       FAMILY HISTORY:  family history includes No Known Problems in his father and mother.    SOCIAL HISTORY:   reports that he has never smoked. He has never been exposed to tobacco smoke. He has never used smokeless tobacco. He reports that he does not currently use alcohol. He reports that he does not use drugs.    ALLERGIES:  Allergies   Allergen Reactions    Latex RASH       MEDICATIONS:  Medications Prior to Admission   Medication Sig Dispense Refill Last Dose    carvedilol 3.125 MG Oral Tab Take 1 tablet (3.125 mg total) by mouth 2 (two) times daily with meals. 180 tablet 3 3/8/2024    Oxybutynin Chloride ER 15 MG Oral Tablet 24 Hr Take 1 tablet (15 mg total) by mouth daily. 90 tablet 3 3/8/2024    cholecalciferol 50 MCG (2000 UT) Oral Tab Take 1 tablet (2,000 Units total) by mouth daily. 90 tablet 3 3/8/2024    Water For Irrigation, Sterile (STERILE WATER FOR IRRIGATION) Irrigation Solution Irrigate with 1,000 mL as directed 2 (two) times  daily as needed (Irrigates bladder twice daily due to catheterization.). 3 each 11     Acetaminophen Extra Strength 500 MG Oral Tab Take 2 tablets (1,000 mg total) by mouth every 6 (six) hours.   Unknown    sodium chloride 0.9 % Irrigation Solution         Current Facility-Administered Medications   Medication Dose Route Frequency    carvedilol (Coreg) tab 3.125 mg  3.125 mg Oral BID with meals    cholecalciferol (VITAMIN D3) tab 2,000 Units  2,000 Units Oral Daily    docusate sodium (Colace) cap 100 mg  100 mg Oral BID PRN    oxybutynin ER (Ditropan-XL) 24 hr tab 15 mg  15 mg Oral Daily    heparin (Porcine) 5000 UNIT/ML injection 5,000 Units  5,000 Units Subcutaneous 2 times per day    polyethylene glycol (PEG 3350) (Miralax) 17 g oral packet 17 g  17 g Oral Daily PRN    sennosides (Senokot) tab 17.2 mg  17.2 mg Oral Nightly PRN    bisacodyl (Dulcolax) 10 MG rectal suppository 10 mg  10 mg Rectal Daily PRN    fleet enema (Fleet) 7-19 GM/118ML rectal enema 133 mL  1 enema Rectal Once PRN    ondansetron (Zofran) 4 MG/2ML injection 4 mg  4 mg Intravenous Q6H PRN    prochlorperazine (Compazine) 10 MG/2ML injection 5 mg  5 mg Intravenous Q8H PRN    cefTRIAXone (Rocephin) 1 g in D5W 100 mL IVPB-ADD  1 g Intravenous Q24H    morphINE PF 2 MG/ML injection 1 mg  1 mg Intravenous Q2H PRN    Or    morphINE PF 2 MG/ML injection 2 mg  2 mg Intravenous Q2H PRN    Or    morphINE PF 4 MG/ML injection 4 mg  4 mg Intravenous Q2H PRN    HYDROcodone-acetaminophen (Norco) 5-325 MG per tab 2 tablet  2 tablet Oral Q6H PRN    gabapentin (Neurontin) cap 300 mg  300 mg Oral TID    baclofen (Lioresal) tab 10 mg  10 mg Oral TID       REVIEW OF SYSTEMS:  Comprehensive review of systems completed and negative with the exception of aforementioned information in the HPI.     PHYSICAL EXAMINATION:    Vitals: BP (!) 134/91 (BP Location: Right arm)   Pulse 89   Temp 97.8 °F (36.6 °C) (Oral)   Resp 20   Ht 64\"   Wt 140 lb (63.5 kg)   SpO2 96%    BMI 24.03 kg/m²  Temp (24hrs), Av.9 °F (36.6 °C), Min:97.5 °F (36.4 °C), Max:98.2 °F (36.8 °C)     Wt Readings from Last 6 Encounters:   24 140 lb (63.5 kg)   24 140 lb 12.8 oz (63.9 kg)   24 140 lb (63.5 kg)   24 138 lb (62.6 kg)   10/23/23 137 lb 6.4 oz (62.3 kg)   10/17/23 134 lb 11.2 oz (61.1 kg)        General: Well developed, well nourished, in no acute distress.     Neurological / Musculoskeletal: Awake, alert, oriented, interactive. Recent and remote memory appear intact. Attention span and concentration are appropriate. No dysarthria. Coordination and motor control grossly intact. Patient follows commands briskly and appropriately. No pronator drift on the right.  Unable to assess on the left secondary to pain in left upper extremity with movement.      Cervical Spine:    Motor/ Extremities   Deltoid Biceps Triceps  Hand intrinsics Wrist extension Wrist flexion   Sensation   R 5/5 5/5 5/5 5/5 5/5 5/5 5/5 Intact to light touch   Left upper extremity with allodynia and severe pain with movement, limiting motor exam.  Patient able to raise arm off bed, but not through full range of motion secondary to pain.  Left hand contracture noted.  Significant pain with attempting to passively extend patient's fingers.  Sensation light touch is intact.    Lumbar Spine:  Hip flexion intact bilaterally.  Unable to plantarflex or dorsiflex.  Baseline secondary to spina bifida.    Reflexes:  -Biceps: 3+ and equal bilaterally  -Triceps: 2+ and equal bilaterally  -Beasley's Sign: Negative  -Ankle clonus: Negative    Cranial Nerves:  I Smell not tested   II Visual acuity at baseline.  Visual fields intact.  Pupils equal, round, reactive to light and accommodating bilaterally.   III, VII No ptosis appreciated   III, IV, VI EOMs intact with full ROM   V Facial sensation intact to light touch   VII No facial asymmetry   VIII Hearing normal to voice   IX, X Soft palate elevation and gag reflex not  tested   XI Spinal accessory muscles intact with 5/5 strength bilaterally   XII Tongue strength normal, midline, without fasciculations or deviations       I&O: I/O last 3 completed shifts:  In: 300 [P.O.:300]  Out: 600 [Urine:600]       LABS:   Recent Labs   Lab 03/07/24 2127 03/08/24 0724   RBC 5.73* 5.09   HGB 16.6 15.4   HCT 48.8 43.3   MCV 85.2 85.1   MCH 29.0 30.3   MCHC 34.0 35.6   RDW 12.3 12.1   NEPRELIM 6.06 3.85   WBC 9.9 7.4   .0 205.0     Recent Labs   Lab 03/07/24 2127 03/08/24 0724   GLU 93 89   BUN 9 9   CREATSERUM 0.98 0.94   EGFRCR 107 113   CA 9.9 9.2   ALB 5.0*  --     141   K 4.5 4.0    108   CO2 28.0 27.0   ALKPHO 69  --    AST 20  --    ALT 33  --    BILT 1.2  --    TP 7.7  --       No results for input(s): \"PT\", \"INR\", \"PTT\" in the last 168 hours.     IMAGING:  XR SHUNT POSITION SERIES  (CPT=70250/00283/92130/01889)    Result Date: 3/8/2024  CONCLUSION:  1. Right parietal approach ventriculoperitoneal shunt is in stable positioning and is intact. 2. Chronic abandoned catheter fragment again noted in the right upper quadrant.     Dictated by (CST): Danis Madrigal MD on 3/08/2024 at 2:06 PM     Finalized by (CST): Danis Madrigal MD on 3/08/2024 at 2:12 PM          CT SPINE CERVICAL (CPT=72125)    Result Date: 3/8/2024  CONCLUSION:   No acute fracture or malalignment of the cervical spine.  Kyphosis is probably positional.  Limited evaluation of the thecal sac without intrathecal contrast.  No spinal canal or foraminal stenosis is seen.  MRI would be more sensitive for detection of spinal canal or foraminal stenosis.    Dictated by (CST): Harry Rodríguez MD on 3/08/2024 at 1:16 PM     Finalized by (CST): Harry Rodríguez MD on 3/08/2024 at 1:24 PM          CT BRAIN OR HEAD (59467)    Result Date: 3/8/2024  CONCLUSION:  1. Right-sided  shunt catheter traversing the frontal horn of the right lateral ventricle and having its tip in the left lateral ventricle abutting the  area of the caudothalamic groove.  Decompressed ventricular system.  No hemorrhage or other acute finding. 2. Changes related to Chiari 2 malformation.     Dictated by (CST): Fory Barger MD on 3/08/2024 at 1:03 PM     Finalized by (CST): Froy Barger MD on 3/08/2024 at 1:10 PM           Imaging reviewed     ASSESSMENT:  Patient Active Problem List   Diagnosis    Spina bifida (HCC)    Weakness of both lower extremities    Foot drop, bilateral    Acne vulgaris    Neurogenic bladder    Spina bifida, unspecified hydrocephalus presence, unspecified spinal region (HCC)    Tachycardia    Strabismus    Regular astigmatism of both eyes    Right arm numbness    Constipation    Gastroenteritis    Gait disturbance    Chiari malformation type II (HCC)    Right foot pain    Right posterior tibial strain    Myalgia    Essential hypertension    Right renal artery stenosis (HCC)    Lesion of urinary bladder    Hyperopia of right eye    Myopia of left eye    Exotropia    Foraminal stenosis of lumbar region    Right lumbar pain    Hydrocephalus, unspecified type (HCC)    S/P  shunt    Pain in both lower extremities    Hospital discharge follow-up    Cystitis    Renal stone    Vaccine counseling    Urinary tract infection without hematuria, site unspecified    Abdominal pain of unknown etiology    Diarrhea, unspecified type       Patient is a pleasant 28-year-old male with a pertinent medical history of spina bifida s/p  shunt placement at 3 years old, followed by revision surgeries for an adult shunt placement in 2004, as well as a proximal shunt revision on 11/3/2023 at [facility], who presented to the ED with complaints of abdominal pain and back pain, diarrhea, and new onset pain and spasticity of the left upper extremity.  He was found to have a UTI.  His brain CT does not show ventriculomegaly.  X-ray shunt series appears grossly unremarkable.  CT cervical spine unremarkable.  Neurology is on board.  Cervical spine MRI  pending.    PLAN:  -No role for acute neurosurgical intervention  -Medical management per hospitalist team  -Neurology on board  -No activity restrictions from a neurosurgical standpoint  -Awaiting MRI cervical spine and brain without contrast  -Further recommendations to follow    Thank you for allowing us to care for this patient.    Will further discuss with Dr. Nickerson once he is out of surgery     Is this a shared or split note between Advanced Practice Provider and Physician? Yes    Stevo Figueroa PA-C  Physician Assistant- Neurosurgery   [facility]  3/8/2024, 12:58 PM        This document was created using Dragon voice recognition software and transcription variances may occur. Please contact documenting provider for clarification of contents if needed.

## 2024-03-08 NOTE — H&P
[facility]  part of [facility]    History & Physical    Chao Reese Patient Status:  Emergency    3/18/1995 MRN [id number]   Location [facility] EMERGENCY DEPARTMENT Attending Bryant Meyer MD   Hosp Day # 0 ERIC Mejía DO     Date:  3/8/2024  Date of Admission:  3/7/2024    Chief Complaint:  Chief Complaint   Patient presents with    Abdominal Pain    Flank Pain    Nausea       History of Present Illness:  Chao Reese is a(n) 28 year old male, who presents for evaluation of sudden onset of abdominal pain radiating to bilateral flanks. PMHx significant for spina bifida s/p  shunt, neurogenic bladder, essential hypertension.  Patient reports that he developed sudden onset of abdominal pain that started today associated with 2 episodes of nonbloody green diarrhea.  He states that he has known kidney stone on the left side.  The pain is 5 out of 10.  He also reports being treated for UTI with treatment that was completed a week prior to presentation.  Per chart review, patient has had multiple previous urinary tract infections that were treated with ciprofloxacin as well as Bactrim.  Most recent urine culture from  did not reveal any growth  but previous from 2/15 and 1/15 reveals Klebsiella pneumonia.  Patient reports that he self caths.  He uses braces to his legs which help him with walking.  On presentation to the ED, he notes that he started having spasticity on the left side of his hand.  After he had a CT abdomen/pelvis he developed left shoulder pain as well as unable to move his left hand.  He is able to move the rest of his body.  He denies any change in vision, lightheadedness, dizziness.  Denies any numbness or tingling sensation anywhere in his body.  He does report that his abdominal pain has now decreased to 1 out of 10.  He states that he follows with neurology at an outside facility and had revision of his  shunt in 2023.  Vital signs are reassuring  on presentation with temp 97.9, heart rate 93, respiratory 22, /97, SpO2 96% on room air.  CT abdomen/pelvis reveals nonobstructing left lower pole calculus, no hydronephrosis.  Urinalysis does reveal leukocyte esterase+, + bacteriuria, elevated WBC.  Patient was given a dose of Rocephin as well as volume 5 mg x 2, Zofran 4 mg IV x 1, and 1 L IV fluid.  He was admitted under hospitalist service with consultation to neurology.    History:  Past Medical History:   Diagnosis Date    Bilateral leg weakness     chronic    Constipation     Depression     Foot drop, bilateral     Gait disturbance     High blood pressure     Neurogenic bladder     Pt self straight caths at home QID and irrigates BID PRN    Other ill-defined conditions(799.89)     shunt from hydrocephalus    PONV (postoperative nausea and vomiting)     if masked used    S/P  shunt     Spina bifida (HCC)     Management:  closure    Strabismus 1998    Done at [facility]    Strabismus      Past Surgical History:   Procedure Laterality Date    OTHER SURGICAL HISTORY  2010    bladder augmentation/catheterizable channel    OTHER SURGICAL HISTORY  07/06/2020    fistulotomy    SPINE SURGERY PROCEDURE UNLISTED  1994    STRABISMUS SURGERY Left 1999 or 2000     Family History   Problem Relation Age of Onset    No Known Problems Father     No Known Problems Mother     Diabetes Neg     Glaucoma Neg       reports that he has never smoked. He has never been exposed to tobacco smoke. He has never used smokeless tobacco. He reports that he does not currently use alcohol. He reports that he does not use drugs.    Allergies:  Allergies   Allergen Reactions    Latex RASH       Home Medications:  Prior to Admission Medications   Prescriptions Last Dose Informant Patient Reported? Taking?   Acetaminophen Extra Strength 500 MG Oral Tab   Yes No   Sig: Take 2 tablets (1,000 mg total) by mouth every 6 (six) hours.   Oxybutynin Chloride ER 15 MG Oral Tablet  24 Hr   No No   Sig: Take 1 tablet (15 mg total) by mouth daily.   Water For Irrigation, Sterile (STERILE WATER FOR IRRIGATION) Irrigation Solution   No No   Sig: Irrigate with 1,000 mL as directed 2 (two) times daily as needed (Irrigates bladder twice daily due to catheterization.).   carvedilol 3.125 MG Oral Tab   No No   Sig: Take 1 tablet (3.125 mg total) by mouth 2 (two) times daily with meals.   cephalexin 500 MG Oral Cap   No No   Sig: Take 1 capsule (500 mg total) by mouth 3 (three) times daily for 7 days.   cholecalciferol 50 MCG (2000 UT) Oral Tab   No No   Sig: Take 1 tablet (2,000 Units total) by mouth daily.   ciprofloxacin 500 MG Oral Tab   No No   Sig: Take 1 tablet (500 mg total) by mouth 2 (two) times daily for 7 days.   docusate sodium (COLACE) 100 MG Oral Cap   No No   Sig: Take 1 capsule (100 mg total) by mouth 2 (two) times daily as needed for constipation.   hydrocortisone 2.5 % External Cream   No No   Sig: Apply to the affected areas twice daily Monday-Friday. Take weekends off. Stop when rash resolved.   ketoconazole 2 % External Shampoo   No No   Sig: You can apply this not only to the scalp 2 or 3 times a week in the shower but also to your beard area and then also the chest where the rash is.  Can also use on the face.   lactulose 20 GM/30ML Oral Solution   No No   Sig: Take 30 mL (20 g total) by mouth 2 (two) times daily as needed (Constipation).   sodium chloride 0.9 % Irrigation Solution   Yes No   sulfamethoxazole-trimethoprim -160 MG Oral Tab per tablet   No No   Sig: Take 1 tablet by mouth 2 (two) times daily for 14 days.   triamcinolone 0.1 % External Cream   No No   Sig: Use twice daily  with flares to rash on chets   Patient not taking: Reported on 2/26/2024      Facility-Administered Medications: None       Review of Systems:  Constitutional: Negative  Eye:  Negative.  Ear/Nose/Mouth/Throat:  Negative.  Respiratory:  Negative  Cardiovascular: Negative  Gastrointestinal:  +  Abdominal pain  Genitourinary:  Negative  Endocrine:  Negative.  Immunologic:  Negative.  Musculoskeletal:  Negative.  Integumentary:  Negative.  Neurologic:  Negative.  Psychiatric:  Negative.  ROS reviewed as documented in chart    Physical Exam:  Temp:  [97.9 °F (36.6 °C)] 97.9 °F (36.6 °C)  Pulse:  [88-93] 88  Resp:  [20-22] 20  BP: (130-154)/(81-97) 130/81  SpO2:  [96 %-99 %] 99 %    General:  Alert and oriented.  Diffuse skin problem:  None.  Eye:  Pupils are equal, round and reactive to light, extraocular movements are intact, Normal conjunctiva.  HENT:  Normocephalic, oral mucosa is moist.  Head:  Normocephalic, atraumatic.  Neck:  Supple, non-tender, no carotid bruit, no jugular venous distention, no lymphadenopathy, no thyromegaly.  Respiratory:  Lungs are clear to auscultation, respirations are non-labored, breath sounds are equal, symmetrical chest wall expansion.  Cardiovascular:  Normal rate, regular rhythm, no murmur, no edema.  Gastrointestinal:  Soft, non-tender, non-distended, normal bowel sounds, no organomegaly.  Lymphatics:  No lymphadenopathy neck, axilla, groin.  Musculoskeletal: Normal range of motion.  normal strength.  Left arm spasticity noticed on physical exam  Feet:  Normal pulses.  Neurologic:  Alert, oriented, no focal deficits, cranial nerves II-XII are grossly intact.  Cognition and Speech:  Oriented, speech clear and coherent.  Psychiatric:  Cooperative, appropriate mood & affect.      Laboratory Data:   Lab Results   Component Value Date    WBC 9.9 03/07/2024    HGB 16.6 03/07/2024    HCT 48.8 03/07/2024    .0 03/07/2024    CREATSERUM 0.98 03/07/2024    BUN 9 03/07/2024     03/07/2024    K 4.5 03/07/2024     03/07/2024    CO2 28.0 03/07/2024    GLU 93 03/07/2024    CA 9.9 03/07/2024    ALB 5.0 03/07/2024    ALKPHO 69 03/07/2024    BILT 1.2 03/07/2024    TP 7.7 03/07/2024    AST 20 03/07/2024    ALT 33 03/07/2024    LIP 32 03/07/2024    MG 2.1 03/07/2024    CK 72  03/07/2024       Imaging:  CT ABDOMEN+PELVIS(CPT=74176)    Result Date: 3/7/2024  CONCLUSION:   Nonobstructing left lower pole caliceal calculus.  No hydronephrosis  Diffuse bladder wall thickening is unchanged and can be seen with cystitis.  Sequela of spina bifida is unchanged.     Dictated by (CST): López Weller MD on 3/07/2024 at 9:52 PM     Finalized by (CST): López Weller MD on 3/07/2024 at 9:56 PM            Assessment and Plan:    Left arm spasticity  -Sudden onset of left hand spasticity.  Patient is neurologically intact with  shunt for his h/o spina bifida, unclear cause of his left hand spasticity.  Will obtain x-ray of left shoulder as the pain /spasticity started after patient underwent CT abdomen /pelvis to look for any acute abnormalities. No swelling/erythema of the left arm. Patient was given Valium 5 mg x 2 with mild improvement.  No electrolyte abnormalities including calcium/magnesium.  -Will give a dose of baclofen and reassess symptoms  -Will also consult neurology for further evaluation (Dr. Mccoy notified) pending evaluation    Recurrent UTI  -Urinalysis grossly positive for UTI.  Patient with previous UTI w/ urine cultures growing Klebsiella pneumo.  He did complete course of antibiotics 3/1/24.  Received Rocephin 1 g IV x 1 in the ED.  -Continue with Rocephin antibiotic pending cultures.    Chronic left renal calculi  -Patient with known history of left renal calculi.  CT abdomen/pelvis does reveal nonobstructive left calculi.  No hydronephrosis.    Other medical conditions  Neurogenic bladder -resume oxybutynin  Spina bifida s/p  shunt  Essential hypertension-resume Coreg with holding parameters      Prophylaxis  Subcutaneous heparin    CODE STATUS  Full    Primary care physician  Nitin Mejía DO    60 minutes spent on this admission - examining patient, obtaining history, reviewing previous medical records, going over test results/imaging and discussing plan of care. All questions  answered.     Disposition  Clinical course will dictate outcome      Acacia Yuan MD  3/8/2024  12:28 AM

## 2024-03-09 ENCOUNTER — APPOINTMENT (OUTPATIENT)
Dept: MRI IMAGING | Facility: HOSPITAL | Age: 29
End: 2024-03-09
Attending: Other
Payer: MEDICARE

## 2024-03-09 ENCOUNTER — APPOINTMENT (OUTPATIENT)
Dept: MRI IMAGING | Facility: HOSPITAL | Age: 29
End: 2024-03-09
Attending: STUDENT IN AN ORGANIZED HEALTH CARE EDUCATION/TRAINING PROGRAM
Payer: MEDICARE

## 2024-03-09 LAB
ANION GAP SERPL CALC-SCNC: 5 MMOL/L (ref 0–18)
BASOPHILS # BLD AUTO: 0.03 X10(3) UL (ref 0–0.2)
BASOPHILS NFR BLD AUTO: 0.5 %
BUN BLD-MCNC: 8 MG/DL (ref 9–23)
BUN/CREAT SERPL: 8.9 (ref 10–20)
CALCIUM BLD-MCNC: 9.7 MG/DL (ref 8.7–10.4)
CHLORIDE SERPL-SCNC: 107 MMOL/L (ref 98–112)
CO2 SERPL-SCNC: 25 MMOL/L (ref 21–32)
CREAT BLD-MCNC: 0.9 MG/DL
DEPRECATED RDW RBC AUTO: 36.1 FL (ref 35.1–46.3)
EGFRCR SERPLBLD CKD-EPI 2021: 119 ML/MIN/1.73M2 (ref 60–?)
EOSINOPHIL # BLD AUTO: 0.19 X10(3) UL (ref 0–0.7)
EOSINOPHIL NFR BLD AUTO: 3.1 %
ERYTHROCYTE [DISTWIDTH] IN BLOOD BY AUTOMATED COUNT: 11.9 % (ref 11–15)
GLUCOSE BLD-MCNC: 110 MG/DL (ref 70–99)
HCT VFR BLD AUTO: 45.4 %
HGB BLD-MCNC: 16.5 G/DL
IMM GRANULOCYTES # BLD AUTO: 0.03 X10(3) UL (ref 0–1)
IMM GRANULOCYTES NFR BLD: 0.5 %
LYMPHOCYTES # BLD AUTO: 2.02 X10(3) UL (ref 1–4)
LYMPHOCYTES NFR BLD AUTO: 33.2 %
MCH RBC QN AUTO: 30.4 PG (ref 26–34)
MCHC RBC AUTO-ENTMCNC: 36.3 G/DL (ref 31–37)
MCV RBC AUTO: 83.8 FL
MONOCYTES # BLD AUTO: 0.41 X10(3) UL (ref 0.1–1)
MONOCYTES NFR BLD AUTO: 6.7 %
NEUTROPHILS # BLD AUTO: 3.4 X10 (3) UL (ref 1.5–7.7)
NEUTROPHILS # BLD AUTO: 3.4 X10(3) UL (ref 1.5–7.7)
NEUTROPHILS NFR BLD AUTO: 56 %
OSMOLALITY SERPL CALC.SUM OF ELEC: 283 MOSM/KG (ref 275–295)
PLATELET # BLD AUTO: 223 10(3)UL (ref 150–450)
POTASSIUM SERPL-SCNC: 3.7 MMOL/L (ref 3.5–5.1)
RBC # BLD AUTO: 5.42 X10(6)UL
SODIUM SERPL-SCNC: 137 MMOL/L (ref 136–145)
WBC # BLD AUTO: 6.1 X10(3) UL (ref 4–11)

## 2024-03-09 PROCEDURE — 70551 MRI BRAIN STEM W/O DYE: CPT | Performed by: STUDENT IN AN ORGANIZED HEALTH CARE EDUCATION/TRAINING PROGRAM

## 2024-03-09 PROCEDURE — 99232 SBSQ HOSP IP/OBS MODERATE 35: CPT | Performed by: OTHER

## 2024-03-09 PROCEDURE — 99233 SBSQ HOSP IP/OBS HIGH 50: CPT | Performed by: HOSPITALIST

## 2024-03-09 PROCEDURE — 72141 MRI NECK SPINE W/O DYE: CPT | Performed by: OTHER

## 2024-03-09 PROCEDURE — 99291 CRITICAL CARE FIRST HOUR: CPT | Performed by: STUDENT IN AN ORGANIZED HEALTH CARE EDUCATION/TRAINING PROGRAM

## 2024-03-09 RX ORDER — CARVEDILOL 6.25 MG/1
6.25 TABLET ORAL 2 TIMES DAILY WITH MEALS
Status: DISCONTINUED | OUTPATIENT
Start: 2024-03-09 | End: 2024-03-12

## 2024-03-09 RX ORDER — KETOROLAC TROMETHAMINE 15 MG/ML
15 INJECTION, SOLUTION INTRAMUSCULAR; INTRAVENOUS EVERY 6 HOURS
Status: DISCONTINUED | OUTPATIENT
Start: 2024-03-09 | End: 2024-03-09

## 2024-03-09 RX ORDER — NAPROXEN 500 MG/1
500 TABLET ORAL EVERY 12 HOURS
Status: DISCONTINUED | OUTPATIENT
Start: 2024-03-10 | End: 2024-03-09

## 2024-03-09 RX ORDER — MUSCLE RUB CREAM 100; 150 MG/G; MG/G
CREAM TOPICAL 3 TIMES DAILY PRN
Status: DISCONTINUED | OUTPATIENT
Start: 2024-03-09 | End: 2024-03-13

## 2024-03-09 RX ORDER — GABAPENTIN 600 MG/1
600 TABLET ORAL 3 TIMES DAILY
Status: DISCONTINUED | OUTPATIENT
Start: 2024-03-09 | End: 2024-03-13

## 2024-03-09 RX ORDER — MECLIZINE HCL 12.5 MG/1
12.5 TABLET ORAL 3 TIMES DAILY PRN
Status: DISCONTINUED | OUTPATIENT
Start: 2024-03-09 | End: 2024-03-13

## 2024-03-09 RX ORDER — KETOROLAC TROMETHAMINE 15 MG/ML
15 INJECTION, SOLUTION INTRAMUSCULAR; INTRAVENOUS ONCE
Status: COMPLETED | OUTPATIENT
Start: 2024-03-09 | End: 2024-03-09

## 2024-03-09 RX ORDER — HYDROCODONE BITARTRATE AND ACETAMINOPHEN 5; 325 MG/1; MG/1
2 TABLET ORAL EVERY 6 HOURS PRN
Status: DISCONTINUED | OUTPATIENT
Start: 2024-03-09 | End: 2024-03-13

## 2024-03-09 NOTE — PROGRESS NOTES
[facility]  part of [facility]    Progress Note    Chao Reese Patient Status:  Observation    3/18/1995 MRN [id number]   Location [facility] 1W Attending Latricia Loya DO   Hosp Day # 0 PCP Nitin Mejía DO     Chief complaint uti, left arm spasticity, pain     Subjective:   Chao Reese is a(n) 28 year old male Pt seen this am. Overall better but arm still weak and painful.     ROS:   No cp, sob   No c/d   No n/v     Objective:   Blood pressure 145/90, pulse 97, temperature 98.5 °F (36.9 °C), temperature source Oral, resp. rate 18, height 5' 4\" (1.626 m), weight 154 lb 4.8 oz (70 kg), SpO2 96%.      Intake/Output Summary (Last 24 hours) at 3/9/2024 1603  Last data filed at 3/9/2024 1212  Gross per 24 hour   Intake 2020 ml   Output 1700 ml   Net 320 ml       Patient Weight(s) for the past 336 hrs:   Weight   24 1541 154 lb 4.8 oz (70 kg)   24 2047 140 lb (63.5 kg)           General appearance: alert, appears stated age and cooperative  Pulmonary:  clear to auscultation bilaterally  Cardiovascular: S1, S2 normal, no murmur, click, rub or gallop, regular rate and rhythm  Abdominal: soft, non-tender; bowel sounds normal; no masses,  no organomegaly  Extremities: extremities normal, atraumatic, no cyanosis or edema  Left arm no swelling or erythema noted         Medicines:     Current Facility-Administered Medications   Medication Dose Route Frequency    HYDROcodone-acetaminophen (Norco) 5-325 MG per tab 2 tablet  2 tablet Oral Q6H PRN    carvedilol (Coreg) tab 6.25 mg  6.25 mg Oral BID with meals    camphor/menthol/methyl salicylate (Thera-Gesic) 10-15 % cream   Topical TID PRN    cholecalciferol (VITAMIN D3) tab 2,000 Units  2,000 Units Oral Daily    docusate sodium (Colace) cap 100 mg  100 mg Oral BID PRN    oxybutynin ER (Ditropan-XL) 24 hr tab 15 mg  15 mg Oral Daily    heparin (Porcine) 5000 UNIT/ML injection 5,000 Units  5,000 Units Subcutaneous 2 times per day     polyethylene glycol (PEG 3350) (Miralax) 17 g oral packet 17 g  17 g Oral Daily PRN    sennosides (Senokot) tab 17.2 mg  17.2 mg Oral Nightly PRN    bisacodyl (Dulcolax) 10 MG rectal suppository 10 mg  10 mg Rectal Daily PRN    fleet enema (Fleet) 7-19 GM/118ML rectal enema 133 mL  1 enema Rectal Once PRN    ondansetron (Zofran) 4 MG/2ML injection 4 mg  4 mg Intravenous Q6H PRN    prochlorperazine (Compazine) 10 MG/2ML injection 5 mg  5 mg Intravenous Q8H PRN    cefTRIAXone (Rocephin) 1 g in D5W 100 mL IVPB-ADD  1 g Intravenous Q24H    morphINE PF 2 MG/ML injection 1 mg  1 mg Intravenous Q2H PRN    Or    morphINE PF 2 MG/ML injection 2 mg  2 mg Intravenous Q2H PRN    Or    morphINE PF 4 MG/ML injection 4 mg  4 mg Intravenous Q2H PRN    gabapentin (Neurontin) cap 300 mg  300 mg Oral TID    baclofen (Lioresal) tab 10 mg  10 mg Oral TID       Lab Results   Component Value Date    WBC 6.1 03/09/2024    HGB 16.5 03/09/2024    HCT 45.4 03/09/2024    .0 03/09/2024    CREATSERUM 0.90 03/09/2024    BUN 8 (L) 03/09/2024     03/09/2024    K 3.7 03/09/2024     03/09/2024    CO2 25.0 03/09/2024     (H) 03/09/2024    CA 9.7 03/09/2024    ALB 5.0 (H) 03/07/2024    ALKPHO 69 03/07/2024    BILT 1.2 03/07/2024    TP 7.7 03/07/2024    AST 20 03/07/2024    ALT 33 03/07/2024    PTT 33.7 10/16/2023    INR 1.09 10/16/2023    TSH 3.309 03/07/2024    LIP 32 03/07/2024    DDIMER <0.27 04/02/2022    ESRML 18 (H) 10/12/2023    MG 2.1 03/07/2024    PHOS 3.1 03/22/2018    CK 72 03/07/2024    B12 340 07/24/2023       MRI SPINE CERVICAL (CPT=72141)    Result Date: 3/9/2024  CONCLUSION:  1. No acute cervical spine fracture.  Mild chronic-appearing volume loss of the cervical cord, which may relate to known history of spina bifida/Chiari II malformation.  Cervical cord demonstrates preserved signal. 2. Mild multilevel cervical spine degenerative changes as detailed.  Findings do not result in significant spinal canal or  neural foraminal stenosis.   elm-remote  Dictated by (CST): Aayush Holder MD on 3/09/2024 at 9:10 AM     Finalized by (CST): Aayush Holder MD on 3/09/2024 at 9:15 AM          MRI BRAIN (CPT=70551)    Result Date: 3/9/2024  CONCLUSION:  1. No acute intracranial abnormality. Specifically, no evidence of acute or early subacute infarction. 2. Right anterior parietal approach ventriculoperitoneal shunt.  No evidence of hydrocephalus. 3. 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  Findings may relate to sequelae of a Chiari II malformation. 4. Lesser incidental findings as above.   elm-remote  Dictated by (CST): Aayush Holder MD on 3/09/2024 at 9:04 AM     Finalized by (CST): Aayush Holder MD on 3/09/2024 at 9:10 AM          XR SHUNT POSITION SERIES  (CPT=70250/85655/38851/19723)    Result Date: 3/8/2024  CONCLUSION:  1. Right parietal approach ventriculoperitoneal shunt is in stable positioning and is intact. 2. Chronic abandoned catheter fragment again noted in the right upper quadrant.     Dictated by (CST): Danis Madrigal MD on 3/08/2024 at 2:06 PM     Finalized by (CST): Danis Madrigal MD on 3/08/2024 at 2:12 PM          CT SPINE CERVICAL (CPT=72125)    Result Date: 3/8/2024  CONCLUSION:   No acute fracture or malalignment of the cervical spine.  Kyphosis is probably positional.  Limited evaluation of the thecal sac without intrathecal contrast.  No spinal canal or foraminal stenosis is seen.  MRI would be more sensitive for detection of spinal canal or foraminal stenosis.    Dictated by (CST): Harry Rodríguez MD on 3/08/2024 at 1:16 PM     Finalized by (CST): Harry Rodríguez MD on 3/08/2024 at 1:24 PM          CT BRAIN OR HEAD (34523)    Result Date: 3/8/2024  CONCLUSION:  1. Right-sided  shunt catheter traversing the frontal horn of the right lateral ventricle and having its tip in the left lateral ventricle abutting the area of the caudothalamic groove.  Decompressed  ventricular system.  No hemorrhage or other acute finding. 2. Changes related to Chiari 2 malformation.     Dictated by (CST): Froy Barger MD on 3/08/2024 at 1:03 PM     Finalized by (CST): Froy Barger MD on 3/08/2024 at 1:10 PM          XR CHEST AP PORTABLE  (CPT=71045)    Result Date: 3/8/2024  CONCLUSION:  1. No acute cardiopulmonary finding.    Dictated by (CST): Froy Barger MD on 3/08/2024 at 11:26 AM     Finalized by (CST): Froy Barger MD on 3/08/2024 at 11:28 AM          XR SHOULDER, COMPLETE (MIN 2 VIEWS), LEFT (CPT=73030)    Result Date: 3/8/2024   No acute fracture or dislocation.  No significant arthropathy.  Visualized portions of the lungs and soft tissues are unremarkable.      Dictated by (CST): Harry Rodríguez MD on 3/08/2024 at 7:55 AM     Finalized by (CST): Harry Rodríguez MD on 3/08/2024 at 7:56 AM          CT ABDOMEN+PELVIS(CPT=74176)    Result Date: 3/7/2024  CONCLUSION:   Nonobstructing left lower pole caliceal calculus.  No hydronephrosis  Diffuse bladder wall thickening is unchanged and can be seen with cystitis.  Sequela of spina bifida is unchanged.     Dictated by (CST): López Weller MD on 3/07/2024 at 9:52 PM     Finalized by (CST): López Weller MD on 3/07/2024 at 9:56 PM         EKG    Result Date: 3/8/2024  Normal sinus rhythm Normal ECG No previous ECGs found in [facility] Confirmed by TUSHAR SAHU ([id number]) on 3/8/2024 5:37:12 PM     Results:     CBC:    Lab Results   Component Value Date    WBC 6.1 03/09/2024    WBC 7.4 03/08/2024    WBC 9.9 03/07/2024     Lab Results   Component Value Date    HEMOGLOBIN 16.3 02/23/2024    HEMOGLOBIN 15.8 02/15/2024    HEMOGLOBIN 15.0 10/31/2018    HGB 16.5 03/09/2024    HGB 15.4 03/08/2024    HGB 16.6 03/07/2024      Lab Results   Component Value Date    .0 03/09/2024    .0 03/08/2024    .0 03/07/2024       Recent Labs   Lab 03/07/24 2127 03/08/24 0724 03/09/24  1133   GLU 93 89 110*   BUN 9 9 8*   CREATSERUM 0.98 0.94 0.90    CA 9.9 9.2 9.7    141 137   K 4.5 4.0 3.7    108 107   CO2 28.0 27.0 25.0             Assessment and Plan:     Urinary tract infection without hematuria, site unspecified  - cont iv abx   - await final cx     Chronic left renal calculi  -Patient with known history of left renal calculi.  CT abdomen/pelvis does reveal nonobstructive left calculi.  No hydronephrosis.    Left arm spasticity- likely 2/2 uti.   -Sudden onset of left hand spasticity.  Patient is neurologically intact with  shunt for his h/o spina bifida, unclear cause of his left hand spasticity.   - apprec neuro and neuro surgery   - mri c spine and brain reviewed and negative   -  shunt intact   - pt/ot           Other medical conditions  Neurogenic bladder -resume oxybutynin  Spina bifida s/p  shunt  Essential hypertension-increase coreg         Prophylaxis  Subcutaneous heparin     CODE STATUS  Full     Primary care physician  DO Latricia Beltran DO         Chart reviewed, including current vitals, notes, labs and imaging  Labs ordered and medications adjusted as outlined above  Coordinate care with care team/consultants  Discussed with patient results of tests, management plan as outlined above, and the need for ongoing hospitalization  D/w RN     [facility]        3/9/2024

## 2024-03-09 NOTE — PROGRESS NOTES
Neurosurgery Progress Note    Assessment:   Chao Reese in room EDOF55/TCGV73-K, is a 28 year old male, Hospital Day ( LOS: 0 days ) hospitalized for Urinary tract infection without hematuria, site unspecified.      The patient's other hospital problems include:   Active Hospital Problems    Abdominal pain of unknown etiology      Diarrhea, unspecified type      *Urinary tract infection without hematuria, site unspecified      Communicating hydrocephalus (HCC)      Myelomeningocele (HCC)        Plan:   -No role for acute neurosurgical intervention  -Medical management per hospitalist team.  Continue antibiotics for UTI  -Neurology on board  -No activity restrictions from a neurosurgical standpoint.  Okay to start PT/OT  -MRI brain and cervical spine without contrast pending  -Further recommendations to follow imaging results    Plan of care discussed with Dr. Nickerson  Subjective:   Patient continues to endorse pain in his left upper extremity.  He feels that the movement in his left hand is improved from yesterday.  Overall, he states that he is starting to feel a little bit better.  He is less diaphoretic today.    Objective:   VITALS: BP (!) 138/98 (BP Location: Left arm)   Pulse 75   Temp 98.9 °F (37.2 °C) (Oral)   Resp 20   Ht 64\"   Wt 140 lb (63.5 kg)   SpO2 96%   BMI 24.03 kg/m²  Temp (24hrs), Av.5 °F (36.9 °C), Min:98.1 °F (36.7 °C), Max:98.9 °F (37.2 °C)       General: Well developed, well nourished, in no acute distress.     Neurological / Musculoskeletal: Awake, alert, oriented, and interactive. Recent and remote memory appear intact. Attention span and concentration are appropriate. No dysarthria. Coordination and motor control grossly intact. Patient follows commands briskly and appropriately. No pronator drift.  Hip flexion intact bilaterally.  Baseline distal weakness secondary to spina bifida.  Right upper extremity full strength.  Continued allodynia in the left upper extremity limiting  ability to participate in motor exam.  Left hand contracture noted, but improved from yesterday.  Able to slightly open hand.  EOMs intact.  Pupils equal, round, and reactive to light bilaterally.  No facial asymmetry.      I&O: I/O last 3 completed shifts:  In: 2570 [P.O.:2460; I.V.:10; IV PIGGYBACK:100]  Out: 2650 [Urine:2650]       Labs:   Recent Labs   Lab 03/07/24 2127 03/08/24 0724   RBC 5.73* 5.09   HGB 16.6 15.4   HCT 48.8 43.3   MCV 85.2 85.1   MCH 29.0 30.3   MCHC 34.0 35.6   RDW 12.3 12.1   NEPRELIM 6.06 3.85   WBC 9.9 7.4   .0 205.0     Recent Labs   Lab 03/07/24 2127 03/08/24 0724   GLU 93 89   BUN 9 9   CREATSERUM 0.98 0.94   EGFRCR 107 113   CA 9.9 9.2   ALB 5.0*  --     141   K 4.5 4.0    108   CO2 28.0 27.0   ALKPHO 69  --    AST 20  --    ALT 33  --    BILT 1.2  --    TP 7.7  --       No results for input(s): \"PT\", \"INR\", \"PTT\" in the last 168 hours.     Imaging:  Awaiting MRIs    Is this a shared or split note between Advanced Practice Provider and Physician? Yes    Stevo Figueroa PA-C  Physician Assistant- Neurosurgery   [facility]  3/9/2024, 8:37 AM        This document was created using Dragon voice recognition software and transcription variances may occur. Please contact documenting provider for clarification of contents if needed.

## 2024-03-09 NOTE — PLAN OF CARE
Patient A&Ox4 on room air. Diaphoretic at times. Afebrile. On IV rocephin. Still having pain to left arm. Norco and ice given prn with little improvement. Straight cath continued. Safety precautions maintained, call light and personal belongings within reach. Plan is for MRI of the brain/spine.    Problem: Patient Centered Care  Goal: Patient preferences are identified and integrated in the patient's plan of care  Description: Interventions:  - What would you like us to know as we care for you? I straight cath myself at home   - Provide timely, complete, and accurate information to patient/family  - Incorporate patient and family knowledge, values, beliefs, and cultural backgrounds into the planning and delivery of care  - Encourage patient/family to participate in care and decision-making at the level they choose  - Honor patient and family perspectives and choices  Outcome: Progressing     Problem: Patient/Family Goals  Goal: Patient/Family Long Term Goal  Description: Patient's Long Term Goal: to have less frequent UTIs    Interventions:  - straight cath Q 6 H  - Intake and output  - See additional Care Plan goals for specific interventions  Outcome: Progressing  Goal: Patient/Family Short Term Goal  Description: Patient's Short Term Goal: to have less pain to my left arm    Interventions:   - MRI brain/spine  - pain medication and ice prn  - See additional Care Plan goals for specific interventions  Outcome: Progressing     Problem: CARDIOVASCULAR - ADULT  Goal: Maintains optimal cardiac output and hemodynamic stability  Description: INTERVENTIONS:  - Monitor vital signs, rhythm, and trends  - Monitor for bleeding, hypotension and signs of decreased cardiac output  - Evaluate effectiveness of vasoactive medications to optimize hemodynamic stability  - Monitor arterial and/or venous puncture sites for bleeding and/or hematoma  - Assess quality of pulses, skin color and temperature  - Assess for signs of decreased  coronary artery perfusion - ex. Angina  - Evaluate fluid balance, assess for edema, trend weights  Outcome: Progressing     Problem: GENITOURINARY - ADULT  Goal: Absence of urinary retention  Description: INTERVENTIONS:  - Assess patient’s ability to void and empty bladder  - Monitor intake/output and perform bladder scan as needed  - Follow urinary retention protocol/standard of care  - Consider collaborating with pharmacy to review patient's medication profile  - Implement strategies to promote bladder emptying  Outcome: Progressing     Problem: NEUROLOGICAL - ADULT  Goal: Achieves stable or improved neurological status  Description: INTERVENTIONS  - Assess for and report changes in neurological status  - Initiate measures to prevent increased intracranial pressure  - Maintain blood pressure and fluid volume within ordered parameters to optimize cerebral perfusion and minimize risk of hemorrhage  - Monitor temperature, glucose, and sodium. Initiate appropriate interventions as ordered  Outcome: Progressing     Problem: PAIN - ADULT  Goal: Verbalizes/displays adequate comfort level or patient's stated pain goal  Description: INTERVENTIONS:  - Encourage pt to monitor pain and request assistance  - Assess pain using appropriate pain scale  - Administer analgesics based on type and severity of pain and evaluate response  - Implement non-pharmacological measures as appropriate and evaluate response  - Consider cultural and social influences on pain and pain management  - Manage/alleviate anxiety  - Utilize distraction and/or relaxation techniques  - Monitor for opioid side effects  - Notify MD/LIP if interventions unsuccessful or patient reports new pain  - Anticipate increased pain with activity and pre-medicate as appropriate  Outcome: Progressing     Problem: RISK FOR INFECTION - ADULT  Goal: Absence of fever/infection during anticipated neutropenic period  Description: INTERVENTIONS  - Monitor WBC  - Administer  growth factors as ordered  - Implement neutropenic guidelines  Outcome: Progressing     Problem: SAFETY ADULT - FALL  Goal: Free from fall injury  Description: INTERVENTIONS:  - Assess pt frequently for physical needs  - Identify cognitive and physical deficits and behaviors that affect risk of falls.  - Motley fall precautions as indicated by assessment.  - Educate pt/family on patient safety including physical limitations  - Instruct pt to call for assistance with activity based on assessment  - Modify environment to reduce risk of injury  - Provide assistive devices as appropriate  - Consider OT/PT consult to assist with strengthening/mobility  - Encourage toileting schedule  Outcome: Progressing

## 2024-03-10 ENCOUNTER — APPOINTMENT (OUTPATIENT)
Dept: GENERAL RADIOLOGY | Facility: HOSPITAL | Age: 29
End: 2024-03-10
Attending: HOSPITALIST
Payer: MEDICARE

## 2024-03-10 PROBLEM — R25.2 SPASTICITY: Status: ACTIVE | Noted: 2024-03-10

## 2024-03-10 LAB
ANION GAP SERPL CALC-SCNC: <0 MMOL/L (ref 0–18)
BASOPHILS # BLD AUTO: 0.03 X10(3) UL (ref 0–0.2)
BASOPHILS NFR BLD AUTO: 0.5 %
BUN BLD-MCNC: 13 MG/DL (ref 9–23)
BUN/CREAT SERPL: 12.1 (ref 10–20)
CALCIUM BLD-MCNC: 9.4 MG/DL (ref 8.7–10.4)
CHLORIDE SERPL-SCNC: 111 MMOL/L (ref 98–112)
CO2 SERPL-SCNC: 28 MMOL/L (ref 21–32)
CREAT BLD-MCNC: 1.07 MG/DL
DEPRECATED RDW RBC AUTO: 38.9 FL (ref 35.1–46.3)
EGFRCR SERPLBLD CKD-EPI 2021: 96 ML/MIN/1.73M2 (ref 60–?)
EOSINOPHIL # BLD AUTO: 0.23 X10(3) UL (ref 0–0.7)
EOSINOPHIL NFR BLD AUTO: 3.6 %
ERYTHROCYTE [DISTWIDTH] IN BLOOD BY AUTOMATED COUNT: 12.2 % (ref 11–15)
GLUCOSE BLD-MCNC: 102 MG/DL (ref 70–99)
HCT VFR BLD AUTO: 44.3 %
HGB BLD-MCNC: 15.1 G/DL
IMM GRANULOCYTES # BLD AUTO: 0.04 X10(3) UL (ref 0–1)
IMM GRANULOCYTES NFR BLD: 0.6 %
LYMPHOCYTES # BLD AUTO: 2.26 X10(3) UL (ref 1–4)
LYMPHOCYTES NFR BLD AUTO: 35 %
MCH RBC QN AUTO: 29.4 PG (ref 26–34)
MCHC RBC AUTO-ENTMCNC: 34.1 G/DL (ref 31–37)
MCV RBC AUTO: 86.2 FL
MONOCYTES # BLD AUTO: 0.63 X10(3) UL (ref 0.1–1)
MONOCYTES NFR BLD AUTO: 9.8 %
NEUTROPHILS # BLD AUTO: 3.27 X10 (3) UL (ref 1.5–7.7)
NEUTROPHILS # BLD AUTO: 3.27 X10(3) UL (ref 1.5–7.7)
NEUTROPHILS NFR BLD AUTO: 50.5 %
OSMOLALITY SERPL CALC.SUM OF ELEC: 286 MOSM/KG (ref 275–295)
PLATELET # BLD AUTO: 210 10(3)UL (ref 150–450)
POTASSIUM SERPL-SCNC: 4.4 MMOL/L (ref 3.5–5.1)
RBC # BLD AUTO: 5.14 X10(6)UL
SODIUM SERPL-SCNC: 138 MMOL/L (ref 136–145)
WBC # BLD AUTO: 6.5 X10(3) UL (ref 4–11)

## 2024-03-10 PROCEDURE — 73030 X-RAY EXAM OF SHOULDER: CPT | Performed by: HOSPITALIST

## 2024-03-10 PROCEDURE — 99232 SBSQ HOSP IP/OBS MODERATE 35: CPT | Performed by: OTHER

## 2024-03-10 PROCEDURE — 99233 SBSQ HOSP IP/OBS HIGH 50: CPT | Performed by: HOSPITALIST

## 2024-03-10 PROCEDURE — 99233 SBSQ HOSP IP/OBS HIGH 50: CPT | Performed by: STUDENT IN AN ORGANIZED HEALTH CARE EDUCATION/TRAINING PROGRAM

## 2024-03-10 RX ORDER — OXCARBAZEPINE 300 MG/1
300 TABLET, FILM COATED ORAL 2 TIMES DAILY
Status: DISCONTINUED | OUTPATIENT
Start: 2024-03-10 | End: 2024-03-13

## 2024-03-10 RX ORDER — CEPHALEXIN 500 MG/1
500 CAPSULE ORAL 4 TIMES DAILY
Status: DISCONTINUED | OUTPATIENT
Start: 2024-03-10 | End: 2024-03-13

## 2024-03-10 NOTE — PROGRESS NOTES
[facility]  [address] [place] [address], [address]  [facility] [address]  [phone]        INPATIENT NEUROLOGY   FOLLOW UP PROGRESS NOTE  [facility]  part of [facility]    Chao Reese Patient Status:  Observation     3/18/1995 MRN [id number]    Location [facility] 1W Attending Latricia Loya DO    Hosp Day # 0 PCP Nitin Mejía DO    Date of Admission:  3/7/2024  Date of Consult Follow Up:  03/10/24       Assessment and Plan:   Chao Reese is a 28 year old right handed male w/ a pmhx sig. 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     On exam  his spasticity/ROM has improved even though his pain has not improved.  He is diaphoretic. Reports he had some relief w/ 600mg gabapentin but does not want to go to 900. Increased  maintenance to 600 mg.     Will add oxcarbazepine which can acts a membrane stabilizer (gabapentin and lyrica can do the same).  He could be improving spontaneously d/t tx of UTI  or the medications. Does not need these medications long term.       L UE spasticity/increased tone and significant neuropathic pain- stable vs. worsening  Differential Diagnosis:  Secondary to UTI  MRI negative for any spinal cord pathology MRI brain negative for any acute process      Plan    Diagnostics:   Mri  brain and c spine completed  Therapeutics:  gabapentin to 600mg TID;Neurochecks Q4  Add oxcarbazepine 400 mg bid; used in acute myelitis to help w/ spasticy; acts as a membrane stabilizer;  Next step: switch gabapentin to lyrica; increase baclofen           INTERVAL EVENTS  24:  mris  completed  03/10/24         SUBJECTIVE:     Rom continues to improve  Pain managed w nsaids/topical delia veliz    Pertinent positive and negatives per HPI.  All others were reviewed and negative.       Objective   OBJECTIVE:   Last vitals and weight :  Blood pressure (!) 159/100, pulse 110, temperature 98.7 °F (37.1 °C), temperature source Oral, resp. rate 16, height 64\", weight 154 lb 4.8 oz (70 kg), SpO2 97%.   Vitals:    03/09/24 1937 03/10/24 0410 03/10/24 0823 03/10/24 1156   BP: (!) 132/93 120/79 (!) 139/98 (!) 159/100   BP Location: Left arm Left arm Left arm Right arm   Pulse: 101 78 92 110   Resp: 18 18 18 16   Temp: 98.3 °F (36.8 °C) 98 °F (36.7 °C) 98.6 °F (37 °C) 98.7 °F (37.1 °C)   TempSrc: Oral Oral Oral Oral   SpO2: 95% 96% 95% 97%   Weight:       Height:           Exam:  - General: appears stated age and in mild distress.  He is diaphoretic from the pain.  - CV: S1, S2 normal           Carotids:   - Pulmonary: no sx respiratory distress            Neurologic Exam  - Mental Status: Alert and attentive. Orjented x4.  Speech is spontaneous, fluent, and prosodic. Comprehension intact. Repetition intact. Phrase length and rate are normal. No paraphasic errors, neologisms, anomia, acalculia, apraxia, anosognosia, or R/L confusion. No neglect.   - Cranial Nerves: No gaze preference. Visual fields:normal Pupils are 5mm briskly constricting to 4mm and equally round and reactive to light  in a well lit room.  EOMI. B/l gaze evoked nystagmus.  Nystagmus. No ptosis. V1-V3 intact B/L to light touch.No pathological facial asymmetry. No flattening of the nasolabial fold. .  Hearing grossly intact.  Tongue midline. No atrophy or fasiculations of the tongue noted. Palate and uvula elevate symmetrically.  Shoulder shrug symmetric.  - Fundoscopic exam:normal w/o hemorrhages, exudates, or papilledema.No attenuation. No pallor.  - Motor:  normal tone, diffusely decrease bulk in LE. No interosseous wasting. No flattening of  hypothenar eminences.         His left hand is no longer clenched in a fist.  He is able to open and close his fingers.  Jacinto to  Flex and extend his left wrist.  He still has significant pain with he is able to left elbow flexion, but his range of motion and his tone have both improved compared to 3/9. They are still not back to normal.                                                        Right                  Left     Motor Strength                           Deltoids                           5                           Triceps                            5                           Biceps                             5                           Wrist Extensors              5                                                             5                                        Hip Flexors                                                   Knee extensors              5                         5  Knee flexors                    5                         5  Plantar flexion                1                         1  Dorsiflexion                    0                         0                                                                                         Reflexes:     C5 C6 C7  L4 S1   R 3+ 3+ 3+ 0 2+   L       1+ 2+   Adductor Spread: Spread of reflexes is noted.    Frontal release signs:Not assessed.    Jaw Jerk:    Michael's sign: questionable on right    Nonsustained clonus: Absent   Sustained clonus: Absent            - Sensory:   Light touch: intact  Temperature: intact  Pinprick:   Vibration: intact     - Cerebellum: No truncal ataxia. No titubations. No dysmetria, no dysdiadochokinesis. No overshoot.   - Plantar response: flexor bilaterally    Medications:   cephalexin  500 mg Oral 4x daily    carvedilol  6.25 mg Oral BID with meals    gabapentin  600 mg Oral TID    cholecalciferol  2,000 Units Oral Daily    oxyBUTYnin Chloride ER  15 mg Oral Daily    heparin  5,000 Units Subcutaneous 2 times per  day    baclofen  10 mg Oral TID       PRNS: HYDROcodone-acetaminophen, camphor/menthol/methyl salicylate, meclizine, docusate sodium, polyethylene glycol (PEG 3350), sennosides, bisacodyl, fleet enema, ondansetron, prochlorperazine, morphINE **OR** morphINE **OR** morphINE    Infusions:          Results:   Laboratory Data:  Lab Results   Component Value Date    WBC 6.5 03/10/2024    HGB 15.1 03/10/2024    HCT 44.3 03/10/2024    .0 03/10/2024    CREATSERUM 1.07 03/10/2024    BUN 13 03/10/2024     03/10/2024    K 4.4 03/10/2024     03/10/2024    CO2 28.0 03/10/2024     (H) 03/10/2024    CA 9.4 03/10/2024    ALB 5.0 (H) 03/07/2024    ALKPHO 69 03/07/2024    TP 7.7 03/07/2024    AST 20 03/07/2024    ALT 33 03/07/2024    PTT 33.7 10/16/2023    INR 1.09 10/16/2023    PTP 14.8 10/16/2023    TSH 3.309 03/07/2024    LIP 32 03/07/2024    DDIMER <0.27 04/02/2022    ESRML 18 (H) 10/12/2023    MG 2.1 03/07/2024    PHOS 3.1 03/22/2018    CK 72 03/07/2024    B12 340 07/24/2023     Recent Results (from the past 72 hour(s))   EKG    Collection Time: 03/07/24  9:00 PM   Result Value Ref Range    Ventricular rate 96 BPM    Atrial rate 96 BPM    P-R Interval 128 ms    QRS Duration 76 ms    Q-T Interval 324 ms    QTC Calculation (Bezet) 409 ms    P Axis 42 degrees    R Axis 54 degrees    T Axis 29 degrees   RAINBOW DRAW LAVENDER    Collection Time: 03/07/24  9:27 PM   Result Value Ref Range    Hold Lavender Auto Resulted    RAINBOW DRAW LIGHT GREEN    Collection Time: 03/07/24  9:27 PM   Result Value Ref Range    Hold Lt Green Auto Resulted    RAINBOW DRAW BLUE    Collection Time: 03/07/24  9:27 PM   Result Value Ref Range    Hold Blue Auto Resulted    Comp Metabolic Panel (14)    Collection Time: 03/07/24  9:27 PM   Result Value Ref Range    Glucose 93 70 - 99 mg/dL    Sodium 139 136 - 145 mmol/L    Potassium 4.5 3.5 - 5.1 mmol/L    Chloride 107 98 - 112 mmol/L    CO2 28.0 21.0 - 32.0 mmol/L    Anion Gap 4 0 -  18 mmol/L    BUN 9 9 - 23 mg/dL    Creatinine 0.98 0.70 - 1.30 mg/dL    BUN/CREA Ratio 9.2 (L) 10.0 - 20.0    Calcium, Total 9.9 8.7 - 10.4 mg/dL    Calculated Osmolality 286 275 - 295 mOsm/kg    eGFR-Cr 107 >=60 mL/min/1.73m2    ALT 33 10 - 49 U/L    AST 20 <=34 U/L    Alkaline Phosphatase 69 45 - 117 U/L    Bilirubin, Total 1.2 0.3 - 1.2 mg/dL    Total Protein 7.7 5.7 - 8.2 g/dL    Albumin 5.0 (H) 3.2 - 4.8 g/dL    Globulin  2.7 (L) 2.8 - 4.4 g/dL    A/G Ratio 1.9 1.0 - 2.0   Lipase    Collection Time: 03/07/24  9:27 PM   Result Value Ref Range    Lipase 32 13 - 75 U/L   CK (Creatine Kinase) (Not Creatinine)    Collection Time: 03/07/24  9:27 PM   Result Value Ref Range    CK 72 46 - 171 U/L   CBC W/ DIFFERENTIAL    Collection Time: 03/07/24  9:27 PM   Result Value Ref Range    WBC 9.9 4.0 - 11.0 x10(3) uL    RBC 5.73 (H) 4.30 - 5.70 x10(6)uL    HGB 16.6 13.0 - 17.5 g/dL    HCT 48.8 39.0 - 53.0 %    MCV 85.2 80.0 - 100.0 fL    MCH 29.0 26.0 - 34.0 pg    MCHC 34.0 31.0 - 37.0 g/dL    RDW-SD 37.7 35.1 - 46.3 fL    RDW 12.3 11.0 - 15.0 %    .0 150.0 - 450.0 10(3)uL    Neutrophil Absolute Prelim 6.06 1.50 - 7.70 x10 (3) uL    Neutrophil Absolute 6.06 1.50 - 7.70 x10(3) uL    Lymphocyte Absolute 2.60 1.00 - 4.00 x10(3) uL    Monocyte Absolute 0.83 0.10 - 1.00 x10(3) uL    Eosinophil Absolute 0.34 0.00 - 0.70 x10(3) uL    Basophil Absolute 0.04 0.00 - 0.20 x10(3) uL    Immature Granulocyte Absolute 0.02 0.00 - 1.00 x10(3) uL    Neutrophil % 61.3 %    Lymphocyte % 26.3 %    Monocyte % 8.4 %    Eosinophil % 3.4 %    Basophil % 0.4 %    Immature Granulocyte % 0.2 %   RAINBOW DRAW GOLD    Collection Time: 03/07/24  9:31 PM   Result Value Ref Range    Hold Gold Auto Resulted    Magnesium    Collection Time: 03/07/24  9:31 PM   Result Value Ref Range    Magnesium 2.1 1.6 - 2.6 mg/dL   TSH W Reflex To Free T4    Collection Time: 03/07/24  9:31 PM   Result Value Ref Range    TSH 3.309 0.550 - 4.780 mIU/mL   Urinalysis  with Culture Reflex    Collection Time: 03/07/24 10:19 PM    Specimen: Urine, straight cath   Result Value Ref Range    Urine Color Yellow Yellow    Clarity Urine Turbid (A) Clear    Spec Gravity 1.012 1.005 - 1.030    Glucose Urine Normal Normal mg/dL    Bilirubin Urine Negative Negative    Ketones Urine Negative Negative mg/dL    Blood Urine 2+ (A) Negative    pH Urine 6.5 5.0 - 8.0    Protein Urine 20 (A) Negative mg/dL    Urobilinogen Urine Normal Normal    Nitrite Urine Negative Negative    Leukocyte Esterase Urine 500 (A) Negative    WBC Urine >50 (A) 0 - 5 /HPF    RBC Urine >10 (A) 0 - 2 /HPF    Bacteria Urine 3+ (A) None Seen /HPF    Squamous Epi. Cells Few (A) None Seen /HPF    Renal Tubular Epithelial Cells None Seen None Seen /HPF    Transitional Cells None Seen None Seen /HPF    Yeast Urine None Seen None Seen /HPF    WBC Clump Present (A) None Seen /HPF   Urine Culture, Routine    Collection Time: 03/07/24 10:19 PM    Specimen: Urine, straight cath   Result Value Ref Range    Urine Culture >100,000 CFU/ML Klebsiella pneumoniae (A)        Susceptibility    Klebsiella pneumoniae -  (no method available)     Ampicillin  Resistant      Ampicillin + Sulbactam 4 Sensitive      Cefazolin <=4 Sensitive      Ciprofloxacin <=0.25 Sensitive      Gentamicin <=1 Sensitive      Meropenem <=0.25 Sensitive      Levofloxacin <=0.12 Sensitive      Nitrofurantoin 64 Intermediate      Piperacillin + Tazobactam <=4 Sensitive      Trimethoprim/Sulfa <=20 Sensitive    Calcium, Ionized    Collection Time: 03/08/24  2:01 AM   Result Value Ref Range    Sample Type Venous     Ionized Calcium 1.13 0.95 - 1.32 mmol/L    Puncture Charge No     Blood Gas Analyzer [id number] ED    Basic Metabolic Panel (8)    Collection Time: 03/08/24  7:24 AM   Result Value Ref Range    Glucose 89 70 - 99 mg/dL    Sodium 141 136 - 145 mmol/L    Potassium 4.0 3.5 - 5.1 mmol/L    Chloride 108 98 - 112 mmol/L    CO2 27.0 21.0 - 32.0 mmol/L    Anion Gap 6 0  - 18 mmol/L    BUN 9 9 - 23 mg/dL    Creatinine 0.94 0.70 - 1.30 mg/dL    BUN/CREA Ratio 9.6 (L) 10.0 - 20.0    Calcium, Total 9.2 8.7 - 10.4 mg/dL    Calculated Osmolality 290 275 - 295 mOsm/kg    eGFR-Cr 113 >=60 mL/min/1.73m2   CBC W/ DIFFERENTIAL    Collection Time: 03/08/24  7:24 AM   Result Value Ref Range    WBC 7.4 4.0 - 11.0 x10(3) uL    RBC 5.09 4.30 - 5.70 x10(6)uL    HGB 15.4 13.0 - 17.5 g/dL    HCT 43.3 39.0 - 53.0 %    MCV 85.1 80.0 - 100.0 fL    MCH 30.3 26.0 - 34.0 pg    MCHC 35.6 31.0 - 37.0 g/dL    RDW-SD 37.6 35.1 - 46.3 fL    RDW 12.1 11.0 - 15.0 %    .0 150.0 - 450.0 10(3)uL    Neutrophil Absolute Prelim 3.85 1.50 - 7.70 x10 (3) uL    Neutrophil Absolute 3.85 1.50 - 7.70 x10(3) uL    Lymphocyte Absolute 2.52 1.00 - 4.00 x10(3) uL    Monocyte Absolute 0.71 0.10 - 1.00 x10(3) uL    Eosinophil Absolute 0.29 0.00 - 0.70 x10(3) uL    Basophil Absolute 0.03 0.00 - 0.20 x10(3) uL    Immature Granulocyte Absolute 0.01 0.00 - 1.00 x10(3) uL    Neutrophil % 52.0 %    Lymphocyte % 34.0 %    Monocyte % 9.6 %    Eosinophil % 3.9 %    Basophil % 0.4 %    Immature Granulocyte % 0.1 %   Basic Metabolic Panel (8)    Collection Time: 03/09/24 11:33 AM   Result Value Ref Range    Glucose 110 (H) 70 - 99 mg/dL    Sodium 137 136 - 145 mmol/L    Potassium 3.7 3.5 - 5.1 mmol/L    Chloride 107 98 - 112 mmol/L    CO2 25.0 21.0 - 32.0 mmol/L    Anion Gap 5 0 - 18 mmol/L    BUN 8 (L) 9 - 23 mg/dL    Creatinine 0.90 0.70 - 1.30 mg/dL    BUN/CREA Ratio 8.9 (L) 10.0 - 20.0    Calcium, Total 9.7 8.7 - 10.4 mg/dL    Calculated Osmolality 283 275 - 295 mOsm/kg    eGFR-Cr 119 >=60 mL/min/1.73m2   CBC W/ DIFFERENTIAL    Collection Time: 03/09/24 11:33 AM   Result Value Ref Range    WBC 6.1 4.0 - 11.0 x10(3) uL    RBC 5.42 4.30 - 5.70 x10(6)uL    HGB 16.5 13.0 - 17.5 g/dL    HCT 45.4 39.0 - 53.0 %    MCV 83.8 80.0 - 100.0 fL    MCH 30.4 26.0 - 34.0 pg    MCHC 36.3 31.0 - 37.0 g/dL    RDW-SD 36.1 35.1 - 46.3 fL    RDW 11.9  11.0 - 15.0 %    .0 150.0 - 450.0 10(3)uL    Neutrophil Absolute Prelim 3.40 1.50 - 7.70 x10 (3) uL    Neutrophil Absolute 3.40 1.50 - 7.70 x10(3) uL    Lymphocyte Absolute 2.02 1.00 - 4.00 x10(3) uL    Monocyte Absolute 0.41 0.10 - 1.00 x10(3) uL    Eosinophil Absolute 0.19 0.00 - 0.70 x10(3) uL    Basophil Absolute 0.03 0.00 - 0.20 x10(3) uL    Immature Granulocyte Absolute 0.03 0.00 - 1.00 x10(3) uL    Neutrophil % 56.0 %    Lymphocyte % 33.2 %    Monocyte % 6.7 %    Eosinophil % 3.1 %    Basophil % 0.5 %    Immature Granulocyte % 0.5 %   Basic Metabolic Panel (8)    Collection Time: 03/10/24  5:57 AM   Result Value Ref Range    Glucose 102 (H) 70 - 99 mg/dL    Sodium 138 136 - 145 mmol/L    Potassium 4.4 3.5 - 5.1 mmol/L    Chloride 111 98 - 112 mmol/L    CO2 28.0 21.0 - 32.0 mmol/L    Anion Gap <0 (L) 0 - 18 mmol/L    BUN 13 9 - 23 mg/dL    Creatinine 1.07 0.70 - 1.30 mg/dL    BUN/CREA Ratio 12.1 10.0 - 20.0    Calcium, Total 9.4 8.7 - 10.4 mg/dL    Calculated Osmolality 286 275 - 295 mOsm/kg    eGFR-Cr 96 >=60 mL/min/1.73m2   CBC W/ DIFFERENTIAL    Collection Time: 03/10/24  5:57 AM   Result Value Ref Range    WBC 6.5 4.0 - 11.0 x10(3) uL    RBC 5.14 4.30 - 5.70 x10(6)uL    HGB 15.1 13.0 - 17.5 g/dL    HCT 44.3 39.0 - 53.0 %    MCV 86.2 80.0 - 100.0 fL    MCH 29.4 26.0 - 34.0 pg    MCHC 34.1 31.0 - 37.0 g/dL    RDW-SD 38.9 35.1 - 46.3 fL    RDW 12.2 11.0 - 15.0 %    .0 150.0 - 450.0 10(3)uL    Neutrophil Absolute Prelim 3.27 1.50 - 7.70 x10 (3) uL    Neutrophil Absolute 3.27 1.50 - 7.70 x10(3) uL    Lymphocyte Absolute 2.26 1.00 - 4.00 x10(3) uL    Monocyte Absolute 0.63 0.10 - 1.00 x10(3) uL    Eosinophil Absolute 0.23 0.00 - 0.70 x10(3) uL    Basophil Absolute 0.03 0.00 - 0.20 x10(3) uL    Immature Granulocyte Absolute 0.04 0.00 - 1.00 x10(3) uL    Neutrophil % 50.5 %    Lymphocyte % 35.0 %    Monocyte % 9.8 %    Eosinophil % 3.6 %    Basophil % 0.5 %    Immature Granulocyte % 0.6 %          Test results/Imaging:   MRI BRAIN (CPT=70551)    Result Date: 3/9/2024  CONCLUSION:  1. No acute intracranial abnormality. Specifically, no evidence of acute or early subacute infarction. 2. Right anterior parietal approach ventriculoperitoneal shunt.  No evidence of hydrocephalus. 3. 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  Findings may relate to sequelae of a Chiari II malformation. 4. Lesser incidental findings as above.   elm-remote  Dictated by (CST): Aayush Holder MD on 3/09/2024 at 9:04 AM     Finalized by (CST): Aayush Holder MD on 3/09/2024 at 9:10 AM          CT BRAIN OR HEAD (99059)    Result Date: 3/8/2024  CONCLUSION:  1. Right-sided  shunt catheter traversing the frontal horn of the right lateral ventricle and having its tip in the left lateral ventricle abutting the area of the caudothalamic groove.  Decompressed ventricular system.  No hemorrhage or other acute finding. 2. Changes related to Chiari 2 malformation.     Dictated by (CST): Froy Barger MD on 3/08/2024 at 1:03 PM     Finalized by (CST): Froy Barger MD on 3/08/2024 at 1:10 PM                Performed an independent visualization of MRI C-spine, MRI brain  Imaging revealed: Agree with read.    Education/Instructions given to: patient, mother, and father   Barriers to Learning:None  Content: Refer to note above. Evaluation/Outcome: Verbalized understanding and Demonstrated understanding    Disclaimer:   This record was dictated using Dragon software. There may be errors due to voice recognition problems that were not realized and corrected during the completion of the note.      This document is not intended to support charting by exception.  Sections left blank in a completed note should be presumed not to have been done.     Total face to face time was 35 minutes, more than 50% of the time was spent in counseling and/or coordination of care related to spasticity, Chiari type II malformation  discussed the case with Dr. Loya's , his hospitalist andr Dr. Nickerson    Thank you.  Trip Mccoy D.O.   Vascular & General Neurology    03/10/24  1:32 PM    Dr. DUGLAS Hearn will attend the neurology service beginning at 7 AM on 03/11/24.

## 2024-03-10 NOTE — PLAN OF CARE
Problem: Patient Centered Care  Goal: Patient preferences are identified and integrated in the patient's plan of care  Description: Interventions:  - What would you like us to know as we care for you?   - Provide timely, complete, and accurate information to patient/family  - Incorporate patient and family knowledge, values, beliefs, and cultural backgrounds into the planning and delivery of care  - Encourage patient/family to participate in care and decision-making at the level they choose  - Honor patient and family perspectives and choices  Outcome: Progressing     Problem: Patient/Family Goals  Goal: Patient/Family Long Term Goal  Description: Patient's Long Term Goal:     Interventions:  -   - See additional Care Plan goals for specific interventions  Outcome: Progressing  Goal: Patient/Family Short Term Goal  Description: Patient's Short Term Goal:     Interventions:   -   - See additional Care Plan goals for specific interventions  Outcome: Progressing     Problem: CARDIOVASCULAR - ADULT  Goal: Maintains optimal cardiac output and hemodynamic stability  Description: INTERVENTIONS:  - Monitor vital signs, rhythm, and trends  - Monitor for bleeding, hypotension and signs of decreased cardiac output  - Evaluate effectiveness of vasoactive medications to optimize hemodynamic stability  - Monitor arterial and/or venous puncture sites for bleeding and/or hematoma  - Assess quality of pulses, skin color and temperature  - Assess for signs of decreased coronary artery perfusion - ex. Angina  - Evaluate fluid balance, assess for edema, trend weights  Outcome: Progressing     Problem: GENITOURINARY - ADULT  Goal: Absence of urinary retention  Description: INTERVENTIONS:  - Assess patient’s ability to void and empty bladder  - Monitor intake/output and perform bladder scan as needed  - Follow urinary retention protocol/standard of care  - Consider collaborating with pharmacy to review patient's medication profile  -  Implement strategies to promote bladder emptying  Outcome: Progressing     Problem: NEUROLOGICAL - ADULT  Goal: Achieves stable or improved neurological status  Description: INTERVENTIONS  - Assess for and report changes in neurological status  - Initiate measures to prevent increased intracranial pressure  - Maintain blood pressure and fluid volume within ordered parameters to optimize cerebral perfusion and minimize risk of hemorrhage  - Monitor temperature, glucose, and sodium. Initiate appropriate interventions as ordered  Outcome: Progressing     Problem: PAIN - ADULT  Goal: Verbalizes/displays adequate comfort level or patient's stated pain goal  Description: INTERVENTIONS:  - Encourage pt to monitor pain and request assistance  - Assess pain using appropriate pain scale  - Administer analgesics based on type and severity of pain and evaluate response  - Implement non-pharmacological measures as appropriate and evaluate response  - Consider cultural and social influences on pain and pain management  - Manage/alleviate anxiety  - Utilize distraction and/or relaxation techniques  - Monitor for opioid side effects  - Notify MD/LIP if interventions unsuccessful or patient reports new pain  - Anticipate increased pain with activity and pre-medicate as appropriate  Outcome: Progressing     Problem: RISK FOR INFECTION - ADULT  Goal: Absence of fever/infection during anticipated neutropenic period  Description: INTERVENTIONS  - Monitor WBC  - Administer growth factors as ordered  - Implement neutropenic guidelines  Outcome: Progressing     Problem: SAFETY ADULT - FALL  Goal: Free from fall injury  Description: INTERVENTIONS:  - Assess pt frequently for physical needs  - Identify cognitive and physical deficits and behaviors that affect risk of falls.  - Effie fall precautions as indicated by assessment.  - Educate pt/family on patient safety including physical limitations  - Instruct pt to call for assistance with  activity based on assessment  - Modify environment to reduce risk of injury  - Provide assistive devices as appropriate  - Consider OT/PT consult to assist with strengthening/mobility  - Encourage toileting schedule  Outcome: Progressing

## 2024-03-10 NOTE — PROGRESS NOTES
[facility]  part of [facility]  Neurological Surgery Progress Note    Chao Reese Patient Status:  Observation    3/18/1995 MRN [id number]   Location [facility] 1W Attending Latricia Loya DO   Hosp Day # 0 PCP Nitin Mejía DO     Subjective:  Slight improvement in spasticity and movement of the left hand, but not pain.  Continues to have shooting pain with movement and to touch.  Had MRI of the cervical spine MRI performed yesterday, which were negative for acute pathology.    Objective:  Vital Signs:  Blood pressure (!) 139/98, pulse 92, temperature 98.6 °F (37 °C), temperature source Oral, resp. rate 18, height 64\", weight 154 lb 4.8 oz (70 kg), SpO2 95%.  General: No acute distress.  Respiratory: Non-labored respirations bilaterally. No audible wheezing  Cardiovascular: Extremities warm and well-perfused.  Abdomen: Soft, nontender, nondistended.   Musculoskeletal: Moves all extremities well, symmetrically.  Extremities: No edema.  Neurologic:  A&Ox3   PERRL, EOMI   Face symmetric, no numbness  Patient follows commands briskly appropriately in all 4 extremities, however, he has baseline distal weakness secondary to spina bifida in the lower extremities.  Right upper extremity is full strength.  Left upper extremity limiting ability to participate in motor exam due to pain in left hand contracture.  He is able to open his hand completely.  Continues to have allodynia of the left upper extremity.    Neurosurgical imaging independently reviewed:  MR brain 3/9/2024: No acute intracranial abnormalities.  No evidence of acute or early subacute infarction.  Stable appearance of ventricular system with no evidence of hydrocephalus.  Stable sequelae of Chiari II malformation.  No syrinx appreciated.  MR cervical 3/9/2024: Multilevel degenerative changes throughout the cervical spine with no evidence of spinal canal or foraminal compromise.  Of particular  interest, no foraminal narrowing involving the lower cervical or upper thoracic nerve roots.  No syrinx.    Assessment & Plan:  Patient Active Problem List   Diagnosis    Spina bifida (HCC)    Weakness of both lower extremities    Foot drop, bilateral    Acne vulgaris    Neurogenic bladder    Myelomeningocele (HCC)    Tachycardia    Strabismus    Regular astigmatism of both eyes    Right arm numbness    Constipation    Gastroenteritis    Gait disturbance    Chiari malformation type II (HCC)    Right foot pain    Right posterior tibial strain    Myalgia    Essential hypertension    Right renal artery stenosis (HCC)    Lesion of urinary bladder    Hyperopia of right eye    Myopia of left eye    Exotropia    Foraminal stenosis of lumbar region    Right lumbar pain    Communicating hydrocephalus (HCC)    S/P  shunt    Pain in both lower extremities    Hospital discharge follow-up    Cystitis    Renal stone    Vaccine counseling    Urinary tract infection without hematuria, site unspecified    Abdominal pain of unknown etiology    Diarrhea, unspecified type     Chao Reese is a(n) 28 year old male with a complex medical history, including spina bifida obstructive hydrocephalus status post  shunt. He presented to the ER with abdominal pain radiating to his back with episodes of diarrhea, acute onset of upper extremity pain and stiffness. CT scan in the ER is negative for ventriculomegaly and there is little concern for shunt malfunction. During my examination patient is diaphoretic. He was found to have UTI. Myelo patients are at significant risk for chronic UTIs and pyelonephritis. He does have a history of a kidney stone. This could in turn exacerbate spasticity and other pre-existing conditions.  Rare instances the patient is to have myelo with superimposed cerebral palsy, systemic illnesses can exacerbate prior spasticity.  However, it is interesting to note that the patient has never suffered from the symptoms  before.  His MRIs of the brain and cervical spine are unrevealing for organic pathology that would explain his presenting complaints.  No role for neurosurgical interventions at this time.  Consider steroids, parsonage-turned syndrome?.  Discussed case with primary hospitalist. Neurosurgery to follow from a distance, will remain available upon request.    Tyelr Nickerson MD  Neurological Surgery    [address], [address] [address] [place] [address] [id number] [phone]  Pager [phone]  3/10/2024 9:35 AM      This note was created using a voice-recognition transcribing system. Incorrect words or phrases may have been missed during proofreading. Please interpret accordingly.

## 2024-03-10 NOTE — PROGRESS NOTES
[facility]  part of [facility]    Progress Note    Chao Reese Patient Status:  Observation    3/18/1995 MRN [id number]   Location [facility] 1W Attending Latricia Loya DO   Hosp Day # 0 PCP Nitin Mejía DO     Chief complaint uti, left arm spasticity, pain     Subjective:   Chao Reese is a(n) 28 year old male Pt seen this am. Weakness in left arm improved but still painful   Dizziness resolved     ROS:   No cp, sob   No c/d   No n/v     Objective:   Blood pressure (!) 139/98, pulse 92, temperature 98.6 °F (37 °C), temperature source Oral, resp. rate 18, height 5' 4\" (1.626 m), weight 154 lb 4.8 oz (70 kg), SpO2 95%.      Intake/Output Summary (Last 24 hours) at 3/10/2024 0909  Last data filed at 3/10/2024 0000  Gross per 24 hour   Intake 240 ml   Output 1100 ml   Net -860 ml       Patient Weight(s) for the past 336 hrs:   Weight   24 1541 154 lb 4.8 oz (70 kg)   24 140 lb (63.5 kg)           General appearance: alert, appears stated age and cooperative  Pulmonary:  clear to auscultation bilaterally  Cardiovascular: S1, S2 normal, no murmur, click, rub or gallop, regular rate and rhythm  Abdominal: soft, non-tender; bowel sounds normal; no masses,  no organomegaly  Extremities: extremities normal, atraumatic, no cyanosis or edema  Left arm no swelling or erythema noted , strength 4/5         Medicines:           Lab Results   Component Value Date    WBC 6.5 03/10/2024    HGB 15.1 03/10/2024    HCT 44.3 03/10/2024    .0 03/10/2024    CREATSERUM 1.07 03/10/2024    BUN 13 03/10/2024     03/10/2024    K 4.4 03/10/2024     03/10/2024    CO2 28.0 03/10/2024     (H) 03/10/2024    CA 9.4 03/10/2024    ALB 5.0 (H) 2024    ALKPHO 69 2024    BILT 1.2 2024    TP 7.7 2024    AST 20 2024    ALT 33 2024    PTT 33.7 10/16/2023    INR 1.09 10/16/2023    TSH 3.309 2024    LIP 32 2024    DDIMER <0.27  04/02/2022    ESRML 18 (H) 10/12/2023    MG 2.1 03/07/2024    PHOS 3.1 03/22/2018    CK 72 03/07/2024    B12 340 07/24/2023       MRI SPINE CERVICAL (CPT=72141)    Result Date: 3/9/2024  CONCLUSION:  1. No acute cervical spine fracture.  Mild chronic-appearing volume loss of the cervical cord, which may relate to known history of spina bifida/Chiari II malformation.  Cervical cord demonstrates preserved signal. 2. Mild multilevel cervical spine degenerative changes as detailed.  Findings do not result in significant spinal canal or neural foraminal stenosis.   elm-remote  Dictated by (CST): Aayush Holder MD on 3/09/2024 at 9:10 AM     Finalized by (CST): Aayush Holder MD on 3/09/2024 at 9:15 AM          MRI BRAIN (CPT=70551)    Result Date: 3/9/2024  CONCLUSION:  1. No acute intracranial abnormality. Specifically, no evidence of acute or early subacute infarction. 2. Right anterior parietal approach ventriculoperitoneal shunt.  No evidence of hydrocephalus. 3. 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  Findings may relate to sequelae of a Chiari II malformation. 4. Lesser incidental findings as above.   elm-remote  Dictated by (CST): Aayush Holder MD on 3/09/2024 at 9:04 AM     Finalized by (CST): Aayush Holder MD on 3/09/2024 at 9:10 AM          XR SHUNT POSITION SERIES  (CPT=70250/81187/12405/66879)    Result Date: 3/8/2024  CONCLUSION:  1. Right parietal approach ventriculoperitoneal shunt is in stable positioning and is intact. 2. Chronic abandoned catheter fragment again noted in the right upper quadrant.     Dictated by (CST): Danis Madrigal MD on 3/08/2024 at 2:06 PM     Finalized by (CST): Danis Madrigal MD on 3/08/2024 at 2:12 PM          CT SPINE CERVICAL (CPT=72125)    Result Date: 3/8/2024  CONCLUSION:   No acute fracture or malalignment of the cervical spine.  Kyphosis is probably positional.  Limited evaluation of the thecal sac without intrathecal contrast.   No spinal canal or foraminal stenosis is seen.  MRI would be more sensitive for detection of spinal canal or foraminal stenosis.    Dictated by (CST): Harry Rodríguez MD on 3/08/2024 at 1:16 PM     Finalized by (CST): Harry Rodríguez MD on 3/08/2024 at 1:24 PM          CT BRAIN OR HEAD (98881)    Result Date: 3/8/2024  CONCLUSION:  1. Right-sided  shunt catheter traversing the frontal horn of the right lateral ventricle and having its tip in the left lateral ventricle abutting the area of the caudothalamic groove.  Decompressed ventricular system.  No hemorrhage or other acute finding. 2. Changes related to Chiari 2 malformation.     Dictated by (CST): Froy Barger MD on 3/08/2024 at 1:03 PM     Finalized by (CST): Froy Barger MD on 3/08/2024 at 1:10 PM               Results:     CBC:    Lab Results   Component Value Date    WBC 6.5 03/10/2024    WBC 6.1 03/09/2024    WBC 7.4 03/08/2024     Lab Results   Component Value Date    HEMOGLOBIN 16.3 02/23/2024    HEMOGLOBIN 15.8 02/15/2024    HEMOGLOBIN 15.0 10/31/2018    HGB 15.1 03/10/2024    HGB 16.5 03/09/2024    HGB 15.4 03/08/2024      Lab Results   Component Value Date    .0 03/10/2024    .0 03/09/2024    .0 03/08/2024       Recent Labs   Lab 03/08/24  0724 03/09/24  1133 03/10/24  0557   GLU 89 110* 102*   BUN 9 8* 13   CREATSERUM 0.94 0.90 1.07   CA 9.2 9.7 9.4    137 138   K 4.0 3.7 4.4    107 111   CO2 27.0 25.0 28.0             Assessment and Plan:     Urinary tract infection without hematuria, site unspecified  - cont iv abx   - await final cx - switch to oral based on cxs - klebsiella     Chronic left renal calculi  -Patient with known history of left renal calculi.  CT abdomen/pelvis does reveal nonobstructive left calculi.  No hydronephrosis.    Left arm spasticity- likely 2/2 uti.   -Sudden onset of left hand spasticity.  Patient is neurologically intact with  shunt for his h/o spina bifida, unclear cause of his left  hand spasticity.   - apprec neuro and neuro surgery   - mri c spine and brain reviewed and negative   -  shunt intact   - pt/ot today   - possible steroids - discussed with neuro and ns           Other medical conditions  Neurogenic bladder -resume oxybutynin  Spina bifida s/p  shunt  Essential hypertension-increase coreg         Prophylaxis  Subcutaneous heparin     CODE STATUS  Full     Primary care physician  Nitin Mejía DO      Will update pt's sister         Latricia Mckenzieight,          Chart reviewed, including current vitals, notes, labs and imaging  Labs ordered and medications adjusted as outlined above  Coordinate care with care team/consultants  Discussed with patient results of tests, management plan as outlined above, and the need for ongoing hospitalization  D/w RN     MDM high

## 2024-03-10 NOTE — OCCUPATIONAL THERAPY NOTE
OCCUPATIONAL THERAPY EVALUATION - INPATIENT     Room Number: 103/103-A  Evaluation Date: 3/10/2024  Type of Evaluation: Initial  Presenting Problem: abdominal and flank pain    Physician Order: IP Consult to Occupational Therapy  Reason for Therapy: ADL/IADL Dysfunction and Discharge Planning    OCCUPATIONAL THERAPY ASSESSMENT   Patient is a 28 year old male admitted 3/7/2024 for flank and abdominal pain, UTI.  Pt developed left shoulder pain and spasticity during admission. XR L shoulder (-). MRI C-spine (-). MRI brain (-).   Prior to admission, patient's baseline is (Mod I with ADLs and ambulation without an AD, wears B AFO for chronic foot drop). Pt lives with his parents in an apartment with 2 SCARLET. He does not drive. He works in dispatch.   Patient is currently functioning below baseline with toileting, bathing, lower body dressing, bed mobility, and transfers.  Patient is requiring maximum assist as a result of the following impairments: decreased functional strength, pain, and medical status. Occupational Therapy will continue to follow for duration of hospitalization.    Pt is primarily limited by c/o significant pain in L shoulder/scapular region. Noted area of increased swelling around scapular region. Pt rating pain \"9/10\". He is not able to tolerate any gentle PROM to shoulder, sensitive to touch. He describes pain in L shoulder as \"stabbing\". He reports \"throbbing\" pain in his left bicep/tricep area. He received pain meds at start of session. Pt is able to actively move his hand, , and actively move his wrist. He is not able to move his shoulder or elbow due to increased pain. Pt requesting a support/sling for L arm when mobilizing. Instructed pt in positioning LUE on pillows and elevating L hand. Heat pack provided for L shoulder. Pt reports he is not able to tolerate ice at this time. Provided resistive  exercises with washcloth.     Patient will benefit from continued skilled OT Services to  facilitate return to prior level of function as patient demonstrates high motivation with excellent tolerance to an intensive therapy program     PLAN  OT Treatment Plan: Balance activities;ADL training;UE strengthening/ROM;Functional transfer training;Endurance training;Patient/Family education;Compensatory technique education  OT Device Recommendations: TBD    OCCUPATIONAL THERAPY MEDICAL/SOCIAL HISTORY   Problem List  Principal Problem:    Urinary tract infection without hematuria, site unspecified  Active Problems:    Myelomeningocele (HCC)    Communicating hydrocephalus (HCC)    Abdominal pain of unknown etiology    Diarrhea, unspecified type    Spasticity    HOME SITUATION  Type of Home: Apartment  Home Layout: One level  Lives With: Family; Parent(s)  Shower/Tub and Equipment: Tub-shower combo; Grab bar; Shower chair  Other Equipment: -- (RW, w/c)  Occupation/Status: works full time   Hand Dominance: Right  Drives: No    Stairs in Home: 2 SCARLET  Use of Assistive Device(s): owns a RW and w/c    Prior Level of Point [place]: Pt has a tub with a GB and seat.     SUBJECTIVE  \"I'm sorry I can't do more.\"    OCCUPATIONAL THERAPY EXAMINATION    OBJECTIVE  Precautions: Limb alert - left (B AFO)    PAIN ASSESSMENT  Ratin  Location: L shoulder stabbing pain, L bicep/tricep throbbing pain  Management Techniques: Relaxation; Repositioning; Heat    ACTIVITY TOLERANCE  Pulse: 95  Heart Rate Source: Monitor     BP: (!) 155/95 (Initial sitting /106, Supine 155/59, 2nd attempt sitting 149/97)  BP Location: Right arm  BP Method: Automatic  Patient Position: Lying    O2 SATURATIONS  Oxygen Therapy  SPO2% on Room Air at Rest: 94  SPO2% Ambulation on Room Air: 95    COGNITION  Overall Cognitive Status:  WFL - within functional limits    VISION  wfl    PERCEPTION  wfl    SENSATION  wfl    Communication: wf;    Behavioral/Emotional/Social: wf;    RANGE OF MOTION   Upper extremity ROM is within functional limits  except L shoulder flexion, ER, IR, elbow flex/ext. Pt is limited by pain    STRENGTH ASSESSMENT  Upper extremity strength is within functional limits except  proximal LUE limited by pain.     COORDINATION  Gross Motor: WFL   Fine Motor: WFL     ACTIVITIES OF DAILY LIVING ASSESSMENT  AM-PAC ‘6-Clicks’ Inpatient Daily Activity Short Form  How much help from another person does the patient currently need…  -   Putting on and taking off regular lower body clothing?: A Lot  -   Bathing (including washing, rinsing, drying)?: A Lot  -   Toileting, which includes using toilet, bedpan or urinal? : A Lot  -   Putting on and taking off regular upper body clothing?: A Lot  -   Taking care of personal grooming such as brushing teeth?: A Little  -   Eating meals?: A Little    AM-PAC Score:  Score: 14  Approx Degree of Impairment: 59.67%  Standardized Score (AM-PAC Scale): 33.39  CMS Modifier (G-Code): CK    FUNCTIONAL TRANSFER ASSESSMENT  Sit to Stand: Edge of Bed  Edge of Bed: Moderate Assist    BED MOBILITY  Rolling: Moderate Assist  Supine to Sit : Maximum Assist  Sit to Supine (OT): Maximum Assist    BALANCE ASSESSMENT  Static Sitting: Stand-by Assist  Sitting Unilateral: Contact Guard Assist  Static Standing: Contact Guard Assist  Standing Unilateral: Moderate Assist    FUNCTIONAL ADL ASSESSMENT  Eating: Minimal Assist  Grooming Seated: Minimal Assist (Min A to wash his face sitting EOB. Pt unable to functionally use LUE due to pain and requiring use of RUE for stabalizing support for sitting.)  Bathing Seated: Maximum Assist  UB Dressing Seated: Maximum Assist  LB Dressing Seated: Maximum Assist  Toileting Seated: Maximum Assist         Skilled Therapy Provided: RN approved session. Pt received in the bed, agreeable to tx. Pt reports \"9/10\" pain in L shoulder and arm. Pt is limited in his ability to functionally use LUE due c/o significant pain. He required Max A for bed mobility with assist for BLE and trunk. Pt initially  c/o dizziness with upright activity, /106. He was assisted back to bed after symptoms did not clear. BP improved in supine, pt reported improvement in dizziness. He requested to attempt sitting again. On 2nd attempt, pt reports dizziness greatly improved, BP stable 149/87. He sat EOB ~5 minutes with SBA. Mother assisting with washing his face as pt not able to functionally use LUE and relying on RUE for balance support. Pt required Max A to don B AFO and shoes. He was able to stand and pivot into the chair with Mod A x2 and RW (pt not able to place LUE on RW- therapist assisting moving the RW). Pt positioned for comfort in the chair with all needs in reach and RN and PCT aware. Mother present.     EDUCATION PROVIDED  Patient: Role of Occupational Therapy; Plan of Care; Discharge Recommendations; Functional Transfer Techniques; Compensatory ADL Techniques  Patient's Response to Education: Verbalized Understanding    The patient's Approx Degree of Impairment: 59.67% has been calculated based on documentation in the [facility] '6 clicks' Inpatient Daily Activity Short Form.  Research supports that patients with this level of impairment may benefit from rehab.  Final disposition will be made by interdisciplinary medical team.     Patient End of Session: Up in chair;Needs met;Call light within reach;RN aware of session/findings;All patient questions and concerns addressed;Family present    OT Goals  Patients self stated goal is: to go home and back to work     Patient will complete functional transfer with supervision  Comment:     Patient will complete toileting with supervision  Comment:     Patient will tolerate standing for 5 minutes in prep for adls with supervision   Comment:    Patient will complete LB dressing with supervision  Comment:          Goals  on: 3/24/24  Frequency: 3-5x/week    Patient Evaluation Complexity Level:   Occupational Profile/Medical History HIGH - Extensive review of history  including review of medical or therapy records    Specific performance deficits impacting engagement in ADL/IADL HIGH  5+ performance deficits    Client Assessment/Performance Deficits HIGH - Comorbidities and significant modifications of tasks    Clinical Decision Making HIGH - Analysis of occupational profile, comprehensive assessments, multiple treatment options    Overall Complexity HIGH     OT Session   Self-Care Home Management: 10 minutes  Therapeutic Activity: 30 minutes

## 2024-03-10 NOTE — PLAN OF CARE
Problem: CARDIOVASCULAR - ADULT  Goal: Maintains optimal cardiac output and hemodynamic stability  Description: INTERVENTIONS:  - Monitor vital signs, rhythm, and trends  - Monitor for bleeding, hypotension and signs of decreased cardiac output  - Evaluate effectiveness of vasoactive medications to optimize hemodynamic stability  - Monitor arterial and/or venous puncture sites for bleeding and/or hematoma  - Assess quality of pulses, skin color and temperature  - Assess for signs of decreased coronary artery perfusion - ex. Angina  - Evaluate fluid balance, assess for edema, trend weights  Outcome: Progressing     Problem: GENITOURINARY - ADULT  Goal: Absence of urinary retention  Description: INTERVENTIONS:  - Assess patient’s ability to void and empty bladder  - Monitor intake/output and perform bladder scan as needed  - Follow urinary retention protocol/standard of care  - Consider collaborating with pharmacy to review patient's medication profile  - Implement strategies to promote bladder emptying  Outcome: Progressing     Problem: NEUROLOGICAL - ADULT  Goal: Achieves stable or improved neurological status  Description: INTERVENTIONS  - Assess for and report changes in neurological status  - Initiate measures to prevent increased intracranial pressure  - Maintain blood pressure and fluid volume within ordered parameters to optimize cerebral perfusion and minimize risk of hemorrhage  - Monitor temperature, glucose, and sodium. Initiate appropriate interventions as ordered  Outcome: Progressing     Problem: PAIN - ADULT  Goal: Verbalizes/displays adequate comfort level or patient's stated pain goal  Description: INTERVENTIONS:  - Encourage pt to monitor pain and request assistance  - Assess pain using appropriate pain scale  - Administer analgesics based on type and severity of pain and evaluate response  - Implement non-pharmacological measures as appropriate and evaluate response  - Consider cultural and  social influences on pain and pain management  - Manage/alleviate anxiety  - Utilize distraction and/or relaxation techniques  - Monitor for opioid side effects  - Notify MD/LIP if interventions unsuccessful or patient reports new pain  - Anticipate increased pain with activity and pre-medicate as appropriate  Outcome: Progressing     Problem: RISK FOR INFECTION - ADULT  Goal: Absence of fever/infection during anticipated neutropenic period  Description: INTERVENTIONS  - Monitor WBC  - Administer growth factors as ordered  - Implement neutropenic guidelines  Outcome: Progressing     Problem: SAFETY ADULT - FALL  Goal: Free from fall injury  Description: INTERVENTIONS:  - Assess pt frequently for physical needs  - Identify cognitive and physical deficits and behaviors that affect risk of falls.  - Anna fall precautions as indicated by assessment.  - Educate pt/family on patient safety including physical limitations  - Instruct pt to call for assistance with activity based on assessment  - Modify environment to reduce risk of injury  - Provide assistive devices as appropriate  - Consider OT/PT consult to assist with strengthening/mobility  - Encourage toileting schedule  Outcome: Progressing

## 2024-03-10 NOTE — PROGRESS NOTES
[facility]  [address], [address]  [facility] [address]  [phone]        INPATIENT NEUROLOGY   FOLLOW UP PROGRESS NOTE  [facility]  part of [facility]    Chao Reese Patient Status:  Observation     3/18/1995 MRN [id number]    Location [facility] 1W Attending Latricia Loya DO    Hosp Day # 0 PCP Nitin Mejía DO    Date of Admission:  3/7/2024  Date of Consult Follow Up:  3/9/2024     Assessment and Plan:   Chao Reese is a 28 year old right handed male w/ a pmhx sig. 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     On exam  his spasticity/ROM has improved even though his pain has not improved.  He is diaphoretic. Reports he had some relief w/ 600mg gabapentin but does not want to go to 900. Will increase maintenance to 600 mg.       L UE spasticity/increased tone and significant neuropathic pain- stable vs. worsening  Differential Diagnosis:  Secondary to UTI  MRI negative for any spinal cord pathology MRI brain negative for any acute process      Plan    Diagnostics:   Mri  brain and c spine completed  Therapeutics:   Increased gabapentin to 600mg TID;Neurochecks Q4              INTERVAL EVENTS  24:  mris completed       SUBJECTIVE:      Reports  some  transient improvement in pain with the 600 mg dose of gabapentin yesterday.  Otherwise no benefit from 300 mg doses.  No benefit from current dosing of baclofen.  Range of motion has improved her left hand.  Pertinent positive and negatives per HPI.  All others were reviewed and negative.       Objective   OBJECTIVE:    Last vitals and weight :  Blood pressure (!) 132/93, pulse 101, temperature 98.3 °F (36.8 °C), temperature source Oral, resp. rate 18, height 64\", weight 154 lb 4.8 oz (70 kg), SpO2 95%.   Vitals:    03/09/24 1556 03/09/24 1805 03/09/24 1900 03/09/24 1937   BP:    (!) 132/93   BP Location:    Left arm   Pulse: 97 120 118 101   Resp:    18   Temp:    98.3 °F (36.8 °C)   TempSrc:    Oral   SpO2:    95%   Weight:       Height:           Exam:  - General: appears stated age and in mild distress.  He is diaphoretic from the pain.  - CV: S1, S2 normal           Carotids:   - Pulmonary: no sx respiratory distress            Neurologic Exam  - Mental Status: Alert and attentive. Orjented x4.  Speech is spontaneous, fluent, and prosodic. Comprehension intact. Repetition intact. Phrase length and rate are normal. No paraphasic errors, neologisms, anomia, acalculia, apraxia, anosognosia, or R/L confusion. No neglect.   - Cranial Nerves: No gaze preference. Visual fields:normal Pupils are 5mm briskly constricting to 4mm and equally round and reactive to light  in a well lit room.  EOMI. B/l gaze evoked nystagmus.  Nystagmus. No ptosis. V1-V3 intact B/L to light touch.No pathological facial asymmetry. No flattening of the nasolabial fold. .  Hearing grossly intact.  Tongue midline. No atrophy or fasiculations of the tongue noted. Palate and uvula elevate symmetrically.  Shoulder shrug symmetric.  - Fundoscopic exam:normal w/o hemorrhages, exudates, or papilledema.No attenuation. No pallor.  - Motor:  normal tone, diffusely decrease bulk in LE. No interosseous wasting. No flattening of hypothenar eminences.         His left hand is no longer clenched in a fist.  He is able to open his fingers.  Flex and extend his left wrist.  He still has significant pain with he is able to left elbow flexion, but his range of motion and his tone have both improved.  They are not back to normal.                                                         Right                  Left     Motor Strength                           Deltoids                           5                           Triceps                            5                           Biceps                             5                           Wrist Extensors              5                                                             5                                        Hip Flexors                                                   Knee extensors              5                         5  Knee flexors                    5                         5  Plantar flexion                1                         1  Dorsiflexion                    0                         0                                                                                         Reflexes:     C5 C6 C7  L4 S1   R 3+ 3+ 3+ 0 2+   L       1+ 2+   Adductor Spread: Spread of reflexes is noted.    Frontal release signs:Not assessed.    Jaw Jerk:    Michael's sign: questionable on right    Nonsustained clonus: Absent   Sustained clonus: Absent            - Sensory:   Light touch: intact  Temperature: intact  Pinprick:   Vibration: intact     - Cerebellum: No truncal ataxia. No titubations. No dysmetria, no dysdiadochokinesis. No overshoot.   - Gait/station: Normal gait and station. Symmetric arm swing.   - Toe walking:  - Heel walking:  - Plantar response: flexor bilaterally    Medications:   cephalexin  500 mg Oral 4x daily    carvedilol  6.25 mg Oral BID with meals    gabapentin  600 mg Oral TID    cholecalciferol  2,000 Units Oral Daily    oxyBUTYnin Chloride ER  15 mg Oral Daily    heparin  5,000 Units Subcutaneous 2 times per day    baclofen  10 mg Oral TID       PRNS: HYDROcodone-acetaminophen, camphor/menthol/methyl salicylate, meclizine, docusate sodium, polyethylene glycol (PEG 3350), sennosides, bisacodyl, fleet enema, ondansetron, prochlorperazine, morphINE **OR** morphINE **OR** morphINE    Infusions:           Results:   Laboratory Data:  Lab Results   Component Value Date    WBC 6.5 03/10/2024    HGB 15.1 03/10/2024    HCT 44.3 03/10/2024    .0 03/10/2024    CREATSERUM 1.07 03/10/2024    BUN 13 03/10/2024     03/10/2024    K 4.4 03/10/2024     03/10/2024    CO2 28.0 03/10/2024     (H) 03/10/2024    CA 9.4 03/10/2024    ALB 5.0 (H) 03/07/2024    ALKPHO 69 03/07/2024    TP 7.7 03/07/2024    AST 20 03/07/2024    ALT 33 03/07/2024    PTT 33.7 10/16/2023    INR 1.09 10/16/2023    PTP 14.8 10/16/2023    TSH 3.309 03/07/2024    LIP 32 03/07/2024    DDIMER <0.27 04/02/2022    ESRML 18 (H) 10/12/2023    MG 2.1 03/07/2024    PHOS 3.1 03/22/2018    CK 72 03/07/2024    B12 340 07/24/2023     Recent Results (from the past 72 hour(s))   EKG    Collection Time: 03/07/24  9:00 PM   Result Value Ref Range    Ventricular rate 96 BPM    Atrial rate 96 BPM    P-R Interval 128 ms    QRS Duration 76 ms    Q-T Interval 324 ms    QTC Calculation (Bezet) 409 ms    P Axis 42 degrees    R Axis 54 degrees    T Axis 29 degrees   RAINBOW DRAW LAVENDER    Collection Time: 03/07/24  9:27 PM   Result Value Ref Range    Hold Lavender Auto Resulted    RAINBOW DRAW LIGHT GREEN    Collection Time: 03/07/24  9:27 PM   Result Value Ref Range    Hold Lt Green Auto Resulted    RAINBOW DRAW BLUE    Collection Time: 03/07/24  9:27 PM   Result Value Ref Range    Hold Blue Auto Resulted    Comp Metabolic Panel (14)    Collection Time: 03/07/24  9:27 PM   Result Value Ref Range    Glucose 93 70 - 99 mg/dL    Sodium 139 136 - 145 mmol/L    Potassium 4.5 3.5 - 5.1 mmol/L    Chloride 107 98 - 112 mmol/L    CO2 28.0 21.0 - 32.0 mmol/L    Anion Gap 4 0 - 18 mmol/L    BUN 9 9 - 23 mg/dL    Creatinine 0.98 0.70 - 1.30 mg/dL    BUN/CREA Ratio 9.2 (L) 10.0 - 20.0    Calcium, Total 9.9 8.7 - 10.4 mg/dL    Calculated Osmolality 286 275 - 295 mOsm/kg    eGFR-Cr 107 >=60 mL/min/1.73m2    ALT 33 10 - 49 U/L    AST 20 <=34 U/L    Alkaline Phosphatase 69  45 - 117 U/L    Bilirubin, Total 1.2 0.3 - 1.2 mg/dL    Total Protein 7.7 5.7 - 8.2 g/dL    Albumin 5.0 (H) 3.2 - 4.8 g/dL    Globulin  2.7 (L) 2.8 - 4.4 g/dL    A/G Ratio 1.9 1.0 - 2.0   Lipase    Collection Time: 03/07/24  9:27 PM   Result Value Ref Range    Lipase 32 13 - 75 U/L   CK (Creatine Kinase) (Not Creatinine)    Collection Time: 03/07/24  9:27 PM   Result Value Ref Range    CK 72 46 - 171 U/L   CBC W/ DIFFERENTIAL    Collection Time: 03/07/24  9:27 PM   Result Value Ref Range    WBC 9.9 4.0 - 11.0 x10(3) uL    RBC 5.73 (H) 4.30 - 5.70 x10(6)uL    HGB 16.6 13.0 - 17.5 g/dL    HCT 48.8 39.0 - 53.0 %    MCV 85.2 80.0 - 100.0 fL    MCH 29.0 26.0 - 34.0 pg    MCHC 34.0 31.0 - 37.0 g/dL    RDW-SD 37.7 35.1 - 46.3 fL    RDW 12.3 11.0 - 15.0 %    .0 150.0 - 450.0 10(3)uL    Neutrophil Absolute Prelim 6.06 1.50 - 7.70 x10 (3) uL    Neutrophil Absolute 6.06 1.50 - 7.70 x10(3) uL    Lymphocyte Absolute 2.60 1.00 - 4.00 x10(3) uL    Monocyte Absolute 0.83 0.10 - 1.00 x10(3) uL    Eosinophil Absolute 0.34 0.00 - 0.70 x10(3) uL    Basophil Absolute 0.04 0.00 - 0.20 x10(3) uL    Immature Granulocyte Absolute 0.02 0.00 - 1.00 x10(3) uL    Neutrophil % 61.3 %    Lymphocyte % 26.3 %    Monocyte % 8.4 %    Eosinophil % 3.4 %    Basophil % 0.4 %    Immature Granulocyte % 0.2 %   RAINBOW DRAW GOLD    Collection Time: 03/07/24  9:31 PM   Result Value Ref Range    Hold Gold Auto Resulted    Magnesium    Collection Time: 03/07/24  9:31 PM   Result Value Ref Range    Magnesium 2.1 1.6 - 2.6 mg/dL   TSH W Reflex To Free T4    Collection Time: 03/07/24  9:31 PM   Result Value Ref Range    TSH 3.309 0.550 - 4.780 mIU/mL   Urinalysis with Culture Reflex    Collection Time: 03/07/24 10:19 PM    Specimen: Urine, straight cath   Result Value Ref Range    Urine Color Yellow Yellow    Clarity Urine Turbid (A) Clear    Spec Gravity 1.012 1.005 - 1.030    Glucose Urine Normal Normal mg/dL    Bilirubin Urine Negative Negative     Ketones Urine Negative Negative mg/dL    Blood Urine 2+ (A) Negative    pH Urine 6.5 5.0 - 8.0    Protein Urine 20 (A) Negative mg/dL    Urobilinogen Urine Normal Normal    Nitrite Urine Negative Negative    Leukocyte Esterase Urine 500 (A) Negative    WBC Urine >50 (A) 0 - 5 /HPF    RBC Urine >10 (A) 0 - 2 /HPF    Bacteria Urine 3+ (A) None Seen /HPF    Squamous Epi. Cells Few (A) None Seen /HPF    Renal Tubular Epithelial Cells None Seen None Seen /HPF    Transitional Cells None Seen None Seen /HPF    Yeast Urine None Seen None Seen /HPF    WBC Clump Present (A) None Seen /HPF   Urine Culture, Routine    Collection Time: 03/07/24 10:19 PM    Specimen: Urine, straight cath   Result Value Ref Range    Urine Culture >100,000 CFU/ML Klebsiella pneumoniae (A)        Susceptibility    Klebsiella pneumoniae -  (no method available)     Ampicillin  Resistant      Ampicillin + Sulbactam 4 Sensitive      Cefazolin <=4 Sensitive      Ciprofloxacin <=0.25 Sensitive      Gentamicin <=1 Sensitive      Meropenem <=0.25 Sensitive      Levofloxacin <=0.12 Sensitive      Nitrofurantoin 64 Intermediate      Piperacillin + Tazobactam <=4 Sensitive      Trimethoprim/Sulfa <=20 Sensitive    Calcium, Ionized    Collection Time: 03/08/24  2:01 AM   Result Value Ref Range    Sample Type Venous     Ionized Calcium 1.13 0.95 - 1.32 mmol/L    Puncture Charge No     Blood Gas Analyzer [id number] ED    Basic Metabolic Panel (8)    Collection Time: 03/08/24  7:24 AM   Result Value Ref Range    Glucose 89 70 - 99 mg/dL    Sodium 141 136 - 145 mmol/L    Potassium 4.0 3.5 - 5.1 mmol/L    Chloride 108 98 - 112 mmol/L    CO2 27.0 21.0 - 32.0 mmol/L    Anion Gap 6 0 - 18 mmol/L    BUN 9 9 - 23 mg/dL    Creatinine 0.94 0.70 - 1.30 mg/dL    BUN/CREA Ratio 9.6 (L) 10.0 - 20.0    Calcium, Total 9.2 8.7 - 10.4 mg/dL    Calculated Osmolality 290 275 - 295 mOsm/kg    eGFR-Cr 113 >=60 mL/min/1.73m2   CBC W/ DIFFERENTIAL    Collection Time: 03/08/24  7:24 AM    Result Value Ref Range    WBC 7.4 4.0 - 11.0 x10(3) uL    RBC 5.09 4.30 - 5.70 x10(6)uL    HGB 15.4 13.0 - 17.5 g/dL    HCT 43.3 39.0 - 53.0 %    MCV 85.1 80.0 - 100.0 fL    MCH 30.3 26.0 - 34.0 pg    MCHC 35.6 31.0 - 37.0 g/dL    RDW-SD 37.6 35.1 - 46.3 fL    RDW 12.1 11.0 - 15.0 %    .0 150.0 - 450.0 10(3)uL    Neutrophil Absolute Prelim 3.85 1.50 - 7.70 x10 (3) uL    Neutrophil Absolute 3.85 1.50 - 7.70 x10(3) uL    Lymphocyte Absolute 2.52 1.00 - 4.00 x10(3) uL    Monocyte Absolute 0.71 0.10 - 1.00 x10(3) uL    Eosinophil Absolute 0.29 0.00 - 0.70 x10(3) uL    Basophil Absolute 0.03 0.00 - 0.20 x10(3) uL    Immature Granulocyte Absolute 0.01 0.00 - 1.00 x10(3) uL    Neutrophil % 52.0 %    Lymphocyte % 34.0 %    Monocyte % 9.6 %    Eosinophil % 3.9 %    Basophil % 0.4 %    Immature Granulocyte % 0.1 %   Basic Metabolic Panel (8)    Collection Time: 03/09/24 11:33 AM   Result Value Ref Range    Glucose 110 (H) 70 - 99 mg/dL    Sodium 137 136 - 145 mmol/L    Potassium 3.7 3.5 - 5.1 mmol/L    Chloride 107 98 - 112 mmol/L    CO2 25.0 21.0 - 32.0 mmol/L    Anion Gap 5 0 - 18 mmol/L    BUN 8 (L) 9 - 23 mg/dL    Creatinine 0.90 0.70 - 1.30 mg/dL    BUN/CREA Ratio 8.9 (L) 10.0 - 20.0    Calcium, Total 9.7 8.7 - 10.4 mg/dL    Calculated Osmolality 283 275 - 295 mOsm/kg    eGFR-Cr 119 >=60 mL/min/1.73m2   CBC W/ DIFFERENTIAL    Collection Time: 03/09/24 11:33 AM   Result Value Ref Range    WBC 6.1 4.0 - 11.0 x10(3) uL    RBC 5.42 4.30 - 5.70 x10(6)uL    HGB 16.5 13.0 - 17.5 g/dL    HCT 45.4 39.0 - 53.0 %    MCV 83.8 80.0 - 100.0 fL    MCH 30.4 26.0 - 34.0 pg    MCHC 36.3 31.0 - 37.0 g/dL    RDW-SD 36.1 35.1 - 46.3 fL    RDW 11.9 11.0 - 15.0 %    .0 150.0 - 450.0 10(3)uL    Neutrophil Absolute Prelim 3.40 1.50 - 7.70 x10 (3) uL    Neutrophil Absolute 3.40 1.50 - 7.70 x10(3) uL    Lymphocyte Absolute 2.02 1.00 - 4.00 x10(3) uL    Monocyte Absolute 0.41 0.10 - 1.00 x10(3) uL    Eosinophil Absolute 0.19 0.00 -  0.70 x10(3) uL    Basophil Absolute 0.03 0.00 - 0.20 x10(3) uL    Immature Granulocyte Absolute 0.03 0.00 - 1.00 x10(3) uL    Neutrophil % 56.0 %    Lymphocyte % 33.2 %    Monocyte % 6.7 %    Eosinophil % 3.1 %    Basophil % 0.5 %    Immature Granulocyte % 0.5 %   Basic Metabolic Panel (8)    Collection Time: 03/10/24  5:57 AM   Result Value Ref Range    Glucose 102 (H) 70 - 99 mg/dL    Sodium 138 136 - 145 mmol/L    Potassium 4.4 3.5 - 5.1 mmol/L    Chloride 111 98 - 112 mmol/L    CO2 28.0 21.0 - 32.0 mmol/L    Anion Gap <0 (L) 0 - 18 mmol/L    BUN 13 9 - 23 mg/dL    Creatinine 1.07 0.70 - 1.30 mg/dL    BUN/CREA Ratio 12.1 10.0 - 20.0    Calcium, Total 9.4 8.7 - 10.4 mg/dL    Calculated Osmolality 286 275 - 295 mOsm/kg    eGFR-Cr 96 >=60 mL/min/1.73m2   CBC W/ DIFFERENTIAL    Collection Time: 03/10/24  5:57 AM   Result Value Ref Range    WBC 6.5 4.0 - 11.0 x10(3) uL    RBC 5.14 4.30 - 5.70 x10(6)uL    HGB 15.1 13.0 - 17.5 g/dL    HCT 44.3 39.0 - 53.0 %    MCV 86.2 80.0 - 100.0 fL    MCH 29.4 26.0 - 34.0 pg    MCHC 34.1 31.0 - 37.0 g/dL    RDW-SD 38.9 35.1 - 46.3 fL    RDW 12.2 11.0 - 15.0 %    .0 150.0 - 450.0 10(3)uL    Neutrophil Absolute Prelim 3.27 1.50 - 7.70 x10 (3) uL    Neutrophil Absolute 3.27 1.50 - 7.70 x10(3) uL    Lymphocyte Absolute 2.26 1.00 - 4.00 x10(3) uL    Monocyte Absolute 0.63 0.10 - 1.00 x10(3) uL    Eosinophil Absolute 0.23 0.00 - 0.70 x10(3) uL    Basophil Absolute 0.03 0.00 - 0.20 x10(3) uL    Immature Granulocyte Absolute 0.04 0.00 - 1.00 x10(3) uL    Neutrophil % 50.5 %    Lymphocyte % 35.0 %    Monocyte % 9.8 %    Eosinophil % 3.6 %    Basophil % 0.5 %    Immature Granulocyte % 0.6 %         Test results/Imaging:   MRI BRAIN (CPT=70551)    Result Date: 3/9/2024  CONCLUSION:  1. No acute intracranial abnormality. Specifically, no evidence of acute or early subacute infarction. 2. Right anterior parietal approach ventriculoperitoneal shunt.  No evidence of hydrocephalus. 3. 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  Findings may relate to sequelae of a Chiari II malformation. 4. Lesser incidental findings as above.   elm-remote  Dictated by (CST): Aayush Holder MD on 3/09/2024 at 9:04 AM     Finalized by (CST): Aayush Holder MD on 3/09/2024 at 9:10 AM          CT BRAIN OR HEAD ([id number])    Result Date: 3/8/2024  CONCLUSION:  1. Right-sided  shunt catheter traversing the frontal horn of the right lateral ventricle and having its tip in the left lateral ventricle abutting the area of the caudothalamic groove.  Decompressed ventricular system.  No hemorrhage or other acute finding. 2. Changes related to Chiari 2 malformation.     Dictated by (CST): Froy Barger MD on 3/08/2024 at 1:03 PM     Finalized by (CST): Froy Barger MD on 3/08/2024 at 1:10 PM                Performed an independent visualization of MRI C-spine, MRI brain  Imaging revealed: Agree with read.    Education/Instructions given to: patient, mother, and father   Barriers to Learning:None  Content: Refer to note above. Evaluation/Outcome: Verbalized understanding and Demonstrated understanding    Disclaimer:   This record was dictated using Dragon software. There may be errors due to voice recognition problems that were not realized and corrected during the completion of the note.      This document is not intended to support charting by exception.  Sections left blank in a completed note should be presumed not to have been done.     Total face to face time was 35 minutes, more than 50% of the time was spent in counseling and/or coordination of care related to spasticity, Chiari type II malformation discussed the case with Dr. Loya's , his hospitalist..    Thank you.  Trip Mccoy D.O.   Vascular & General Neurology    3/9/2024  8:23 PM

## 2024-03-10 NOTE — PHYSICAL THERAPY NOTE
PHYSICAL THERAPY EVALUATION - INPATIENT     Room Number: 103/103-A  Evaluation Date: 3/10/2024  Type of Evaluation: Initial   Physician Order: PT Eval and Treat    Presenting Problem: UTI without hematuria, new onset L shoulder pain during this admission  Co-Morbidities : spina bifida, VPS, neurogenic bladder  Reason for Therapy: Mobility Dysfunction and Discharge Planning    PHYSICAL THERAPY ASSESSMENT   Patient is a pleasant 28 year old male admitted 3/7/2024 for UTI without hematuria; while inpatient pt had new onset of L shoulder pain etiology unclear at time of this note.  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  Patient is currently functioning below baseline with bed mobility, transfers, gait, stair negotiation, maintaining seated position, and standing prolonged periods.  Patient is requiring maximum assist as a result of the following impairments: decreased functional strength, pain, impaired dynamic sitting and standing balance, and decreased muscular endurance.  Physical Therapy will continue to follow for duration of hospitalization.    Patient will benefit from continued skilled PT Services to facilitate return to prior level of function as patient demonstrates high motivation with excellent tolerance to an intensive therapy program .    PLAN  PT Treatment Plan: Bed mobility;Body mechanics;Endurance;Energy conservation;Patient education;Family education;Gait training;Transfer training;Balance training;Stair training  Rehab Potential : Good  Frequency (Obs): 5x/week    PHYSICAL THERAPY MEDICAL/SOCIAL HISTORY   History related to current admission: Pt reports new onset of L shoulder pain following transfer from hospital bed to ICU cart, no hx of LUE pain prior to this. Pt has hx of spina bifida and wears  bilateral AFOs     Problem List  Principal Problem:    Urinary tract infection without hematuria, site unspecified  Active Problems:    Myelomeningocele (HCC)    Communicating hydrocephalus (HCC)    Abdominal pain of unknown etiology    Diarrhea, unspecified type    Spasticity      HOME SITUATION  Home Situation  Type of Home: Apartment  Home Layout: One level  Stairs to Enter : 2  Lives With: Family;Parent(s)  Drives: No  Patient Owned Equipment: Rolling walker;Wheelchair;AFO     Prior Level of [place]: Independent for ambulation with bilateral AFOs, does not use an assistive device, 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 [employer]    SUBJECTIVE  \"I'm sorry I couldn't do more.\"    PHYSICAL THERAPY EXAMINATION   OBJECTIVE  Precautions: Limb alert - left (B AFO)  Fall Risk: High fall risk    WEIGHT BEARING RESTRICTION  Weight Bearing Restriction: None                PAIN ASSESSMENT  Ratin  Location: L shoulder/upper arm  Management Techniques: Relaxation;Repositioning (Heat pack)    COGNITION  Overall Cognitive Status:  WFL - within functional limits    RANGE OF MOTION AND STRENGTH ASSESSMENT  Upper extremity ROM and strength is very limited through LUE due to pain  Lower extremity ROM is limited through bilateral ankles - hx spina bifida - otherwise WFL  Lower extremity strength is limited through bilateral ankles - hx spinal bifida    BALANCE  Static Sitting: Fair +  Dynamic Sitting: Fair  Static Standing: Poor  Dynamic Standing: Poor    ADDITIONAL TESTS                                    NEUROLOGICAL FINDINGS                      ACTIVITY TOLERANCE  Pulse: 95  Heart Rate Source: Monitor     BP: (!) 155/95 (Sitting /106, supine 155/59, 2nd attempt sitting 149/97)  BP Location: Right arm  BP Method: Automatic  Patient Position: Lying    O2 WALK  Oxygen Therapy  SPO2% on Room Air at Rest: 94  SPO2% Ambulation on Room Air: 95    AM-PAC '6-Clicks' INPATIENT SHORT FORM - BASIC  MOBILITY  How much difficulty does the patient currently have...  Patient Difficulty: Turning over in bed (including adjusting bedclothes, sheets and blankets)?: A Lot   Patient Difficulty: Sitting down on and standing up from a chair with arms (e.g., wheelchair, bedside commode, etc.): A Lot   Patient Difficulty: Moving from lying on back to sitting on the side of the bed?: A Lot   How much help from another person does the patient currently need...   Help from Another: Moving to and from a bed to a chair (including a wheelchair)?: A Lot   Help from Another: Need to walk in hospital room?: A Lot   Help from Another: Climbing 3-5 steps with a railing?: Total     AM-PAC Score:  Raw Score: 11   Approx Degree of Impairment: 72.57%   Standardized Score (AM-PAC Scale): 33.86   CMS Modifier (G-Code): CL    FUNCTIONAL ABILITY STATUS  Functional Mobility/Gait Assessment  Gait Assistance: Moderate assistance (2 people)  Distance (ft): 3 ft  Assistive Device: Rolling walker  Pattern: Shuffle;R Foot drop;L Foot drop (Unable to grasp walker with LUE)  Rolling: maximum assist  Supine to Sit: maximum assist  Sit to Supine: maximum assist  Sit to Stand: moderate assist and 2 people  Pt attempted supine > sit with Max A however became dizzy and needed to lay back down. After ~3 min supine rest pt requesting to attempt again and was able to sit up with Max A - sat at EOB with SBA and then able to stand up with Mod A x2 and walker. Bilateral AFOs and shoes were donned prior to standing.   Stand pivot transfer from bed > chair towards L side with walker and Mod A x2. Pt unable to grasp walker due to LUE pain thus assisted with navigation of walker.   Pt ended session up in chair - recommended nursing staff have pt transfer towards R side and get into bed on R side to minimize discomfort to L shoulder.     Exercise/Education Provided:  Bed mobility  Body mechanics  Energy conservation  Functional activity tolerated  Gait  training  Posture  Transfer training  Safety precautions,  strength/elevation to reduce edema to LUE, role of IP PT    The patient's Approx Degree of Impairment: 72.57% has been calculated based on documentation in the [facility] '6 clicks' Inpatient Basic Mobility Short Form.  Research supports that patients with this level of impairment may benefit from Intensive Rehabilitation .  Final disposition will be made by interdisciplinary medical team.    Patient End of Session: Up in chair;Needs met;Call light within reach;RN aware of session/findings;All patient questions and concerns addressed;Family present    CURRENT GOALS  Goals to be met by: 3/24/24  Patient Goal Patient's self-stated goal is: get moving again   Goal #1 Patient is able to demonstrate supine - sit EOB @ level: minimum assistance     Goal #1   Current Status    Goal #2 Patient is able to demonstrate transfers Sit to/from Stand at assistance level: minimum assistance with walker - rolling     Goal #2  Current Status    Goal #3 Patient is able to ambulate 30 feet with assist device: walker - rolling at assistance level: minimum assistance   Goal #3   Current Status    Goal #4 Patient will negotiate 2 stairs/one curb w/ assistive device and Min A   Goal #4   Current Status    Goal #5 Patient to demonstrate independence with home activity/exercise instructions provided to patient in preparation for discharge.   Goal #5   Current Status    Goal #6    Goal #6  Current Status      Patient Evaluation Complexity Level:  History High - 3 or more personal factors and/or co-morbidities   Examination of body systems High - addressing a total of 4 or more elements   Clinical Presentation  High - Unstable   Clinical Decision Making  High Complexity     Therapeutic Activity:  45 minutes  PT High Complex Eval

## 2024-03-11 ENCOUNTER — APPOINTMENT (OUTPATIENT)
Dept: MRI IMAGING | Facility: HOSPITAL | Age: 29
End: 2024-03-11
Attending: HOSPITALIST
Payer: MEDICARE

## 2024-03-11 PROCEDURE — 73221 MRI JOINT UPR EXTREM W/O DYE: CPT | Performed by: HOSPITALIST

## 2024-03-11 PROCEDURE — 99233 SBSQ HOSP IP/OBS HIGH 50: CPT | Performed by: HOSPITALIST

## 2024-03-11 PROCEDURE — 99233 SBSQ HOSP IP/OBS HIGH 50: CPT | Performed by: OTHER

## 2024-03-11 NOTE — PROGRESS NOTES
History of Presenting Illness:  28-year-old male with history of Arnold-Chiari malformation type II with  shunting admitted for diaphoresis UTI, left upper extremity spasticity.  Patient currently feels that his spasticity in his arm is now better.  Denies any worsening of the pain.  No diplopia, dysarthria, dysphagia or new weakness.      Vitals:   Vitals:    03/11/24 1300   BP: 140/85   Pulse: 97   Resp: 18   Temp:           Examination:    Awake , alert, non-dysarthric, expressive an receptive language normal.   Pupils round and reactive to light, extra ocular movement normal, mild end gaze nystagmus noted.  No facial weakness, hearing normal.  Left upper extremity in sling brace.  Wasting noted in left lower extremity.  Strength on the right side normal      Investigations:    MRI SHOULDER, LEFT (CPT=73221)    Result Date: 3/11/2024  CONCLUSION:   Partial-thickness bursal surface tear of the supraspinatus.  Small interstitial tear of the distal musculotendinous junction of the infraspinatus.  No full-thickness or retracted rotator cuff tear.  Mild subacromial subdeltoid bursitis.  Mild intramuscular edema within the infraspinatus and teres minor suggestive of low-grade muscular strains.    Dictated by (CST): López Weller MD on 3/11/2024 at 11:16 AM     Finalized by (CST): López Weller MD on 3/11/2024 at 11:31 AM          XR SHOULDER, COMPLETE (MIN 2 VIEWS), LEFT (CPT=73030)    Result Date: 3/10/2024  CONCLUSION:  1. No radiographically visible acute osseous abnormality of the left shoulder.  2. Slight lateral acromial downsloping contour is nonspecific, but can predispose to impingement syndrome in the appropriate clinical setting.    Dictated by (CST): Pawan Johnson MD on 3/10/2024 at 7:35 PM     Finalized by (CST): Pawan Johnson MD on 3/10/2024 at 7:36 PM             No results found for: \"A1C\"     Lab Results   Component Value Date    LDL 88 03/22/2018    HDL 47 03/22/2018    TRIG 203 (H) 03/22/2018         Recent Labs   Lab 03/08/24 0724 03/09/24  1133 03/10/24  0557   RBC 5.09 5.42 5.14   HGB 15.4 16.5 15.1   HCT 43.3 45.4 44.3   MCV 85.1 83.8 86.2   MCH 30.3 30.4 29.4   MCHC 35.6 36.3 34.1   RDW 12.1 11.9 12.2   NEPRELIM 3.85 3.40 3.27   WBC 7.4 6.1 6.5   .0 223.0 210.0       Recent Labs   Lab 03/08/24 0724 03/09/24  1133 03/10/24  0557   GLU 89 110* 102*   BUN 9 8* 13   CREATSERUM 0.94 0.90 1.07   EGFRCR 113 119 96   CA 9.2 9.7 9.4    137 138   K 4.0 3.7 4.4    107 111   CO2 27.0 25.0 28.0       Impression/MDM.      Increasing spasticity of the left upper extremity.  Symptomatically controlled.  Patient currently on oxcarbazepine 300 mg twice a day, as well as gabapentin 600 mg 3 times a day.  Patient also on baclofen 10 mg 3 times a day.  Patient seems to feel that his pain is well-controlled with these medications, he is not having significant spasms.  Continue medications at prednisone dose.  Increasing drowsiness.  Possibly related to side effects of medication.  Patient explained.  No further neurological intervention recommended at this time.  Patient to follow-up with his neurosurgeon after discharge.     no

## 2024-03-11 NOTE — CONSULTS
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       Plan     28M w/ Type 2 chiari/myelomeningocele (walks independently with weakness at ankles) and 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     Cont PT,OT  Neuropathic pain - regime being modified as per neurology  UTI - Keflex as per IM  No role for NeuroSx  Discussed at length with patient about rehab, can consider AIR however declining inpatient rehab. Strong preference is to go home with home health. He feels he can manage at home as his pain improves.    Thank you for this consultation and allowing me to participate in this patients care.       ALBERTO SELF,  MD    3/11/2024    9:52 AM

## 2024-03-11 NOTE — PHYSICAL THERAPY NOTE
PHYSICAL THERAPY TREATMENT NOTE - INPATIENT     Room Number: 103/103-A       Presenting Problem: UTI without hematuria, new onset L shoulder pain during this admission  Co-Morbidities : spina bifida, VPS, neurogenic bladder    Problem List  Principal Problem:    Urinary tract infection without hematuria, site unspecified  Active Problems:    Myelomeningocele (HCC)    Communicating hydrocephalus (HCC)    Abdominal pain of unknown etiology    Diarrhea, unspecified type    Spasticity      PHYSICAL THERAPY ASSESSMENT   Patient demonstrates limited progress this session, goals  remain in progress.    Patient continues to function below baseline with bed mobility, transfers, gait, stair negotiation, maintaining seated position, standing prolonged periods, and performing household tasks.  Contributing factors to remaining limitations include decreased functional strength, decreased endurance/aerobic capacity, pain, impaired standing balance, impaired coordination, impaired motor planning, decreased muscular endurance, and medical status.  Next session anticipate patient to progress bed mobility, transfers, gait, stair negotiation, maintaining seated position, standing prolonged periods, and performing household tasks.  Physical Therapy will continue to follow patient for duration of hospitalization.    Patient continues to benefit from continued skilled PT services: at discharge to promote functional independence in home.  Anticipate patient will return home with home health PT.    (Pt will benefit from lift assist into his home up 3 steps, history of spina bifida unable to perform stair mobility owns w/c.)    PLAN  PT Treatment Plan: Bed mobility;Body mechanics;Endurance;Family education;Patient education;Energy conservation;Gait training;Neuromuscular re-educate;Strengthening;Transfer training;Balance training;Range of motion  Frequency (Obs): 3-5x/week    SUBJECTIVE  It nearly my birthday and I was hoping I could return  home for home rehab with my family if my medical progress allows. I will just need help to reenter my home for the steps and the rest my parents help me with in the home for mobility and ADL's.    OBJECTIVE  Precautions: Limb alert - left (B AFO)    WEIGHT BEARING RESTRICTION                PAIN ASSESSMENT   Ratin  Location: L shoulder LUE immobilizer sling  Management Techniques: Activity promotion;Body mechanics;Breathing techniques;Relaxation;Repositioning    BALANCE  Static Sitting: Fair +  Dynamic Sitting: Fair  Static Standing: Poor  Dynamic Standing: Poor    ACTIVITY TOLERANCE  Pulse: 97  Heart Rate Source: Monitor  Resp: 18  BP: 140/85  BP Location: Right arm  BP Method: Automatic  Patient Position: Sitting     O2 WALK  Oxygen Therapy  SPO2% on Room Air at Rest: 90  SPO2% Ambulation on Room Air: 92    AM-PAC '6-Clicks' INPATIENT SHORT FORM - BASIC MOBILITY  How much difficulty does the patient currently have...  Patient Difficulty: Turning over in bed (including adjusting bedclothes, sheets and blankets)?: A Lot   Patient Difficulty: Sitting down on and standing up from a chair with arms (e.g., wheelchair, bedside commode, etc.): A Lot   Patient Difficulty: Moving from lying on back to sitting on the side of the bed?: A Lot   How much help from another person does the patient currently need...   Help from Another: Moving to and from a bed to a chair (including a wheelchair)?: A Lot   Help from Another: Need to walk in hospital room?: A Lot   Help from Another: Climbing 3-5 steps with a railing?: Total     AM-PAC Score:  Raw Score: 11   Approx Degree of Impairment: 72.57%   Standardized Score (AM-PAC Scale): 33.86   CMS Modifier (G-Code): CL    FUNCTIONAL ABILITY STATUS  Functional Mobility/Gait Assessment  Gait Assistance: Moderate assistance  Distance (ft): 3  Assistive Device:  (PT assisted from front with SPT to chair)  Pattern: Shuffle (shuffling step to gait unsteady LE's wears braces  BLE's)  Rolling: minimal assist  Supine to Sit: minimal assist  Sit to Supine: moderate assist  Sit to Stand: moderate assist    Additional information: [facility] PT with family assist is anticipated secondary to medical progress. Pt ed with HEP in chair for LE therex including marching and LAQ's x 10 reps. Pt assisted to a chair with pt ed for SPT with PT standing in from pt unable to use the RW with only one arm with the LUE in a immobilizer sling mod a for transfers. MRI results pending for L shoulder.     The patient's Approx Degree of Impairment: 72.57% has been calculated based on documentation in the [facility] '6 clicks' Inpatient Daily Activity Short Form.  Research supports that patients with this level of impairment may benefit from IP rehab.  Final disposition will be made by interdisciplinary medical team.    THERAPEUTIC EXERCISES  Lower Extremity Alternating marching  LAQ     Position Sitting & Standing       Patient End of Session: Up in chair;With  staff;Needs met;Call light within reach;RN aware of session/findings;All patient questions and concerns addressed;Alarm set    CURRENT GOALS   Goals to be met by: 3/24/24  Patient Goal Patient's self-stated goal is: get moving again   Goal #1 Patient is able to demonstrate supine - sit EOB @ level: minimum assistance      Goal #1   Current Status  Min A    Goal #2 Patient is able to demonstrate transfers Sit to/from Stand at assistance level: minimum assistance with walker - rolling      Goal #2  Current Status  Min A to EOB   Goal #3 Patient is able to ambulate 30 feet with assist device: walker - rolling at assistance level: minimum assistance   Goal #3   Current Status  Mod A with SPT   Goal #4 Patient will negotiate 2 stairs/one curb w/ assistive device and Min A   Goal #4   Current Status  Mod A 3 steps   Goal #5 Patient to demonstrate independence with home activity/exercise instructions provided to patient in preparation for discharge.   Goal #5   Current  Status  ongoing     Gait Training: 10 minutes  Therapeutic Exercise: 15 minutes

## 2024-03-11 NOTE — PLAN OF CARE
Problem: Patient Centered Care  Goal: Patient preferences are identified and integrated in the patient's plan of care  Description: Interventions:  - What would you like us to know as we care for you? Pt from home with family  - Provide timely, complete, and accurate information to patient/family  - Incorporate patient and family knowledge, values, beliefs, and cultural backgrounds into the planning and delivery of care  - Encourage patient/family to participate in care and decision-making at the level they choose  - Honor patient and family perspectives and choices  Outcome: Progressing     Problem: Patient/Family Goals  Goal: Patient/Family Long Term Goal  Description: Patient's Long Term Goal:   Interventions:  - See additional Care Plan goals for specific interventions  Outcome: Progressing  Goal: Patient/Family Short Term Goal  Description: Patient's Short Term Goal:   Interventions:   - See additional Care Plan goals for specific interventions  Outcome: Progressing     Problem: CARDIOVASCULAR - ADULT  Goal: Maintains optimal cardiac output and hemodynamic stability  Description: INTERVENTIONS:  - Monitor vital signs, rhythm, and trends  - Monitor for bleeding, hypotension and signs of decreased cardiac output  - Evaluate effectiveness of vasoactive medications to optimize hemodynamic stability  - Monitor arterial and/or venous puncture sites for bleeding and/or hematoma  - Assess quality of pulses, skin color and temperature  - Assess for signs of decreased coronary artery perfusion - ex. Angina  - Evaluate fluid balance, assess for edema, trend weights  Outcome: Progressing     Problem: GENITOURINARY - ADULT  Goal: Absence of urinary retention  Description: INTERVENTIONS:  - Assess patient’s ability to void and empty bladder  - Monitor intake/output and perform bladder scan as needed  - Follow urinary retention protocol/standard of care  - Consider collaborating with pharmacy to review patient's medication  profile  - Implement strategies to promote bladder emptying  Outcome: Progressing     Problem: NEUROLOGICAL - ADULT  Goal: Achieves stable or improved neurological status  Description: INTERVENTIONS  - Assess for and report changes in neurological status  - Initiate measures to prevent increased intracranial pressure  - Maintain blood pressure and fluid volume within ordered parameters to optimize cerebral perfusion and minimize risk of hemorrhage  - Monitor temperature, glucose, and sodium. Initiate appropriate interventions as ordered  Outcome: Progressing     Problem: METABOLIC/FLUID AND ELECTROLYTES - ADULT  Goal: Glucose maintained within prescribed range  Description: INTERVENTIONS:  - Monitor Blood Glucose as ordered  - Assess for signs and symptoms of hyperglycemia and hypoglycemia  - Administer ordered medications to maintain glucose within target range  - Assess barriers to adequate nutritional intake and initiate nutrition consult as needed  - Instruct patient on self management of diabetes  Outcome: Progressing  Goal: Electrolytes maintained within normal limits  Description: INTERVENTIONS:  - Monitor labs and rhythm and assess patient for signs and symptoms of electrolyte imbalances  - Administer electrolyte replacement as ordered  - Monitor response to electrolyte replacements, including rhythm and repeat lab results as appropriate  - Fluid restriction as ordered  - Instruct patient on fluid and nutrition restrictions as appropriate  Outcome: Progressing  Goal: Hemodynamic stability and optimal renal function maintained  Description: INTERVENTIONS:  - Monitor labs and assess for signs and symptoms of volume excess or deficit  - Monitor intake, output and patient weight  - Monitor urine specific gravity, serum osmolarity and serum sodium as indicated or ordered  - Monitor response to interventions for patient's volume status, including labs, urine output, blood pressure (other measures as available)  -  Encourage oral intake as appropriate  - Instruct patient on fluid and nutrition restrictions as appropriate  Outcome: Progressing     Problem: PAIN - ADULT  Goal: Verbalizes/displays adequate comfort level or patient's stated pain goal  Description: INTERVENTIONS:  - Encourage pt to monitor pain and request assistance  - Assess pain using appropriate pain scale  - Administer analgesics based on type and severity of pain and evaluate response  - Implement non-pharmacological measures as appropriate and evaluate response  - Consider cultural and social influences on pain and pain management  - Manage/alleviate anxiety  - Utilize distraction and/or relaxation techniques  - Monitor for opioid side effects  - Notify MD/LIP if interventions unsuccessful or patient reports new pain  - Anticipate increased pain with activity and pre-medicate as appropriate  Outcome: Progressing     Problem: RISK FOR INFECTION - ADULT  Goal: Absence of fever/infection during anticipated neutropenic period  Description: INTERVENTIONS  - Monitor WBC  - Administer growth factors as ordered  - Implement neutropenic guidelines  Outcome: Progressing     Problem: SAFETY ADULT - FALL  Goal: Free from fall injury  Description: INTERVENTIONS:  - Assess pt frequently for physical needs  - Identify cognitive and physical deficits and behaviors that affect risk of falls.  - [place] fall precautions as indicated by assessment.  - Educate pt/family on patient safety including physical limitations  - Instruct pt to call for assistance with activity based on assessment  - Modify environment to reduce risk of injury  - Provide assistive devices as appropriate  - Consider OT/PT consult to assist with strengthening/mobility  - Encourage toileting schedule  Outcome: Progressing

## 2024-03-11 NOTE — CM/SW NOTE
Department  notified of request for HHC, aidin referrals started. Assigned CM/SW to follow up with pt/family on further discharge planning.

## 2024-03-11 NOTE — CM/SW NOTE
Dr. Mccoy PMR saw Chao.  Chao's preference is home health therapies at this time.  CM started F2F and will send referrals in Aidin.  Will need list of HH choice prior to dc.    / to remain available for support and/or discharge planning.      Ellen Logan MBA BSN RN CRRN   RN Case Manager  [phone]

## 2024-03-11 NOTE — PLAN OF CARE
Mri done for left should, showed superficial bursa tear, mild shoulder stain, Norco for pain control. Straight Cath QID. Up in chair for most of the day. Call light within reach and bed in low position.  Problem: Patient Centered Care  Goal: Patient preferences are identified and integrated in the patient's plan of care  Description: Interventions:  - What would you like us to know as we care for you?   - Provide timely, complete, and accurate information to patient/family  - Incorporate patient and family knowledge, values, beliefs, and cultural backgrounds into the planning and delivery of care  - Encourage patient/family to participate in care and decision-making at the level they choose  - Honor patient and family perspectives and choices  Outcome: Progressing     Problem: Patient/Family Goals  Goal: Patient/Family Long Term Goal  Description: Patient's Long Term Goal:     Interventions:  -   - See additional Care Plan goals for specific interventions  Outcome: Progressing  Goal: Patient/Family Short Term Goal  Description: Patient's Short Term Goal:     Interventions:   -   - See additional Care Plan goals for specific interventions  Outcome: Progressing     Problem: CARDIOVASCULAR - ADULT  Goal: Maintains optimal cardiac output and hemodynamic stability  Description: INTERVENTIONS:  - Monitor vital signs, rhythm, and trends  - Monitor for bleeding, hypotension and signs of decreased cardiac output  - Evaluate effectiveness of vasoactive medications to optimize hemodynamic stability  - Monitor arterial and/or venous puncture sites for bleeding and/or hematoma  - Assess quality of pulses, skin color and temperature  - Assess for signs of decreased coronary artery perfusion - ex. Angina  - Evaluate fluid balance, assess for edema, trend weights  Outcome: Progressing     Problem: GENITOURINARY - ADULT  Goal: Absence of urinary retention  Description: INTERVENTIONS:  - Assess patient’s ability to void and empty  bladder  - Monitor intake/output and perform bladder scan as needed  - Follow urinary retention protocol/standard of care  - Consider collaborating with pharmacy to review patient's medication profile  - Implement strategies to promote bladder emptying  Outcome: Progressing     Problem: NEUROLOGICAL - ADULT  Goal: Achieves stable or improved neurological status  Description: INTERVENTIONS  - Assess for and report changes in neurological status  - Initiate measures to prevent increased intracranial pressure  - Maintain blood pressure and fluid volume within ordered parameters to optimize cerebral perfusion and minimize risk of hemorrhage  - Monitor temperature, glucose, and sodium. Initiate appropriate interventions as ordered  Outcome: Progressing     Problem: METABOLIC/FLUID AND ELECTROLYTES - ADULT  Goal: Glucose maintained within prescribed range  Description: INTERVENTIONS:  - Monitor Blood Glucose as ordered  - Assess for signs and symptoms of hyperglycemia and hypoglycemia  - Administer ordered medications to maintain glucose within target range  - Assess barriers to adequate nutritional intake and initiate nutrition consult as needed  - Instruct patient on self management of diabetes  Outcome: Progressing  Goal: Electrolytes maintained within normal limits  Description: INTERVENTIONS:  - Monitor labs and rhythm and assess patient for signs and symptoms of electrolyte imbalances  - Administer electrolyte replacement as ordered  - Monitor response to electrolyte replacements, including rhythm and repeat lab results as appropriate  - Fluid restriction as ordered  - Instruct patient on fluid and nutrition restrictions as appropriate  Outcome: Progressing  Goal: Hemodynamic stability and optimal renal function maintained  Description: INTERVENTIONS:  - Monitor labs and assess for signs and symptoms of volume excess or deficit  - Monitor intake, output and patient weight  - Monitor urine specific gravity, serum  osmolarity and serum sodium as indicated or ordered  - Monitor response to interventions for patient's volume status, including labs, urine output, blood pressure (other measures as available)  - Encourage oral intake as appropriate  - Instruct patient on fluid and nutrition restrictions as appropriate  Outcome: Progressing     Problem: PAIN - ADULT  Goal: Verbalizes/displays adequate comfort level or patient's stated pain goal  Description: INTERVENTIONS:  - Encourage pt to monitor pain and request assistance  - Assess pain using appropriate pain scale  - Administer analgesics based on type and severity of pain and evaluate response  - Implement non-pharmacological measures as appropriate and evaluate response  - Consider cultural and social influences on pain and pain management  - Manage/alleviate anxiety  - Utilize distraction and/or relaxation techniques  - Monitor for opioid side effects  - Notify MD/LIP if interventions unsuccessful or patient reports new pain  - Anticipate increased pain with activity and pre-medicate as appropriate  Outcome: Progressing     Problem: RISK FOR INFECTION - ADULT  Goal: Absence of fever/infection during anticipated neutropenic period  Description: INTERVENTIONS  - Monitor WBC  - Administer growth factors as ordered  - Implement neutropenic guidelines  Outcome: Progressing     Problem: SAFETY ADULT - FALL  Goal: Free from fall injury  Description: INTERVENTIONS:  - Assess pt frequently for physical needs  - Identify cognitive and physical deficits and behaviors that affect risk of falls.  - [place] fall precautions as indicated by assessment.  - Educate pt/family on patient safety including physical limitations  - Instruct pt to call for assistance with activity based on assessment  - Modify environment to reduce risk of injury  - Provide assistive devices as appropriate  - Consider OT/PT consult to assist with strengthening/mobility  - Encourage toileting schedule  Outcome:  Progressing

## 2024-03-11 NOTE — PROGRESS NOTES
[facility]  part of [facility]    Progress Note    Chao Reese Patient Status:  Observation    3/18/1995 MRN [id number]   Location [facility] 1W Attending Latricia Loya DO   Hosp Day # 0 PCP Nitin Mejía DO     Chief complaint uti, left arm spasticity, pain     Subjective:   Chao Reese is a(n) 28 year old male Pt seen this am. Pain in arm still bad. Primarily in elbow and radiates to shoulder. Also with constipation.     ROS:   No cp, sob   Constipation   No n/v     Objective:   Blood pressure 140/85, pulse 96, temperature 98.3 °F (36.8 °C), temperature source Oral, resp. rate 18, height 5' 4\" (1.626 m), weight 154 lb 4.8 oz (70 kg), SpO2 95%.      Intake/Output Summary (Last 24 hours) at 3/11/2024 0903  Last data filed at 3/10/2024 2104  Gross per 24 hour   Intake 360 ml   Output 1450 ml   Net -1090 ml       Patient Weight(s) for the past 336 hrs:   Weight   24 1541 154 lb 4.8 oz (70 kg)   24 2047 140 lb (63.5 kg)           General appearance: alert, appears stated age and cooperative  Pulmonary:  clear to auscultation bilaterally  Cardiovascular: S1, S2 normal, no murmur, click, rub or gallop, regular rate and rhythm  Abdominal: soft, non-tender; bowel sounds normal; no masses,  no organomegaly  Extremities: extremities normal, atraumatic, no cyanosis or edema  Left arm no swelling or erythema noted , strength 4/5         Medicines:           Lab Results   Component Value Date    WBC 6.5 03/10/2024    HGB 15.1 03/10/2024    HCT 44.3 03/10/2024    .0 03/10/2024    CREATSERUM 1.07 03/10/2024    BUN 13 03/10/2024     03/10/2024    K 4.4 03/10/2024     03/10/2024    CO2 28.0 03/10/2024     (H) 03/10/2024    CA 9.4 03/10/2024    ALB 5.0 (H) 2024    ALKPHO 69 2024    BILT 1.2 2024    TP 7.7 2024    AST 20 2024    ALT 33 2024    PTT 33.7 10/16/2023    INR 1.09 10/16/2023    TSH 3.309 2024    LIP 32  03/07/2024    DDIMER <0.27 04/02/2022    ESRML 18 (H) 10/12/2023    MG 2.1 03/07/2024    PHOS 3.1 03/22/2018    CK 72 03/07/2024    B12 340 07/24/2023       XR SHOULDER, COMPLETE (MIN 2 VIEWS), LEFT (CPT=73030)    Result Date: 3/10/2024  CONCLUSION:  1. No radiographically visible acute osseous abnormality of the left shoulder.  2. Slight lateral acromial downsloping contour is nonspecific, but can predispose to impingement syndrome in the appropriate clinical setting.    Dictated by (CST): Pawan Johnson MD on 3/10/2024 at 7:35 PM     Finalized by (CST): Pawan Johnson MD on 3/10/2024 at 7:36 PM          MRI SPINE CERVICAL (CPT=72141)    Result Date: 3/9/2024  CONCLUSION:  1. No acute cervical spine fracture.  Mild chronic-appearing volume loss of the cervical cord, which may relate to known history of spina bifida/Chiari II malformation.  Cervical cord demonstrates preserved signal. 2. Mild multilevel cervical spine degenerative changes as detailed.  Findings do not result in significant spinal canal or neural foraminal stenosis.   elm-remote  Dictated by (CST): Aayush Holder MD on 3/09/2024 at 9:10 AM     Finalized by (CST): Aayush Holder MD on 3/09/2024 at 9:15 AM               Results:     CBC:    Lab Results   Component Value Date    WBC 6.5 03/10/2024    WBC 6.1 03/09/2024    WBC 7.4 03/08/2024     Lab Results   Component Value Date    HEMOGLOBIN 16.3 02/23/2024    HEMOGLOBIN 15.8 02/15/2024    HEMOGLOBIN 15.0 10/31/2018    HGB 15.1 03/10/2024    HGB 16.5 03/09/2024    HGB 15.4 03/08/2024      Lab Results   Component Value Date    .0 03/10/2024    .0 03/09/2024    .0 03/08/2024       Recent Labs   Lab 03/08/24  0724 03/09/24  1133 03/10/24  0557   GLU 89 110* 102*   BUN 9 8* 13   CREATSERUM 0.94 0.90 1.07   CA 9.2 9.7 9.4    137 138   K 4.0 3.7 4.4    107 111   CO2 27.0 25.0 28.0             Assessment and Plan:     Urinary tract infection without hematuria, site  unspecified  - cx with klebsiella   - cont keflex day 4 / 7 abx    Chronic left renal calculi  -Patient with known history of left renal calculi.  CT abdomen/pelvis does reveal nonobstructive left calculi.  No hydronephrosis.    Left arm spasticity- likely 2/2 uti.   -Sudden onset of left hand spasticity.  Patient is neurologically intact with  shunt for his h/o spina bifida, unclear cause of his left hand spasticity.   - apprec neuro and neuro surgery   - mri c spine and brain reviewed and negative   -  shunt intact   - pt/ot - rec Acute rehab   - mri shoulder pending   - possible steroids - discussed with neuro and ns - discussed with Dr Mccoy. Not indicated at this time           Other medical conditions  Neurogenic bladder -cont oxybutynin  Spina bifida s/p  shunt  Essential hypertension - cont coreg         Prophylaxis  Subcutaneous heparin     CODE STATUS  Full     Primary care physician  DO Latricia Beltran DO         Chart reviewed, including current vitals, notes, labs and imaging  Labs ordered and medications adjusted as outlined above  Coordinate care with care team/consultants  Discussed with patient results of tests, management plan as outlined above, and the need for ongoing hospitalization  D/w RN     MDM high

## 2024-03-12 PROBLEM — M67.912 TENDINOPATHY OF LEFT ROTATOR CUFF: Status: ACTIVE | Noted: 2024-03-12

## 2024-03-12 LAB
ANION GAP SERPL CALC-SCNC: 6 MMOL/L (ref 0–18)
BASOPHILS # BLD AUTO: 0.04 X10(3) UL (ref 0–0.2)
BASOPHILS NFR BLD AUTO: 0.3 %
BUN BLD-MCNC: 10 MG/DL (ref 9–23)
BUN/CREAT SERPL: 10.3 (ref 10–20)
CALCIUM BLD-MCNC: 10 MG/DL (ref 8.7–10.4)
CHLORIDE SERPL-SCNC: 102 MMOL/L (ref 98–112)
CO2 SERPL-SCNC: 30 MMOL/L (ref 21–32)
CREAT BLD-MCNC: 0.97 MG/DL
D DIMER PPP FEU-MCNC: 0.28 UG/ML FEU (ref ?–0.5)
DEPRECATED RDW RBC AUTO: 39.1 FL (ref 35.1–46.3)
EGFRCR SERPLBLD CKD-EPI 2021: 108 ML/MIN/1.73M2 (ref 60–?)
EOSINOPHIL # BLD AUTO: 0.13 X10(3) UL (ref 0–0.7)
EOSINOPHIL NFR BLD AUTO: 1 %
ERYTHROCYTE [DISTWIDTH] IN BLOOD BY AUTOMATED COUNT: 12.6 % (ref 11–15)
GLUCOSE BLD-MCNC: 103 MG/DL (ref 70–99)
HCT VFR BLD AUTO: 47 %
HGB BLD-MCNC: 16.1 G/DL
IMM GRANULOCYTES # BLD AUTO: 0.05 X10(3) UL (ref 0–1)
IMM GRANULOCYTES NFR BLD: 0.4 %
LYMPHOCYTES # BLD AUTO: 1.6 X10(3) UL (ref 1–4)
LYMPHOCYTES NFR BLD AUTO: 11.8 %
MCH RBC QN AUTO: 29.3 PG (ref 26–34)
MCHC RBC AUTO-ENTMCNC: 34.3 G/DL (ref 31–37)
MCV RBC AUTO: 85.5 FL
MONOCYTES # BLD AUTO: 1.05 X10(3) UL (ref 0.1–1)
MONOCYTES NFR BLD AUTO: 7.7 %
NEUTROPHILS # BLD AUTO: 10.7 X10 (3) UL (ref 1.5–7.7)
NEUTROPHILS # BLD AUTO: 10.7 X10(3) UL (ref 1.5–7.7)
NEUTROPHILS NFR BLD AUTO: 78.8 %
OSMOLALITY SERPL CALC.SUM OF ELEC: 285 MOSM/KG (ref 275–295)
PLATELET # BLD AUTO: 244 10(3)UL (ref 150–450)
POTASSIUM SERPL-SCNC: 4.9 MMOL/L (ref 3.5–5.1)
RBC # BLD AUTO: 5.5 X10(6)UL
SODIUM SERPL-SCNC: 138 MMOL/L (ref 136–145)
WBC # BLD AUTO: 13.6 X10(3) UL (ref 4–11)

## 2024-03-12 PROCEDURE — 99233 SBSQ HOSP IP/OBS HIGH 50: CPT | Performed by: HOSPITALIST

## 2024-03-12 RX ORDER — TRIAMCINOLONE ACETONIDE 40 MG/ML
40 INJECTION, SUSPENSION INTRA-ARTICULAR; INTRAMUSCULAR ONCE
Status: COMPLETED | OUTPATIENT
Start: 2024-03-12 | End: 2024-03-12

## 2024-03-12 RX ORDER — CARVEDILOL 6.25 MG/1
12.5 TABLET ORAL 2 TIMES DAILY WITH MEALS
Status: DISCONTINUED | OUTPATIENT
Start: 2024-03-12 | End: 2024-03-13

## 2024-03-12 RX ORDER — LIDOCAINE HYDROCHLORIDE 10 MG/ML
10 INJECTION, SOLUTION EPIDURAL; INFILTRATION; INTRACAUDAL; PERINEURAL ONCE
Status: COMPLETED | OUTPATIENT
Start: 2024-03-12 | End: 2024-03-12

## 2024-03-12 NOTE — PROGRESS NOTES
[facility]  part of [facility]    Progress Note    Chao Reese Patient Status:  Observation    3/18/1995 MRN [id number]   Location [facility] 1W Attending Latricia Loya DO   Hosp Day # 0 PCP Nitin Mejía DO     Chief complaint uti, left arm spasticity, pain     Subjective:   Chao Reese is a(n) 28 year old male Pt seen this am. Denies cp or sob. Still with left shoulder, elbow pain.     ROS:   No cp, sob   Constipation   No n/v     Objective:   Blood pressure (!) 142/98, pulse 119, temperature 99.5 °F (37.5 °C), temperature source Oral, resp. rate 20, height 5' 4\" (1.626 m), weight 142 lb 4.8 oz (64.5 kg), SpO2 91%.      Intake/Output Summary (Last 24 hours) at 3/12/2024 1745  Last data filed at 3/12/2024 1700  Gross per 24 hour   Intake 760 ml   Output 1700 ml   Net -940 ml       Patient Weight(s) for the past 336 hrs:   Weight   24 0558 142 lb 4.8 oz (64.5 kg)   24 1541 154 lb 4.8 oz (70 kg)   24 2047 140 lb (63.5 kg)           General appearance: alert, appears stated age and cooperative  Pulmonary:  clear to auscultation bilaterally  Cardiovascular: S1, S2 normal, no murmur, click, rub or gallop, regular rate and rhythm  Abdominal: soft, non-tender; bowel sounds normal; no masses,  no organomegaly  Extremities: extremities normal, atraumatic, no cyanosis or edema  Left arm no swelling or erythema noted , strength 4/5 , ttp left shoulder         Medicines:           Lab Results   Component Value Date    WBC 6.5 03/10/2024    HGB 15.1 03/10/2024    HCT 44.3 03/10/2024    .0 03/10/2024    CREATSERUM 1.07 03/10/2024    BUN 13 03/10/2024     03/10/2024    K 4.4 03/10/2024     03/10/2024    CO2 28.0 03/10/2024     (H) 03/10/2024    CA 9.4 03/10/2024    ALB 5.0 (H) 2024    ALKPHO 69 2024    BILT 1.2 2024    TP 7.7 2024    AST 20 2024    ALT 33 2024    PTT 33.7 10/16/2023    INR 1.09 10/16/2023    TSH 3.309  03/07/2024    LIP 32 03/07/2024    DDIMER 0.28 03/12/2024    ESRML 18 (H) 10/12/2023    MG 2.1 03/07/2024    PHOS 3.1 03/22/2018    CK 72 03/07/2024    B12 340 07/24/2023       MRI SHOULDER, LEFT (CPT=73221)    Result Date: 3/11/2024  CONCLUSION:   Partial-thickness bursal surface tear of the supraspinatus.  Small interstitial tear of the distal musculotendinous junction of the infraspinatus.  No full-thickness or retracted rotator cuff tear.  Mild subacromial subdeltoid bursitis.  Mild intramuscular edema within the infraspinatus and teres minor suggestive of low-grade muscular strains.    Dictated by (CST): López Weller MD on 3/11/2024 at 11:16 AM     Finalized by (CST): López Weller MD on 3/11/2024 at 11:31 AM          XR SHOULDER, COMPLETE (MIN 2 VIEWS), LEFT (CPT=73030)    Result Date: 3/10/2024  CONCLUSION:  1. No radiographically visible acute osseous abnormality of the left shoulder.  2. Slight lateral acromial downsloping contour is nonspecific, but can predispose to impingement syndrome in the appropriate clinical setting.    Dictated by (CST): Pawan Johnson MD on 3/10/2024 at 7:35 PM     Finalized by (CST): Pawan Johnson MD on 3/10/2024 at 7:36 PM               Results:     CBC:    Lab Results   Component Value Date    WBC 6.5 03/10/2024    WBC 6.1 03/09/2024    WBC 7.4 03/08/2024     Lab Results   Component Value Date    HEMOGLOBIN 16.3 02/23/2024    HEMOGLOBIN 15.8 02/15/2024    HEMOGLOBIN 15.0 10/31/2018    HGB 15.1 03/10/2024    HGB 16.5 03/09/2024    HGB 15.4 03/08/2024      Lab Results   Component Value Date    .0 03/10/2024    .0 03/09/2024    .0 03/08/2024       Recent Labs   Lab 03/08/24  0724 03/09/24  1133 03/10/24  0557   GLU 89 110* 102*   BUN 9 8* 13   CREATSERUM 0.94 0.90 1.07   CA 9.2 9.7 9.4    137 138   K 4.0 3.7 4.4    107 111   CO2 27.0 25.0 28.0             Assessment and Plan:     Urinary tract infection without hematuria, site unspecified  - cx  with klebsiella   - cont keflex day 5 / 7 abx    Chronic left renal calculi  -Patient with known history of left renal calculi.  CT abdomen/pelvis does reveal nonobstructive left calculi.  No hydronephrosis.    Left arm spasticity- likely 2/2 uti.   -Sudden onset of left hand spasticity.  Patient is neurologically intact with  shunt for his h/o spina bifida, unclear cause of his left hand spasticity.   - apprec neuro and neuro surgery   - mri c spine and brain reviewed and negative   -  shunt intact   - pt/ot - rec Acute rehab   - mri shoulder with suprispinatour tear and bursitis - Dr Corona consulted for steroid injection    - possible iv steroids - discussed with neuro and ns - discussed with Dr Mccoy. Not indicated at this time      sinus tachycardia on tele - d dimer negative. Likely due to pain and anxiety.  - check bmp  to r/o dehydration   - check cbc to r/o anemia   - monitor on tele      Other medical conditions  Neurogenic bladder -cont oxybutynin  Spina bifida s/p  shunt  Essential hypertension - uncontrolled - increase coreg         Prophylaxis  Subcutaneous heparin     CODE STATUS  Full     Primary care physician  DO Latricia Beltran DO    Dispo: home tomorrow if pain controlled and HR improved      Chart reviewed, including current vitals, notes, labs and imaging  Labs ordered and medications adjusted as outlined above  Coordinate care with care team/consultants  Discussed with patient results of tests, management plan as outlined above, and the need for ongoing hospitalization  D/w RN     MDM high

## 2024-03-12 NOTE — PLAN OF CARE
Ortho consulted for shoulder-will need steroid injection. Pt has been tachy on tele, D-Dimer checked, was negative. Coreg dose increased. Call light within reach and bed in low position.  Problem: Patient Centered Care  Goal: Patient preferences are identified and integrated in the patient's plan of care  Description: Interventions:  - What would you like us to know as we care for you?   - Provide timely, complete, and accurate information to patient/family  - Incorporate patient and family knowledge, values, beliefs, and cultural backgrounds into the planning and delivery of care  - Encourage patient/family to participate in care and decision-making at the level they choose  - Honor patient and family perspectives and choices  Outcome: Progressing     Problem: Patient/Family Goals  Goal: Patient/Family Long Term Goal  Description: Patient's Long Term Goal:     Interventions:  -   - See additional Care Plan goals for specific interventions  Outcome: Progressing  Goal: Patient/Family Short Term Goal  Description: Patient's Short Term Goal:     Interventions:   -   - See additional Care Plan goals for specific interventions  Outcome: Progressing     Problem: CARDIOVASCULAR - ADULT  Goal: Maintains optimal cardiac output and hemodynamic stability  Description: INTERVENTIONS:  - Monitor vital signs, rhythm, and trends  - Monitor for bleeding, hypotension and signs of decreased cardiac output  - Evaluate effectiveness of vasoactive medications to optimize hemodynamic stability  - Monitor arterial and/or venous puncture sites for bleeding and/or hematoma  - Assess quality of pulses, skin color and temperature  - Assess for signs of decreased coronary artery perfusion - ex. Angina  - Evaluate fluid balance, assess for edema, trend weights  Outcome: Progressing     Problem: GENITOURINARY - ADULT  Goal: Absence of urinary retention  Description: INTERVENTIONS:  - Assess patient’s ability to void and empty bladder  - Monitor  intake/output and perform bladder scan as needed  - Follow urinary retention protocol/standard of care  - Consider collaborating with pharmacy to review patient's medication profile  - Implement strategies to promote bladder emptying  Outcome: Progressing     Problem: NEUROLOGICAL - ADULT  Goal: Achieves stable or improved neurological status  Description: INTERVENTIONS  - Assess for and report changes in neurological status  - Initiate measures to prevent increased intracranial pressure  - Maintain blood pressure and fluid volume within ordered parameters to optimize cerebral perfusion and minimize risk of hemorrhage  - Monitor temperature, glucose, and sodium. Initiate appropriate interventions as ordered  Outcome: Progressing     Problem: METABOLIC/FLUID AND ELECTROLYTES - ADULT  Goal: Glucose maintained within prescribed range  Description: INTERVENTIONS:  - Monitor Blood Glucose as ordered  - Assess for signs and symptoms of hyperglycemia and hypoglycemia  - Administer ordered medications to maintain glucose within target range  - Assess barriers to adequate nutritional intake and initiate nutrition consult as needed  - Instruct patient on self management of diabetes  Outcome: Progressing  Goal: Electrolytes maintained within normal limits  Description: INTERVENTIONS:  - Monitor labs and rhythm and assess patient for signs and symptoms of electrolyte imbalances  - Administer electrolyte replacement as ordered  - Monitor response to electrolyte replacements, including rhythm and repeat lab results as appropriate  - Fluid restriction as ordered  - Instruct patient on fluid and nutrition restrictions as appropriate  Outcome: Progressing  Goal: Hemodynamic stability and optimal renal function maintained  Description: INTERVENTIONS:  - Monitor labs and assess for signs and symptoms of volume excess or deficit  - Monitor intake, output and patient weight  - Monitor urine specific gravity, serum osmolarity and serum  sodium as indicated or ordered  - Monitor response to interventions for patient's volume status, including labs, urine output, blood pressure (other measures as available)  - Encourage oral intake as appropriate  - Instruct patient on fluid and nutrition restrictions as appropriate  Outcome: Progressing     Problem: PAIN - ADULT  Goal: Verbalizes/displays adequate comfort level or patient's stated pain goal  Description: INTERVENTIONS:  - Encourage pt to monitor pain and request assistance  - Assess pain using appropriate pain scale  - Administer analgesics based on type and severity of pain and evaluate response  - Implement non-pharmacological measures as appropriate and evaluate response  - Consider cultural and social influences on pain and pain management  - Manage/alleviate anxiety  - Utilize distraction and/or relaxation techniques  - Monitor for opioid side effects  - Notify MD/LIP if interventions unsuccessful or patient reports new pain  - Anticipate increased pain with activity and pre-medicate as appropriate  Outcome: Progressing     Problem: RISK FOR INFECTION - ADULT  Goal: Absence of fever/infection during anticipated neutropenic period  Description: INTERVENTIONS  - Monitor WBC  - Administer growth factors as ordered  - Implement neutropenic guidelines  Outcome: Progressing     Problem: SAFETY ADULT - FALL  Goal: Free from fall injury  Description: INTERVENTIONS:  - Assess pt frequently for physical needs  - Identify cognitive and physical deficits and behaviors that affect risk of falls.  - [place] fall precautions as indicated by assessment.  - Educate pt/family on patient safety including physical limitations  - Instruct pt to call for assistance with activity based on assessment  - Modify environment to reduce risk of injury  - Provide assistive devices as appropriate  - Consider OT/PT consult to assist with strengthening/mobility  - Encourage toileting schedule  Outcome: Progressing

## 2024-03-12 NOTE — CM/SW NOTE
CM provided list of accepting HH agencies to Chao at bedside.  Chao is presently sleeping and mom is in the room.  Chao's mom presented with the list and asked to review with her son for HH choice.    PLAN:  Home with home health, need choice.    Ellen Logan MBA BSN RN CRRN   RN Case Manager  [phone]

## 2024-03-13 VITALS
BODY MASS INDEX: 24.3 KG/M2 | SYSTOLIC BLOOD PRESSURE: 140 MMHG | HEART RATE: 112 BPM | OXYGEN SATURATION: 93 % | DIASTOLIC BLOOD PRESSURE: 98 MMHG | WEIGHT: 142.31 LBS | HEIGHT: 64 IN | RESPIRATION RATE: 20 BRPM | TEMPERATURE: 98 F

## 2024-03-13 PROCEDURE — 99239 HOSP IP/OBS DSCHRG MGMT >30: CPT | Performed by: HOSPITALIST

## 2024-03-13 RX ORDER — BISACODYL 10 MG
10 SUPPOSITORY, RECTAL RECTAL
Qty: 12 SUPPOSITORY | Refills: 0 | Status: SHIPPED | OUTPATIENT
Start: 2024-03-13

## 2024-03-13 RX ORDER — OXCARBAZEPINE 300 MG/1
300 TABLET, FILM COATED ORAL 2 TIMES DAILY
Qty: 60 TABLET | Refills: 0 | Status: SHIPPED | OUTPATIENT
Start: 2024-03-13

## 2024-03-13 RX ORDER — MECLIZINE HCL 12.5 MG/1
12.5 TABLET ORAL 3 TIMES DAILY PRN
Qty: 30 TABLET | Refills: 0 | Status: SHIPPED | OUTPATIENT
Start: 2024-03-13

## 2024-03-13 RX ORDER — DOCUSATE SODIUM 100 MG/1
100 CAPSULE, LIQUID FILLED ORAL 2 TIMES DAILY PRN
Qty: 60 CAPSULE | Refills: 0 | Status: SHIPPED | OUTPATIENT
Start: 2024-03-13

## 2024-03-13 RX ORDER — BACLOFEN 10 MG/1
10 TABLET ORAL 3 TIMES DAILY
Qty: 90 TABLET | Refills: 0 | Status: SHIPPED | OUTPATIENT
Start: 2024-03-13

## 2024-03-13 RX ORDER — CEPHALEXIN 500 MG/1
500 CAPSULE ORAL 4 TIMES DAILY
Qty: 10 CAPSULE | Refills: 0 | Status: SHIPPED | OUTPATIENT
Start: 2024-03-13

## 2024-03-13 RX ORDER — GABAPENTIN 600 MG/1
600 TABLET ORAL 3 TIMES DAILY
Qty: 90 TABLET | Refills: 0 | Status: SHIPPED | OUTPATIENT
Start: 2024-03-13

## 2024-03-13 RX ORDER — HYDROCODONE BITARTRATE AND ACETAMINOPHEN 5; 325 MG/1; MG/1
1 TABLET ORAL EVERY 6 HOURS PRN
Qty: 12 TABLET | Refills: 0 | Status: SHIPPED | OUTPATIENT
Start: 2024-03-13

## 2024-03-13 RX ORDER — MUSCLE RUB CREAM 100; 150 MG/G; MG/G
1 CREAM TOPICAL 3 TIMES DAILY PRN
Qty: 1 EACH | Refills: 0 | Status: SHIPPED | OUTPATIENT
Start: 2024-03-13

## 2024-03-13 NOTE — CM/SW NOTE
Addendum 9:33:  [facility] unable to staff pt, pt agreeable to [facility].  Reservation made and AVS updated.      [facility]    [address] [place] [address]  Phone: [phone]  Fax: [phone]    SW met w/pt bedside and for HH choice, pt choose [facility].     [facility]  [address] [place] [address]  Phone: [phone]  Fax: [phone]    PT AVS updated to reflect choice        SW/CM to remain available for support and/or discharge planning.      Yana Bustillo, MSW, LSW  Social Work   Ext:#[phone]

## 2024-03-13 NOTE — PLAN OF CARE
Problem: CARDIOVASCULAR - ADULT  Goal: Maintains optimal cardiac output and hemodynamic stability  Description: INTERVENTIONS:  - Monitor vital signs, rhythm, and trends  - Monitor for bleeding, hypotension and signs of decreased cardiac output  - Evaluate effectiveness of vasoactive medications to optimize hemodynamic stability  - Monitor arterial and/or venous puncture sites for bleeding and/or hematoma  - Assess quality of pulses, skin color and temperature  - Assess for signs of decreased coronary artery perfusion - ex. Angina  - Evaluate fluid balance, assess for edema, trend weights  Outcome: Progressing     Problem: GENITOURINARY - ADULT  Goal: Absence of urinary retention  Description: INTERVENTIONS:  - Assess patient’s ability to void and empty bladder  - Monitor intake/output and perform bladder scan as needed  - Follow urinary retention protocol/standard of care  - Consider collaborating with pharmacy to review patient's medication profile  - Implement strategies to promote bladder emptying  Outcome: Progressing     Problem: NEUROLOGICAL - ADULT  Goal: Achieves stable or improved neurological status  Description: INTERVENTIONS  - Assess for and report changes in neurological status  - Initiate measures to prevent increased intracranial pressure  - Maintain blood pressure and fluid volume within ordered parameters to optimize cerebral perfusion and minimize risk of hemorrhage  - Monitor temperature, glucose, and sodium. Initiate appropriate interventions as ordered  Outcome: Progressing     Problem: METABOLIC/FLUID AND ELECTROLYTES - ADULT  Goal: Glucose maintained within prescribed range  Description: INTERVENTIONS:  - Monitor Blood Glucose as ordered  - Assess for signs and symptoms of hyperglycemia and hypoglycemia  - Administer ordered medications to maintain glucose within target range  - Assess barriers to adequate nutritional intake and initiate nutrition consult as needed  - Instruct patient on  self management of diabetes  Outcome: Progressing  Goal: Electrolytes maintained within normal limits  Description: INTERVENTIONS:  - Monitor labs and rhythm and assess patient for signs and symptoms of electrolyte imbalances  - Administer electrolyte replacement as ordered  - Monitor response to electrolyte replacements, including rhythm and repeat lab results as appropriate  - Fluid restriction as ordered  - Instruct patient on fluid and nutrition restrictions as appropriate  Outcome: Progressing  Goal: Hemodynamic stability and optimal renal function maintained  Description: INTERVENTIONS:  - Monitor labs and assess for signs and symptoms of volume excess or deficit  - Monitor intake, output and patient weight  - Monitor urine specific gravity, serum osmolarity and serum sodium as indicated or ordered  - Monitor response to interventions for patient's volume status, including labs, urine output, blood pressure (other measures as available)  - Encourage oral intake as appropriate  - Instruct patient on fluid and nutrition restrictions as appropriate  Outcome: Progressing     Problem: PAIN - ADULT  Goal: Verbalizes/displays adequate comfort level or patient's stated pain goal  Description: INTERVENTIONS:  - Encourage pt to monitor pain and request assistance  - Assess pain using appropriate pain scale  - Administer analgesics based on type and severity of pain and evaluate response  - Implement non-pharmacological measures as appropriate and evaluate response  - Consider cultural and social influences on pain and pain management  - Manage/alleviate anxiety  - Utilize distraction and/or relaxation techniques  - Monitor for opioid side effects  - Notify MD/LIP if interventions unsuccessful or patient reports new pain  - Anticipate increased pain with activity and pre-medicate as appropriate  Outcome: Progressing     Problem: RISK FOR INFECTION - ADULT  Goal: Absence of fever/infection during anticipated neutropenic  period  Description: INTERVENTIONS  - Monitor WBC  - Administer growth factors as ordered  - Implement neutropenic guidelines  Outcome: Progressing     Problem: SAFETY ADULT - FALL  Goal: Free from fall injury  Description: INTERVENTIONS:  - Assess pt frequently for physical needs  - Identify cognitive and physical deficits and behaviors that affect risk of falls.  - [place] fall precautions as indicated by assessment.  - Educate pt/family on patient safety including physical limitations  - Instruct pt to call for assistance with activity based on assessment  - Modify environment to reduce risk of injury  - Provide assistive devices as appropriate  - Consider OT/PT consult to assist with strengthening/mobility  - Encourage toileting schedule  Outcome: Progressing

## 2024-03-13 NOTE — CM/SW NOTE
03/13/24 1300   Discharge disposition   Expected discharge disposition Home or Self   Post Acute Care Provider   (Kameron MAYERS)   Discharge transportation Private car     SW informed  provider of pt discharge and requested follow up with pt/family for SOC in the community.     TRISH Lane, LSW  Social Work   Ext:#[phone]

## 2024-03-13 NOTE — PHYSICAL THERAPY NOTE
PHYSICAL THERAPY TREATMENT NOTE - INPATIENT     Room Number: 103/103-A       Presenting Problem:  (UTI)  Co-Morbidities : spina bifida, VPS, neurogenic bladder    Problem List  Principal Problem:    Urinary tract infection without hematuria, site unspecified  Active Problems:    Myelomeningocele (HCC)    Communicating hydrocephalus (HCC)    Abdominal pain of unknown etiology    Diarrhea, unspecified type    Spasticity    Tendinopathy of left rotator cuff      PHYSICAL THERAPY ASSESSMENT   Patient demonstrates fair progress this session, goals  remain in progress.    Patient continues to function near baseline with gait, standing prolonged periods, and AFO's .  Contributing factors to remaining limitations include decreased functional strength, decreased endurance/aerobic capacity, and impaired   balance.  Next session anticipate patient to progress bed mobility, transfers, and gait.  Physical Therapy will continue to follow patient for duration of hospitalization.    Patient continues to benefit from continued skilled PT services: at discharge to promote functional independence and safety with additional support and return home with OP PT.    PLAN  PT Treatment Plan: Bed mobility;Patient education;Family education;Gait training;Transfer training  Frequency (Obs): 3-5x/week    SUBJECTIVE  \"I need to go\"    OBJECTIVE  Precautions: Limb alert - left (B AFO)    PAIN ASSESSMENT   Ratin  Location: L shoulder LUE immobilizer sling  Management Techniques: Activity promotion;Body mechanics;Breathing techniques;Relaxation;Repositioning    BALANCE  Static Sitting: Fair +  Dynamic Sitting: Fair +  Static Standing: Poor  Dynamic Standing: Poor    ACTIVITY TOLERANCE  Pulse: 115        BP: 127/86       AM-PAC '6-Clicks' INPATIENT SHORT FORM - BASIC MOBILITY  How much difficulty does the patient currently have...  Patient Difficulty: Turning over in bed (including adjusting bedclothes, sheets and blankets)?: None   Patient  Difficulty: Sitting down on and standing up from a chair with arms (e.g., wheelchair, bedside commode, etc.): A Little   Patient Difficulty: Moving from lying on back to sitting on the side of the bed?: None   How much help from another person does the patient currently need...   Help from Another: Moving to and from a bed to a chair (including a wheelchair)?: A Little   Help from Another: Need to walk in hospital room?: A Lot   Help from Another: Climbing 3-5 steps with a railing?: A Lot     AM-PAC Score:  Raw Score: 18   Approx Degree of Impairment: 46.58%   Standardized Score (AM-PAC Scale): 43.63   CMS Modifier (G-Code): CK    FUNCTIONAL ABILITY STATUS  Functional Mobility/Gait Assessment  Gait Assistance: Moderate assistance  Distance (ft): 20  Assistive Device:  (HHA)  Pattern: Shuffle  Rolling: stand-by assist  Supine to Sit: stand-by assist  Sit to Supine: Min A  Sit to Stand: Min A      The patient's Approx Degree of Impairment: 46.58% has been calculated based on documentation in the [facility] '6 clicks' Inpatient Daily Activity Short Form.  Research supports that patients with this level of impairment may benefit from home. Final disposition will be made by interdisciplinary medical team.    Patient End of Session: Up in chair    CURRENT GOALS     Goals to be met by: 3/24/24  Patient Goal Patient's self-stated goal is: get moving again   Goal #1 Patient is able to demonstrate supine - sit EOB @ level: minimum assistance      Goal #1   Current Status  SBA    Goal #2 Patient is able to demonstrate transfers Sit to/from Stand at assistance level: minimum assistance with walker - rolling      Goal #2  Current Status  SBA   Goal #3 Patient is able to ambulate 30 feet with assist device: walker - rolling at assistance level: minimum assistance   Goal #3   Current Status  Mod A with SPT   Goal #4 Patient will negotiate 2 stairs/one curb w/ assistive device and Min A   Goal #4   Current Status  Mod A 3 steps   Goal  #5 Patient to demonstrate independence with home activity/exercise instructions provided to patient in preparation for discharge.   Goal #5   Current Status  ongoing     Gait Training: 10 minutes  Theractivity: 15 minutes

## 2024-03-13 NOTE — PHYSICAL THERAPY NOTE
PHYSICAL THERAPY TREATMENT NOTE - INPATIENT     Room Number: 103/103-A       Presenting Problem:  (UTI)  Co-Morbidities : spina bifida, VPS, neurogenic bladder    Problem List  Principal Problem:    Urinary tract infection without hematuria, site unspecified  Active Problems:    Myelomeningocele (HCC)    Communicating hydrocephalus (HCC)    Abdominal pain of unknown etiology    Diarrhea, unspecified type    Spasticity    Tendinopathy of left rotator cuff      PHYSICAL THERAPY ASSESSMENT   Patient demonstrates fair progress this session, goals  remain in progress.    Patient continues to function near baseline with bed mobility, transfers, and gait.  Contributing factors to remaining limitations include impaired balance.  Next session anticipate patient to progress bed mobility, transfers, and gait.  Physical Therapy will continue to follow patient for duration of hospitalization. Recommend use of RW upon dc home.    Patient continues to benefit from continued skilled PT services: at discharge to promote functional independence and safety with additional support and return home with OP PT.    PLAN  PT Treatment Plan: Bed mobility;Patient education;Family education;Gait training;Balance training  Frequency (Obs): 3-5x/week    SUBJECTIVE  \"Should I ice my shoulder\"    OBJECTIVE  Precautions: Limb alert - left (B AFO)    WEIGHT BEARING RESTRICTION                PAIN ASSESSMENT   Ratin  Location: L shoulder LUE immobilizer sling  Management Techniques: Activity promotion;Body mechanics;Breathing techniques;Relaxation;Repositioning    BALANCE  Static Sitting: Fair -  Dynamic Sitting: Fair -  Static Standing: Poor +  Dynamic Standing: Poor    ACTIVITY TOLERANCE  Pulse: 115        BP: 127/86              O2 WALK       AM-PAC '6-Clicks' INPATIENT SHORT FORM - BASIC MOBILITY  How much difficulty does the patient currently have...  Patient Difficulty: Turning over in bed (including adjusting bedclothes, sheets and  blankets)?: None   Patient Difficulty: Sitting down on and standing up from a chair with arms (e.g., wheelchair, bedside commode, etc.): A Little   Patient Difficulty: Moving from lying on back to sitting on the side of the bed?: None   How much help from another person does the patient currently need...   Help from Another: Moving to and from a bed to a chair (including a wheelchair)?: None   Help from Another: Need to walk in hospital room?: A Little   Help from Another: Climbing 3-5 steps with a railing?: A Lot     AM-PAC Score:  Raw Score: 20   Approx Degree of Impairment: 35.83%   Standardized Score (AM-PAC Scale): 47.67   CMS Modifier (G-Code): CJ    FUNCTIONAL ABILITY STATUS  Functional Mobility/Gait Assessment  Gait Assistance: Contact guard assist  Distance (ft): 120  Assistive Device: Rolling walker (trial with cane but unable to ambulate without a limp)  Pattern: Shuffle  Rolling: minimal assist  Supine to Sit: minimal assist  Sit to Supine: minimal assist  Sit to Stand: minimal assist    The patient's Approx Degree of Impairment: 35.83% has been calculated based on documentation in the [facility] '6 clicks' Inpatient Daily Activity Short Form.  Research supports that patients with this level of impairment may benefit from home.  Final disposition will be made by interdisciplinary medical team.    Patient End of Session: Up in chair    CURRENT GOALS     Goals to be met by: 3/24/24  Patient Goal Patient's self-stated goal is: get moving again   Goal #1 Patient is able to demonstrate supine - sit EOB @ level: minimum assistance      Goal #1   Current Status  SBA    Goal #2 Patient is able to demonstrate transfers Sit to/from Stand at assistance level: minimum assistance with walker - rolling      Goal #2  Current Status  SBA   Goal #3 Patient is able to ambulate 30 feet with assist device: walker - rolling at assistance level: minimum assistance   Goal #3   Current Status  Mod A with SPT   Goal #4 Patient will  negotiate 2 stairs/one curb w/ assistive device and Min A   Goal #4   Current Status  Min A 120 ft   Goal #5 Patient to demonstrate independence with home activity/exercise instructions provided to patient in preparation for discharge.   Goal #5   Current Status  ongoing     Gait Training: 10 minutes  Theractivity: 15 minutes

## 2024-03-13 NOTE — OCCUPATIONAL THERAPY NOTE
OCCUPATIONAL THERAPY TREATMENT NOTE - INPATIENT        Room Number: 103/103-A     Presenting Problem: abdominal and flank pain    Problem List  Principal Problem:    Urinary tract infection without hematuria, site unspecified  Active Problems:    Myelomeningocele (HCC)    Communicating hydrocephalus (HCC)    Abdominal pain of unknown etiology    Diarrhea, unspecified type    Spasticity    Tendinopathy of left rotator cuff      OCCUPATIONAL THERAPY ASSESSMENT   Patient demonstrates good  progress this session, goals remain in progress.    Patient continues to function below baseline with toileting, bathing, upper body dressing, lower body dressing, bed mobility, transfers, dynamic standing balance, functional standing tolerance, and energy conservation strategies.   Contributing factors to remaining limitations include decreased functional strength, decreased endurance, impaired dynamic standing balance, impaired coordination, impaired motor planning, and decreased muscular endurance.  Next session anticipate patient to progress toileting, bathing, upper body dressing, lower body dressing, bed mobility, transfers, dynamic standing balance, functional standing tolerance, and energy conservation strategies.  Occupational Therapy will continue to follow patient for duration of hospitalization.    Patient continues to benefit from continued skilled OT services: at discharge to promote functional independence and safety with additional support and return home with OP OT.     PLAN  OT Treatment Plan: Balance activities;Energy conservation/work simplification techniques;ADL training;IADL training;Functional transfer training;UE strengthening/ROM;Endurance training;Patient/Family education;Patient/Family training;Equipment eval/education;Neuromuscluar reeducation;Compensatory technique education  OT Device Recommendations: TBD    SUBJECTIVE  \"Are you guys coming back again today?\"    OBJECTIVE  Precautions: Limb alert - left  (B AFO)    WEIGHT BEARING RESTRICTION     PAIN ASSESSMENT  Rating: Unable to rate  Location: L shoulder  Management Techniques: Activity promotion; Body mechanics; Relaxation; Repositioning    ACTIVITIES OF DAILY LIVING ASSESSMENT  AM-PAC ‘6-Clicks’ Inpatient Daily Activity Short Form  How much help from another person does the patient currently need…  -   Putting on and taking off regular lower body clothing?: A Little  -   Bathing (including washing, rinsing, drying)?: A Little  -   Toileting, which includes using toilet, bedpan or urinal? : A Little  -   Putting on and taking off regular upper body clothing?: A Little  -   Taking care of personal grooming such as brushing teeth?: None  -   Eating meals?: None    AM-PAC Score:  Score: 20  Approx Degree of Impairment: 38.32%  Standardized Score (AM-PAC Scale): 42.03  CMS Modifier (G-Code): CJ    FUNCTIONAL TRANSFER ASSESSMENT  Sit to Stand: Edge of Bed  Edge of Bed: Minimal Assist  Toilet Transfer: Minimal Assist    BED MOBILITY  Rolling: Not Tested  Supine to Sit : Minimal Assist  Sit to Supine (OT): Not Tested    FUNCTIONAL ADL ASSESSMENT  Eating: Independent  Grooming Seated: Independent  Bathing Seated: Minimal Assist  UB Dressing Seated: Minimal Assist  LB Dressing Seated: Minimal Assist  Toileting Seated: Minimal Assist    THERAPEUTIC EXERCISE     Skilled Therapy Provided: Patient requring min A for all ADLs and functional mobility this session. Patient is limited by his overall strength and endurance, as well as balance. Education provided on body mechanics and importance of OOB activity. Patient is very motivated to work with therapy.    EDUCATION PROVIDED  Patient: Role of Occupational Therapy; Plan of Care; Adaptive Equipment Recommendations; Discharge Recommendations; Functional Transfer Techniques; DME Recommendations; Fall Prevention; Posture/Positioning; Energy Conservation; Compensatory ADL Techniques; Proper Body Mechanics  Patient's Response to  Education: Verbalized Understanding; Returned Demonstration    The patient's Approx Degree of Impairment: 38.32% has been calculated based on documentation in the [facility] '6 clicks' Inpatient Daily Activity Short Form.  Research supports that patients with this level of impairment may benefit from OP OT.  Final disposition will be made by interdisciplinary medical team.    Patient End of Session: Up in chair;Needs met;Call light within reach;RN aware of session/findings;All patient questions and concerns addressed    OT Goals:  Patients self stated goal is: to go home and back to work      Patient will complete functional transfer with supervision  Comment:     Patient will complete toileting with supervision  Comment:     Patient will tolerate standing for 5 minutes in prep for adls with supervision   Comment:    Patient will complete LB dressing with supervision  Comment:            Goals  on: 3/24/24  Frequency: 3-5x/week    Self-Care Home Management: 15 minutes  Therapeutic Activity: 8 minutes    KELECHI Clifford/L  [facility]  [id number]

## 2024-03-13 NOTE — DISCHARGE INSTRUCTIONS
Sometimes managing your health at home requires assistance.  A representative from the home health agency will contact you or your family to schedule your first visit.       [address] [address]  Phone: [phone]  Fax: [phone]    Ok to go home after lunch if dizziness better  Outpt pt/ot  Please give work note     Medication List        START taking these medications      baclofen 10 MG Tabs  Commonly known as: Lioresal  Take 1 tablet (10 mg total) by mouth 3 (three) times daily.     bisacodyl 10 MG Supp  Commonly known as: Dulcolax  Place 1 suppository (10 mg total) rectally daily as needed.     camphor/menthol/methyl salicylate 10-15 % Crea  Apply 1 Application topically 3 (three) times daily as needed.     cephalexin 500 MG Caps  Commonly known as: Keflex  Take 1 capsule (500 mg total) by mouth in the morning, at noon, in the evening, and at bedtime. Please take evening and bedtime doses today and continue 8 more doses     gabapentin 600 MG Tabs  Commonly known as: Neurontin  Take 1 tablet (600 mg total) by mouth 3 (three) times daily.     HYDROcodone-acetaminophen 5-325 MG Tabs  Commonly known as: Norco  Take 1 tablet by mouth every 6 (six) hours as needed for Pain. Watch drowsiness no alcohol     meclizine 12.5 MG Tabs  Commonly known as: Antivert  Take 1 tablet (12.5 mg total) by mouth 3 (three) times daily as needed for Dizziness.     OXcarbazepine 300 MG Tabs  Commonly known as: Trileptal  Take 1 tablet (300 mg total) by mouth 2 (two) times daily.     Sennosides 17.2 MG Tabs  Take 1 tablet (17.2 mg total) by mouth nightly as needed (constipation, as needed if no bowel movement that day).            CONTINUE taking these medications      Acetaminophen Extra Strength 500 MG Tabs     carvedilol 3.125 MG Tabs  Commonly known as: Coreg  Take 1 tablet (3.125 mg total) by mouth 2 (two) times daily with meals.     cholecalciferol 50 MCG (2000 UT) Tabs  Take 1 tablet  (2,000 Units total) by mouth daily.     docusate sodium 100 MG Caps  Commonly known as: Colace  Take 1 capsule (100 mg total) by mouth 2 (two) times daily as needed for constipation.     oxyBUTYnin Chloride ER 15 MG Tb24  Commonly known as: DITROPAN XL  Take 1 tablet (15 mg total) by mouth daily.     Sterile Water for Irrigation Soln  Irrigate with 1,000 mL as directed 2 (two) times daily as needed (Irrigates bladder twice daily due to catheterization.).            STOP taking these medications      sodium chloride 0.9 % Soln               Where to Get Your Medications        [id number] - [place], [place] - [address] [phone], [phone]  [address] [address]      Phone: [phone]   baclofen 10 MG Tabs  bisacodyl 10 MG Supp  camphor/menthol/methyl salicylate 10-15 % Crea  cephalexin 500 MG Caps  docusate sodium 100 MG Caps  gabapentin 600 MG Tabs  HYDROcodone-acetaminophen 5-325 MG Tabs  meclizine 12.5 MG Tabs  OXcarbazepine 300 MG Tabs  Sennosides 17.2 MG Tabs

## 2024-03-13 NOTE — DISCHARGE SUMMARY
Dc summary#[id number]  > 30 min spent on dc    Hospital Discharge Diagnoses: uti with sepsis    Lace+ Score: 68  59-90 High Risk  29-58 Medium Risk  0-28   Low Risk.    TCM Follow-Up Recommendation:  LACE > 58: High Risk of readmission after discharge from the hospital.tcm fu recommended  ,

## 2024-03-13 NOTE — CONSULTS
[facility]  part of [facility]    Report of Consultation    Chao Reese Patient Status:  Observation    3/18/1995 MRN [id number]   Location [facility] 1W Attending Latricia Loya DO   Hosp Day # 0 PCP Nitin Mejía DO     Date of Admission:  3/7/2024  Date of Consult: 3/12/2024    Reason for Consultation:   Consults    History provided by:patient  HPI:     Chief Complaint   Patient presents with    Abdominal Pain    Flank Pain    Nausea     HPI patient is a 27-year-old man with a Chiari malformation here for other issues.  While he was here he developed left shoulder pain that was hampering his ability to change positions in bed and ambulate.  He denies prior left shoulder pain.  MRI was ordered and shows tendinopathy.  The radiologist read as partial thickness tear but in my reading, is more of a tendinopathy picture.  There is little swelling and edema within the tendon but not significant tearing.  The symptoms are at the rotator cuff insertion site consistent with the MRI findings.  I was consulted to see if I had anything to offer the patient.    The patient lives at home with his parents.  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  He hangs out with friends in his spare time.    History     Past Medical History:   Diagnosis Date    Bilateral leg weakness     chronic    Constipation     Depression     Foot drop, bilateral     Gait disturbance     High blood pressure     Neurogenic bladder     Pt self straight caths at home QID and irrigates BID PRN    Other ill-defined conditions(799.89)     shunt from hydrocephalus    PONV (postoperative nausea and vomiting)     if masked used    S/P  shunt     Spina bifida (HCC)     Management:  closure    Strabismus     Done at [facility]    Strabismus      Past Surgical History:   Procedure Laterality Date    OTHER SURGICAL HISTORY      bladder augmentation/catheterizable channel    OTHER SURGICAL  HISTORY  07/06/2020    fistulotomy    SPINE SURGERY PROCEDURE UNLISTED  1994    STRABISMUS SURGERY Left 1999 or 2000     Family History   Problem Relation Age of Onset    No Known Problems Father     No Known Problems Mother     Diabetes Neg     Glaucoma Neg      Social History:  Social History     Socioeconomic History    Marital status: Single   Tobacco Use    Smoking status: Never     Passive exposure: Never    Smokeless tobacco: Never   Vaping Use    Vaping Use: Never used   Substance and Sexual Activity    Alcohol use: Not Currently    Drug use: No   Other Topics Concern    Caffeine Concern Yes     Comment: 1 cup a day.    Exercise No    Pt has a pacemaker No    Pt has a defibrillator No    Reaction to local anesthetic No   Social History Narrative    The patient uses the following assistive device(s):  Splint on R leg .      The patient does live in a home with stairs.     Social Determinants of Health     Financial Resource Strain: Low Risk  (10/19/2023)    Financial Resource Strain     Difficulty of Paying Living Expenses: Not hard at all     Med Affordability: No   Food Insecurity: No Food Insecurity (3/8/2024)    Food Insecurity     Food Insecurity: Never true   Transportation Needs: No Transportation Needs (3/8/2024)    Transportation Needs     Lack of Transportation: No   Housing Stability: Low Risk  (3/8/2024)    Housing Stability     Housing Instability: No     Allergies/Medications:   Allergies:   Allergies   Allergen Reactions    Latex RASH     Medications Prior to Admission   Medication Sig    carvedilol 3.125 MG Oral Tab Take 1 tablet (3.125 mg total) by mouth 2 (two) times daily with meals.    Oxybutynin Chloride ER 15 MG Oral Tablet 24 Hr Take 1 tablet (15 mg total) by mouth daily.    cholecalciferol 50 MCG (2000 UT) Oral Tab Take 1 tablet (2,000 Units total) by mouth daily.    Water For Irrigation, Sterile (STERILE WATER FOR IRRIGATION) Irrigation Solution Irrigate with 1,000 mL as directed 2  (two) times daily as needed (Irrigates bladder twice daily due to catheterization.).    Acetaminophen Extra Strength 500 MG Oral Tab Take 2 tablets (1,000 mg total) by mouth every 6 (six) hours.    sodium chloride 0.9 % Irrigation Solution        Review of Systems:   Review of Systems  Apparently there was an element of cervical radiculopathy down the arm but he did not complain of that to me.  He is complained of intrinsic left shoulder pain and difficulty elevating the left arm.  Physical Exam:   Vital Signs:   height is 5' 4\" (1.626 m) and weight is 142 lb 4.8 oz (64.5 kg). His oral temperature is 98 °F (36.7 °C). His blood pressure is 138/96 (abnormal) and his pulse is 120. His respiration is 18 and oxygen saturation is 91%.   Physical Exam  Exam was somewhat limited due to his pain.  He elevated to about 90 degrees.  He had tenderness to the rotator cuff insertion site.  No acromioclavicular tenderness.  No swelling or erythema of the left shoulder.  Results:     Lab Results   Component Value Date    WBC 13.6 (H) 03/12/2024    HGB 16.1 03/12/2024    HCT 47.0 03/12/2024    .0 03/12/2024    CREATSERUM 0.97 03/12/2024    BUN 10 03/12/2024     03/12/2024    K 4.9 03/12/2024     03/12/2024    CO2 30.0 03/12/2024     (H) 03/12/2024    CA 10.0 03/12/2024    ALB 5.0 (H) 03/07/2024    ALKPHO 69 03/07/2024    BILT 1.2 03/07/2024    TP 7.7 03/07/2024    AST 20 03/07/2024    ALT 33 03/07/2024    PTT 33.7 10/16/2023    INR 1.09 10/16/2023    TSH 3.309 03/07/2024    LIP 32 03/07/2024    DDIMER 0.28 03/12/2024    ESRML 18 (H) 10/12/2023    MG 2.1 03/07/2024    PHOS 3.1 03/22/2018    CK 72 03/07/2024    B12 340 07/24/2023     MRI SHOULDER, LEFT (CPT=73221)    Result Date: 3/11/2024  CONCLUSION:   Partial-thickness bursal surface tear of the supraspinatus.  Small interstitial tear of the distal musculotendinous junction of the infraspinatus.  No full-thickness or retracted rotator cuff tear.  Mild  subacromial subdeltoid bursitis.  Mild intramuscular edema within the infraspinatus and teres minor suggestive of low-grade muscular strains.    Dictated by (CST): López Weller MD on 3/11/2024 at 11:16 AM     Finalized by (CST): López Weller MD on 3/11/2024 at 11:31 AM               MRI shows tendinopathy of the supraspinatus in my opinion.    Impression:     Urinary tract infection without hematuria, site unspecified  Per medicine      Myelomeningocele (HCC)  Per neurosurgery      Communicating hydrocephalus (HCC)  Per neurosurgery      Abdominal pain of unknown etiology  Per medicine      Diarrhea, unspecified type  Per medicine      Spasticity  Per medicine      Tendinopathy of left rotator cuff  I discussed the findings of the MRI with the patient.  I do not think he needs surgery orthopedically.  I recommended cortisone injection and physical therapy.  He has questions and then agreed to the procedure.  Area was prepped with ChloraPrep and using sterile technique, I injected the left glenohumeral joint with lidocaine 1% 5 cc and Kenalog 1 cc but I also put some of the medication into the subacromial space.  He tolerated this well.  He should have physical therapy for the left shoulder.  He is weightbearing as tolerated for the left shoulder at this time.  Again I am not planning orthopedic surgery.  Thank you this consult.         Recommendations:  As above.    Obinna Moore MD  3/12/2024

## 2024-03-14 ENCOUNTER — PATIENT OUTREACH (OUTPATIENT)
Dept: CASE MANAGEMENT | Age: 29
End: 2024-03-14

## 2024-03-14 DIAGNOSIS — N39.0 URINARY TRACT INFECTION WITHOUT HEMATURIA, SITE UNSPECIFIED: ICD-10-CM

## 2024-03-14 DIAGNOSIS — Z02.9 ENCOUNTERS FOR UNSPECIFIED ADMINISTRATIVE PURPOSE: Primary | ICD-10-CM

## 2024-03-14 DIAGNOSIS — Q05.9 SPINA BIFIDA WITHOUT HYDROCEPHALUS, UNSPECIFIED SPINAL REGION (HCC): ICD-10-CM

## 2024-03-14 DIAGNOSIS — N39.0 SEPSIS DUE TO GRAM-NEGATIVE UTI (HCC): ICD-10-CM

## 2024-03-14 DIAGNOSIS — A41.50 SEPSIS DUE TO GRAM-NEGATIVE UTI (HCC): ICD-10-CM

## 2024-03-14 PROCEDURE — 1111F DSCHRG MED/CURRENT MED MERGE: CPT

## 2024-03-14 NOTE — PAYOR COMM NOTE
Discharge Notification    Patient Name: CARLOS MARTIN  Payor: JAQUAN GAO  Subscriber #: [id number]  Authorization Number: [id number]  Admit Date/Time: 3/7/2024 9:03 PM  Discharge Date/Time: 3/13/2024 8:26 PM    OBSERVATON

## 2024-03-14 NOTE — PROGRESS NOTES
Initial Post Discharge Follow Up   Discharge Date: 3/13/24  Contact Date: 3/14/2024    Consent Verification:  Assessment Completed With: Patient  HIPAA Verified?  Yes    Discharge Dx:   Urinary Tract Infection with Sepsis  Left shoulder tendinopathy  Spina bifida S/P  shunt revision in November 23    General:   How have you been since your discharge from the hospital? Patient states he is doing ok, states he is on bed rest and icing his left shoulder. Patient states he has a mild squeezing headache in forehead and sides of his head, it is not the worst headache he has ever had. Patient reports that the pain medication he took for his shoulder helps. Patient reports that he still has some dizziness, however, he thinks it is because he has not eaten much. NCM instructed patient to continue to drink plenty of fluids so that he is not dehydrated and try to eat small snack size meals more frequently through out the day. NCM reviewed with patient how to prevent UTI's especially since he straight cath's. Patient denies shortness of breath, no chest pain, no pain radiating to neck, jaw, shoulders, arms or upper back. Patient denies abdominal pain, no vomiting, however, states he is nauseated. Patient is going to try to sip some ginger ale or 7Up with the bubbles stirred out. NCM instructed patient to change positions frequently, sit up as tolerated, rest when needed, stay hydrated and continue to take up to ten deep breaths an hour while awake, or if watching television take a deep breath with every commercial. NCM reviewed discharge instructions, medications, S&S of infection/blood clots with patient, he verbalized understanding of these. Patient denies any further questions or needs at this time.  Do you have any pain since discharge?  Yes  Where: left shoulder pain, headache in front and sides   Rating on pain scale 1-10, 10 being the worst pain you have ever experienced, what is current pain: 6/10, in left shoulder,  5/10 for headache.  Alleviating factors: pain medication, camphor/menthol/methyl salicylate 10-15 % Cream to left shoulder, NCM suggested ice on for 20 minutes then off for 20 minutes and make sure to have a cloth between the ice pack and his skin. Patient reported that he tried heat and it did not help.   Aggravating factors: no  Is the pain manageable at home? Yes  When you were leaving the hospital were your discharge instructions reviewed with you? Yes  How well were your discharge instructions explained to you?   On a scale of 1-5   1- Very Poor and 5- Very well   Very Well  Do you have any questions about your discharge instructions?  No  Before leaving the hospital was your diagnoses explained to you? Yes  Do you have any questions about your diagnoses? No  Are you able to perform normal daily activities of living as you have prior to your hospital stay (dressing, bathing, ambulating to the bathroom, etc)? yes  If No, What are some barriers or concerns?  Patient states his mother is there if he needs help  (NCM) Was patient given a different diet per AVS? no, nauseated, not eating much. NCM suggested that patient eat more frequent smaller meals throughout the day instead of three larger meals.    Medications:   STOP taking:  sodium chloride 0.9 % Soln  Review your updated medication list below.  Current Outpatient Medications   Medication Sig Dispense Refill    docusate sodium (COLACE) 100 MG Oral Cap Take 1 capsule (100 mg total) by mouth 2 (two) times daily as needed for constipation. 60 capsule 0    baclofen 10 MG Oral Tab Take 1 tablet (10 mg total) by mouth 3 (three) times daily. 90 tablet 0    bisacodyl 10 MG Rectal Suppos Place 1 suppository (10 mg total) rectally daily as needed. 12 suppository 0    camphor/menthol/methyl salicylate 10-15 % External Cream Apply 1 Application topically 3 (three) times daily as needed. 1 each 0    cephalexin 500 MG Oral Cap Take 1 capsule (500 mg total) by mouth in the  morning, at noon, in the evening, and at bedtime. Please take evening and bedtime doses today and continue 8 more doses 10 capsule 0    gabapentin 600 MG Oral Tab Take 1 tablet (600 mg total) by mouth 3 (three) times daily. 90 tablet 0    OXcarbazepine 300 MG Oral Tab Take 1 tablet (300 mg total) by mouth 2 (two) times daily. 60 tablet 0    meclizine 12.5 MG Oral Tab Take 1 tablet (12.5 mg total) by mouth 3 (three) times daily as needed for Dizziness. 30 tablet 0    HYDROcodone-acetaminophen 5-325 MG Oral Tab Take 1 tablet by mouth every 6 (six) hours as needed for Pain. Watch drowsiness no alcohol 12 tablet 0    sennosides 17.2 MG Oral Tab Take 1 tablet (17.2 mg total) by mouth nightly as needed (constipation, as needed if no bowel movement that day). 30 tablet 0    Water For Irrigation, Sterile (STERILE WATER FOR IRRIGATION) Irrigation Solution Irrigate with 1,000 mL as directed 2 (two) times daily as needed (Irrigates bladder twice daily due to catheterization.). 3 each 11    Acetaminophen Extra Strength 500 MG Oral Tab Take 2 tablets (1,000 mg total) by mouth every 6 (six) hours.      carvedilol 3.125 MG Oral Tab Take 1 tablet (3.125 mg total) by mouth 2 (two) times daily with meals. 180 tablet 3    Oxybutynin Chloride ER 15 MG Oral Tablet 24 Hr Take 1 tablet (15 mg total) by mouth daily. 90 tablet 3    cholecalciferol 50 MCG (2000 UT) Oral Tab Take 1 tablet (2,000 Units total) by mouth daily. 90 tablet 3     Were there any changes to your current medication(s) noted on the AVS? Yes  If so, were these medication changes discussed with you prior to leaving the hospital? Yes  If a new medication was prescribed:    Was the new medication's purpose & side effects reviewed? Yes  Do you have any questions about your new medication? No  Did you  your discharge medications when you left the hospital? Yes  Let's go over your medications together to make sure we are not missing anything. Medications Reviewed  Are  there any reasons that keep you from taking your medication as prescribed? No  Are you having any concerns with constipation? No  Did patient receive their flu shot (Sept-March)? No    Discharge medications reviewed/discussed/and reconciled against outpatient medications with patient.  Any changes or updates to medications sent to PCP.  Patient Acknowledged     Referrals/orders at D/C:  Referrals/orders placed at D/C? yes  What services:   Home health, PT, and OT   (If HH was ordered) Has HH been set up?  Yes    If Yes: With Whom: [facility] Phone: [phone]    DME ordered at D/C? No, patient has a walker, wheelchair and crutches at home already.      Discharge orders, AVS reviewed and discussed with patient. Any changes or updates to orders sent to PCP.  Patient Acknowledged      SDOH:   Transportation:  .  Transportation Needs: No Transportation Needs (3/8/2024)    Transportation Needs     Lack of Transportation: No       Financial Strain:    Financial Resource Strain: Low Risk  (10/19/2023)    Financial Resource Strain     Difficulty of Paying Living Expenses: Not hard at all     Med Affordability: No        Diagnosis specifics:   Urinary Tract Infections In Males  Medicines to treat a UTI  Most UTIs are treated with antibiotics. These kill the bacteria. The length of time you need to take them depends on the type of infection. Take antibiotics exactly as directed until all of the medicine is gone, even if you feel better. If you don't, the infection may not go away. It may become harder to treat. For some types of UTIs, you may be given other medicine to help treat your symptoms.   Self-care to treat and prevent UTIs   The lifestyle changes below will help get rid of your infection. They may help prevent future UTIs.   Drink plenty of fluids. This includes water, juice, or other caffeine-free drinks. This helps flush bacteria out of your system.  Empty your bladder when you feel the urge to pee and  before going to sleep. Urine that stays in your bladder promotes infection.  Use condoms during sex. These help prevent UTIs caused by bacteria spread from sex.  Keep follow-up appointments with your healthcare provider. They may do tests to make sure the infection has cleared. If needed, more treatment can be started.    Follow up appointments:      Your appointments       Date & Time Appointment Department ([place])    Mar 16, 2024 10:00 AM CDT Exam - Established with Justin Mckeon APRN [facility] ([facility]        Apr 02, 2024  2:00 PM CDT Consult with Syed Castañeda MD [facility] ([facility])              [facility]  [address]  [place] [address]  [phone] [facility]  [address]  [place] [address]  [phone]            TCC  Was TCC ordered: No    PCP (If no TCC appointment)  Does patient already have a PCP appointment scheduled? Yes  NCM Confirmed PCP office TCM appointment with patient on 3/16/24 at 9:40 am.       Specialist    Does the patient have any other follow up appointment(s) needing to be scheduled? Yes  If yes: NCM reviewed upcoming specialist appointment with patient: Yes  Does the patient need assistance scheduling appointment(s): No, patient reports that he is going to call and schedule with Dr Mccoy, neurologist. Patient will aslo schedule with Dr Moore if shoulder pain persists.  Future Appointments   Date Time Provider Department Center   3/16/2024  9:40 AM Justin Mckeon APRN [facility]   4/2/2024  2:00 PM Syed Castañeda MD [facility]       Is there any reason as to why you cannot make your appointment(s)?  No     Needs post D/C:   Now that you are home, are  there any needs or concerns you need addressed before your next visit with your PCP?  (DME, meds, questions, etc.): No    Interventions by NCM:   NCM reviewed medications, discharge instructions, S&S of infection/blood clots. Patient instructed to report any new or worsening symptoms, when to call the doctor and when to call 911. Patient verbalized understanding of these. NCM instructed patient to call PCP with any questions or needs, she states she will.      CCM referral placed:    Yes    BOOK BY DATE: 3/27/2024

## 2024-03-15 NOTE — PROGRESS NOTES
Subjective:   Chao Reese is a 28 year old male who presents for TCM (Transition Of Care Management) (UTI )   Patient is a pleasant 28 year old male with past medical history consistent for spina bifida,  shunt, and neurogenic bladder who self caths QID, who presents as follow-up from trip to the emergency department and subsequent admission to the Hospital on 3/7/2024 for abdominal pain and radiate towards the back.  CT of the abdomen and pelvis showed nonobstructing stone left lower pole with no hydronephrosis.  UA was positive for leukocytes, elevated white blood cells were also seen on CBC. Of note patient also suffered from left shoulder pain and spasticity after receiving the CT abdomen pelvis.  With a history of  shunt and recently being exchanged in November 2023 he was admitted and neuro was placed on consult and iv abx begun for uti.  Neurology MD Mccoy saw patient in hospital stating the spasticity was likely secondary to the UTI.  A CT of the head was ordered and showed no acute findings.  CT of the C-spine was also performed that showed no acute findings.  MRI was recommended and completed which showed again no acute findings.  Due to the left shoulder pain patient was also seen by MD Moore from orthopedics where he received a steroid injection for a tendon arthropathy.  Patient declined acute rehab after being evaluated by PMR, wanted to go home with home health especially for his birthday.  Patient discharged home on baclofen 3 times daily Keflex 4 times daily for 2 more days from [place], oxcarbazepine 300 mg daily oxybutynin 15 mg daily and gabapentin 600 mg 3 times daily.    Patient presents to office today and states when getting CT at hospital and they were transferring he fell onto his left shoulder. Uti has cleared. No back pain at this time. Having some issues with constipation. Reports hard stools. Pain in arm is better. In sling today. Some swelling in back per pt. Able to sleep some.  Taking tylenol. Started PT in home yesterday. Was recommended to schedule follow up with neuro and ortho in next 2 weeks, has to make appointment still. Does not feel he can return work at this time. Does not feel stable walking with so many medications, perhaps light duty. Wants to be evaluated outpatient by neuro and ortho prior.         Past Medical History:   Diagnosis Date    Bilateral leg weakness     chronic    Constipation     Depression     Foot drop, bilateral     Gait disturbance     High blood pressure     Neurogenic bladder     Pt self straight caths at home QID and irrigates BID PRN    Other ill-defined conditions(799.89)     shunt from hydrocephalus    PONV (postoperative nausea and vomiting)     if masked used    S/P  shunt     Spina bifida (HCC)     Management:  closure    Strabismus 1998    Done at [facility]    Strabismus       Past Surgical History:   Procedure Laterality Date    Other surgical history  2010    bladder augmentation/catheterizable channel    Other surgical history  07/06/2020    fistulotomy    Spine surgery procedure unlisted  1994    Strabismus surgery Left 1999 or 2000        History/Other:    Chief Complaint Reviewed and Verified  Nursing Notes Reviewed and   Verified  Tobacco Reviewed  Allergies Reviewed  Medications Reviewed    Problem List Reviewed  Medical History Reviewed  Surgical History   Reviewed  Family History Reviewed         Tobacco:  He has never smoked tobacco.    Current Outpatient Medications   Medication Sig Dispense Refill    magnesium hydroxide (MELCHOR MILK OF MAGNESIA) 400 MG/5ML Oral Suspension Take 30 mL by mouth daily as needed for constipation. 118 mL 0    polyethylene glycol, PEG 3350, 17 g Oral Powd Pack Take 17 g by mouth daily. 30 each 3    docusate sodium (COLACE) 100 MG Oral Cap Take 1 capsule (100 mg total) by mouth 2 (two) times daily as needed for constipation. 60 capsule 0    baclofen 10 MG Oral Tab Take 1 tablet  (10 mg total) by mouth 3 (three) times daily. 90 tablet 0    bisacodyl 10 MG Rectal Suppos Place 1 suppository (10 mg total) rectally daily as needed. 12 suppository 0    camphor/menthol/methyl salicylate 10-15 % External Cream Apply 1 Application topically 3 (three) times daily as needed. 1 each 0    cephalexin 500 MG Oral Cap Take 1 capsule (500 mg total) by mouth in the morning, at noon, in the evening, and at bedtime. Please take evening and bedtime doses today and continue 8 more doses 10 capsule 0    gabapentin 600 MG Oral Tab Take 1 tablet (600 mg total) by mouth 3 (three) times daily. 90 tablet 0    OXcarbazepine 300 MG Oral Tab Take 1 tablet (300 mg total) by mouth 2 (two) times daily. 60 tablet 0    meclizine 12.5 MG Oral Tab Take 1 tablet (12.5 mg total) by mouth 3 (three) times daily as needed for Dizziness. 30 tablet 0    HYDROcodone-acetaminophen 5-325 MG Oral Tab Take 1 tablet by mouth every 6 (six) hours as needed for Pain. Watch drowsiness no alcohol 12 tablet 0    Water For Irrigation, Sterile (STERILE WATER FOR IRRIGATION) Irrigation Solution Irrigate with 1,000 mL as directed 2 (two) times daily as needed (Irrigates bladder twice daily due to catheterization.). 3 each 11    Acetaminophen Extra Strength 500 MG Oral Tab Take 2 tablets (1,000 mg total) by mouth every 6 (six) hours.      carvedilol 3.125 MG Oral Tab Take 1 tablet (3.125 mg total) by mouth 2 (two) times daily with meals. 180 tablet 3    Oxybutynin Chloride ER 15 MG Oral Tablet 24 Hr Take 1 tablet (15 mg total) by mouth daily. 90 tablet 3    cholecalciferol 50 MCG (2000 UT) Oral Tab Take 1 tablet (2,000 Units total) by mouth daily. 90 tablet 3         Review of Systems:  Review of Systems   Constitutional: Negative.  Negative for activity change, chills and fever.   HENT: Negative.  Negative for congestion, ear pain, postnasal drip, sinus pain, sore throat and trouble swallowing.    Respiratory: Negative.  Negative for cough, shortness  of breath and wheezing.    Cardiovascular: Negative.  Negative for chest pain and leg swelling.   Gastrointestinal: Negative.  Negative for abdominal pain, blood in stool, constipation and diarrhea.   Endocrine: Negative.    Genitourinary: Negative.  Negative for difficulty urinating, dysuria and flank pain.   Musculoskeletal:  Positive for arthralgias, joint swelling and myalgias. Negative for back pain and neck stiffness.        Antalgic gait  Left shoulder in sling   Skin: Negative.  Negative for color change and rash.   Neurological: Negative.  Negative for dizziness and headaches.   Hematological:  Negative for adenopathy.         Objective:   /83   Pulse 105   Wt 140 lb (63.5 kg)   BMI 24.02 kg/m²  Estimated body mass index is 24.02 kg/m² as calculated from the following:    Height as of 3/10/24: 5' 4.02\" (1.626 m).    Weight as of this encounter: 140 lb (63.5 kg).  Physical Exam  Vitals and nursing note reviewed.   Constitutional:       Appearance: Normal appearance. He is normal weight.   HENT:      Head: Normocephalic.      Right Ear: External ear normal.      Left Ear: External ear normal.      Nose: Nose normal.      Mouth/Throat:      Mouth: Mucous membranes are moist.   Eyes:      Extraocular Movements: Extraocular movements intact.      Pupils: Pupils are equal, round, and reactive to light.   Cardiovascular:      Rate and Rhythm: Normal rate and regular rhythm.      Pulses: Normal pulses.      Heart sounds: Normal heart sounds. No murmur heard.  Pulmonary:      Effort: Pulmonary effort is normal. No respiratory distress.      Breath sounds: Normal breath sounds. No wheezing.   Abdominal:      General: There is no distension.      Palpations: Abdomen is soft.      Tenderness: There is no abdominal tenderness.   Musculoskeletal:         General: Swelling, tenderness and signs of injury present. No deformity.      Cervical back: Normal range of motion and neck supple.      Right lower leg: No  edema.      Left lower leg: No edema.      Comments: Left arm in sling   Lymphadenopathy:      Cervical: No cervical adenopathy.   Skin:     General: Skin is warm and dry.      Capillary Refill: Capillary refill takes less than 2 seconds.      Findings: No rash.   Neurological:      General: No focal deficit present.      Mental Status: He is alert and oriented to person, place, and time.   Psychiatric:         Mood and Affect: Mood normal.         Behavior: Behavior normal.           Assessment & Plan:   1. Hospital discharge follow-up (Primary)  Assessment & Plan:  Progressing improvement.  Follow up specialty  Light duty for work per pt request.  No lifting or prolonged standing.     Patient aware of plan of care. All questions answered to satisfaction of the patient. Patient instructed to call office or reach out via [employer] if any issues arise. For urgent issues and/or reviewed red flags please proceed to the urgent care or ER.  Also, inform the nurse practitioner with any new symptoms or medication side effects.        2. Constipation, unspecified constipation type  Assessment & Plan:  Constipated with hard bms, taking docusate. Does not like other meds  MOM daily PRN   Start miralax daily  Continue docusate   Increase water and fiber intake  Orders:  -     Magnesium Hydroxide; Take 30 mL by mouth daily as needed for constipation.  Dispense: 118 mL; Refill: 0  -     Polyethylene Glycol 3350; Take 17 g by mouth daily.  Dispense: 30 each; Refill: 3  3. Cystitis  Assessment & Plan:  Completed keflex  Denies symptoms or backpain  resolved  4. Renal stone  Assessment & Plan:  Stable on CT, no hydronephrosis.  5. Spasticity  Assessment & Plan:  On carbamazepine, gabapentin, oxybutynin, and baclofen.   Needs to schedule follow up with neuro for 2 weeks  Movememnt improving.  Has started PT  6. Left shoulder pain, unspecified chronicity  Assessment & Plan:  Maintain sling.  Pain meds as needed.   Follow up with ortho  in 2 weeks.        Return for Annual physical.    LOS Wu, 3/15/2024, 12:51 PM

## 2024-03-15 NOTE — DISCHARGE SUMMARY
[facility]    PATIENT'S NAME: CARLOS MARTIN   ATTENDING PHYSICIAN: Hemalatha Corrales MD   PATIENT ACCOUNT#:   [id number]    LOCATION:  [facility]  MEDICAL RECORD #:   [id number]       YOB: 1995  ADMISSION DATE:       03/07/2024      DISCHARGE DATE:  03/13/2024    DISCHARGE SUMMARY      Greater than 45 minutes were spent preparing this discharge.    DISCHARGE DIAGNOSIS:  Urinary tract infection.    HISTORY AND HOSPITAL COURSE:  This is a very pleasant 28-year-old [employer] male who unfortunately suffers spina bifida, who presents with abdominal pain, flank pain, and nausea.  He was admitted to the hospital and found to have a urinary tract infection, but also complained of left arm spasticity that improved when Dr. Moore injected him.  Patient's urinary tract infection proved to be Klebsiella pneumoniae, and he was started on antibiotics and did well.  His CT showed no hydronephrosis, nonobstructing urinary calculus, but otherwise no other issues.  He was also evaluated by Neurology for spasticity, and his medications were adjusted as well.  He is warned about side effects.  Dr. Mccyo of [facility] saw him as well, and Dr. Mccoy considering acute rehab, but he declined it and he wanted to go with home health, especially to celebrate his birthday, [birth year], with his friends at work.     PHYSICAL EXAMINATION ON DISCHARGE:    VITAL SIGNS:  Temperature is 98.3, pulse is 109, respiratory rate 20, blood pressure is 140/98.  LUNGS:  Occasional rhonchi.  HEART:  Normal S1, S2.  No S3.  ABDOMEN:  Soft and nontender.  EXTREMITIES:  Without edema.   NEUROLOGIC:  He is alert and oriented and now at baseline; weakness and spasticity, back to baseline.     LABORATORY STUDIES:  Please see chart.    ASSESSMENT AND PLAN:    1.   Spina bifida with spasticity.  Continue medications.  2.   Urinary tract infection, klebsiella sensitive.  Will continue Keflex.  Left renal calculi.  3.   Sinus  tachycardia, now better.  Workup negative.  4.   Neurogenic bladder.  Continue oxybutynin.  5.   Hypertension.  Increase Coreg.  6.   DVT prophylaxis.  Subcutaneous hepatin.    CONDITION ON DISCHARGE:  Stable.    CODE STATUS:  Full Code.    DIET:  Low salt.    ACTIVITY:  As tolerated.    FOLLOWUP:  Follow up with Dr. Mejía in a few days, Dr. Mccoy in 2 weeks, Dr. Moore in 2 weeks as needed, Dr. Trip Mccoy of Neurology in 2 weeks as needed.  Return if any issues, fever, chills, sweats, nausea, vomiting, weakness.     DISCHARGE MEDICATIONS:    1.   Tylenol 1000 mg every 6 hours as needed.  Watch total daily Tylenol, limit to 3 g.  2.   Coreg 3.125 mg twice a day.  3.   Vitamin D 2000 units daily.  4.   Colace 100 mg p.o. b.i.d. p.r.n. constipation.  5.   Oxybutynin ER 15 mg daily.    6.   Baclofen 10 mg 3 times a day.  7.   Dulcolax suppository as needed.  8.   Duragesic cream 3 times a day as needed.  9.   Keflex 500 mg 4 times a day 10 capsules.  Finish all those.  Stop if rash.  10.   Gabapentin 600 mg 3 times a day.  11.   Norco 5/325 one tablet every 6 hours as needed.  Watch total daily Tylenol, limit to 3 g.  12.   Meclizine 12.5 mg 3 times a day.  13.   Trileptal 300 mg twice a day.  14.   Senokot 17.2 mg nightly as need constipation.    RISK OF READMISSION:  High.  TCM followup recommended.     Dictated By Hemalatha Corrales MD  d: 03/14/2024 18:14:43  t: 03/14/2024 23:45:52  Job [id number]  [facility]/

## 2024-03-16 ENCOUNTER — OFFICE VISIT (OUTPATIENT)
Dept: FAMILY MEDICINE CLINIC | Facility: CLINIC | Age: 29
End: 2024-03-16

## 2024-03-16 VITALS
HEART RATE: 105 BPM | DIASTOLIC BLOOD PRESSURE: 83 MMHG | WEIGHT: 140 LBS | SYSTOLIC BLOOD PRESSURE: 133 MMHG | BODY MASS INDEX: 24 KG/M2

## 2024-03-16 DIAGNOSIS — Z09 HOSPITAL DISCHARGE FOLLOW-UP: Primary | ICD-10-CM

## 2024-03-16 DIAGNOSIS — K59.00 CONSTIPATION, UNSPECIFIED CONSTIPATION TYPE: ICD-10-CM

## 2024-03-16 DIAGNOSIS — M25.512 LEFT SHOULDER PAIN, UNSPECIFIED CHRONICITY: ICD-10-CM

## 2024-03-16 DIAGNOSIS — N20.0 RENAL STONE: ICD-10-CM

## 2024-03-16 DIAGNOSIS — R25.2 SPASTICITY: ICD-10-CM

## 2024-03-16 DIAGNOSIS — N30.90 CYSTITIS: ICD-10-CM

## 2024-03-16 RX ORDER — POLYETHYLENE GLYCOL 3350 17 G/17G
17 POWDER, FOR SOLUTION ORAL DAILY
Qty: 30 EACH | Refills: 3 | Status: SHIPPED | OUTPATIENT
Start: 2024-03-16

## 2024-03-16 NOTE — ASSESSMENT & PLAN NOTE
On carbamazepine, gabapentin, oxybutynin, and baclofen.   Needs to schedule follow up with neuro for 2 weeks  Movememnt improving.  Has started PT

## 2024-03-16 NOTE — ASSESSMENT & PLAN NOTE
Constipated with hard bms, taking docusate. Does not like other meds  MOM daily PRN   Start miralax daily  Continue docusate   Increase water and fiber intake

## 2024-03-16 NOTE — ASSESSMENT & PLAN NOTE
Progressing improvement.  Follow up specialty  Light duty for work per pt request.  No lifting or prolonged standing.     Patient aware of plan of care. All questions answered to satisfaction of the patient. Patient instructed to call office or reach out via [employer] if any issues arise. For urgent issues and/or reviewed red flags please proceed to the urgent care or ER.  Also, inform the nurse practitioner with any new symptoms or medication side effects.

## 2024-03-18 ENCOUNTER — TELEPHONE (OUTPATIENT)
Dept: NEUROLOGY | Facility: CLINIC | Age: 29
End: 2024-03-18

## 2024-03-21 ENCOUNTER — OFFICE VISIT (OUTPATIENT)
Dept: NEUROLOGY | Facility: CLINIC | Age: 29
End: 2024-03-21
Payer: MEDICARE

## 2024-03-21 VITALS — HEIGHT: 64 IN | BODY MASS INDEX: 23.9 KG/M2 | WEIGHT: 140 LBS

## 2024-03-21 DIAGNOSIS — M25.612 DECREASED ROM OF LEFT SHOULDER: Primary | ICD-10-CM

## 2024-03-21 NOTE — PATIENT INSTRUCTIONS
Stop the OXcarbazepine   Stay on baclofen and gabapentin for the  time being  I wont refill the BACLOFEN and gabapentin because you may not need refills.   If your symptoms are getting better, you can stop the medications.

## 2024-03-21 NOTE — PROGRESS NOTES
[facility] [address], [address]  [facility] [address]  [phone]        Neurology Clinic Follow Up Note    Chief Complaint:  Er F/u (ER follow up if 3/7/24 - The pt presents as an ER follow up for UE spasms. States that the spasms start in his arms and radiates to his lower back. Pt also c/o of pain at the sites of the spasms.  Currently taking Gabapentin and states it helps. Pt is currently doing at home PT. )      HPI:   Chao Reese is a 29 year old male w/ a pmhx sig. 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  He was admitted w/ diarrhea. He  presented for gastroenteritis/diarrhea and his symptoms began immediately after he had a CT scan. Reports it began after he was transferred. He had a UTI that was being treated. He was seen by nsgy. He had a mri brain and c spine without contrast that were negative for any acute processes.     He had a MRI of his shoulder on 3/11/24; the read was a partial-thickness bursal surface tear of the supraspinatus. Also Small interstitial tear of the distal musculotendinous junction of the infraspinatus and Mild subacromial subdeltoid bursitis       First started on  baclofen and gabapentin then added oxcarbazepine. He had gradual improvement over the 3 days he was seen. He is now in  PT/OT at home. He has not been able to return to work yet.    03/21/24 Interval History/Subjective :   Here w/ mom  for hospital discharge follow up.  He has stabbing pain that radiates down his arm from his shoulder to his biceps,  Radiates down from his scapula down his back  He has stabbing pain  radiating down from his elbow    His mom took a photo of him flexing his left arm while asleep  His  ROM has improved since he was last seen in the hospital    Pain makes him nauseated   He has home PT/OT, which is helping; his 3rd day of tx is tomorrow.  This past Tuesday he had a significant improvement compared to when he was in the hospital.    Better w/ topical nsaids/icy hot     He is on baclofen 10mg tid (morning, evening, and bedtime).  Clarified he can  do am, afternoon, and bedtime.  He is nauseated as well.      Needs more time off work.    Pt  mentions the transfer from the cart to the CT gantry may have triggered his sx.        ROS: Pertinent positive and negatives per HPI.  All others were reviewed and negative.     Medications:  Current Outpatient Medications   Medication Instructions    Acetaminophen Extra Strength 500 MG Oral Tab 2 tablets, Oral, Every 6 hours    baclofen (LIORESAL) 10 mg, Oral, 3 times daily    bisacodyl (DULCOLAX) 10 mg, Rectal, Daily as needed    camphor/menthol/methyl salicylate 10-15 % External Cream 1 Application, Topical, 3 times daily PRN    carvedilol (COREG) 3.125 mg, Oral, 2 times daily with meals    cholecalciferol 2,000 Units, Oral, Daily    docusate sodium (COLACE) 100 mg, Oral, 2 times daily PRN    gabapentin (NEURONTIN) 600 mg, Oral, 3 times daily    HYDROcodone-acetaminophen 5-325 MG Oral Tab 1 tablet, Oral, Every 6 hours PRN, Watch drowsiness no alcohol    magnesium hydroxide (MELCHOR MILK OF MAGNESIA) 400 MG/5ML Oral Suspension 30 mL, Oral, Daily as needed    meclizine (ANTIVERT) 12.5 mg, Oral, 3 times daily PRN    OXcarbazepine (TRILEPTAL) 300 mg, Oral, 2 times daily    oxyBUTYnin Chloride ER (DITROPAN XL) 15 mg, Oral, Daily    Polyethylene Glycol 3350 (MIRALAX) 17 g, Oral, Daily    Water For Irrigation, Sterile (STERILE WATER FOR IRRIGATION) Irrigation Solution 1,000 mL, Irrigation, 2 times daily PRN        Reviewed and assessed      Objective:  Last vitals and  weight :  Body mass index is 24.03 kg/m².   There were no vitals filed for this visit.   Height 64\", weight 140 lb (63.5 kg).  Ht 64\"   Wt 140 lb (63.5 kg)   BMI 24.03 kg/m²   Exam:    General: appears stated age and in mild distress.  He is diaphoretic from the pain.           Neurologic Exam  - Mental Status: Alert and attentive. Orjented x4.  Speech is spontaneous, fluent, and prosodic. Comprehension intact. Repetition intact. Phrase length and rate are normal. No paraphasic errors, neologisms, anomia, acalculia, apraxia, anosognosia, or R/L confusion. No neglect.   - Cranial Nerves: No gaze preference. Visual fields:normal Pupils are 5mm briskly constricting to 4mm and equally round and reactive to light  in a well lit room.  EOMI. B/l gaze evoked nystagmus.  Nystagmus. No ptosis. V1-V3 intact B/L to light touch.No pathological facial asymmetry. No flattening of the nasolabial fold. .  Hearing grossly intact.  Tongue midline. No atrophy or fasiculations of the tongue noted. Palate and uvula elevate symmetrically.  Shoulder shrug symmetric.  - Fundoscopic exam:normal w/o hemorrhages, exudates, or papilledema.No attenuation. No pallor.  - Motor:  normal tone, diffusely decrease bulk in LE. No interosseous wasting. No flattening of hypothenar eminences.    His ROM has  improved. He can flex                                                               Right                  Left     Motor Strength                           Deltoids                           5                 3 - limited d/t pain and ?submaximal effort           Triceps                            5                      3     Biceps                             5                      4     Wrist Extensors              5                     4+                                        5                                        Hip Flexors                 5  5                                                                                                                                Reflexes:     C5 C6 C7  L4 S1   R 3+ 3+ 3+ 0 2+   L       1+ 2+   Adductor Spread: Spread of reflexes is noted.    Frontal release signs:Not assessed.    Jaw Jerk:    Michael's sign: questionable on right    Nonsustained clonus: Absent   Sustained clonus: Absent            - Sensory:   Light touch: intact        - Cerebellum: No truncal ataxia. No titubations. No dysmetria, no dysdiadochokinesis. No overshoot.     Most recent lab results:   Reviewed and assessed  No results found for this or any previous visit (from the past 36 hour(s)).    Diagnostic studies:  Performed an independent visualization of  mri brain and mri cspine  Imaging revealed:   agree w read     Assessment      Anticipate he will continue to improve gradually. He is doing much better compared to how he was doing in the hospital.  Will stop oxcarbazepine (given nausea) and and add Voltaren gel.    Will give him  6 weeks off work and will reassess then,.     Plan   1. Decreased ROM of left shoulder  - diclofenac (VOLTAREN) 1 % External Gel; Apply 4 g topically 4 (four) times daily for 22 days.  Dispense: 350 g; Refill: 0  Stop the OXcarbazepine   Stay on baclofen and gabapentin for the  time being  I wont refill the BACLOFEN and gabapentin because you may not need refills.   If your symptoms are getting better, you can stop the medications.               This document is not intended to support charting by exception.  Sections left blank in a completed note should be presumed not to have been done.      Disclaimer:   This record was dictated using  Dragon software. There may be errors due to voice recognition problems that were not realized and corrected during the completion of the note.       .      Thank you for allowing me to participate in the care of your patient.    Trip Mccoy DO  3/21/2024

## 2024-03-29 ENCOUNTER — TELEPHONE (OUTPATIENT)
Dept: NEUROSURGERY | Age: 29
End: 2024-03-29

## 2024-04-02 ENCOUNTER — OFFICE VISIT (OUTPATIENT)
Facility: CLINIC | Age: 29
End: 2024-04-02
Payer: MEDICARE

## 2024-04-02 VITALS — SYSTOLIC BLOOD PRESSURE: 136 MMHG | HEART RATE: 92 BPM | DIASTOLIC BLOOD PRESSURE: 85 MMHG

## 2024-04-02 DIAGNOSIS — A04.8 H. PYLORI INFECTION: Primary | ICD-10-CM

## 2024-04-02 PROCEDURE — 99214 OFFICE O/P EST MOD 30 MIN: CPT | Performed by: STUDENT IN AN ORGANIZED HEALTH CARE EDUCATION/TRAINING PROGRAM

## 2024-04-02 NOTE — PROGRESS NOTES
[facility] - Gastroenterology                                                                                                      Clinic Follow-up Visit    Chief Complaint   Patient presents with    Follow - Up     Was in ER; constipation/diarrhea      Subjective/HPI:   Chao Reese is a 29 year old year-old man with history of spina bifida s/p closure, hydrocephalus s/p  shunt, s/p bladder surgery who presented to GI clinic initially on 8/1/2023 with altered bowel habits, abdominal bloating and lack of appetite. Labs were unremarkable, KUB with moderate right sided stool burden, H. Pylori breath test was positive for H. Pylori. He completed treatment for h. Pylori with amoxicillin, clarithromycin and PPI. This was very difficult for him and caused many symptoms.    Symptoms from antibiotics resolved after a couple days.    He recently was admitted to the hospital for cystitis despite multiple courses of antibiotics.  When he was on antibiotics he had diarrhea/constipation abdominal discomfort.  Once he was off the antibiotics his altered bowel habits resolved.    He currently has no abdominal pain and is having regular/well-formed bowel movements.    He is here with mom today.      Wt Readings from Last 6 Encounters:   03/21/24 140 lb (63.5 kg)   03/16/24 140 lb (63.5 kg)   03/12/24 142 lb 4.8 oz (64.5 kg)   03/10/24 154 lb 5.2 oz (70 kg)   02/26/24 140 lb 12.8 oz (63.9 kg)   01/16/24 140 lb (63.5 kg)        History, Medications, Allergies, ROS:      Past Medical History:   Diagnosis Date    Bilateral leg weakness     chronic    Constipation     Depression     Foot drop, bilateral     Gait disturbance     High blood pressure     Neurogenic bladder     Pt self straight caths at home QID and irrigates BID PRN    Other ill-defined conditions(799.89)     shunt from hydrocephalus    PONV (postoperative nausea and vomiting)      if masked used    S/P  shunt     Spina bifida (HCC)     Management:  closure    Strabismus 1998    Done at [facility]    Strabismus       Past Surgical History:   Procedure Laterality Date    OTHER SURGICAL HISTORY  2010    bladder augmentation/catheterizable channel    OTHER SURGICAL HISTORY  07/06/2020    fistulotomy    SPINE SURGERY PROCEDURE UNLISTED  1994    STRABISMUS SURGERY Left 1999 or 2000      Family History   Problem Relation Age of Onset    No Known Problems Father     No Known Problems Mother     Diabetes Neg     Glaucoma Neg       Social History:   Social History     Socioeconomic History    Marital status: Single   Tobacco Use    Smoking status: Never     Passive exposure: Never    Smokeless tobacco: Never   Vaping Use    Vaping Use: Never used   Substance and Sexual Activity    Alcohol use: Not Currently    Drug use: No   Other Topics Concern    Caffeine Concern Yes     Comment: 1 cup a day.    Exercise No    Pt has a pacemaker No    Pt has a defibrillator No    Reaction to local anesthetic No   Social History Narrative    The patient uses the following assistive device(s):  Splint on R leg .      The patient does live in a home with stairs.     Social Determinants of Health     Financial Resource Strain: Low Risk  (10/19/2023)    Financial Resource Strain     Difficulty of Paying Living Expenses: Not hard at all     Med Affordability: No   Food Insecurity: No Food Insecurity (3/8/2024)    Food Insecurity     Food Insecurity: Never true   Transportation Needs: No Transportation Needs (3/8/2024)    Transportation Needs     Lack of Transportation: No   Housing Stability: Low Risk  (3/8/2024)    Housing Stability     Housing Instability: No        Medications (Active prior to today's visit):  Current Outpatient Medications   Medication Sig Dispense Refill    diclofenac (VOLTAREN) 1 % External Gel Apply 4 g topically 4 (four) times daily for 22 days. 350 g 0    bisacodyl 10 MG  Rectal Suppos Place 1 suppository (10 mg total) rectally daily as needed. 12 suppository 0    camphor/menthol/methyl salicylate 10-15 % External Cream Apply 1 Application topically 3 (three) times daily as needed. 1 each 0    Acetaminophen Extra Strength 500 MG Oral Tab Take 2 tablets (1,000 mg total) by mouth every 6 (six) hours.      carvedilol 3.125 MG Oral Tab Take 1 tablet (3.125 mg total) by mouth 2 (two) times daily with meals. 180 tablet 3    cholecalciferol 50 MCG (2000 UT) Oral Tab Take 1 tablet (2,000 Units total) by mouth daily. 90 tablet 3    magnesium hydroxide (MELCHOR MILK OF MAGNESIA) 400 MG/5ML Oral Suspension Take 30 mL by mouth daily as needed for constipation. (Patient not taking: Reported on 4/2/2024) 118 mL 0    polyethylene glycol, PEG 3350, 17 g Oral Powd Pack Take 17 g by mouth daily. (Patient not taking: Reported on 4/2/2024) 30 each 3    docusate sodium (COLACE) 100 MG Oral Cap Take 1 capsule (100 mg total) by mouth 2 (two) times daily as needed for constipation. (Patient not taking: Reported on 4/2/2024) 60 capsule 0    baclofen 10 MG Oral Tab Take 1 tablet (10 mg total) by mouth 3 (three) times daily. (Patient not taking: Reported on 4/2/2024) 90 tablet 0    gabapentin 600 MG Oral Tab Take 1 tablet (600 mg total) by mouth 3 (three) times daily. (Patient not taking: Reported on 4/2/2024) 90 tablet 0    meclizine 12.5 MG Oral Tab Take 1 tablet (12.5 mg total) by mouth 3 (three) times daily as needed for Dizziness. (Patient not taking: Reported on 4/2/2024) 30 tablet 0    HYDROcodone-acetaminophen 5-325 MG Oral Tab Take 1 tablet by mouth every 6 (six) hours as needed for Pain. Watch drowsiness no alcohol (Patient not taking: Reported on 4/2/2024) 12 tablet 0    Water For Irrigation, Sterile (STERILE WATER FOR IRRIGATION) Irrigation Solution Irrigate with 1,000 mL as directed 2 (two) times daily as needed (Irrigates bladder twice daily due to catheterization.). (Patient not taking:  Reported on 4/2/2024) 3 each 11    Oxybutynin Chloride ER 15 MG Oral Tablet 24 Hr Take 1 tablet (15 mg total) by mouth daily. (Patient not taking: Reported on 4/2/2024) 90 tablet 3       Allergies:  Allergies   Allergen Reactions    Latex RASH       ROS:   CONSTITUTIONAL:  negative for fevers, chills  EYES:  negative for change in vision  RESPIRATORY:  negative for  shortness of breath  CARDIOVASCULAR:  negative for  chest pain  GASTROINTESTINAL:  see HPI  GENITOURINARY:  negative for dysuria  INTEGUMENT/BREAST:  SKIN:  negative for  rash  ALLERGIC/IMMUNOLOGIC:  negative for hay fever  ENDOCRINE:  negative for cold intolerance and heat intolerance  MUSCULOSKELETAL:  negative for  joint stiffness and joint swelling  BEHAVIOR/PSYCH:  negative for depressed mood    PHYSICAL EXAM:   Blood pressure 136/85, pulse 92.    Gen- Patient appears comfortable and in no acute discomfort  HEENT: the sclera appears anicteric, oropharynx clear, mucus membranes appear moist  CV- regular rate and rhythm, the extremities are warm and well perfused   Lung- Moves air well; No labored breathing  Abdomen- soft, non-tender exam in all quadrants without rigidity or guarding, non-distended, no masses are palpated  Skin- No jaundice  Ext: no edema is evident.   Neuro- Alert and oriented x4, and patient is having movement of all 4 extremities   Psych - appropriate, non-agitated    Labs/Imaging:     Patient's labs and imaging were reviewed and discussed with patient today.     ASSESSMENT/PLAN:   Chao Reese is a 29 year old year-old man with history of spina bifida s/p closure, hydrocephalus s/p  shunt, s/p bladder surgery who presents to GI clinic today for follow-up.    #hx of h. Pylori  -s/p treatment.  -awaiting eradication testing.  -ordered.    #Constipation  -Clinically doing better at this time.  He uses MiraLAX occasionally and is also made dietary changes which seem to be helping.  -Continue miralax.    Orders This Visit:  Orders  Placed This Encounter   Procedures    Helicobacter Pylori Breath Test, Adult       Meds This Visit:  Requested Prescriptions      No prescriptions requested or ordered in this encounter       Imaging & Referrals:  None     Syed Castañeda MD  [facility] Gastroenterology  4/2/2024

## 2024-04-04 ENCOUNTER — APPOINTMENT (OUTPATIENT)
Dept: NEUROSURGERY | Age: 29
End: 2024-04-04

## 2024-04-04 VITALS — BODY MASS INDEX: 23.22 KG/M2 | WEIGHT: 136.02 LBS | HEIGHT: 64 IN

## 2024-04-04 DIAGNOSIS — Z98.2 VP (VENTRICULOPERITONEAL) SHUNT STATUS: ICD-10-CM

## 2024-04-04 DIAGNOSIS — Q05.4: Primary | ICD-10-CM

## 2024-04-04 ASSESSMENT — ENCOUNTER SYMPTOMS
HEMATOLOGIC/LYMPHATIC NEGATIVE: 1
RESPIRATORY NEGATIVE: 1
PSYCHIATRIC NEGATIVE: 1
ALLERGIC/IMMUNOLOGIC NEGATIVE: 1
CONSTITUTIONAL NEGATIVE: 1
GASTROINTESTINAL NEGATIVE: 1
EYES NEGATIVE: 1
ENDOCRINE NEGATIVE: 1

## 2024-04-09 ENCOUNTER — TELEPHONE (OUTPATIENT)
Dept: NEUROSURGERY | Age: 29
End: 2024-04-09

## 2024-05-01 ENCOUNTER — TELEPHONE (OUTPATIENT)
Dept: NEUROLOGY | Facility: CLINIC | Age: 29
End: 2024-05-01

## 2024-05-01 ENCOUNTER — TELEPHONE (OUTPATIENT)
Dept: FAMILY MEDICINE CLINIC | Facility: CLINIC | Age: 29
End: 2024-05-01

## 2024-05-01 NOTE — TELEPHONE ENCOUNTER
Patient was unable to come to appointment  Friday 5/3/24, but an appointment was available tomorrow. Appointment scheduled.    Future Appointments   Date Time Provider Department Center   5/2/2024  9:30 AM [facility]   7/11/2024  2:20 PM [facility]

## 2024-05-01 NOTE — TELEPHONE ENCOUNTER
Pt had to reschedule his 5/9 appt to 7/11 due to provider personal. Pt advised during his visit dr mosher was going to be providing a work clearance to go back. Pt needs some sort of ppwk to return back to work. Pls advise.

## 2024-05-01 NOTE — TELEPHONE ENCOUNTER
Dr. Mejía- patient is requesting to follow up with you personally in regards to his shoulder as neurologist needed to re-schedule his 5/9 follow up appointment to July. He would like to see you about receiving a note for work as he has been off since March 7th and has been doing PT in home. PT advised him to contact PCP. Offered other providers in the office, patient declined at this time. Please advise as you have no available appointments. Thank you      Patient was advised that  is not in the office today and will return on Thursday (day) 5/2/24. Patient verbalized understanding and is ok with message being set to PCP.

## 2024-05-01 NOTE — TELEPHONE ENCOUNTER
Phone call returned to pt. Pt states that today is his last appointment with OT, and he cannot wait until July for him to go back to work. Pt states that his arm is better, but still gives him pain, he states he is not able to lift much. Pt is hoping to go back to work this month (May). Pt is also wondering about having another MRI on his arm to make sure everything is ok. RN advised that message will be sent to provider.

## 2024-05-02 ENCOUNTER — OFFICE VISIT (OUTPATIENT)
Dept: FAMILY MEDICINE CLINIC | Facility: CLINIC | Age: 29
End: 2024-05-02
Payer: MEDICARE

## 2024-05-02 VITALS
TEMPERATURE: 97 F | HEIGHT: 64 IN | BODY MASS INDEX: 23.15 KG/M2 | HEART RATE: 89 BPM | SYSTOLIC BLOOD PRESSURE: 113 MMHG | WEIGHT: 135.63 LBS | DIASTOLIC BLOOD PRESSURE: 79 MMHG

## 2024-05-02 DIAGNOSIS — M75.42 ROTATOR CUFF IMPINGEMENT SYNDROME OF LEFT SHOULDER: Chronic | ICD-10-CM

## 2024-05-02 DIAGNOSIS — G89.29 CHRONIC LEFT SHOULDER PAIN: Primary | ICD-10-CM

## 2024-05-02 DIAGNOSIS — M25.512 CHRONIC LEFT SHOULDER PAIN: Primary | ICD-10-CM

## 2024-05-02 DIAGNOSIS — M67.912 TENDINOPATHY OF LEFT SHOULDER: Chronic | ICD-10-CM

## 2024-05-02 PROCEDURE — 99214 OFFICE O/P EST MOD 30 MIN: CPT | Performed by: FAMILY MEDICINE

## 2024-05-02 NOTE — PROGRESS NOTES
Patient ID: Chao Reese is a 29 year old male.    HPI  Chief Complaint   Patient presents with    Follow - Up     Left shoulder pain      Last seen by me on 1/16/2024.  Patient was worked in today for left shoulder pain.    Patient visited the ER on 3/7/2024 for something unrelated to his left shoulder pain; I reviewed the note. He completed MRI Shoulder on 3/11/2024.  Pt is right hand dominant. He fell onto the bed during a bed transfer to do a diagnostic test with regard to his abdominal discomfort that he went to the emergency room for. He has bee doing occupational therapy for 8 weeks and they are coming to his house; finished the sessions on 5/1/2024. He has been doing the exercises at home with some relief. Pt saw Dr. Moore, orthopedics on 3/12/2024; received the steroid injection in his left shoulder at the ER.  States it did definitely help.  He hasn't done a physical therapy for this yet. 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 I provided a note for him.     He was supposed to see Dr. Mccoy, neurologist on 5/9/2024; however, he was rescheduled to July 2024.  This is for an unrelated issue.      Wt Readings from Last 6 Encounters:   05/02/24 135 lb 9.6 oz   03/21/24 140 lb   03/16/24 140 lb   03/12/24 142 lb 4.8 oz   03/10/24 154 lb 5.2 oz   02/26/24 140 lb 12.8 oz       BMI Readings from Last 6 Encounters:   05/02/24 23.28 kg/m²   03/21/24 24.03 kg/m²   03/16/24 24.02 kg/m²   03/12/24 24.41 kg/m²   03/10/24 26.48 kg/m²   02/26/24 24.55 kg/m²       BP Readings from Last 6 Encounters:   05/02/24 113/79   04/02/24 136/85   03/16/24 133/83   03/13/24 (!) 140/98   02/26/24 124/67   02/23/24 (!) 149/98         Review of Systems   Respiratory:  Negative for shortness of breath.    Cardiovascular:  Negative for chest pain.           Medical History:      Past Medical History:    Bilateral leg weakness    chronic    Constipation    Depression    Foot drop,  bilateral    Gait disturbance    High blood pressure    Neurogenic bladder    Pt self straight caths at home QID and irrigates BID PRN    Other ill-defined conditions(799.89)    shunt from hydrocephalus    PONV (postoperative nausea and vomiting)    if masked used    S/P  shunt    Spina bifida (HCC)    Management:  closure    Strabismus    Done at [facility]    Strabismus       Past Surgical History:   Procedure Laterality Date    Other surgical history  2010    bladder augmentation/catheterizable channel    Other surgical history  07/06/2020    fistulotomy    Spine surgery procedure unlisted  1994    Strabismus surgery Left 1999 or 2000          Current Outpatient Medications   Medication Sig Dispense Refill    bisacodyl 10 MG Rectal Suppos Place 1 suppository (10 mg total) rectally daily as needed. 12 suppository 0    camphor/menthol/methyl salicylate 10-15 % External Cream Apply 1 Application topically 3 (three) times daily as needed. 1 each 0    Acetaminophen Extra Strength 500 MG Oral Tab Take 2 tablets (1,000 mg total) by mouth every 6 (six) hours.      carvedilol 3.125 MG Oral Tab Take 1 tablet (3.125 mg total) by mouth 2 (two) times daily with meals. 180 tablet 3    cholecalciferol 50 MCG (2000 UT) Oral Tab Take 1 tablet (2,000 Units total) by mouth daily. 90 tablet 3    magnesium hydroxide (MELCHOR MILK OF MAGNESIA) 400 MG/5ML Oral Suspension Take 30 mL by mouth daily as needed for constipation. (Patient not taking: Reported on 4/2/2024) 118 mL 0    polyethylene glycol, PEG 3350, 17 g Oral Powd Pack Take 17 g by mouth daily. (Patient not taking: Reported on 4/2/2024) 30 each 3    docusate sodium (COLACE) 100 MG Oral Cap Take 1 capsule (100 mg total) by mouth 2 (two) times daily as needed for constipation. (Patient not taking: Reported on 4/2/2024) 60 capsule 0    baclofen 10 MG Oral Tab Take 1 tablet (10 mg total) by mouth 3 (three) times daily. (Patient not taking: Reported on  4/2/2024) 90 tablet 0    gabapentin 600 MG Oral Tab Take 1 tablet (600 mg total) by mouth 3 (three) times daily. (Patient not taking: Reported on 4/2/2024) 90 tablet 0    meclizine 12.5 MG Oral Tab Take 1 tablet (12.5 mg total) by mouth 3 (three) times daily as needed for Dizziness. (Patient not taking: Reported on 4/2/2024) 30 tablet 0    HYDROcodone-acetaminophen 5-325 MG Oral Tab Take 1 tablet by mouth every 6 (six) hours as needed for Pain. Watch drowsiness no alcohol (Patient not taking: Reported on 4/2/2024) 12 tablet 0    Water For Irrigation, Sterile (STERILE WATER FOR IRRIGATION) Irrigation Solution Irrigate with 1,000 mL as directed 2 (two) times daily as needed (Irrigates bladder twice daily due to catheterization.). (Patient not taking: Reported on 4/2/2024) 3 each 11    Oxybutynin Chloride ER 15 MG Oral Tablet 24 Hr Take 1 tablet (15 mg total) by mouth daily. (Patient not taking: Reported on 4/2/2024) 90 tablet 3     Allergies:  Allergies   Allergen Reactions    Latex RASH        Physical Exam:       Physical Exam  Blood pressure 113/79, pulse 89, temperature 97.3 °F (36.3 °C), temperature source Temporal, height 5' 4\" (1.626 m), weight 135 lb 9.6 oz.      Physical Exam   Constitutional: Patient is oriented to person, place, and time. Patient appears well-developed and well-nourished. No distress.   Head: Normocephalic.   Eyes: Conjunctivae and EOM are normal.   Neurological: Patient is alert and oriented to person, place, and time.   Skin: Skin is warm.   Psychiatry: Normal mood and affect.      SHOULDER EXAM: LEFT    Range of Motion-Abduction 75 degrees    Forward Flexion 100 degrees   IR WNL   ER WNL   Atrophy None   Bruising None   Strength       Supraspinatus 5/5      Internal Rotation 5/5      External Rotation 4/5      Speed's Test 5/5   Stability/Laxity WNL   Pain in Impingement Arc Positive   Apprehension Sign negative    Pain        Rotator Cuff resistance Positive       Bicipital Groove none        AC joint Positive        Neurovascular status Intact       Crepitus w/ ROM Positive   Cross Body AC pain Positive   Hawkin's Impingement Positive   Neer's Impingement Positive       Vitals reviewed.           Assessment/Plan:      Diagnoses and all orders for this visit:    Chronic left shoulder pain  -     PHYSICAL THERAPY EXTERNAL  Multiple hospital notes and doctor visits to review which I did.  X-ray and MRI results reviewed.  Explained to mom and patient what was happening on the shoulder posterior.  He still having crepitus and discomfort along with decreased range of motion of the left shoulder and already received a steroid injection.  I think he needs to do formal physical therapy and he is going to [facility] in the past with good results and we will do a referral.  Rotator cuff impingement syndrome of left shoulder  -     PHYSICAL THERAPY EXTERNAL    Tendinopathy of left shoulder  -     PHYSICAL THERAPY EXTERNAL        Referrals (if applicable)  Orders Placed This Encounter   Procedures    PHYSICAL THERAPY EXTERNAL     Treatment: Evaluate & Treat, Include modalities if therapist feels they are needed  Physician goals: Pain relief, Increased Function, Activities of daily living and Education  Frequency: 2-3 times per week.........Duration: 4-6 weeks      Can take to various Physical Therapy locations as is APPROVED by your insurance.  MY FAX # : [phone] for therapist to send me notes     Referral Priority:   Routine     Referral Type:   Rehab Services     Requested Specialty:   Physical Therapy     Number of Visits Requested:   9       Follow up if symptoms persist.  Take medicine (if given) as prescribed.  Approach to treatment discussed and patient/family member understands and agrees to plan.     Return in about 6 weeks (around 6/13/2024) for l shoulder pain.    There are no Patient Instructions on file for this visit.    Shilpa Rodriguez    5/2/2024    By signing my name below, IShilpa,   attest that this documentation has been prepared under the direction and in the presence of Nitin Mejía DO.   Electronically Signed: Shilpa Rodriguez, 5/2/2024, 9:23 AM.    I, Nitin Mejía DO,  personally performed the services described in this documentation. All medical record entries made by the scribe were at my direction and in my presence.  I have reviewed the chart and discharge instructions (if applicable) and agree that the record reflects my personal performance and is accurate and complete.  Nitin Mejía DO, 5/2/2024, 11:11 AM

## 2024-05-16 DIAGNOSIS — E55.9 VITAMIN D DEFICIENCY: ICD-10-CM

## 2024-05-20 ENCOUNTER — APPOINTMENT (OUTPATIENT)
Dept: GENERAL RADIOLOGY | Age: 29
End: 2024-05-20
Attending: NURSE PRACTITIONER

## 2024-05-20 ENCOUNTER — HOSPITAL ENCOUNTER (OUTPATIENT)
Age: 29
Discharge: HOME | End: 2024-05-20

## 2024-05-20 VITALS
TEMPERATURE: 98 F | DIASTOLIC BLOOD PRESSURE: 111 MMHG | SYSTOLIC BLOOD PRESSURE: 140 MMHG | RESPIRATION RATE: 16 BRPM | OXYGEN SATURATION: 99 % | HEART RATE: 102 BPM

## 2024-05-20 DIAGNOSIS — R03.0 ELEVATED BLOOD PRESSURE READING: ICD-10-CM

## 2024-05-20 DIAGNOSIS — R22.42 LOCALIZED SWELLING OF LEFT FOOT: ICD-10-CM

## 2024-05-20 DIAGNOSIS — M79.89 FOOT SWELLING: Primary | ICD-10-CM

## 2024-05-20 PROCEDURE — 73630 X-RAY EXAM OF FOOT: CPT | Performed by: NURSE PRACTITIONER

## 2024-05-20 PROCEDURE — 99213 OFFICE O/P EST LOW 20 MIN: CPT | Performed by: NURSE PRACTITIONER

## 2024-05-20 RX ORDER — CHOLECALCIFEROL (VITAMIN D3) 125 MCG
2000 CAPSULE ORAL DAILY
Qty: 90 TABLET | Refills: 0 | OUTPATIENT
Start: 2024-05-20

## 2024-05-20 NOTE — DISCHARGE INSTRUCTIONS
No abnormality seen on the foot x-ray.  Elevate.  Give your foot some time out of the brace.  Your blood pressure is elevated.  Schedule follow-up with your primary doctor

## 2024-05-20 NOTE — ED PROVIDER NOTES
Patient Seen in: [facility]      History     Chief Complaint   Patient presents with    Foot Swelling     Stated Complaint: left foot swollen and pain    Subjective:   29-year-old male with spina bifida, hypertension presents from home with complaint of left foot swelling.  States his mother noticed that the foot was swollen.  Does notice mild pain to the area, although he does note that he has minimal sensation to his feet.  Does not recall specific trauma.  He has been ambulatory with his braces.  Has not noticed any wounds.  No drainage.    The history is provided by the patient and a parent. No  was used.       Objective:   Past Medical History:    Bilateral leg weakness    chronic    Constipation    Depression    Foot drop, bilateral    Gait disturbance    High blood pressure    Neurogenic bladder    Pt self straight caths at home QID and irrigates BID PRN    Other ill-defined conditions(799.89)    shunt from hydrocephalus    PONV (postoperative nausea and vomiting)    if masked used    S/P  shunt    Spina bifida (HCC)    Management:  closure    Strabismus    Done at [facility]    Strabismus              Past Surgical History:   Procedure Laterality Date    Other surgical history  2010    bladder augmentation/catheterizable channel    Other surgical history  07/06/2020    fistulotomy    Spine surgery procedure unlisted  1994    Strabismus surgery Left 1999 or 2000                Social History     Socioeconomic History    Marital status: Single   Tobacco Use    Smoking status: Never     Passive exposure: Never    Smokeless tobacco: Never   Vaping Use    Vaping status: Never Used   Substance and Sexual Activity    Alcohol use: Not Currently    Drug use: No   Other Topics Concern    Caffeine Concern Yes     Comment: 1 cup a day.    Exercise No    Pt has a pacemaker No    Pt has a defibrillator No    Reaction to local anesthetic No   Social History Narrative     The patient uses the following assistive device(s):  Splint on R leg .      The patient does live in a home with stairs.     Social Determinants of Health     Financial Resource Strain: Low Risk  (11/3/2023)    Received from [facility], [facility]    Financial Resource Strain     In the past year, have you or any family members you live with been unable to get any of the following when it was really needed? Check all that apply.: None   Food Insecurity: No Food Insecurity (3/8/2024)    Food Insecurity     Food Insecurity: Never true   Transportation Needs: No Transportation Needs (3/8/2024)    Transportation Needs     Lack of Transportation: No   Social Connections: Low Risk  (11/3/2023)    Received from [facility], [facility]    Social Connections     How often do you see or talk to people that you care about and feel close to? (For example: talking to friends on the phone, visiting friends or family, going to [employer] or club meetings): 5 or more times a week   Housing Stability: Low Risk  (3/8/2024)    Housing Stability     Housing Instability: No              Review of Systems    Positive for stated complaint: left foot swollen and pain  Other systems are as noted in HPI.  Constitutional and vital signs reviewed.      All other systems reviewed and negative except as noted above.    Physical Exam     ED Triage Vitals [05/20/24 1706]   BP (!) 140/111   Pulse 102   Resp 16   Temp 98 °F (36.7 °C)   Temp src Temporal   SpO2 99 %   O2 Device None (Room air)       Current Vitals:   Vital Signs  BP: (!) 140/111  Pulse: 102  Resp: 16  Temp: 98 °F (36.7 °C)  Temp src: Temporal    Oxygen Therapy  SpO2: 99 %  O2 Device: None (Room air)            Physical Exam  Vitals and nursing note reviewed.   Constitutional:       General: He is not in acute distress.     Appearance: Normal appearance. He is not ill-appearing or toxic-appearing.   HENT:      Head: Normocephalic and  atraumatic.      Nose: Nose normal.      Mouth/Throat:      Mouth: Mucous membranes are moist.      Pharynx: Oropharynx is clear.   Eyes:      Pupils: Pupils are equal, round, and reactive to light.   Cardiovascular:      Rate and Rhythm: Normal rate and regular rhythm.      Pulses: Normal pulses.      Comments: /111  Pulmonary:      Effort: Pulmonary effort is normal. No respiratory distress.      Breath sounds: Normal breath sounds.      Comments: Lungs clear.  No adventitious lung sounds.  No distress.  No hypoxia.  Pulse ox 99% ra. Which is normal    Abdominal:      General: Abdomen is flat.      Palpations: Abdomen is soft.   Musculoskeletal:         General: No signs of injury. Normal range of motion.      Cervical back: Normal range of motion and neck supple.      Left foot: Swelling present.      Comments: Very mild swelling over the left first metatarsal.  No tenderness.  No deformity.  No wounds.  No erythema.  No warmth.  Chronic developmental changes to bilateral feet   Skin:     General: Skin is warm and dry.      Capillary Refill: Capillary refill takes less than 2 seconds.   Neurological:      General: No focal deficit present.      Mental Status: He is alert and oriented to person, place, and time.      GCS: GCS eye subscore is 4. GCS verbal subscore is 5. GCS motor subscore is 6.   Psychiatric:         Mood and Affect: Mood normal.         Behavior: Behavior normal.         Thought Content: Thought content normal.         Judgment: Judgment normal.           ED Course   Labs Reviewed - No data to display  XR FOOT, COMPLETE (MIN 3 VIEWS), LEFT (CPT=73630)    Result Date: 5/20/2024  CONCLUSION:  1. No acute fracture dislocation. 2. Chronic developmental/congenital hindfoot /midfoot deformity.    Dictated by (CST): Malachi Leyva MD on 5/20/2024 at 5:32 PM     Finalized by (CST): Malachi Leyva MD on 5/20/2024 at 5:35 PM                [facility]        Medical Decision Making  Left foot  pain  Differential diagnosis: Fracture, contusion  X-ray of the left foot is negative for fracture or dislocation  There is minimal swelling.  No open wounds.  No cellulitis.  No abscess.  Unclear cause.  Minor trauma?  Irritation from his brace?  Recommend elevate, ice.  Schedule time without the brace.  Follow-up with primary doctor if persistent symptoms.  His blood pressure is elevated today.  He notes that this is chronic.  Denies associated symptoms.  He needs to talk to Dr. Mejía    Results and plan of care discussed with the patient/family. They are in agreement with discharge. They understand to follow up with their primary doctor or the referral physician for further evaluation, especially if no improvement.  Also discussed the limitations of immediate care, patient is aware that if symptoms are worse they should go to the emergency room. Verbal and written discharge instructions were given.       Problems Addressed:  Elevated blood pressure reading: acute illness or injury  Foot swelling: acute illness or injury  Localized swelling of left foot: acute illness or injury    Amount and/or Complexity of Data Reviewed  Independent Historian: parent  Radiology: ordered and independent interpretation performed. Decision-making details documented in ED Course.     Details: No fracture        Disposition and Plan     Clinical Impression:  1. Foot swelling    2. Elevated blood pressure reading    3. Localized swelling of left foot         Disposition:  Discharge  5/20/2024  5:56 pm    Follow-up:  Nitin Mejía DO  [address] [place] [id number]  [phone]                Medications Prescribed:  Current Discharge Medication List

## 2024-05-21 ENCOUNTER — TELEPHONE (OUTPATIENT)
Facility: CLINIC | Age: 29
End: 2024-05-21

## 2024-05-21 NOTE — TELEPHONE ENCOUNTER
1st reminder letter sent out via mail and [employer] for the following:   Helicobacter Pylori Breath Test, Adult (Order #[id number]) on 4/2/24

## 2024-06-03 ENCOUNTER — TELEPHONE (OUTPATIENT)
Dept: FAMILY MEDICINE CLINIC | Facility: CLINIC | Age: 29
End: 2024-06-03

## 2024-06-03 DIAGNOSIS — H52.12 SEVERE MYOPIA OF LEFT EYE: Primary | ICD-10-CM

## 2024-06-10 ENCOUNTER — APPOINTMENT (OUTPATIENT)
Dept: CT IMAGING | Age: 29
End: 2024-06-10
Attending: NURSE PRACTITIONER
Payer: MEDICARE

## 2024-06-10 ENCOUNTER — HOSPITAL ENCOUNTER (OUTPATIENT)
Age: 29
Discharge: HOME | End: 2024-06-10
Payer: MEDICARE

## 2024-06-10 VITALS
TEMPERATURE: 98 F | HEART RATE: 85 BPM | OXYGEN SATURATION: 99 % | SYSTOLIC BLOOD PRESSURE: 130 MMHG | DIASTOLIC BLOOD PRESSURE: 88 MMHG | RESPIRATION RATE: 18 BRPM

## 2024-06-10 DIAGNOSIS — N20.0 RENAL STONES: ICD-10-CM

## 2024-06-10 DIAGNOSIS — N30.90 CYSTITIS: Primary | ICD-10-CM

## 2024-06-10 LAB
#MXD IC: 0.5 X10ˆ3/UL (ref 0.1–1)
BILIRUB UR QL STRIP: NEGATIVE
BUN BLD-MCNC: 8 MG/DL (ref 7–18)
CHLORIDE BLD-SCNC: 102 MMOL/L (ref 98–112)
CLARITY UR: CLEAR
CO2 BLD-SCNC: 28 MMOL/L (ref 21–32)
COLOR UR: YELLOW
CREAT BLD-MCNC: 0.9 MG/DL
EGFRCR SERPLBLD CKD-EPI 2021: 118 ML/MIN/1.73M2 (ref 60–?)
GLUCOSE BLD-MCNC: 94 MG/DL (ref 70–99)
GLUCOSE UR STRIP-MCNC: NEGATIVE MG/DL
HCT VFR BLD AUTO: 48.9 %
HCT VFR BLD CALC: 52 %
HGB BLD-MCNC: 16.6 G/DL
ISTAT IONIZED CALCIUM FOR CHEM 8: 1.19 MMOL/L (ref 1.12–1.32)
KETONES UR STRIP-MCNC: NEGATIVE MG/DL
LYMPHOCYTES # BLD AUTO: 2.3 X10ˆ3/UL (ref 1–4)
LYMPHOCYTES NFR BLD AUTO: 29.7 %
MCH RBC QN AUTO: 29 PG (ref 26–34)
MCHC RBC AUTO-ENTMCNC: 33.9 G/DL (ref 31–37)
MCV RBC AUTO: 85.5 FL (ref 80–100)
MIXED CELL %: 7 %
NEUTROPHILS # BLD AUTO: 4.9 X10ˆ3/UL (ref 1.5–7.7)
NEUTROPHILS NFR BLD AUTO: 63.3 %
NITRITE UR QL STRIP: NEGATIVE
PH UR STRIP: 6 [PH]
PLATELET # BLD AUTO: 229 X10ˆ3/UL (ref 150–450)
POTASSIUM BLD-SCNC: 4.4 MMOL/L (ref 3.6–5.1)
PROT UR STRIP-MCNC: NEGATIVE MG/DL
RBC # BLD AUTO: 5.72 X10ˆ6/UL
SODIUM BLD-SCNC: 139 MMOL/L (ref 136–145)
SP GR UR STRIP: <=1.005
UROBILINOGEN UR STRIP-ACNC: <2 MG/DL
WBC # BLD AUTO: 7.7 X10ˆ3/UL (ref 4–11)

## 2024-06-10 PROCEDURE — 99213 OFFICE O/P EST LOW 20 MIN: CPT | Performed by: NURSE PRACTITIONER

## 2024-06-10 PROCEDURE — 81002 URINALYSIS NONAUTO W/O SCOPE: CPT | Performed by: NURSE PRACTITIONER

## 2024-06-10 PROCEDURE — 74176 CT ABD & PELVIS W/O CONTRAST: CPT | Performed by: NURSE PRACTITIONER

## 2024-06-10 PROCEDURE — 85025 COMPLETE CBC W/AUTO DIFF WBC: CPT | Performed by: NURSE PRACTITIONER

## 2024-06-10 PROCEDURE — 80047 BASIC METABLC PNL IONIZED CA: CPT | Performed by: NURSE PRACTITIONER

## 2024-06-10 RX ORDER — CEPHALEXIN 500 MG/1
500 CAPSULE ORAL 2 TIMES DAILY
Qty: 14 CAPSULE | Refills: 0 | Status: SHIPPED | OUTPATIENT
Start: 2024-06-10 | End: 2024-06-17

## 2024-06-10 NOTE — DISCHARGE INSTRUCTIONS
Please start antibiotics and take as directed.  Follow-up with primary care provider.  Any worsening symptoms please go to the emergency department.

## 2024-06-10 NOTE — ED QUICK NOTES
:   Patient left the IC in stable condition via [facility] to [facility] for [facility] with family. Instructed patient to go straight to facility and avoid drinking/eating. He verbalized understanding of instructions given.      Yuko JACKSON RN, 06/10/24, 1:00 PM

## 2024-06-10 NOTE — ED INITIAL ASSESSMENT (HPI)
Pt was working today, c/o sharp stabbing left sided kidney pain. Pt self-catherizes, he thinks there might have been a blockage (no urine during insertion of cath, then during removal the urine felt \"very hot and came leaking out\". History of kidney stones

## 2024-06-10 NOTE — ED PROVIDER NOTES
Patient Seen in: [facility]      History     Chief Complaint   Patient presents with    Pain     Stated Complaint: Left flank abdom/back pain    Subjective:   HPI    This is a 29-year-old male with a history of spina bifida, neurogenic bladder, hypertension, who presents for left flank pain.  Patient states that he does catheterize himself.  This morning he felt bladder fullness and states that he had a sensation that he had to urinate.  He states that he attempted to cath himself and he had no urinary output.  Then felt sharp left-sided flank pain.  Concerned that he has a possible kidney stone.  Patient reports intermittent sharp left flank pain, no vomiting.  Also reports sensation of bladder feeling full.    Objective:   No pertinent past medical history.            No pertinent past surgical history.              No pertinent social history.            Review of Systems   Genitourinary:  Positive for flank pain.   All other systems reviewed and are negative.      Positive for stated complaint: Left flank abdom/back pain  Other systems are as noted in HPI.  Constitutional and vital signs reviewed.      All other systems reviewed and negative except as noted above.    Physical Exam     ED Triage Vitals [06/10/24 1154]   /88   Pulse 85   Resp 18   Temp 98 °F (36.7 °C)   Temp src Temporal   SpO2 99 %   O2 Device None (Room air)       Current Vitals:   Vital Signs  BP: 130/88  Pulse: 85  Resp: 18  Temp: 98 °F (36.7 °C)  Temp src: Temporal    Oxygen Therapy  SpO2: 99 %  O2 Device: None (Room air)            Physical Exam  Vitals and nursing note reviewed.   Constitutional:       General: He is awake. He is not in acute distress.     Appearance: Normal appearance. He is not ill-appearing, toxic-appearing or diaphoretic.   HENT:      Head: Normocephalic and atraumatic.      Right Ear: Tympanic membrane, ear canal and external ear normal.      Left Ear: Tympanic membrane, ear canal and external ear  normal.      Nose: Nose normal.      Mouth/Throat:      Mouth: Mucous membranes are moist.      Pharynx: Oropharynx is clear. Uvula midline.   Eyes:      General: Lids are normal.      Extraocular Movements: Extraocular movements intact.      Conjunctiva/sclera: Conjunctivae normal.      Pupils: Pupils are equal, round, and reactive to light.   Cardiovascular:      Rate and Rhythm: Normal rate and regular rhythm.      Pulses: Normal pulses.      Heart sounds: Normal heart sounds.   Pulmonary:      Effort: Pulmonary effort is normal.      Breath sounds: Normal breath sounds and air entry. No stridor, decreased air movement or transmitted upper airway sounds.   Abdominal:      Palpations: Abdomen is soft.      Tenderness: There is no abdominal tenderness. There is left CVA tenderness.   Skin:     General: Skin is warm and dry.      Capillary Refill: Capillary refill takes less than 2 seconds.   Neurological:      General: No focal deficit present.      Mental Status: He is alert and oriented to person, place, and time.   Psychiatric:         Mood and Affect: Mood normal.         Behavior: Behavior normal. Behavior is cooperative.         Thought Content: Thought content normal.         Judgment: Judgment normal.       ED Course     Labs Reviewed   POCT CBC - Abnormal; Notable for the following components:       Result Value    RBC IC 5.72 (*)     All other components within normal limits   EM POCT URINALYSIS DIPSTICK - Abnormal; Notable for the following components:    Blood, Urine Trace-Intact (*)     Leukocyte esterase urine Small (*)     All other components within normal limits   POCT ISTAT CHEM8 CARTRIDGE - Normal   URINE CULTURE, ROUTINE     Urine reviewed, small leukocyte esterase, trace blood.  Culture pending.    Discussed with patient and mother need for advanced imaging which we do not have available at [facility].  Agrees with mother driving to [facility] to have CT renal protocol.  CBC and Chem-8 will be drawn prior  to him leaving here.    Labs reviewed, CBC and Chem-8 unremarkable.  Urine reviewed, small leukocyte esterase, trace blood.  Culture pending.  Patient mom will take him to [facility] for CT renal     CT reviewed, nonobstructing left renal calculi are apparent.  No evidence of ureterolithiasis or obstructive uropathy.  Nonspecific bladder wall thickening.  Lesser incidental findings.    CT ABDOMEN+PELVIS KIDNEYSTONE 2D RNDR(NO IV,NO ORAL)(CPT=74176)    Result Date: 6/10/2024  CONCLUSION:  1. Nonobstructing left renal calculi are apparent. No evidence of ureterolithiasis or obstructive uropathy.  2. Left renal cortical scarring is present.   3. Nonspecific bladder wall thickening with submucosal fat deposition and foci of intraluminal gas. Correlation for recent instrumentation is advised. Urinalysis may be of benefit to ascertain for potential acute cystitis.  4. Nonspecific fat deposition throughout the colon which could reflect sequela of chronic inflammation, obesity, or steroid use, among other etiologies.  5. Possible cholelithiasis without CT evidence acute complication.  6. Spina bifida of the lumbosacral region with meningocele.  7. Ventriculoperitoneal shunt catheters are present in the right aspect of the peritoneal cavity.   8. Lesser incidental findings as above.    Dictated by (CST): Pawan Johnson MD on 6/10/2024 at 2:22 PM     Finalized by (CST): Pawan Johnson MD on 6/10/2024 at 2:31 PM          [facility]     Differnetial diagnosis including but not limited to     Cystiits, pyelnophritis, nephrolithiasis     Discussed with the patient the ct findings.  Labs here are reassuring.  Will treat for cystitis at this time with Keflex which she has tolerated well in the past.  I have reviewed previous urine cultures which does demonstrate Klebsiella pneumoniae, only resistant to ampicillin.  Most recent was 3/7/2024.  Same growth on 2/15/2024.  Discussed with the patient close follow-up with PCP was recommended.  Any  fever, worsening pain or any other concerns he should be seen in the emergency department.  Patient verbalized plan of care and states understanding.  Case discussed with Dr. Mantilla who agrees with plan of care.  Disposition and Plan     Clinical Impression:  1. Cystitis    2. Renal stones         Disposition:  Discharge  6/10/2024  2:39 pm    Follow-up:  Nitin Mejía DO  [address] [place] [address]  [phone]                Medications Prescribed:  Discharge Medication List as of 6/10/2024  2:41 PM        START taking these medications    Details   cephalexin 500 MG Oral Cap Take 1 capsule (500 mg total) by mouth 2 (two) times daily for 7 days., Normal, Disp-14 capsule, R-0

## 2024-06-13 ENCOUNTER — OFFICE VISIT (OUTPATIENT)
Dept: FAMILY MEDICINE CLINIC | Facility: CLINIC | Age: 29
End: 2024-06-13
Payer: MEDICARE

## 2024-06-13 VITALS
TEMPERATURE: 97 F | HEART RATE: 88 BPM | WEIGHT: 139 LBS | SYSTOLIC BLOOD PRESSURE: 134 MMHG | DIASTOLIC BLOOD PRESSURE: 82 MMHG | HEIGHT: 64 IN | BODY MASS INDEX: 23.73 KG/M2

## 2024-06-13 DIAGNOSIS — N30.90 CYSTITIS: ICD-10-CM

## 2024-06-13 DIAGNOSIS — M25.512 CHRONIC LEFT SHOULDER PAIN: Primary | ICD-10-CM

## 2024-06-13 DIAGNOSIS — R10.84 GENERALIZED ABDOMINAL PAIN: ICD-10-CM

## 2024-06-13 DIAGNOSIS — G89.29 CHRONIC LEFT SHOULDER PAIN: Primary | ICD-10-CM

## 2024-06-13 DIAGNOSIS — M75.42 ROTATOR CUFF IMPINGEMENT SYNDROME OF LEFT SHOULDER: ICD-10-CM

## 2024-06-13 PROCEDURE — G2211 COMPLEX E/M VISIT ADD ON: HCPCS | Performed by: FAMILY MEDICINE

## 2024-06-13 PROCEDURE — 99214 OFFICE O/P EST MOD 30 MIN: CPT | Performed by: FAMILY MEDICINE

## 2024-06-13 NOTE — PROGRESS NOTES
Patient ID: Chao Reese is a 29 year old male.    HPI  Chief Complaint   Patient presents with    Follow - Up     Left shoulder pain      Last seen by me on 5/2/2024.    Patient presents here today with his mother.     Pt received a steroid injection at the ER visit. He has been doing physical therapy for his left shoulder; states they extended the sessions as they felt his left shoulder muscle was still weak. Pt does the exercises at home as well. He completed MRI shoulder in March 2024; I reviewed the results. Pt states the pain is manageable but is still unable to lift heavy things. Pt is able to do daily activities without any issues.     He visited the immediate care on 6/10/2024 for UTI; treated with Cephalexin x 7 days. Pt left early from work on 6/10/2024 and took 6/11/2024 off; requests to be off on 6/14/2024 as he is urinating more often with the antibiotics. I provided a note for him to return back on Monday.  He is not having any fevers or chills.  No blood in the urine..     He requests a referral to [facility] for a past visit on 7/21/2023 as he went to the immediate care for abdominal pain; pt spoke with his insurance for the continued medical bills and they requested a referral from me.  I am not sure this referral will go through but I told him that I will put it in and see what manage care states.        Wt Readings from Last 6 Encounters:   06/13/24 139 lb   05/02/24 135 lb 9.6 oz   03/21/24 140 lb   03/16/24 140 lb   03/12/24 142 lb 4.8 oz   03/10/24 154 lb 5.2 oz       BMI Readings from Last 6 Encounters:   06/13/24 23.86 kg/m²   05/02/24 23.28 kg/m²   03/21/24 24.03 kg/m²   03/16/24 24.02 kg/m²   03/12/24 24.41 kg/m²   03/10/24 26.48 kg/m²       BP Readings from Last 6 Encounters:   06/13/24 134/82   06/10/24 130/88   05/20/24 (!) 140/111   05/02/24 113/79   04/02/24 136/85 03/16/24 133/83         Review of Systems   Respiratory:  Negative for shortness of breath.    Cardiovascular:   Negative for chest pain.           Medical History:      Past Medical History:    Bilateral leg weakness    chronic    Constipation    Depression    Foot drop, bilateral    Gait disturbance    High blood pressure    Neurogenic bladder    Pt self straight caths at home QID and irrigates BID PRN    Other ill-defined conditions(799.89)    shunt from hydrocephalus    PONV (postoperative nausea and vomiting)    if masked used    S/P  shunt    Spina bifida (HCC)    Management:  closure    Strabismus    Done at [facility]    Strabismus       Past Surgical History:   Procedure Laterality Date    Other surgical history  2010    bladder augmentation/catheterizable channel    Other surgical history  07/06/2020    fistulotomy    Spine surgery procedure unlisted  1994    Strabismus surgery Left 1999 or 2000          Current Outpatient Medications   Medication Sig Dispense Refill    cephalexin 500 MG Oral Cap Take 1 capsule (500 mg total) by mouth 2 (two) times daily for 7 days. 14 capsule 0    docusate sodium (COLACE) 100 MG Oral Cap Take 1 capsule (100 mg total) by mouth 2 (two) times daily as needed for constipation. 60 capsule 0    bisacodyl 10 MG Rectal Suppos Place 1 suppository (10 mg total) rectally daily as needed. 12 suppository 0    camphor/menthol/methyl salicylate 10-15 % External Cream Apply 1 Application topically 3 (three) times daily as needed. 1 each 0    Acetaminophen Extra Strength 500 MG Oral Tab Take 2 tablets (1,000 mg total) by mouth every 6 (six) hours.      carvedilol 3.125 MG Oral Tab Take 1 tablet (3.125 mg total) by mouth 2 (two) times daily with meals. 180 tablet 3    Oxybutynin Chloride ER 15 MG Oral Tablet 24 Hr Take 1 tablet (15 mg total) by mouth daily. 90 tablet 3    cholecalciferol 50 MCG (2000 UT) Oral Tab Take 1 tablet (2,000 Units total) by mouth daily. 90 tablet 3    polyethylene glycol, PEG 3350, 17 g Oral Powd Pack Take 17 g by mouth daily. (Patient not taking:  Reported on 4/2/2024) 30 each 3     Allergies:  Allergies   Allergen Reactions    Latex RASH        Physical Exam:       Physical Exam  Blood pressure 134/82, pulse 88, temperature 97.2 °F (36.2 °C), temperature source Temporal, height 5' 4\" (1.626 m), weight 139 lb.      Physical Exam   Constitutional: Patient is oriented to person, place, and time. Patient appears well-developed and well-nourished. No distress.   Head: Normocephalic.   Eyes: Conjunctivae and EOM are normal.   Neurological: Patient is alert and oriented to person, place, and time.   Skin: Skin is warm.   Psychiatry: Normal mood and affect.      SHOULDER EXAM: LEFT    Range of Motion-Abduction WNL   Forward Flexion WNL   IR WNL   ER WNL   Atrophy None   Bruising None   Strength       Supraspinatus 5/5      Internal Rotation 5/5      External Rotation 5/5      Speed's Test 5/5   Stability/Laxity WNL   Pain in Impingement Arc none   Apprehension Sign negative    Pain        Rotator Cuff resistance none       Bicipital Groove none       AC joint none       Neurovascular status Intact       Crepitus w/ ROM Minimal    Cross Body AC pain none   Hawkin's Impingement negative   Neer's Impingement negative     Comments: He has some tenderness over the left SS muscle.  His shoulder exam is incredibly better.  He is doing really well with this physical therapy.    Vitals reviewed.           Assessment/Plan:      Diagnoses and all orders for this visit:    Chronic left shoulder pain  Continue physical therapy as it is definitely helping  Rotator cuff impingement syndrome of left shoulder  No more impingement signs on exam.  Cystitis  States he feels better and is taking his antibiotics.  I gave him a letter for work  Generalized abdominal pain  -     [facility] - In Network  He wanted a referral for a past immediate care visit.  He brought the billing and I reviewed what was written in the instructions that were given to him and his mother with regard to  possibly getting this approved.  Reviewed that immediate care notes as well.      Referrals (if applicable)  Orders Placed This Encounter   Procedures    Immediate Care - In Network     He had seen Tom Bustillos in the immediate care on July 21, 2023 for abdominal discomfort.  His insurance is requiring a referral for this as he continues to get billed.  He states that a referral that needs to be faxed to [phone].     Referral Priority:   Routine     Referral Type:   Urgent Care     Requested Specialty:   Immediate Care     Number of Visits Requested:   1       Follow up if symptoms persist.  Take medicine (if given) as prescribed.  Approach to treatment discussed and patient/family member understands and agrees to plan.     No follow-ups on file.    There are no Patient Instructions on file for this visit.    Shilpa Rodriguez    6/13/2024    By signing my name below, IShilpa,  attest that this documentation has been prepared under the direction and in the presence of Nitin Mejía DO.   Electronically Signed: Shilpa Rodriguez, 6/13/2024, 10:21 AM.    I, Nitin Mejía DO,  personally performed the services described in this documentation. All medical record entries made by the scribe were at my direction and in my presence.  I have reviewed the chart and discharge instructions (if applicable) and agree that the record reflects my personal performance and is accurate and complete.  Nitin Mejía DO, 6/13/2024, 11:11 AM

## 2024-06-15 NOTE — ED INITIAL ASSESSMENT (HPI)
Pt presents to immediate care with complaint of nausea, abdominal discomfort, and diarrhea since Wednesday. Pt sates he cannot sleep at night and feels pressure in his upper mid epigastric region, states 0/10 pain. Pt states \"I feel like there a rock in there\". Pt states he is unable to tolerate any liquids other than water. Last bowel movement was described as watery and occurred at 0100. Pt denies blood in stool, fever, and chills. 15-David-2024 11:47

## 2024-06-22 DIAGNOSIS — E55.9 VITAMIN D DEFICIENCY: ICD-10-CM

## 2024-06-24 DIAGNOSIS — Q05.9 SPINA BIFIDA, UNSPECIFIED HYDROCEPHALUS PRESENCE, UNSPECIFIED SPINAL REGION (HCC): ICD-10-CM

## 2024-06-24 DIAGNOSIS — I70.1 RENAL ARTERY STENOSIS (HCC): ICD-10-CM

## 2024-06-24 DIAGNOSIS — N31.9 NEUROGENIC BLADDER: ICD-10-CM

## 2024-06-24 DIAGNOSIS — I10 ESSENTIAL HYPERTENSION: ICD-10-CM

## 2024-06-25 RX ORDER — CHOLECALCIFEROL (VITAMIN D3) 125 MCG
2000 CAPSULE ORAL DAILY
Qty: 90 TABLET | Refills: 0 | Status: SHIPPED | OUTPATIENT
Start: 2024-06-25

## 2024-06-25 NOTE — TELEPHONE ENCOUNTER
Please Review. Protocol Failed; No Protocol         Component  Ref Range & Units 7/24/23 10:46 AM   Vitamin D, 25OH, Total  30.0 - 100.0 ng/mL 89.8      Recent Visits  Date Type Provider Dept   06/13/24 Office Visit Nitin Mejía, DO [facility]   05/02/24 Office Visit KymNitin mcintosh, DO [facility]   03/16/24 Office Visit Justin Mckeon APRN [facility]   02/26/24 Office Visit Justin Mckeon APRN [facility]   01/16/24 Office Visit KymNitin mcintosh, DO [facility]   01/08/24 Office Visit Nitin Mejía, DO [facility]   11/13/23 Office Visit Nitin Mejía, DO [facility]   11/02/23 Office Visit Nitin Mejía, DO [facility]   10/23/23 Office Visit Nitin Mejía, DO [facility]   10/10/23 Office Visit Nitin Mejía, DO [facility]   Showing recent visits within past 540 days with a meds authorizing provider and meeting all other requirements  Future Appointments  No visits were found meeting these conditions.  Showing future appointments within next 150 days with a meds authorizing provider and meeting all other requirements    Requested Prescriptions   Pending Prescriptions Disp Refills    CHOLECALCIFEROL 50 MCG (2000 UT) Oral Tab [Pharmacy Med Name: Vitamin D3 50 Mcg Tab Rug] 90 tablet 0     Sig: Take 1 tablet (2,000 Units total) by mouth daily.       There is no refill protocol information for this order            Future Appointments         Provider Department Appt Notes    In 2 weeks Trip Mccoy DO [facility] 6 week follow up  Sent [email or web address] message          Recent Outpatient Visits              1 week ago Chronic left shoulder pain    [facility] Nitin Mejía DO    Office Visit    1 month ago Chronic left shoulder pain    [facility] Nitin Mejía DO    Office Visit    2 months ago H. pylori infection    [facility]  [facility]    Office Visit    3 months ago Decreased ROM of left shoulder    [facility]    Office Visit    3 months ago Hospital discharge follow-up    [facility], [facility], Justin Jeronimo APRN    Office Visit

## 2024-06-27 RX ORDER — OXYBUTYNIN CHLORIDE 15 MG/1
15 TABLET, EXTENDED RELEASE ORAL DAILY
Qty: 90 TABLET | Refills: 3 | Status: SHIPPED | OUTPATIENT
Start: 2024-06-27

## 2024-06-27 RX ORDER — CARVEDILOL 3.12 MG/1
3.12 TABLET ORAL 2 TIMES DAILY WITH MEALS
Qty: 180 TABLET | Refills: 3 | Status: SHIPPED | OUTPATIENT
Start: 2024-06-27

## 2024-06-27 NOTE — TELEPHONE ENCOUNTER
Refill Per Protocol     Requested Prescriptions   Pending Prescriptions Disp Refills    OXYBUTYNIN CHLORIDE ER 15 MG Oral Tablet 24 Hr [Pharmacy Med Name: Oxybutynin Chloride Er 24hr 15 Mg Tab Ajan] 90 tablet 0     Sig: Take 1 tablet by mouth once daily.       Genitourinary Medications Passed - 6/24/2024 11:20 AM        Passed - Patient does not have pulmonary hypertension on problem list        Passed - In person appointment or virtual visit in the past 12 mos or appointment in next 3 mos     Recent Outpatient Visits              2 weeks ago [facility]    [facility]    1 month ago Chronic left shoulder pain    [facility] [place] [facility] [place] [facility]    [facility] Visit    2 months ago [facility]. [facility]    [facility]    3 months ago Decreased ROM of left shoulder    [facility]    Office Visit    3 months ago Hospital discharge follow-up    [facility]    Office Visit          Future Appointments         Provider Department Appt Notes    In 2 weeks Trip Mccoy DO [facility] 6 week follow up  Sent [employer] message                      CARVEDILOL 3.125 MG Oral Tab [Pharmacy Med Name: Carvedilol 3.125 Mg Tab Zydu] 180 tablet 0     Sig: Take 1 tablet (3.125 mg total) by mouth 2 (two) times daily with meals.       Hypertension Medications Protocol Passed - 6/24/2024 11:20 AM        Passed - CMP or BMP in past 12 months        Passed - Last BP reading less than 140/90     BP Readings from Last 1 Encounters:   06/13/24 134/82               Passed - In person appointment or virtual visit in the past 12 mos or appointment in next 3 mos     Recent Outpatient  Visits              2 weeks ago Chronic left shoulder pain    [facility], [facility] Nitin Tanner,     Office Visit    1 month ago [facility] left shoulder pain    [facility]     [facility]    2 months [facility]. [facility]    [facility]    3 months ago Decreased ROM of left shoulder    [facility] Trip Mccoy,     Office Visit    3 months ago Hospital discharge follow-up    [facility], Justin Jeronimo APRN    Office Visit          Future Appointments         Provider Department Appt Notes    In 2 weeks Trip Mccoy DO [facility] 6 week follow up  Sent [employer] message                    Passed - EGFRCR or GFRNAA > 50     GFR Evaluation  EGFRCR: 118 , resulted on 6/10/2024                 Future Appointments         Provider Department Appt Notes    In [facility]  Sent [employer] message          Recent Outpatient Visits              2 weeks ago Chronic left shoulder pain    [facility], Nitin Tanner,     Office Visit    1 month ago Chronic left shoulder pain    [facility] Nitin Tanner,     Office Visit    2 months ago [facility]. pylori infection    [facility]    [facility] Visit    3 months ago Decreased ROM of left shoulder    [facility] Trip Mccoy,     Office Visit    3 months ago Hospital discharge follow-up    [facility]    Office Visit

## 2024-07-08 ENCOUNTER — NURSE TRIAGE (OUTPATIENT)
Dept: FAMILY MEDICINE CLINIC | Facility: CLINIC | Age: 29
End: 2024-07-08

## 2024-07-08 NOTE — TELEPHONE ENCOUNTER
Please reply to pool: EM RN TRIAGE  Action Requested: Summary for Provider     []  Critical Lab, Recommendations Needed  [] Need Additional Advice  [x]   FYI    []   Need Orders  [] Need Medications Sent to Pharmacy  []  Other     SUMMARY: Patient contacts clinic reporting sudden onset of left flank pain within the last 30 minutes. History of kidney stone on that side.  History of spina bifida and self catheterization.  States when he went to catheterize himself he could not get the catheter to pass.  He came home from work and states he had a bowel movement.  Denies fever or chills, nausea or vomiting.  Nurse recommended emergency room evaluation to rule out retention/obstruction.  Patient agreed and will go.  Flagged for follow up.     Reason for call: Flank Pain  Onset: Data Unavailable                       Reason for Disposition   Unable to urinate (or only a few drops) > 4 hours and bladder feels very full (e.g., palpable bladder or strong urge to urinate)    Protocols used: Flank Pain-A-OH

## 2024-07-09 NOTE — TELEPHONE ENCOUNTER
No emergency room visits noted in [facility] or care everywhere.  Follow up call placed to patient.  He did not report to emergency room as advised.  He reports he rested at home and pushed fluids.  States pain subsided and he was able to get urine from his catheter.  He currently denies pain and is self catheterizing with urine return.  He needs documentation for work due to leaving sick yesterday.  Follow up scheduled with nurse practitioner  for 7/11.  Patient advised to report any progression or recurrence of symptoms prior.  He verbalized understanding and compliance.  Dr. Kym FONTANA, please advise if nurse practitioner follow up is appropriate or if you would like to see the patient.

## 2024-07-10 NOTE — TELEPHONE ENCOUNTER
Future Appointments   Date Time Provider Department Center   7/11/2024 11:40 AM Justin Mckeon APRN [facility]   7/11/2024  2:20 PM Trip Mccoy DO [facility]     See message below; no further action by RN Triage at this time

## 2024-07-11 ENCOUNTER — OFFICE VISIT (OUTPATIENT)
Dept: FAMILY MEDICINE CLINIC | Facility: CLINIC | Age: 29
End: 2024-07-11
Payer: MEDICARE

## 2024-07-11 ENCOUNTER — OFFICE VISIT (OUTPATIENT)
Dept: NEUROLOGY | Facility: CLINIC | Age: 29
End: 2024-07-11
Payer: MEDICARE

## 2024-07-11 VITALS
HEIGHT: 64 IN | BODY MASS INDEX: 23.7 KG/M2 | HEART RATE: 88 BPM | WEIGHT: 138.81 LBS | SYSTOLIC BLOOD PRESSURE: 132 MMHG | DIASTOLIC BLOOD PRESSURE: 88 MMHG

## 2024-07-11 VITALS — BODY MASS INDEX: 23.56 KG/M2 | WEIGHT: 138 LBS | HEIGHT: 64 IN

## 2024-07-11 DIAGNOSIS — R82.90 ABNORMAL URINE FINDING: ICD-10-CM

## 2024-07-11 DIAGNOSIS — S46.812D TEAR OF LEFT INFRASPINATUS TENDON, SUBSEQUENT ENCOUNTER: Primary | ICD-10-CM

## 2024-07-11 DIAGNOSIS — R10.9 FLANK PAIN: Primary | ICD-10-CM

## 2024-07-11 DIAGNOSIS — K59.00 CONSTIPATION, UNSPECIFIED CONSTIPATION TYPE: ICD-10-CM

## 2024-07-11 LAB
APPEARANCE: CLEAR
BILIRUBIN: NEGATIVE
GLUCOSE (URINE DIPSTICK): NEGATIVE MG/DL
KETONES (URINE DIPSTICK): NEGATIVE MG/DL
MULTISTIX LOT#: ABNORMAL NUMERIC
NITRITE, URINE: NEGATIVE
PH, URINE: 6.5 (ref 4.5–8)
PROTEIN (URINE DIPSTICK): NEGATIVE MG/DL
SPECIFIC GRAVITY: 1.02 (ref 1–1.03)
URINE-COLOR: YELLOW
UROBILINOGEN,SEMI-QN: 0.2 MG/DL (ref 0–1.9)

## 2024-07-11 PROCEDURE — 99214 OFFICE O/P EST MOD 30 MIN: CPT | Performed by: OTHER

## 2024-07-11 PROCEDURE — 99213 OFFICE O/P EST LOW 20 MIN: CPT

## 2024-07-11 PROCEDURE — 81003 URINALYSIS AUTO W/O SCOPE: CPT

## 2024-07-11 RX ORDER — MAGNESIUM HYDROXIDE 1200 MG/15ML
LIQUID ORAL
COMMUNITY
Start: 2024-07-06

## 2024-07-11 NOTE — PROGRESS NOTES
[place] [facility] [address], [address]  [facility] [address]  [phone]        Neurology Clinic Follow Up Note    Chief Complaint:  Follow - Up (LOV: 3/21/24 Patient f/u on Left shoulder pain with decreased ROM. Completed 5 weeks of PT. Back at work, but not heavy lifting. Uses [employer].)      HPI:   Chao Reese is a 29 year old male w/ a pmhx sig. 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  He was admitted w/ diarrhea. He  presented for gastroenteritis/diarrhea and his symptoms began immediately after he had a CT scan. Reports it began after he was transferred. He had a UTI that was being treated. He was seen by nsgy. He had a mri brain and c spine without contrast that were negative for any acute processes.     He had a MRI of his shoulder on 3/11/24; the read was a partial-thickness bursal surface tear of the supraspinatus. Also Small interstitial tear of the distal musculotendinous junction of the infraspinatus and Mild subacromial subdeltoid bursitis       First started on  baclofen and gabapentin then added oxcarbazepine. He had gradual improvement over the 3 days he was seen. He is now in  PT/OT at home. He has not been able to return to work yet.    07/11/24 Interval History/Subjective :   His PCP referred him to [facility] b/c he still had limited ROM. He completed PT and his ROM has improved.  He wants to know if he needs another MRI.  He is worried he will tear his  infraspinatus and supraspinatus again. He had small tears in the past per mri report.   Explained this was out of my area of expertise and would have to  see orthopaedic surgery.      03/21/24 Interval History/Subjective :   Here w/ mom  for hospital discharge follow up.  He has stabbing pain that radiates down his arm from his shoulder to his biceps,  Radiates down from his scapula down his back  He has stabbing pain radiating down from his elbow    His mom took a photo of him flexing his left arm while asleep  His  ROM has improved since he was last seen in the hospital    Pain makes him nauseated   He has home PT/OT, which is helping; his 3rd day of tx is tomorrow.  This past Tuesday he had a significant improvement compared to when he was in the hospital.    Better w/ topical nsaids/icy hot     He is on baclofen 10mg tid (morning, evening, and bedtime).  Clarified he can  do am, afternoon, and bedtime.  He is nauseated as well.      Needs more time off work.    Pt  mentions the transfer from the cart to the CT gantry may have triggered his sx.        ROS: Pertinent positive and negatives per HPI.  All others were reviewed and negative.     Medications:  Current Outpatient Medications   Medication Instructions    Acetaminophen Extra Strength 500 MG Oral Tab 2 tablets, Oral, Every 6 hours    bisacodyl (DULCOLAX) 10 mg, Rectal, Daily as needed    camphor/menthol/methyl salicylate 10-15 % External Cream 1 Application, Topical, 3 times daily PRN    carvedilol (COREG) 3.125 mg, Oral, 2 times daily with meals    cholecalciferol (VITAMIN D3) 2,000 Units, Oral, Daily    docusate sodium (COLACE) 100 mg, Oral, 2 times daily PRN    oxyBUTYnin Chloride ER (DITROPAN XL) 15 mg, Oral, Daily    Polyethylene Glycol 3350 (MIRALAX) 17 g, Oral, Daily    sodium chloride 0.9 % Irrigation Solution Irrigate with 1,000 mL as directed 2 (two) times daily as needed (Irrigates bladder twice daily due to catheterization.).       Reviewed and assessed      Objective:  Last vitals and weight :  Body mass index is 23.69 kg/m².   There were no vitals filed for this visit.   Height 64\", weight 138  lb (62.6 kg).  Ht 64\"   Wt 138 lb (62.6 kg)   BMI 23.69 kg/m²   Exam:    General: appears stated age and in mild distress.  He is diaphoretic from the pain.           Neurologic Exam  - Mental Status: Alert and attentive. Orjented x4.  Speech is spontaneous, fluent, and prosodic. Comprehension intact. Repetition intact. Phrase length and rate are normal. No paraphasic errors, neologisms, anomia, acalculia, apraxia, anosognosia, or R/L confusion. No neglect.   - Cranial Nerves: No gaze preference. Visual fields:normal Pupils are 5mm briskly constricting to 4mm and equally round and reactive to light  in a well lit room.  EOMI. B/l gaze evoked nystagmus.  Nystagmus. No ptosis. V1-V3 intact B/L to light touch.No pathological facial asymmetry. No flattening of the nasolabial fold. .  Hearing grossly intact.  Tongue midline. No atrophy or fasiculations of the tongue noted. Palate and uvula elevate symmetrically.  Shoulder shrug symmetric.  - Fundoscopic exam:normal w/o hemorrhages, exudates, or papilledema.No attenuation. No pallor.  - Motor:  normal tone, diffusely decrease bulk in LE. No interosseous wasting. No flattening of hypothenar eminences.    His ROM has  improved. He can flex                                                               Right                  Left     Motor Strength                           Deltoids                           5                 3 - limited d/t pain and ?submaximal effort           Triceps                            5                      3     Biceps                             5                      4     Wrist Extensors              5                     4+                                        5                                        Hip Flexors                 5  5                                                                                                                               Reflexes:     C5 C6 C7  L4 S1   R 3+ 3+ 3+ 0 2+   L       1+ 2+   Adductor  Spread: Spread of reflexes is noted.    Frontal release signs:Not assessed.    Jaw Jerk:    Michael's sign: questionable on right    Nonsustained clonus: Absent   Sustained clonus: Absent            - Sensory:   Light touch: intact        - Cerebellum: No truncal ataxia. No titubations. No dysmetria, no dysdiadochokinesis. No overshoot.     Most recent lab results:   Reviewed and assessed  Recent Results (from the past 36 hour(s))   URINALYSIS, AUTO, W/O SCOPE    Collection Time: 07/11/24 12:01 PM   Result Value Ref Range    Glucose Urine Negative Negative mg/dL    Bilirubin Urine Negative Negative    Ketones, UA Negative Negative - Trace mg/dL    Spec Gravity 1.020 1.005 - 1.030    Blood Urine Small (A) Negative    PH Urine 6.5 5.0 - 8.0    Protein Urine Negative Negative - Trace mg/dL    Urobilinogen Urine 0.2 0.2 - 1.0 mg/dL    Nitrite Urine Negative Negative    Leukocyte Esterase Urine Large (A) Negative    APPEARANCE Clear Clear    Color Urine Yellow Yellow    Multistix Lot# [id number] Numeric    Multistix Expiration Date 12/31/24 Date       Diagnostic studies:  Performed an independent visualization of  mri brain and mri cspine  Imaging revealed:   agree w read     Assessment      He is worried he will tear his  infraspinatus and supraspinatus again. He had small tears in the past per mri report.   Explained this was out of my area of expertise and would have to see orthopaedic surgery     Plan   1. Tear of left infraspinatus tendon, subsequent encounter  - MRI SHOULDER, LEFT (CPT=73221); Future  - Ortho Referral - In Network                  This document is not intended to support charting by exception.  Sections left blank in a completed note should be presumed not to have been done.      Disclaimer:   This record was dictated using  Dragon software. There may be errors due to voice recognition problems that were not realized and corrected during the completion of the note.       .      Thank you for allowing  me to participate in the care of your patient.    Trip Mccoy DO  07/11/24

## 2024-07-11 NOTE — PROGRESS NOTES
Subjective:   Chao Reese is a 29 year old male who presents for Follow - Up (Flank pain left side: feel better today)   Patient is a pleasant 29 year old male with past medical history consistent for spina bifida,  shunt, and neurogenic bladder who self caths QID, who presents to follow up on recent instance of flank pain. Review of TE reveals    \"Patient contacts clinic reporting sudden onset of left flank pain within the last 30 minutes. History of kidney stone on that side.  History of spina bifida and self catheterization.  States when he went to catheterize himself he could not get the catheter to pass.  He came home from work and states he had a bowel movement.  Denies fever or chills, nausea or vomiting.  Nurse recommended emergency room evaluation to rule out retention/obstruction.\"    Patient states on Monday at work he was 2.5 hours into shift. He went to straight cath and met resistance. He removed catheter and then urine emptied bladder. He went home. This pain subsided after he had a BM when he got home. This BM was hard. He still occasionally takes MOM every other day but not regularly taking stool softeners. He has increased his water intake. He had some mild left flank pain at work which has since reoslved. He sees [facility] out of [facility]. Encouraged to make appt if symptoms recur. Denies fever, chills, back pain. Will run urine in office today.             Past Medical History:    Bilateral leg weakness    chronic    Constipation    Depression    Foot drop, bilateral    Gait disturbance    High blood pressure    Neurogenic bladder    Pt self straight caths at home QID and irrigates BID PRN    Other ill-defined conditions(799.89)    shunt from hydrocephalus    PONV (postoperative nausea and vomiting)    if masked used    S/P  shunt    Spina bifida (HCC)    Management:  closure    Strabismus    Done at [facility]    Strabismus      Past Surgical History:   Procedure Laterality  Date    Other surgical history  2010    bladder augmentation/catheterizable channel    Other surgical history  07/06/2020    fistulotomy    Spine surgery procedure unlisted  1994    Strabismus surgery Left 1999 or 2000        History/Other:    Chief Complaint Reviewed and Verified  Nursing Notes Reviewed and   Verified  Tobacco Reviewed  Allergies Reviewed  Medications Reviewed    Medical History Reviewed  Surgical History Reviewed  Family History   Reviewed  Social History Reviewed         Tobacco:  He has never smoked tobacco.    Current Outpatient Medications   Medication Sig Dispense Refill    sodium chloride 0.9 % Irrigation Solution Irrigate with 1,000 mL as directed 2 (two) times daily as needed (Irrigates bladder twice daily due to catheterization.).      oxyBUTYnin Chloride ER 15 MG Oral Tablet 24 Hr Take 1 tablet (15 mg total) by mouth daily. 90 tablet 3    carvedilol 3.125 MG Oral Tab Take 1 tablet (3.125 mg total) by mouth 2 (two) times daily with meals. 180 tablet 3    cholecalciferol 50 MCG (2000 UT) Oral Tab Take 1 tablet (2,000 Units total) by mouth daily. 90 tablet 0    docusate sodium (COLACE) 100 MG Oral Cap Take 1 capsule (100 mg total) by mouth 2 (two) times daily as needed for constipation. 60 capsule 0    bisacodyl 10 MG Rectal Suppos Place 1 suppository (10 mg total) rectally daily as needed. 12 suppository 0    camphor/menthol/methyl salicylate 10-15 % External Cream Apply 1 Application topically 3 (three) times daily as needed. 1 each 0    Acetaminophen Extra Strength 500 MG Oral Tab Take 2 tablets (1,000 mg total) by mouth every 6 (six) hours.      polyethylene glycol, PEG 3350, 17 g Oral Powd Pack Take 17 g by mouth daily. (Patient not taking: Reported on 4/2/2024) 30 each 3         Review of Systems:  Review of Systems   Constitutional: Negative.  Negative for activity change, chills and fever.   HENT: Negative.  Negative for congestion, ear pain, postnasal drip, sinus pain,  sore throat and trouble swallowing.    Respiratory: Negative.  Negative for cough, shortness of breath and wheezing.    Cardiovascular: Negative.  Negative for chest pain and leg swelling.   Gastrointestinal:  Positive for constipation. Negative for abdominal pain, blood in stool and diarrhea.   Endocrine: Negative.    Genitourinary:  Positive for flank pain. Negative for difficulty urinating and dysuria.   Musculoskeletal:  Negative for arthralgias, back pain and neck stiffness.   Skin: Negative.  Negative for color change and rash.   Neurological: Negative.  Negative for dizziness and headaches.   Hematological:  Negative for adenopathy.         Objective:   /88   Pulse 88   Ht 5' 4\" (1.626 m)   Wt 138 lb 12.8 oz (63 kg)   BMI 23.82 kg/m²  Estimated body mass index is 23.82 kg/m² as calculated from the following:    Height as of this encounter: 5' 4\" (1.626 m).    Weight as of this encounter: 138 lb 12.8 oz (63 kg).  Physical Exam  Vitals and nursing note reviewed.   Constitutional:       Appearance: Normal appearance. He is normal weight.   HENT:      Head: Normocephalic.      Right Ear: External ear normal.      Left Ear: External ear normal.      Nose: Nose normal.      Mouth/Throat:      Mouth: Mucous membranes are moist.   Cardiovascular:      Rate and Rhythm: Normal rate and regular rhythm.      Pulses: Normal pulses.      Heart sounds: Normal heart sounds. No murmur heard.  Pulmonary:      Effort: No respiratory distress.      Breath sounds: Normal breath sounds.   Abdominal:      General: There is no distension.      Palpations: Abdomen is soft.      Tenderness: There is no abdominal tenderness. There is no right CVA tenderness or left CVA tenderness.      Comments: Palpable stool LLQ   Musculoskeletal:         General: Normal range of motion.      Right lower leg: No edema.      Left lower leg: No edema.   Skin:     General: Skin is warm and dry.      Capillary Refill: Capillary refill takes  less than 2 seconds.      Findings: No rash.   Neurological:      General: No focal deficit present.      Mental Status: He is alert and oriented to person, place, and time.   Psychiatric:         Mood and Affect: Mood normal.         Behavior: Behavior normal.           Assessment & Plan:   1. Flank pain (Primary)  -     URINALYSIS, AUTO, W/O SCOPE  -     Urine Culture, Routine; Future; Expected date: 07/11/2024  -     Urine Culture, Routine  2. Constipation, unspecified constipation type  3. Abnormal urine finding    Flank pain and symptoms likely secondary to constipation. Overflow incontinence when removed catheter. Symptoms resolved with large amount of urine next cath after BM. Palpable stool in colon. UA in office with Large leuks and small blood will send culture for sensitivities with hx. Treat as needed. Advised on following regimen for constipation. Increase fluid intake and fiber. Follow up annual physical       Return for Annual physical.    LOS Wu, 7/10/2024, 8:53 PM

## 2024-07-14 DIAGNOSIS — N30.90 CYSTITIS: Primary | ICD-10-CM

## 2024-07-14 RX ORDER — SULFAMETHOXAZOLE AND TRIMETHOPRIM 800; 160 MG/1; MG/1
1 TABLET ORAL 2 TIMES DAILY
Qty: 14 TABLET | Refills: 0 | Status: SHIPPED | OUTPATIENT
Start: 2024-07-14 | End: 2024-07-21

## 2024-07-14 NOTE — PROGRESS NOTES
1. Cystitis  Patient with flank pain.  UA in office with leukocyte Esterace and blood.  Culture with sensitivity to Bactrim  Start Bactrim twice daily x 7 days  Test for resolution 2 weeks  Follow-up as needed  - sulfamethoxazole-trimethoprim -160 MG Oral Tab per tablet; Take 1 tablet by mouth 2 (two) times daily for 7 days.  Dispense: 14 tablet; Refill: 0  - UA/M WITH CULTURE REFLEX [3020]; Future    Justin Mckeon, APRN

## 2024-07-30 ENCOUNTER — TELEPHONE (OUTPATIENT)
Dept: FAMILY MEDICINE CLINIC | Facility: CLINIC | Age: 29
End: 2024-07-30

## 2024-07-30 ENCOUNTER — LAB ENCOUNTER (OUTPATIENT)
Dept: LAB | Age: 29
End: 2024-07-30
Attending: FAMILY MEDICINE
Payer: MEDICARE

## 2024-07-30 ENCOUNTER — OFFICE VISIT (OUTPATIENT)
Dept: FAMILY MEDICINE CLINIC | Facility: CLINIC | Age: 29
End: 2024-07-30
Payer: MEDICARE

## 2024-07-30 VITALS
TEMPERATURE: 97 F | DIASTOLIC BLOOD PRESSURE: 89 MMHG | BODY MASS INDEX: 23.9 KG/M2 | HEIGHT: 64 IN | SYSTOLIC BLOOD PRESSURE: 140 MMHG | WEIGHT: 140 LBS | HEART RATE: 76 BPM

## 2024-07-30 DIAGNOSIS — I70.1 RENAL ARTERY STENOSIS (HCC): ICD-10-CM

## 2024-07-30 DIAGNOSIS — R36.9 BLOODY DISCHARGE FROM PENIS: ICD-10-CM

## 2024-07-30 DIAGNOSIS — I10 ESSENTIAL HYPERTENSION: ICD-10-CM

## 2024-07-30 DIAGNOSIS — L64.9 MALE PATTERN BALDNESS: ICD-10-CM

## 2024-07-30 DIAGNOSIS — N30.90 CYSTITIS: Primary | ICD-10-CM

## 2024-07-30 DIAGNOSIS — N30.90 CYSTITIS: ICD-10-CM

## 2024-07-30 LAB
BILIRUB UR QL: NEGATIVE
COLOR UR: COLORLESS
GLUCOSE UR-MCNC: NORMAL MG/DL
KETONES UR-MCNC: NEGATIVE MG/DL
LEUKOCYTE ESTERASE UR QL STRIP.AUTO: 500
NITRITE UR QL STRIP.AUTO: NEGATIVE
PH UR: 6 [PH] (ref 5–8)
PROT UR-MCNC: NEGATIVE MG/DL
RBC #/AREA URNS AUTO: >10 /HPF
SP GR UR STRIP: 1.01 (ref 1–1.03)
UROBILINOGEN UR STRIP-ACNC: NORMAL

## 2024-07-30 PROCEDURE — 81001 URINALYSIS AUTO W/SCOPE: CPT

## 2024-07-30 PROCEDURE — 87086 URINE CULTURE/COLONY COUNT: CPT

## 2024-07-30 PROCEDURE — 99214 OFFICE O/P EST MOD 30 MIN: CPT | Performed by: FAMILY MEDICINE

## 2024-07-30 RX ORDER — CARVEDILOL 6.25 MG/1
6.25 TABLET ORAL 2 TIMES DAILY WITH MEALS
Qty: 180 TABLET | Refills: 1 | Status: SHIPPED | OUTPATIENT
Start: 2024-07-30

## 2024-07-30 NOTE — PROGRESS NOTES
Patient ID: Chao Reese is a 29 year old male.    HPI  Chief Complaint   Patient presents with    Follow - Up     UTI, saw Justin Mckeon 7/11/24    Hair/Scalp Problem     Thinning of the hair      He saw Justin Mckeon and had a urinalysis urinalysis and urine culture.  Urine culture was positive for Klebsiella pneumonia patient has a neurogenic bladder and has to catheterize himself.  Is not having any fevers.  He has been taking the antibiotic.    He states this morning he cathed himself as usual and when he pulled out the catheter he noticed some blood on the tip of it.  This happened twice but he never had any hematuria into the toilet that he saw.  There is no dysuria.  He decided to start increasing fluid intake this morning and states he feels fine otherwise.  Is not having any abdominal pain or chest pain or shortness of breath.    He been compliant with his blood pressure medication.    He states his family members have noticed that his hair is thinning on top.  He is never done anything about this.      Wt Readings from Last 6 Encounters:   07/30/24 140 lb (63.5 kg)   07/11/24 138 lb (62.6 kg)   07/11/24 138 lb 12.8 oz (63 kg)   06/13/24 139 lb (63 kg)   05/02/24 135 lb 9.6 oz (61.5 kg)   03/21/24 140 lb (63.5 kg)       BMI Readings from Last 6 Encounters:   07/30/24 24.03 kg/m²   07/11/24 23.69 kg/m²   07/11/24 23.82 kg/m²   06/13/24 23.86 kg/m²   05/02/24 23.28 kg/m²   03/21/24 24.03 kg/m²       BP Readings from Last 6 Encounters:   07/30/24 145/86   07/11/24 132/88   06/13/24 134/82   06/10/24 130/88   05/20/24 (!) 140/111   05/02/24 113/79         Review of Systems   Constitutional:  Negative for chills and fever.   Gastrointestinal:  Negative for nausea and vomiting.   Genitourinary:  Negative for dysuria and hematuria.           Medical History:      Past Medical History:    Bilateral leg weakness    chronic    Constipation    Depression    Foot drop, bilateral    Gait disturbance    High blood  pressure    Neurogenic bladder    Pt self straight caths at home QID and irrigates BID PRN    Other ill-defined conditions(799.89)    shunt from hydrocephalus    PONV (postoperative nausea and vomiting)    if masked used    S/P  shunt    Spina bifida (HCC)    Management:  closure    Strabismus    Done at [facility]    Strabismus       Past Surgical History:   Procedure Laterality Date    Other surgical history  2010    bladder augmentation/catheterizable channel    Other surgical history  07/06/2020    fistulotomy    Spine surgery procedure unlisted  1994    Strabismus surgery Left 1999 or 2000          Current Outpatient Medications   Medication Sig Dispense Refill    sodium chloride 0.9 % Irrigation Solution Irrigate with 1,000 mL as directed 2 (two) times daily as needed (Irrigates bladder twice daily due to catheterization.).      oxyBUTYnin Chloride ER 15 MG Oral Tablet 24 Hr Take 1 tablet (15 mg total) by mouth daily. 90 tablet 3    carvedilol 3.125 MG Oral Tab Take 1 tablet (3.125 mg total) by mouth 2 (two) times daily with meals. 180 tablet 3    cholecalciferol 50 MCG (2000 UT) Oral Tab Take 1 tablet (2,000 Units total) by mouth daily. 90 tablet 0    polyethylene glycol, PEG 3350, 17 g Oral Powd Pack Take 17 g by mouth daily. 30 each 3    docusate sodium (COLACE) 100 MG Oral Cap Take 1 capsule (100 mg total) by mouth 2 (two) times daily as needed for constipation. 60 capsule 0    bisacodyl 10 MG Rectal Suppos Place 1 suppository (10 mg total) rectally daily as needed. 12 suppository 0    camphor/menthol/methyl salicylate 10-15 % External Cream Apply 1 Application topically 3 (three) times daily as needed. 1 each 0    Acetaminophen Extra Strength 500 MG Oral Tab Take 2 tablets (1,000 mg total) by mouth every 6 (six) hours.       Allergies:  Allergies   Allergen Reactions    Latex RASH        Physical Exam:       Physical Exam  Blood pressure 145/86, pulse 76, temperature 97.2 °F (36.2 °C),  temperature source Temporal, height 5' 4\" (1.626 m), weight 140 lb (63.5 kg).  Vitals:    07/30/24 1136 07/30/24 1146   BP: 145/86 140/89   Pulse: 76    Temp: 97.2 °F (36.2 °C)    TempSrc: Temporal    Weight: 140 lb (63.5 kg)    Height: 5' 4\" (1.626 m)      Physical Exam   Constitutional: Patient is oriented to person, place, and time. Patient appears well-developed and well-nourished.   HENT:   Mouth/Throat: Mucous membranes are normal.   Neck: Normal range of motion. Neck supple. No thyromegaly   Cardiovascular: Normal rate, regular rhythm and normal heart sounds.    Pulmonary/Chest: Effort normal and breath sounds normal. No respiratory distress.   Abdominal: Normal appearance and bowel sounds are normal. There is    no tenderness.  There is no rigidity, no rebound, no guarding and negative.  Negative CVA tenderness  Neurological: Patient is alert and oriented to person, place, and time.   Skin: Skin is warm and dry.  Scalp: Male pattern baldness with visible scalp through his thinning hair.  Psychiatric: Patient has a normal mood and affect.   Vitals reviewed.           Assessment/Plan:      Diagnoses and all orders for this visit:    Cystitis  -     Urinalysis with Culture Reflex; Future  Reviewed the urinalysis and urine culture.  Will go ahead and do a urine and culture today.  Bloody discharge from penis  -     Urinalysis with Culture Reflex; Future  He did notice blood on the tip of the catheter when he pulled it out this morning x 2.  Has not happened since.  Will await the urinalysis  Male pattern baldness  -     DERM - INTERNAL  Patient Instructions   Use Rogaine (minoxidil) 5% foam over-the-counter and to do this at nighttime on the scalp as directed.  Also see the dermatologist as you may have other prescription options in addition to the Rogaine for hair loss prevention .      I increased your carvedilol to 6.25 mg twice daily.  If you still have the 3.125 mg doses at home start taking 2 tablets in  the morning and 2 tablets at dinner until you are finished and then the new prescription will be at the pharmacy.    Essential hypertension  -     carvedilol 6.25 MG Oral Tab; Take 1 tablet (6.25 mg total) by mouth 2 (two) times daily with meals. Her blood pressure  See patient instructions above  Renal artery stenosis (HCC)  -     carvedilol 6.25 MG Oral Tab; Take 1 tablet (6.25 mg total) by mouth 2 (two) times daily with meals. Her blood pressure  Has already seen nephrology      Referrals (if applicable)  Orders Placed This Encounter   Procedures    DERM - INTERNAL     If he is busy, you may see one of his partners.     Referral Priority:   Routine     Referral Type:   OFFICE VISIT     Referred to Provider:   Scooter Hernández MD     Requested Specialty:   DERMATOLOGY     Number of Visits Requested:   3         Follow up if symptoms persist.  Take medicine (if given) as prescribed.  Approach to treatment discussed and patient/family member understands and agrees to plan.     No follow-ups on file.        Nitin Mejía DO  7/30/2024

## 2024-07-30 NOTE — PATIENT INSTRUCTIONS
Use Rogaine (minoxidil) 5% foam over-the-counter and to do this at nighttime on the scalp as directed.  Also see the dermatologist as you may have other prescription options in addition to the Rogaine for hair loss prevention .      I increased your carvedilol to 6.25 mg twice daily.  If you still have the 3.125 mg doses at home start taking 2 tablets in the morning and 2 tablets at dinner until you are finished and then the new prescription will be at the pharmacy.

## 2024-08-14 ENCOUNTER — HOSPITAL ENCOUNTER (OUTPATIENT)
Age: 29
Discharge: HOME | End: 2024-08-14
Payer: MEDICARE

## 2024-08-14 ENCOUNTER — APPOINTMENT (OUTPATIENT)
Dept: GENERAL RADIOLOGY | Age: 29
End: 2024-08-14
Attending: NURSE PRACTITIONER
Payer: MEDICARE

## 2024-08-14 VITALS
SYSTOLIC BLOOD PRESSURE: 124 MMHG | WEIGHT: 140 LBS | DIASTOLIC BLOOD PRESSURE: 82 MMHG | TEMPERATURE: 98 F | HEIGHT: 64 IN | BODY MASS INDEX: 23.9 KG/M2 | OXYGEN SATURATION: 97 % | HEART RATE: 82 BPM | RESPIRATION RATE: 18 BRPM

## 2024-08-14 DIAGNOSIS — M79.676 TOE PAIN: Primary | ICD-10-CM

## 2024-08-14 PROCEDURE — 73630 X-RAY EXAM OF FOOT: CPT | Performed by: NURSE PRACTITIONER

## 2024-08-14 PROCEDURE — 99213 OFFICE O/P EST LOW 20 MIN: CPT | Performed by: NURSE PRACTITIONER

## 2024-08-14 NOTE — ED INITIAL ASSESSMENT (HPI)
Pt presents to the IC with c/o atraumatic right 5th toe pain and bruising since waking this morning. Pt has a hx of spina bifida and normally cannot feel much on his foot. Notes nerve pain described as \"pressure\". Pt wears AFO to the right foot. +tenderness to the toe and foot.

## 2024-08-14 NOTE — ED PROVIDER NOTES
Patient Seen in: [facility]      History     Chief Complaint   Patient presents with    Toe Pain     Stated Complaint: Right Toe Bruising    Subjective:   Patient is a 29-year-old male with spina bifida who presents today for right fourth and fifth toe bruising that he noticed today.  He reports yesterday he was wearing his foot brace, and did a good deal of walking, that he typically does not do.  He has limited sensation in the foot due to his spina bifida, and reports he does not recall any sort of injury.  He does report his shoes did feel too tight yesterday.  He denies numbness or tingling in the right lower extremity.        Objective:   Past Medical History:    Bilateral leg weakness    chronic    Constipation    Depression    Foot drop, bilateral    Gait disturbance    High blood pressure    Neurogenic bladder    Pt self straight caths at home QID and irrigates BID PRN    Other ill-defined conditions(799.89)    shunt from hydrocephalus    PONV (postoperative nausea and vomiting)    if masked used    S/P  shunt    Spina bifida (HCC)    Management:  closure    Strabismus    Done at [facility]    Strabismus              Past Surgical History:   Procedure Laterality Date    Other surgical history  2010    bladder augmentation/catheterizable channel    Other surgical history  07/06/2020    fistulotomy    Spine surgery procedure unlisted  1994    Strabismus surgery Left 1999 or 2000                Social History     Socioeconomic History    Marital status: Single   Tobacco Use    Smoking status: Never     Passive exposure: Never    Smokeless tobacco: Never   Vaping Use    Vaping status: Never Used   Substance and Sexual Activity    Alcohol use: Not Currently    Drug use: No   Other Topics Concern    Caffeine Concern Yes     Comment: 1 cup a day.    Exercise No    Pt has a pacemaker No    Pt has a defibrillator No    Reaction to local anesthetic No   Social History Narrative     The patient uses the following assistive device(s):  Splint on R leg .      The patient does live in a home with stairs.     Social Determinants of Health     Financial Resource Strain: Low Risk  (11/3/2023)    Received from [facility]    Financial Resource Strain     In the past year, have you or any family members you live with been unable to get any of the following when it was really needed? Check all that apply.: None   Food Insecurity: No Food Insecurity (3/8/2024)    Food Insecurity     Food Insecurity: Never true   Transportation Needs: No Transportation Needs (3/8/2024)    Transportation Needs     Lack of Transportation: No   Social Connections: Low Risk  (11/3/2023)    Received from [facility]    Social Connections     How often do you see or talk to people that you care about and feel close to? (For example: talking to friends on the phone, visiting friends or family, going to [employer] or club meetings): 5 or more times a week   Housing Stability: Low Risk  (3/8/2024)    Housing Stability     Housing Instability: No              Review of Systems   All other systems reviewed and are negative.      Positive for stated Chief Complaint: Toe Pain    Other systems are as noted in HPI.  Constitutional and vital signs reviewed.      All other systems reviewed and negative except as noted above.    Physical Exam     ED Triage Vitals [08/14/24 0857]   /82   Pulse 82   Resp 18   Temp 98.2 °F (36.8 °C)   Temp src Temporal   SpO2 97 %   O2 Device None (Room air)       Current Vitals:   Vital Signs  BP: 124/82  Pulse: 82  Resp: 18  Temp: 98.2 °F (36.8 °C)  Temp src: Temporal    Oxygen Therapy  SpO2: 97 %  O2 Device: None (Room air)            Physical Exam  Constitutional:       Appearance: Normal appearance.   Musculoskeletal:      Right ankle: Normal.      Right Achilles Tendon: Normal.      Right foot: Normal range of motion and normal  capillary refill. Bony tenderness (and eccymosis along the right 4th and 5th toes) present. Normal pulse.         ED Course   Labs Reviewed - No data to display    MDM     Medical Decision Making  Differentials considered: Right toe fracture versus right toe sprain versus bone contusion versus other soft tissue injury    Likely 4th and 5th toe contusions, possibly some irritation from the right foot brace.  There is no fracture on right foot x-ray.  He is neurovascularly intact, baseline for patient.  Discussed rest, elevation, Tylenol for pain.  He was advised follow-up with primary care provider in 1 week if no improvement.  Patient verbalized understanding and agreeable to plan of care.    Amount and/or Complexity of Data Reviewed  Radiology: ordered and independent interpretation performed. Decision-making details documented in ED Course.     Details: I personally reviewed right foot x-ray: No fractures    Risk  OTC drugs.        Disposition and Plan     Clinical Impression:  1. Toe pain         Disposition:  Discharge  8/14/2024  9:41 am    Follow-up:  No follow-up provider specified.        Medications Prescribed:  Discharge Medication List as of 8/14/2024  9:54 AM

## 2024-08-16 DIAGNOSIS — L21.9 SEBORRHEIC DERMATITIS: ICD-10-CM

## 2024-08-18 RX ORDER — KETOCONAZOLE 20 MG/ML
SHAMPOO TOPICAL
Qty: 120 ML | Refills: 11 | Status: SHIPPED | OUTPATIENT
Start: 2024-08-18

## 2024-08-20 ENCOUNTER — TELEPHONE (OUTPATIENT)
Dept: FAMILY MEDICINE CLINIC | Facility: CLINIC | Age: 29
End: 2024-08-20

## 2024-08-20 DIAGNOSIS — Z01.00 EYE EXAM, ROUTINE: Primary | ICD-10-CM

## 2024-08-20 NOTE — TELEPHONE ENCOUNTER
Patient is requesting referral.     Name of specialist and specialty department :   Dr. Emilee Powell  Optometrist  [facility]  [address] [place]  [address]    Phone # [phone]    FAX # [phone]  Please fax to provider at approval    Reason for visit with the specialist:  Eye check up   Appointment date:   None     CSS informed patient the turnaround time for referral is 5-7 business days.    Patient was informed to check their [facility] account for referral status.

## 2024-08-20 NOTE — TELEPHONE ENCOUNTER
Patient is requesting referral for [facility] to Dr. Shaffer. Patient had to reschedule his appointment because he needed a referral.

## 2024-08-26 ENCOUNTER — OFFICE VISIT (OUTPATIENT)
Dept: FAMILY MEDICINE CLINIC | Facility: CLINIC | Age: 29
End: 2024-08-26
Payer: MEDICARE

## 2024-08-26 ENCOUNTER — HOSPITAL ENCOUNTER (OUTPATIENT)
Dept: GENERAL RADIOLOGY | Age: 29
Discharge: HOME | End: 2024-08-26
Attending: FAMILY MEDICINE
Payer: MEDICARE

## 2024-08-26 VITALS
HEIGHT: 64 IN | TEMPERATURE: 97 F | SYSTOLIC BLOOD PRESSURE: 131 MMHG | DIASTOLIC BLOOD PRESSURE: 83 MMHG | BODY MASS INDEX: 24 KG/M2 | HEART RATE: 80 BPM

## 2024-08-26 DIAGNOSIS — S20.212A CONTUSION OF LEFT CHEST WALL, INITIAL ENCOUNTER: ICD-10-CM

## 2024-08-26 DIAGNOSIS — M25.551 ACUTE RIGHT HIP PAIN: Primary | ICD-10-CM

## 2024-08-26 DIAGNOSIS — M79.651 RIGHT THIGH PAIN: ICD-10-CM

## 2024-08-26 DIAGNOSIS — M25.551 ACUTE RIGHT HIP PAIN: ICD-10-CM

## 2024-08-26 PROCEDURE — 73502 X-RAY EXAM HIP UNI 2-3 VIEWS: CPT | Performed by: FAMILY MEDICINE

## 2024-08-26 PROCEDURE — 71101 X-RAY EXAM UNILAT RIBS/CHEST: CPT | Performed by: FAMILY MEDICINE

## 2024-08-26 PROCEDURE — 73552 X-RAY EXAM OF FEMUR 2/>: CPT | Performed by: FAMILY MEDICINE

## 2024-08-26 NOTE — PROGRESS NOTES
Patient ID: Chao Reese is a 29 year old male.    HPI  Chief Complaint   Patient presents with    Hip Pain     Right hip / leg pain / mostly on hamstring      He is here with his mother.  He states 2 days ago he went to his nephew's soccer game and there is quite a bit of walking to get to the game itself.  He was sitting and then standing to take video.  He states once they were done they were walking back to the car and his family member was to his left and he turned to talk to them and as he did this he felt his right thigh laterally locking up and felt pain and then fell down onto his left rib area.  He did not hit the ground fully with his weight as his family members were able to catch him.  There is no head trauma.  They put him in one of the lawn chairs and carry the lawn chair with a minute to the car as he stated he was having pain if he put weight on the right foot.  He states at home he was using a walker.  He feels a little bit better today but did call off of work.  He complains of pain in the right lateral thigh along with the quadricep and hamstring and states it hurts to bear weight at times.  No groin pain.  He does complain of some mild pain on the left rib cage area but does not notice any bruising.  It does not hurt to take a deep breath.  He has been applying IcyHot and taking periodic ibuprofen at 400 or 600 mg with food which helps to relieve the discomfort.        Wt Readings from Last 6 Encounters:   08/14/24 140 lb (63.5 kg)   07/30/24 140 lb (63.5 kg)   07/11/24 138 lb (62.6 kg)   07/11/24 138 lb 12.8 oz (63 kg)   06/13/24 139 lb (63 kg)   05/02/24 135 lb 9.6 oz (61.5 kg)       BMI Readings from Last 6 Encounters:   08/26/24 24.03 kg/m²   08/14/24 24.03 kg/m²   07/30/24 24.03 kg/m²   07/11/24 23.69 kg/m²   07/11/24 23.82 kg/m²   06/13/24 23.86 kg/m²       BP Readings from Last 6 Encounters:   08/26/24 131/83   08/14/24 124/82   07/30/24 140/89   07/11/24 132/88   06/13/24 134/82    06/10/24 130/88         Review of Systems  He has no dizziness, chest pain, shortness of breath.      Medical History:      Past Medical History:    Bilateral leg weakness    chronic    Constipation    Depression    Foot drop, bilateral    Gait disturbance    High blood pressure    Neurogenic bladder    Pt self straight caths at home QID and irrigates BID PRN    Other ill-defined conditions(799.89)    shunt from hydrocephalus    PONV (postoperative nausea and vomiting)    if masked used    S/P  shunt    Spina bifida (HCC)    Management:  closure    Strabismus    Done at [facility]    Strabismus       Past Surgical History:   Procedure Laterality Date    Other surgical history  2010    bladder augmentation/catheterizable channel    Other surgical history  07/06/2020    fistulotomy    Spine surgery procedure unlisted  1994    Strabismus surgery Left 1999 or 2000          Current Outpatient Medications   Medication Sig Dispense Refill    ketoconazole 2 % External Shampoo You can apply this not only to the scalp 2 or 3 times a week in the shower but also to your beard area and then also the chest where the rash is.  Can also use on the face. 120 mL 11    carvedilol 6.25 MG Oral Tab Take 1 tablet (6.25 mg total) by mouth 2 (two) times daily with meals. Her blood pressure 180 tablet 1    sodium chloride 0.9 % Irrigation Solution Irrigate with 1,000 mL as directed 2 (two) times daily as needed (Irrigates bladder twice daily due to catheterization.).      oxyBUTYnin Chloride ER 15 MG Oral Tablet 24 Hr Take 1 tablet (15 mg total) by mouth daily. 90 tablet 3    cholecalciferol 50 MCG (2000 UT) Oral Tab Take 1 tablet (2,000 Units total) by mouth daily. 90 tablet 0    polyethylene glycol, PEG 3350, 17 g Oral Powd Pack Take 17 g by mouth daily. 30 each 3    docusate sodium (COLACE) 100 MG Oral Cap Take 1 capsule (100 mg total) by mouth 2 (two) times daily as needed for constipation. 60 capsule 0     bisacodyl 10 MG Rectal Suppos Place 1 suppository (10 mg total) rectally daily as needed. 12 suppository 0    camphor/menthol/methyl salicylate 10-15 % External Cream Apply 1 Application topically 3 (three) times daily as needed. 1 each 0    Acetaminophen Extra Strength 500 MG Oral Tab Take 2 tablets (1,000 mg total) by mouth every 6 (six) hours.       Allergies:  Allergies   Allergen Reactions    Latex RASH        Physical Exam:       Physical Exam  Blood pressure 131/83, pulse 80, temperature 97.3 °F (36.3 °C), temperature source Temporal, height 5' 4\" (1.626 m).  Physical Exam   Constitutional: Patient is oriented to person, place, and time. Patient appears well-developed and well-nourished. No distress.  He is in the wheelchair today.  HENT:   Head: Normocephalic and atraumatic.  No head trauma  Neck: Normal range of motion. Neck supple. No thyromegaly present.   Lymphadenopathy:     Has  no cervical adenopathy.   Cardiovascular: Normal rate, regular rhythm and no murmur heard.  Pulmonary/Chest: Effort normal and breath sounds normal. No respiratory distress.  No bruising or swelling over the lateral rib cage where he points to the contusion.  He is tender somewhat diffusely in this area but again no crepitus or step-off on the rib cage on the left.  Chest wall is moving symmetrically.  He is not winded when he speaks.  Neurological: Patient is alert and oriented to person, place, and time.   Skin: Skin is warm and dry.  No bruising or open wounds  Psychiatric: Patient has a normal mood and affect.   Right thigh: He is tender mostly over the iliotibial band and then mildly over the quadricep and hamstring area but mother states that is actually less than before.  He agrees.  He has good internal and external rotation of his right hip without pain in the groin.  Nursing note and vitals reviewed.           Assessment/Plan:      Diagnoses and all orders for this visit:    Acute right hip pain  -     XR FEMUR MIN 2  VIEWS RIGHT (CPT=73552); Future  -     XR HIP + PELVIS MIN 4 VIEWS RIGHT (CPT=73503); Future  X-ray femur and right hip  Right thigh pain  -     XR FEMUR MIN 2 VIEWS RIGHT (CPT=73552); Future  This looks to be much more iliotibial band tenderness but because of his spina bifida I am going to go ahead and do a femur x-ray as well.  Contusion of left chest wall, initial encounter  -     XR RIBS WITH CHEST (3 VIEWS), LEFT  (CPT=71101); Future  Rib x-rays on the left.  He can continue IcyHot as it seems to make a difference for him.    Note for work given.      Referrals (if applicable)  No orders of the defined types were placed in this encounter.        Follow up if symptoms persist.  Take medicine (if given) as prescribed.  Approach to treatment discussed and patient/family member understands and agrees to plan.     No follow-ups on file.        Nitin Mejía DO  8/26/2024

## 2024-08-27 ENCOUNTER — TELEPHONE (OUTPATIENT)
Dept: FAMILY MEDICINE CLINIC | Facility: CLINIC | Age: 29
End: 2024-08-27

## 2024-08-27 NOTE — TELEPHONE ENCOUNTER
[employer] message sent advising of result note process.     Postponed to confirm patient has reviewed message.

## 2024-08-28 ENCOUNTER — TELEPHONE (OUTPATIENT)
Dept: FAMILY MEDICINE CLINIC | Facility: CLINIC | Age: 29
End: 2024-08-28

## 2024-08-29 NOTE — TELEPHONE ENCOUNTER
Results read by patient.        Most likely just a bone bruise as you do not have any fractures of your ribs.  The lungs look good behind the ribs.   Written by Nitin Mejía DO on 8/29/2024 12:24 PM CDT  Seen by patient Chao Reese on 8/29/2024 12:29 PM     Your right hip x-ray shows no fractures or dislocation.  It shows you have spina bifida.   Written by Nitin Mejía DO on 8/29/2024 12:25 PM CDT  Seen by patient Chao Reese on 8/29/2024 12:28 PM

## 2024-08-30 ENCOUNTER — PATIENT OUTREACH (OUTPATIENT)
Dept: CASE MANAGEMENT | Age: 29
End: 2024-08-30

## 2024-09-16 NOTE — PROGRESS NOTES
Subjective:   Chao Reese is a 29 year old male who presents for Low Back Pain (Past 5 days, stabbing pain, wasn't able to work yesterday, headaches, )   Patient is a pleasant 29 year old male with past medical history consistent for spina bifida,  shunt, and neurogenic bladder who self caths QID, who presents to be evaluated for pain to the left side of his back.    Of note patient just saw MD Mejía on 8/26/24.  Note was reviewed.  Chest x-ray was reviewed. Patient was comfortable using IcyHot and ibuprofen for pain.    Patient follows up in office today and states has been left sided back that started on Friday. He was 2 hours into work.He was unable to cath and had some pressure in back. Needs a note for going home early and for missing Monday. Denies fever and chills, no hematuria. Mild back pain relieved with OTC anaglesics    Check UA positive nitrites large leuks small blood         Past Medical History:    Bilateral leg weakness    chronic    Constipation    Depression    Foot drop, bilateral    Gait disturbance    High blood pressure    Neurogenic bladder    Pt self straight caths at home QID and irrigates BID PRN    Other ill-defined conditions(799.89)    shunt from hydrocephalus    PONV (postoperative nausea and vomiting)    if masked used    S/P  shunt    Spina bifida (HCC)    Management:  closure    Strabismus    Done at [facility]    Strabismus      Past Surgical History:   Procedure Laterality Date    Other surgical history  2010    bladder augmentation/catheterizable channel    Other surgical history  07/06/2020    fistulotomy    Spine surgery procedure unlisted  1994    Strabismus surgery Left 1999 or 2000        History/Other:    Chief Complaint Reviewed and Verified  Nursing Notes Reviewed and   Verified  Tobacco Reviewed  Allergies Reviewed  Medications Reviewed    Problem List Reviewed  Medical History Reviewed  Surgical History   Reviewed  Family History Reviewed   Social History Reviewed         Tobacco:  He has never smoked tobacco.    Current Outpatient Medications   Medication Sig Dispense Refill    sulfamethoxazole-trimethoprim -160 MG Oral Tab per tablet Take 1 tablet by mouth 2 (two) times daily for 5 days. 10 tablet 0    ketoconazole 2 % External Shampoo You can apply this not only to the scalp 2 or 3 times a week in the shower but also to your beard area and then also the chest where the rash is.  Can also use on the face. 120 mL 11    carvedilol 6.25 MG Oral Tab Take 1 tablet (6.25 mg total) by mouth 2 (two) times daily with meals. Her blood pressure 180 tablet 1    sodium chloride 0.9 % Irrigation Solution Irrigate with 1,000 mL as directed 2 (two) times daily as needed (Irrigates bladder twice daily due to catheterization.).      oxyBUTYnin Chloride ER 15 MG Oral Tablet 24 Hr Take 1 tablet (15 mg total) by mouth daily. 90 tablet 3    cholecalciferol 50 MCG (2000 UT) Oral Tab Take 1 tablet (2,000 Units total) by mouth daily. 90 tablet 0    polyethylene glycol, PEG 3350, 17 g Oral Powd Pack Take 17 g by mouth daily. 30 each 3    docusate sodium (COLACE) 100 MG Oral Cap Take 1 capsule (100 mg total) by mouth 2 (two) times daily as needed for constipation. 60 capsule 0    bisacodyl 10 MG Rectal Suppos Place 1 suppository (10 mg total) rectally daily as needed. 12 suppository 0    camphor/menthol/methyl salicylate 10-15 % External Cream Apply 1 Application topically 3 (three) times daily as needed. 1 each 0    Acetaminophen Extra Strength 500 MG Oral Tab Take 2 tablets (1,000 mg total) by mouth every 6 (six) hours.           Review of Systems:  Review of Systems   Constitutional:  Negative for chills and fever.   Respiratory:  Negative for shortness of breath.    Cardiovascular:  Negative for chest pain.   Musculoskeletal:  Positive for back pain.   All other systems reviewed and are negative.        Objective:   /88   Pulse 90   Ht 5' 4\" (1.626 m)    Wt 139 lb 12.8 oz (63.4 kg)   SpO2 97%   BMI 24.00 kg/m²  Estimated body mass index is 24 kg/m² as calculated from the following:    Height as of this encounter: 5' 4\" (1.626 m).    Weight as of this encounter: 139 lb 12.8 oz (63.4 kg).  Physical Exam  Vitals and nursing note reviewed.   Constitutional:       Appearance: Normal appearance.   Cardiovascular:      Rate and Rhythm: Normal rate and regular rhythm.      Pulses: Normal pulses.      Heart sounds: Normal heart sounds.   Pulmonary:      Effort: Pulmonary effort is normal.      Breath sounds: Normal breath sounds.   Abdominal:      General: Abdomen is flat. Bowel sounds are normal. There is no distension.      Palpations: Abdomen is soft. There is no mass.      Tenderness: There is no abdominal tenderness. There is no right CVA tenderness, left CVA tenderness, guarding or rebound.      Hernia: No hernia is present.   Musculoskeletal:         General: No swelling or tenderness. Normal range of motion.   Skin:     Capillary Refill: Capillary refill takes less than 2 seconds.   Neurological:      Mental Status: He is alert and oriented to person, place, and time.           Assessment & Plan:   1. Back pain, unspecified back location, unspecified back pain laterality, unspecified chronicity (Primary)  -     URINALYSIS, AUTO, W/O SCOPE  -     Urine Culture, Routine; Future; Expected date: 09/17/2024  -     Urine Culture, Routine  -     Sulfamethoxazole-Trimethoprim; Take 1 tablet by mouth 2 (two) times daily for 5 days.  Dispense: 10 tablet; Refill: 0  2. Abnormal urine finding  -     Urine Culture, Routine; Future; Expected date: 09/17/2024  -     Urine Culture, Routine  -     Sulfamethoxazole-Trimethoprim; Take 1 tablet by mouth 2 (two) times daily for 5 days.  Dispense: 10 tablet; Refill: 0    1. Back pain, unspecified back location, unspecified back pain laterality, unspecified chronicity  History of neurogenic bladder straight caths   frequent urinary tract  infections  UA in office with large amount leukocytes, positive nitrates, and hematuria.  Urine culture sent  Start Bactrim DS twice daily x 5 days  Change as needed  Avoid bladder irritants  Await sensitivities  - URINALYSIS, AUTO, W/O SCOPE  - Urine Culture, Routine [E]; Future  - Urine Culture, Routine [E]  - sulfamethoxazole-trimethoprim -160 MG Oral Tab per tablet; Take 1 tablet by mouth 2 (two) times daily for 5 days.  Dispense: 10 tablet; Refill: 0    2. Abnormal urine finding  History of neurogenic bladder straight caths   frequent urinary tract infections  UA in office with large amount leukocytes, positive nitrates, and hematuria.  Urine culture sent  Start Bactrim DS twice daily x 5 days  Change as needed  Avoid bladder irritants  Await sensitivities  - Urine Culture, Routine [E]; Future  - Urine Culture, Routine [E]  - sulfamethoxazole-trimethoprim -160 MG Oral Tab per tablet; Take 1 tablet by mouth 2 (two) times daily for 5 days.  Dispense: 10 tablet; Refill: 0    Patient aware of plan of care. All questions answered to satisfaction of the patient. Patient instructed to call office or reach out via [employer] if any issues arise. For urgent issues and/or reviewed red flags please proceed to the urgent care or ER.  Also, inform the nurse practitioner with any new symptoms or medication side effects.      Return for Annual physical.    LOS Wu, 9/16/2024, 12:28 PM

## 2024-09-17 ENCOUNTER — OFFICE VISIT (OUTPATIENT)
Dept: FAMILY MEDICINE CLINIC | Facility: CLINIC | Age: 29
End: 2024-09-17
Payer: MEDICARE

## 2024-09-17 VITALS
HEIGHT: 64 IN | WEIGHT: 139.81 LBS | SYSTOLIC BLOOD PRESSURE: 124 MMHG | OXYGEN SATURATION: 97 % | BODY MASS INDEX: 23.87 KG/M2 | HEART RATE: 90 BPM | DIASTOLIC BLOOD PRESSURE: 88 MMHG

## 2024-09-17 DIAGNOSIS — M54.9 BACK PAIN, UNSPECIFIED BACK LOCATION, UNSPECIFIED BACK PAIN LATERALITY, UNSPECIFIED CHRONICITY: Primary | ICD-10-CM

## 2024-09-17 DIAGNOSIS — R82.90 ABNORMAL URINE FINDING: ICD-10-CM

## 2024-09-17 LAB
APPEARANCE: CLEAR
BILIRUBIN: NEGATIVE
GLUCOSE (URINE DIPSTICK): NEGATIVE MG/DL
KETONES (URINE DIPSTICK): NEGATIVE MG/DL
MULTISTIX LOT#: ABNORMAL NUMERIC
NITRITE, URINE: POSITIVE
PH, URINE: 6 (ref 4.5–8)
PROTEIN (URINE DIPSTICK): NEGATIVE MG/DL
SPECIFIC GRAVITY: 1.02 (ref 1–1.03)
URINE-COLOR: YELLOW
UROBILINOGEN,SEMI-QN: 0.2 MG/DL (ref 0–1.9)

## 2024-09-17 PROCEDURE — 99213 OFFICE O/P EST LOW 20 MIN: CPT

## 2024-09-17 PROCEDURE — 81003 URINALYSIS AUTO W/O SCOPE: CPT

## 2024-09-17 RX ORDER — SULFAMETHOXAZOLE/TRIMETHOPRIM 800-160 MG
1 TABLET ORAL 2 TIMES DAILY
Qty: 10 TABLET | Refills: 0 | Status: SHIPPED | OUTPATIENT
Start: 2024-09-17 | End: 2024-09-22

## 2024-09-17 NOTE — PATIENT INSTRUCTIONS
Take your antibiotic as prescribed.  Please take the whole course. Do not stop the antibiotic because you start to feel better. Drink extra water and fluids to help flush out your system.  Avoid drinks that are carbonated, have alcohol, or have caffeine as they can irritate the bladder.  Try to urinate often and fully empty the bladder each time. To relieve pain you may take over-the-counter pain medication such as Tylenol 650 mg every 4-6 hours as needed. To prevent future UTIs, drink plenty of water each day, urinate when you needs to. If you are sexually active urinate right after you have sex. Avoid bubble baths.  Finally, for females, after you use a toilet wipe from front to back.

## 2024-09-18 ENCOUNTER — TELEPHONE (OUTPATIENT)
Facility: CLINIC | Age: 29
End: 2024-09-18

## 2024-09-18 NOTE — TELEPHONE ENCOUNTER
I spoke to the pt    He will go for the h pylori breath test tomorrow in [place]    Pt does not use PPI/H2 blockers     Pt advised to not eat/drink one hour prior to testing    He has received 2 overdue result letters

## 2024-09-25 DIAGNOSIS — E55.9 VITAMIN D DEFICIENCY: ICD-10-CM

## 2024-09-27 ENCOUNTER — LAB ENCOUNTER (OUTPATIENT)
Dept: LAB | Facility: HOSPITAL | Age: 29
End: 2024-09-27
Attending: STUDENT IN AN ORGANIZED HEALTH CARE EDUCATION/TRAINING PROGRAM
Payer: MEDICARE

## 2024-09-27 DIAGNOSIS — A04.8 H. PYLORI INFECTION: ICD-10-CM

## 2024-09-27 PROCEDURE — 83013 H PYLORI (C-13) BREATH: CPT

## 2024-09-29 LAB — H PYLORI BREATH TEST: NEGATIVE

## 2024-09-29 NOTE — TELEPHONE ENCOUNTER
Please review. Protocol Failed or has No Protocol.  Last Vitamin D 7/4/23  Component  Ref Range & Units 7/24/23 10:46 AM   Vitamin D, 25OH, Total  30.0 - 100.0 ng/mL 89.8     Requested Prescriptions   Pending Prescriptions Disp Refills    CHOLECALCIFEROL 50 MCG (2000 UT) Oral Tab [Pharmacy Med Name: Vitamin D3 50 Mcg Tab Rugb] 90 tablet 0     Sig: Take 1 tablet (2,000 Units total) by mouth daily.       There is no refill protocol information for this order              Recent Outpatient Visits              1 week ago Back pain, unspecified back location, unspecified back pain laterality, unspecified chronicity    [facility]    [facility] Visit    1 month ago Acute right hip pain    [facility], Nitin Tanner,     Office Visit    2 months ago [facility]stitis    [facility]     [facility] Visit    2 months ago Flank pain    [facility]    Office Visit    2 months ago T[facility] left infraspinatus tendon[facility]     [facility]

## 2024-09-30 RX ORDER — CHOLECALCIFEROL (VITAMIN D3) 50 MCG
2000 TABLET ORAL DAILY
Qty: 90 TABLET | Refills: 0 | Status: SHIPPED | OUTPATIENT
Start: 2024-09-30

## 2024-10-15 ENCOUNTER — NURSE TRIAGE (OUTPATIENT)
Dept: FAMILY MEDICINE CLINIC | Facility: CLINIC | Age: 29
End: 2024-10-15

## 2024-10-15 NOTE — TELEPHONE ENCOUNTER
Action Requested: Summary for Provider     []  Critical Lab, Recommendations Needed  [x] Need Additional Advice  []   FYI    []   Need Orders  [] Need Medications Sent to Pharmacy  []  Other     SUMMARY: Patient reports neck discomfort, shooting down lower back, down to left foot. \"Stabbing electricity\" to the last 2 days. Patient denies increased weakness. Patient mentioned he has spina bifida.  Patient has been applying icy hot without relief. Patient states it feels like a tight muscle. Patient states he also felt electricity sensation on his fingers last night.  Offered appointment to be seen today. Patient states he is not able to come in.  Requesting to see Dr Mejía only, on Thursday.  Please advise, can we use Res24?  Discussed symptoms needed ER evaluation.      Reason for call: Neck Pain (Neck pain, shoot lower back, down foot-stabbing electicity)  Onset: Data Unavailable

## 2024-10-16 NOTE — TELEPHONE ENCOUNTER
Verified name and .    Patient was advised of LOS Wade's message.    Patient verbalized understanding.

## 2024-10-16 NOTE — TELEPHONE ENCOUNTER
Verified name and .    Patient calling to follow up on message below- he was advised that we are awaiting response from Dr. Mejía.

## 2024-10-16 NOTE — TELEPHONE ENCOUNTER
Patient offered appointment with Dr. Mejía for this Friday. Patient aware Dr. Mejía is out of the office today.  Patient states, throbbing and electricity sensation in the foot down to lower back is the same.  Patient states every time he moves his arm, he gets the same sensation.  Please advise Patient should be seen sooner than Friday given his history of spina bifida.  Routed to pod mate.  Future Appointments   Date Time Provider Department Center   10/18/2024 11:45 AM Nitin Mejía DO [facility]

## 2024-10-18 ENCOUNTER — LAB ENCOUNTER (OUTPATIENT)
Dept: LAB | Age: 29
End: 2024-10-18
Attending: FAMILY MEDICINE
Payer: MEDICARE

## 2024-10-18 ENCOUNTER — OFFICE VISIT (OUTPATIENT)
Dept: FAMILY MEDICINE CLINIC | Facility: CLINIC | Age: 29
End: 2024-10-18
Payer: MEDICARE

## 2024-10-18 VITALS
SYSTOLIC BLOOD PRESSURE: 131 MMHG | WEIGHT: 135 LBS | HEIGHT: 64 IN | DIASTOLIC BLOOD PRESSURE: 84 MMHG | BODY MASS INDEX: 23.05 KG/M2 | HEART RATE: 83 BPM

## 2024-10-18 DIAGNOSIS — M54.2 NECK PAIN ON LEFT SIDE: Primary | Chronic | ICD-10-CM

## 2024-10-18 DIAGNOSIS — R82.90 ABNORMAL URINE ODOR: ICD-10-CM

## 2024-10-18 DIAGNOSIS — N30.90 CYSTITIS: ICD-10-CM

## 2024-10-18 DIAGNOSIS — R20.0 NUMBNESS OF LEFT HAND: ICD-10-CM

## 2024-10-18 DIAGNOSIS — M75.112 NONTRAUMATIC INCOMPLETE TEAR OF LEFT ROTATOR CUFF: Chronic | ICD-10-CM

## 2024-10-18 DIAGNOSIS — R20.2 PARESTHESIA OF LEFT FOOT: Chronic | ICD-10-CM

## 2024-10-18 LAB
BILIRUB UR QL: NEGATIVE
CLARITY UR: CLEAR
GLUCOSE UR-MCNC: NORMAL MG/DL
HGB UR QL STRIP.AUTO: NEGATIVE
KETONES UR-MCNC: NEGATIVE MG/DL
LEUKOCYTE ESTERASE UR QL STRIP.AUTO: 250
NITRITE UR QL STRIP.AUTO: NEGATIVE
PH UR: 7 [PH] (ref 5–8)
PROT UR-MCNC: NEGATIVE MG/DL
SP GR UR STRIP: 1.01 (ref 1–1.03)
UROBILINOGEN UR STRIP-ACNC: NORMAL

## 2024-10-18 PROCEDURE — 81001 URINALYSIS AUTO W/SCOPE: CPT

## 2024-10-18 PROCEDURE — 87086 URINE CULTURE/COLONY COUNT: CPT

## 2024-10-18 PROCEDURE — 87186 SC STD MICRODIL/AGAR DIL: CPT

## 2024-10-18 PROCEDURE — 87088 URINE BACTERIA CULTURE: CPT

## 2024-10-18 NOTE — PROGRESS NOTES
Patient ID: Chao Reese is a 29 year old male.    HPI  Chief Complaint   Patient presents with    Shoulder Pain     Left shoulder pain     Neck Pain     Left side    Numbness     Juan Pablo. Hand numbness     Tingling     Left foot      He has numerous complaints.  Reviewed the message below.  His biggest complaint is that he has had pain on the left posterior neck that goes to the left shoulder and then also down to the left low back and buttock area and then down his left leg.  He complains of numbness in the hands bilaterally but that was only for 1 day last week.  It is usually the left hand that is been constant now for some time.  He states it affects mostly the 1st through 4th digit of the left hand.  He is right-hand dominant.  He states he also has numbness and tingling of the left foot.  He had a partial tear in the rotator cuff of the left shoulder and did see an orthopedic specialist.  No surgery was needed.    Lastly he states his urine has a different color to it.  He has a neurogenic bladder.  He has not had any fevers or blood in the urine but does want to give a UA downstairs to see if he has an infection or not.  He has been doing self-catheterization on a regular basis to avoid infection.    He has had multiple tests done in the past due to his spina bifida and I reviewed the MRIs.    He also needs some days off of work.  He states his mom and dad are both in [place] and he would like next week off as he also missed this past Wednesday and then today.    Gloria Alberto, RN   Registered Nurse     Telephone Encounter     Signed     Encounter Date: 10/15/2024     Signed         Action Requested: Summary for Provider      []  Critical Lab, Recommendations Needed  [x] Need Additional Advice  []   FYI    []   Need Orders  [] Need Medications Sent to Pharmacy  []  Other      SUMMARY: Patient reports neck discomfort, shooting down lower back, down to left foot. \"Stabbing electricity\" to the last 2 days.  Patient denies increased weakness. Patient mentioned he has spina bifida.  Patient has been applying icy hot without relief. Patient states it feels like a tight muscle. Patient states he also felt electricity sensation on his fingers last night.  Offered appointment to be seen today. Patient states he is not able to come in.  Requesting to see Dr Mejía only, on Thursday.  Please advise, can we use Res24?  Discussed symptoms needed ER evaluation.        Reason for call: Neck Pain (Neck pain, shoot lower back, down foot-stabbing electicity)  Onset: Data Unavailable                         Printable Result Report    Open Hard Copy Result Report (Order #[id number] - MRI SPINE CERVICAL (CPT=72141))      PACS Images     Show images for MRI SPINE CERVICAL (CPT=72141)  Study Result    Narrative   PROCEDURE: MRI SPINE CERVICAL (CPT=72141)     COMPARISON: [facility], MRI BRAIN (CPT=70551), 3/09/2024, 7:54 AM.     INDICATIONS: l ue contracted; pt has a shunt     TECHNIQUE: A variety of imaging planes and parameters were utilized for visualization of suspected pathology.       FINDINGS:  ALIGNMENT: The anatomic cervical lordosis is preserved.  BONES: No fractures or osseous lesions are apparent.  CORD: Normal caliber, contour, and signal intensity.    CRANIOCERVICAL AREA: Normal foramen magnum without Chiari malformation.    PARASPINAL AREA: Left maxillary sinus retention cyst.  OTHER: There is no visible swelling of the prevertebral soft tissues.     CERVICAL DISC LEVELS:  C2-C3: No significant disc/facet abnormality, spinal stenosis, or foraminal stenosis.    C3-C4: Mild disc desiccation and degeneration.  No high-grade spinal canal or neural foraminal stenosis.  C4-C5: Small diffuse disc bulge.  No high-grade spinal canal or neural foraminal stenosis.  C5-C6: Small diffuse disc bulge with superimposed central disc protrusion/annular fissure.  No high-grade spinal canal or neural foraminal stenosis.  C6-C7:  Small diffuse disc bulge.  No substantial spinal canal or neural foraminal compromise.  C7-T1: No significant disc/facet abnormality, spinal stenosis, or foraminal stenosis.                 Impression   CONCLUSION:  1. No acute cervical spine fracture.  Mild chronic-appearing volume loss of the cervical cord, which may relate to known history of spina bifida/Chiari II malformation.  Cervical cord demonstrates preserved signal.  2. Mild multilevel cervical spine degenerative changes as detailed.  Findings do not result in significant spinal canal or neural foraminal stenosis.        elm-remote     Dictated by (CST): Aayush Holder MD on 3/09/2024 at 9:10 AM      Finalized by (CST): Aayush Holder MD on 3/09/2024 at 9:15 AM           Wt Readings from Last 6 Encounters:   10/18/24 135 lb (61.2 kg)   09/17/24 139 lb 12.8 oz (63.4 kg)   08/14/24 140 lb (63.5 kg)   07/30/24 140 lb (63.5 kg)   07/11/24 138 lb (62.6 kg)   07/11/24 138 lb 12.8 oz (63 kg)       BMI Readings from Last 6 Encounters:   10/18/24 23.17 kg/m²   09/17/24 24.00 kg/m²   08/26/24 24.03 kg/m²   08/14/24 24.03 kg/m²   07/30/24 24.03 kg/m²   07/11/24 23.69 kg/m²       BP Readings from Last 6 Encounters:   10/18/24 (!) 146/98   09/17/24 124/88   08/26/24 131/83   08/14/24 124/82   07/30/24 140/89   07/11/24 132/88         Review of Systems  As above      Medical History:      Past Medical History:    Bilateral leg weakness    chronic    Constipation    Depression    Foot drop, bilateral    Gait disturbance    High blood pressure    Neurogenic bladder    Pt self straight caths at home QID and irrigates BID PRN    Other ill-defined conditions(799.89)    shunt from hydrocephalus    PONV (postoperative nausea and vomiting)    if masked used    S/P  shunt    Spina bifida (HCC)    Management:  closure    Strabismus    Done at [facility]    Strabismus       Past Surgical History:   Procedure Laterality Date    Other surgical history  2010     bladder augmentation/catheterizable channel    Other surgical history  07/06/2020    fistulotomy    Spine surgery procedure unlisted  1994    Strabismus surgery Left 1999 or 2000          Current Outpatient Medications   Medication Sig Dispense Refill    CHOLECALCIFEROL 50 MCG (2000 UT) Oral Tab Take 1 tablet (2,000 Units total) by mouth daily. 90 tablet 0    ketoconazole 2 % External Shampoo You can apply this not only to the scalp 2 or 3 times a week in the shower but also to your beard area and then also the chest where the rash is.  Can also use on the face. 120 mL 11    carvedilol 6.25 MG Oral Tab Take 1 tablet (6.25 mg total) by mouth 2 (two) times daily with meals. Her blood pressure 180 tablet 1    sodium chloride 0.9 % Irrigation Solution Irrigate with 1,000 mL as directed 2 (two) times daily as needed (Irrigates bladder twice daily due to catheterization.).      oxyBUTYnin Chloride ER 15 MG Oral Tablet 24 Hr Take 1 tablet (15 mg total) by mouth daily. 90 tablet 3    polyethylene glycol, PEG 3350, 17 g Oral Powd Pack Take 17 g by mouth daily. 30 each 3    docusate sodium (COLACE) 100 MG Oral Cap Take 1 capsule (100 mg total) by mouth 2 (two) times daily as needed for constipation. 60 capsule 0    bisacodyl 10 MG Rectal Suppos Place 1 suppository (10 mg total) rectally daily as needed. 12 suppository 0    camphor/menthol/methyl salicylate 10-15 % External Cream Apply 1 Application topically 3 (three) times daily as needed. 1 each 0    Acetaminophen Extra Strength 500 MG Oral Tab Take 2 tablets (1,000 mg total) by mouth every 6 (six) hours.       Allergies:Allergies[1]     Physical Exam:       Physical Exam  Blood pressure 131/84, pulse 83, height 5' 4\" (1.626 m), weight 135 lb (61.2 kg).  Vitals:    10/18/24 1133 10/18/24 1151   BP: (!) 146/98 131/84   Pulse: 83    Weight: 135 lb (61.2 kg)    Height: 5' 4\" (1.626 m)      Physical Exam   Constitutional: Patient is oriented to person, place, and time. Patient  appears well-developed and well-nourished. No distress.   HENT:   Head: Normocephalic.   Neck: Muscular tenderness present. No tracheal deviation present. No thyromegaly present.   Has tenderness left  trapezius up into cervical perispinal muscles and into scalp. does have increased tone. Pain in the left levator scapula and left trapezius muscle increases when he flexes his neck and then looks to the right.    Neurological: Patient is alert and oriented to person, place, and time. Patient has normal strength and normal reflexes all 4 extremities with 1 out of 4 DTR lower extremity and 2 out of 4 DTR upper extremity..  He has numbness of the left hand.  When I did a Tinel's on the median nerve of the left wrist he could not feel really anything as he feels his left hand and wrist are a bit numb.  There is negative Tinel's.  He still has full range of motion of his fingers.  Coordination and gait normal.   Skin: Skin is warm and dry.   Psychiatric: Patient has bit of a anxious affect today.  Needed a lot of reassurance as he was wondering if this was a blood clot or something that would be very dangerous to him.  Vitals reviewed.           Assessment/Plan:      Diagnoses and all orders for this visit:    Neck pain on left side  -     PHYSIATRY - INTERNAL  Please see physiatry as may need an EMG/NCV.  Already had an MRI of the cervical spine by a different physician just this March.  Paresthesia of left foot  -     PHYSIATRY - INTERNAL    Numbness of left hand  -     PHYSIATRY - INTERNAL    Abnormal urine odor  -     Urinalysis with Culture Reflex; Future  Urinalysis today.  Nontraumatic incomplete tear of left rotator cuff  He was worried that he needs surgery for the left shoulder but I explained that this is radiating pain from the neck into his shoulder and possibly more discogenic.      Referrals (if applicable)  Orders Placed This Encounter   Procedures    PHYSIATRY - INTERNAL     He may need an EMG/NCV but I  would leave this up to you to see if he needs all 4 extremities done or not.     Referral Priority:   Routine     Referral Type:   OFFICE VISIT     Referred to Provider:   Brianne Bai DO     Requested Specialty:   PHYSIATRY     Number of Visits Requested:   3         Follow up if symptoms persist.  Take medicine (if given) as prescribed.  Approach to treatment discussed and patient/family member understands and agrees to plan.     No follow-ups on file.        Nitin Mejía DO  10/18/2024       [1]   Allergies  Allergen Reactions    Latex RASH

## 2024-10-20 DIAGNOSIS — N30.01 ACUTE CYSTITIS WITH HEMATURIA: Primary | ICD-10-CM

## 2024-10-20 DIAGNOSIS — B96.20 E-COLI UTI: ICD-10-CM

## 2024-10-20 DIAGNOSIS — N39.0 E-COLI UTI: ICD-10-CM

## 2024-10-20 RX ORDER — CIPROFLOXACIN 500 MG/1
500 TABLET, FILM COATED ORAL 2 TIMES DAILY
Qty: 14 TABLET | Refills: 0 | Status: SHIPPED | OUTPATIENT
Start: 2024-10-20 | End: 2024-10-27

## 2024-10-20 NOTE — PROGRESS NOTES
1. Acute cystitis with hematuria  Recent uti retest for eradication after treatment with bactrim ds bid x 5 days  Positive for infection with ecoli  Since second uti in last month and unsure if initial eradicated will treat with cipro bid  x7 days  Repeat UA 6 weeks   - ciprofloxacin 500 MG Oral Tab; Take 1 tablet (500 mg total) by mouth 2 (two) times daily for 7 days.  Dispense: 14 tablet; Refill: 0  - Urinalysis with Culture Reflex; Future    2. E-coli UTI  Recent uti retest for eradication after treatment with bactrim ds bid x 5 days  Positive for infection with ecoli  Since second uti in last month and unsure if initial eradicated will treat with cipro bid  x7 days  Repeat UA 6 weeks   - ciprofloxacin 500 MG Oral Tab; Take 1 tablet (500 mg total) by mouth 2 (two) times daily for 7 days.  Dispense: 14 tablet; Refill: 0  - Urinalysis with Culture Reflex; Future    LOS Wu

## 2024-10-21 NOTE — PROGRESS NOTES
Called patient for Chronic Care Management      Left message to call back   Call #1    [facility]  Care Management Department  [phone] work

## 2024-10-22 ENCOUNTER — OFFICE VISIT (OUTPATIENT)
Dept: PHYSICAL MEDICINE AND REHAB | Facility: CLINIC | Age: 29
End: 2024-10-22
Payer: MEDICARE

## 2024-10-22 VITALS — WEIGHT: 135 LBS | BODY MASS INDEX: 23.05 KG/M2 | HEIGHT: 64 IN

## 2024-10-22 DIAGNOSIS — R29.898 LEG WEAKNESS, BILATERAL: Primary | ICD-10-CM

## 2024-10-22 DIAGNOSIS — M54.2 TRIGGER POINT OF NECK: ICD-10-CM

## 2024-10-22 DIAGNOSIS — I10 ESSENTIAL HYPERTENSION: ICD-10-CM

## 2024-10-22 DIAGNOSIS — M54.12 CERVICAL RADICULOPATHY: ICD-10-CM

## 2024-10-22 DIAGNOSIS — Q05.1 SPINA BIFIDA OF THORACOLUMBAR REGION WITH HYDROCEPHALUS (HCC): ICD-10-CM

## 2024-10-22 DIAGNOSIS — Q07.01 CHIARI MALFORMATION TYPE II (HCC): ICD-10-CM

## 2024-10-22 DIAGNOSIS — Q05.9 MYELOMENINGOCELE (HCC): ICD-10-CM

## 2024-10-22 DIAGNOSIS — M54.16 BILATERAL LUMBAR RADICULOPATHY: ICD-10-CM

## 2024-10-22 PROCEDURE — 99215 OFFICE O/P EST HI 40 MIN: CPT | Performed by: PHYSICAL MEDICINE & REHABILITATION

## 2024-10-22 RX ORDER — GABAPENTIN 300 MG/1
CAPSULE ORAL
Qty: 90 CAPSULE | Refills: 0 | Status: SHIPPED | OUTPATIENT
Start: 2024-10-22

## 2024-10-22 NOTE — PATIENT INSTRUCTIONS
Therapy and home exercises  Gabapentin 300 mg at nighttime slowly increase to 3 times daily  MRI of the lumbar spine and follow-up after

## 2024-10-22 NOTE — PROGRESS NOTES
[facility]  FOLLOW UP EVALUATION        HISTORY OF PRESENT ILLNESS:     Chief Complaint   Patient presents with    Neck Pain     LOV: 10/12/2023 pt comes in with stabbing/aching pain that radiates down L arm. Admits T/N down L arm into L hand. Admits weakness. Rates pain 5/10. Imaging done 3/2024. Takes ibuprofen PRN.       The patient is a 29 year old male with significant past medical history of spina bifida with meningocele s/p surgery with chronic bilateral leg weakness, Arnold-Chiari malformation type II, hydrocephalus with VPS shunt placement who presents for follow-up evaluation of left-sided neck pain with radiation of the arm as well as bilateral low back penetration of the legs.  He states since last visit he has seen neurosurgery and follows with his neurologist as well as well as has seen orthopedic surgery for a partial rotator cuff tear.  He has had physical therapy for the shoulder but he is having worsening neck pain that radiates down the arm with numbness tingling sensation in the hand.  He rates pain to be 5 out of 10.  Pain is triggered with certain movements of the cervical spine.  He also has trigger points as his sister states which upon palpation to radiate pain down into the hand.  He states this is constant and takes ibuprofen as needed.  He is also having lower back pain with radiation bilateral legs.  He does have permanent numbness and tingling in the left leg related to his dropfoot and spina bifida however he feels there has been a little bit change since the last several months.  He was recommended MRI imaging during his last visit with me about a year ago but had never had it done for the lumbar spine.  He did have MRI imaging of the cervical spine completed March 2024 as noted below.  He is currently taking ibuprofen.  He is also currently on antibiotic treatment for UTI.     PHYSICAL EXAM:   Ht 64\"   Wt 135 lb (61.2 kg)   BMI 23.17  kg/m²     Gait Normal Able to toe walk and heel walk without any difficulty    LUMBAR SPINE:  Inspection: no erythema, swelling, or obvious deformity.  Their iliac crest and shoulder heights are symmetrical.     Palpation: Non tender to palpation of the spinous process.   ROM: Restricted in forward flexion  Strength: 5/5 in bilateral lower extremities  Sensation: Intact to light touch in all dermatomes of the lower extremities  Reflexes: 0/4 at L4 and S1  Facet Loading: no specific facet pain  Straight leg raise: negative for radicular pain symptoms  Slump test: positive for pain symptoms for radicular pain symptoms  Clonus: Negative    Patient motion of the cervical spine.  Pain is reproduced with extension and sidebending to the left with some radiation down the arm.  There is tenderness to palpation with trigger points in the upper traps cervical paraspinals and levator scapulae noted.  Reflexes are +3 equal bilaterally positive Michael sign left-sided.        IMAGING:   MRI cervical spine completed 3/9/2024 was personally reviewed which is notable for chronic appearing volume loss of the cervical cord with spina bifida and carry to malformation.  There is a small diffuse disc bulge at C4-5 but no high-grade spinal canal or neuroforaminal stenosis.  There is a small diffuse disc bulge at C5-6 with a superimposed central disc protrusion with an annular fissure.  There is small diffuse disc bulge at C6-7.  There is no significant spinal or foraminal narrowing.    CT cervical thoracic and lumbar spine completed on 9/15/2023 was reviewed which was largely normal with mild spinal degenerative changes of the lumbar spine at L4-5 with mild right neural foraminal stenosis at the level.    All imaging results were reviewed and discussed with patient.      ASSESSMENT/PLAN:     1. Leg weakness, bilateral    2. Bilateral lumbar radiculopathy    3. Chills (without fever)        Chao Reese is a pleasant 28-year-old male with  complex medical history including spina bifida with meningocele status postsurgical repair, Arnold-Chiari 2 malformation and hydrocephalus status post  shunt placement who presents for evaluation of neck pain with radiation in the arm as well as headaches and low back pain with radiation of the legs.  His symptoms are similar to about a year ago.  His MRI imaging was reviewed there is small disc herniations and I do believe he is somewhat symptomatic with cervical radiculopathy though he also has bilateral leg pain with left worse than right concerning for lumbar radiculopathy for which I recommend MRI imaging of the lumbar spine.  I have also recommended that he start a physical therapy program for the cervical spine and he may benefit from trigger point injection in the future as well as possible cervical interlaminar epidural injection.  Given his imaging was about 6 months ago he may benefit from repeat MRI imaging to evaluate if there has been any progression of the cervical disc disease.  Recommend that he start gabapentin 300 mg at nighttime and slowly increase to 3 times daily.    I spent 40 minutes with the patient going over his history, doing a thorough exam, discussing different treatments, discussing various different imaging, reviewing his imaging results, previous notes etc.    The patient verbalized understanding with the plan and was in agreement. All questions/concerns were addressed and there were no barriers to learning.  Please note Dragon dictation software was used to dictate this note and may result in inadvertent typos.    Brianne Bai DO, FAAPMR & CAQSM  Physical Medicine and Rehabilitation  Sports and Spine Medicine    PAST MEDICAL HISTORY:     Past Medical History:    Bilateral leg weakness    chronic    Constipation    Depression    Foot drop, bilateral    Gait disturbance    High blood pressure    Neurogenic bladder    Pt self straight caths at home QID and irrigates BID PRN    Other  ill-defined conditions(799.89)    shunt from hydrocephalus    PONV (postoperative nausea and vomiting)    if masked used    S/P  shunt    Spina bifida (HCC)    Management:  closure    Strabismus    Done at [facility]    Strabismus         PAST SURGICAL HISTORY:     Past Surgical History:   Procedure Laterality Date    Other surgical history  2010    bladder augmentation/catheterizable channel    Other surgical history  07/06/2020    fistulotomy    Spine surgery procedure unlisted  1994    Strabismus surgery Left 1999 or 2000         CURRENT MEDICATIONS:     Current Outpatient Medications   Medication Sig Dispense Refill    ciprofloxacin 500 MG Oral Tab Take 1 tablet (500 mg total) by mouth 2 (two) times daily for 7 days. 14 tablet 0    CHOLECALCIFEROL 50 MCG (2000 UT) Oral Tab Take 1 tablet (2,000 Units total) by mouth daily. 90 tablet 0    ketoconazole 2 % External Shampoo You can apply this not only to the scalp 2 or 3 times a week in the shower but also to your beard area and then also the chest where the rash is.  Can also use on the face. 120 mL 11    carvedilol 6.25 MG Oral Tab Take 1 tablet (6.25 mg total) by mouth 2 (two) times daily with meals. Her blood pressure 180 tablet 1    sodium chloride 0.9 % Irrigation Solution Irrigate with 1,000 mL as directed 2 (two) times daily as needed (Irrigates bladder twice daily due to catheterization.).      oxyBUTYnin Chloride ER 15 MG Oral Tablet 24 Hr Take 1 tablet (15 mg total) by mouth daily. 90 tablet 3    polyethylene glycol, PEG 3350, 17 g Oral Powd Pack Take 17 g by mouth daily. 30 each 3    docusate sodium (COLACE) 100 MG Oral Cap Take 1 capsule (100 mg total) by mouth 2 (two) times daily as needed for constipation. 60 capsule 0    bisacodyl 10 MG Rectal Suppos Place 1 suppository (10 mg total) rectally daily as needed. 12 suppository 0    camphor/menthol/methyl salicylate 10-15 % External Cream Apply 1 Application topically 3 (three) times  daily as needed. 1 each 0    Acetaminophen Extra Strength 500 MG Oral Tab Take 2 tablets (1,000 mg total) by mouth every 6 (six) hours.           ALLERGIES:     Allergies   Allergen Reactions    Latex RASH         FAMILY HISTORY:     Family History   Problem Relation Age of Onset    No Known Problems Father     No Known Problems Mother     Diabetes Neg     Glaucoma Neg           SOCIAL HISTORY:     Social History     Socioeconomic History    Marital status: Single   Tobacco Use    Smoking status: Never     Passive exposure: Never    Smokeless tobacco: Never   Vaping Use    Vaping status: Never Used   Substance and Sexual Activity    Alcohol use: Not Currently    Drug use: No   Other Topics Concern    Caffeine Concern Yes     Comment: 1 cup a day.    Exercise No    Pt has a pacemaker No    Pt has a defibrillator No    Reaction to local anesthetic No   Social History Narrative    The patient uses the following assistive device(s):  Splint on R leg .      The patient does live in a home with stairs.     Social Drivers of Health     Financial Resource Strain: Low Risk  (11/3/2023)    Received from [employer], [facility]    Financial Resource Strain     In the past year, have you or any family members you live with been unable to get any of the following when it was really needed? Check all that apply.: None   Food Insecurity: No Food Insecurity (3/8/2024)    Food Insecurity     Food Insecurity: Never true   Transportation Needs: No Transportation Needs (3/8/2024)    Transportation Needs     Lack of Transportation: No   Social Connections: Low Risk  (11/3/2023)    Received from [employer], [facility]    Social Connections     How often do you see or talk to people that you care about and feel close to? (For example: talking to friends on the phone, visiting friends or family, going to Sikhism or club meetings): 5 or more times a week   Housing Stability: Low Risk  (3/8/2024)     Housing Stability     Housing Instability: No          REVIEW OF SYSTEMS:   A comprehensive 10 point review of systems was completed.  Pertinent positives and negatives noted in the the HPI.      PHYSICAL EXAM:   General: No immediate distress  Head: Normocephalic/ Atraumatic  Eyes: Extra-occular movements intact.   Ears: No auricular hematoma or deformities  Mouth: No lesions or ulcerations  Heart: peripheral pulses intact. Normal capillary refill.   Lungs: Non-labored respirations  Abdomen: No abdominal guarding  Extremities: No lower extremity edema bilaterally   Skin: No lesions noted   Cognition: alert & oriented x 3, attentive, able to follow 2 step commands, comprehention intact, spontaneous speech intact  Psychiatric: Mood and affect appropriate      LABS:   No results found for: \"EAG\", \"A1C\"  Lab Results   Component Value Date    WBC 13.6 (H) 03/12/2024    RBC 5.50 03/12/2024    HGB 16.1 03/12/2024    HCT 47.0 03/12/2024    MCV 85.5 06/10/2024    MCH 29.3 03/12/2024    MCHC 33.9 06/10/2024    RDW 12.6 03/12/2024    .0 03/12/2024    MPV 9.8 03/22/2018     Lab Results   Component Value Date     (H) 03/12/2024    BUN 10 03/12/2024    BUNCREA 10.3 03/12/2024    CREATSERUM 0.97 03/12/2024    ANIONGAP 6 03/12/2024    GFRNAA 116 04/02/2022    GFRAA 134 04/02/2022    CA 10.0 03/12/2024    OSMOCALC 285 03/12/2024    ALKPHO 69 03/07/2024    AST 20 03/07/2024    ALT 33 03/07/2024    BILT 1.2 03/07/2024    TP 7.7 03/07/2024    ALB 5.0 (H) 03/07/2024    GLOBULIN 2.7 (L) 03/07/2024     03/12/2024    K 4.9 03/12/2024     03/12/2024    CO2 30.0 03/12/2024     Lab Results   Component Value Date    PTP 14.8 10/16/2023    INR 1.09 10/16/2023     Lab Results   Component Value Date    VITD 89.8 07/24/2023

## 2024-10-24 ENCOUNTER — TELEPHONE (OUTPATIENT)
Dept: NEUROSURGERY | Age: 29
End: 2024-10-24

## 2024-10-25 ENCOUNTER — TELEPHONE (OUTPATIENT)
Dept: FAMILY MEDICINE CLINIC | Facility: CLINIC | Age: 29
End: 2024-10-25

## 2024-10-25 DIAGNOSIS — R20.0 NUMBNESS OF LEFT HAND: ICD-10-CM

## 2024-10-25 DIAGNOSIS — M54.2 NECK PAIN ON LEFT SIDE: Primary | ICD-10-CM

## 2024-10-25 NOTE — TELEPHONE ENCOUNTER
Spoke with patient, Date of Birth verified  He stated he saw Dr Brianne Bai on 10-22-24 for shoulder and arm pain and numbness.  Patient stated he still has same symptom and Dr Bai did not order the test whatever Dr Mejía recommended him to do.   He ordered MRI of lumbar spine as he had tear on his disc before, he is scheduled on 11-6.   Patient stated he is off this week till 10-28.  Dr Bai said he can go back to work next week but he doesn't want to do that as he works as dispatcher for [employer], he is in the computer typing a lot.  He is concerned of too much movement will continue to have pain and numbness in his arm.  Patient have not started gabapentin yes as prescribed by Dr Bai due to he still on antibiotic for UTI.   Putting ice, ibuprofen with minimal  relief, last noc he woke up with pain and took ibuprofen with minimal relief.    He will try go back to work next week if OK with Dr Mejía?   pls advise, thanks in advance.           Future Appointments   Date Time Provider Department Center   11/19/2024 10:45 AM Brianne Bai, DO [facility]

## 2024-10-26 NOTE — TELEPHONE ENCOUNTER
He can definitely take the gabapentin even if he is on the antibiotics.  I would definitely do the MRI of the lumbar spine as per her Dr. Bai.  I think he should try to go back to work as resting the muscles too long can cause atrophy and decreased function.

## 2024-10-28 NOTE — TELEPHONE ENCOUNTER
Verified name and .    Patient was advised of Dr. Mejía's message.    He states that he does not feel comfortable working with the numbness and pain in his arm since he would be on the computer all day.    He states he called off work today due to the numbness and pain.    He states he is taking Ibuprofen a long with the Gabapentin with no relief.    He is asking to be excused from work for longer period of time and also asking for recommendations for other pain medication.    Please advise and thank you.    Patient has upcoming appointment with Dr. Mejía:  Future Appointments   Date Time Provider Department Center   10/31/2024 11:30 AM Nitin Mejía DO [facility]   2024 10:45 AM Brianne Bai DO PM[facility]

## 2024-10-29 RX ORDER — TRAMADOL HYDROCHLORIDE 50 MG/1
50 TABLET ORAL EVERY 8 HOURS PRN
Qty: 40 TABLET | Refills: 0 | Status: SHIPPED | OUTPATIENT
Start: 2024-10-29

## 2024-10-29 NOTE — TELEPHONE ENCOUNTER
Inform him that letter has been sent to Yvonne to stay out of work at least until he sees me on 10/31/2024.  He can continue the gabapentin and the ibuprofen if he wishes but I did add tramadol that he can take 1 pill 3 times daily as needed for pain.

## 2024-10-31 ENCOUNTER — OFFICE VISIT (OUTPATIENT)
Dept: FAMILY MEDICINE CLINIC | Facility: CLINIC | Age: 29
End: 2024-10-31
Payer: MEDICARE

## 2024-10-31 ENCOUNTER — ORDER TRANSCRIPTION (OUTPATIENT)
Dept: PHYSICAL THERAPY | Facility: HOSPITAL | Age: 29
End: 2024-10-31

## 2024-10-31 VITALS
BODY MASS INDEX: 23.56 KG/M2 | WEIGHT: 138 LBS | DIASTOLIC BLOOD PRESSURE: 87 MMHG | HEIGHT: 64 IN | HEART RATE: 85 BPM | SYSTOLIC BLOOD PRESSURE: 129 MMHG

## 2024-10-31 DIAGNOSIS — R20.0 NUMBNESS OF LEFT HAND: Primary | ICD-10-CM

## 2024-10-31 DIAGNOSIS — R20.2 PARESTHESIA OF LEFT FOOT: ICD-10-CM

## 2024-10-31 DIAGNOSIS — M79.672 LEFT FOOT PAIN: ICD-10-CM

## 2024-10-31 DIAGNOSIS — Q07.01 CHIARI MALFORMATION TYPE II (HCC): ICD-10-CM

## 2024-10-31 DIAGNOSIS — M25.512 ACUTE PAIN OF LEFT SHOULDER: ICD-10-CM

## 2024-10-31 DIAGNOSIS — Q05.9: ICD-10-CM

## 2024-10-31 DIAGNOSIS — M54.12 CERVICAL RADICULITIS: ICD-10-CM

## 2024-10-31 DIAGNOSIS — R19.7 DIARRHEA, UNSPECIFIED TYPE: ICD-10-CM

## 2024-10-31 DIAGNOSIS — Q05.9 SPINA BIFIDA, UNSPECIFIED HYDROCEPHALUS PRESENCE, UNSPECIFIED SPINAL REGION (HCC): ICD-10-CM

## 2024-10-31 DIAGNOSIS — M54.2 NECK PAIN ON LEFT SIDE: ICD-10-CM

## 2024-10-31 DIAGNOSIS — M54.2 CERVICALGIA: Primary | ICD-10-CM

## 2024-10-31 PROCEDURE — 99214 OFFICE O/P EST MOD 30 MIN: CPT | Performed by: FAMILY MEDICINE

## 2024-10-31 PROCEDURE — G2211 COMPLEX E/M VISIT ADD ON: HCPCS | Performed by: FAMILY MEDICINE

## 2024-10-31 NOTE — PROGRESS NOTES
Called patient for Chronic Care Management      Left message to call back   Call #2    [facility]  Care Management Department  [phone] work

## 2024-10-31 NOTE — PROGRESS NOTES
Subjective:   Chao Reese is a 29 year old male who presents for a Initial Annual Wellness Visit (outside the first 12 months of Medicare eligibility, no prior AWV) and scheduled follow up of multiple significant but stable problems.     Last seen on 10/18/2024.      Pt c/o of left neck, shoulder, and arm pain and tingling that is now radiating down to his lower back from the left trapezius area and then travels from the left buttock area to his left leg. He has numbness in the left hand and stabbing pain in the left foot while he is walking. When he lays down he feels like someone is pulling on his left foot. When he is turning his head to the right he feel pain along his left neck and trapezius muscles.  He consulted with Dr. Bai, physiatrist, once and he ordered an MRI of his lower back and referred him to physical therapy. He hadn't yet gone to physical therapy due to insurance issues. He was also prescribed gabapentin, but he hadn't taken it yet  as he was concerned about taking the medication 3 times a day. I discussed with him and ordered an EMG. Pt is not comfortable returning to work, I provided a note for the next two weeks.     Pt c/o of diarrhea once or twice a day but usually just in the morning. Still drinking water and eating.  He describes the stool as watery. He denies blood in the stool. I discussed with him.    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 Pt wears bilateral AFO braces bilateral legs. Hx spina bifida.                  Patient Active Problem List   Diagnosis    Spina bifida (HCC)    Weakness of both lower extremities    Foot drop, bilateral    Acne vulgaris    Neurogenic bladder    Myelomeningocele (HCC)    Tachycardia    Strabismus    Regular astigmatism of both eyes    Right arm numbness    Constipation    Gastroenteritis    Gait disturbance    Chiari malformation type II (HCC)    Right foot pain    Right posterior tibial strain    Myalgia     Essential hypertension    Right renal artery stenosis (HCC)    Lesion of urinary bladder    Hyperopia of right eye    Myopia of left eye    Exotropia    Foraminal stenosis of lumbar region    Right lumbar pain    Communicating hydrocephalus (HCC)    S/P  shunt    Pain in both lower extremities    Hospital discharge follow-up    Cystitis    Renal stone    Vaccine counseling    Urinary tract infection without hematuria, site unspecified    Abdominal pain of unknown etiology    Diarrhea, unspecified type    Spasticity    Tendinopathy of left rotator cuff    Left shoulder pain    Decreased ROM of left shoulder     Allergies:  He is allergic to latex.    Current Medications:  Outpatient Medications Marked as Taking for the 10/31/24 encounter (Office Visit) with Nitin Mejía DO   Medication Sig    gabapentin 300 MG Oral Cap Start with night time dose only. If well tolerated, increase to two times daily. If well tolerated, increase to three times daily.    CHOLECALCIFEROL 50 MCG (2000 UT) Oral Tab Take 1 tablet (2,000 Units total) by mouth daily.    carvedilol 6.25 MG Oral Tab Take 1 tablet (6.25 mg total) by mouth 2 (two) times daily with meals. Her blood pressure    oxyBUTYnin Chloride ER 15 MG Oral Tablet 24 Hr Take 1 tablet (15 mg total) by mouth daily.    docusate sodium (COLACE) 100 MG Oral Cap Take 1 capsule (100 mg total) by mouth 2 (two) times daily as needed for constipation.    bisacodyl 10 MG Rectal Suppos Place 1 suppository (10 mg total) rectally daily as needed.    Acetaminophen Extra Strength 500 MG Oral Tab Take 2 tablets (1,000 mg total) by mouth every 6 (six) hours.       Medical History:  He  has a past medical history of Bilateral leg weakness, Constipation, Depression, Foot drop, bilateral, Gait disturbance, High blood pressure, Neurogenic bladder, Other ill-defined conditions(799.89), PONV (postoperative nausea and vomiting), S/P  shunt, Spina bifida (HCC), Strabismus (1998), and  Strabismus.  Surgical History:  He  has a past surgical history that includes Strabismus surgery (Left, 1999 or 2000); spine surgery procedure unlisted (1994); other surgical history (2010); and other surgical history (07/06/2020).   Family History:  His family history includes No Known Problems in his father and mother.  Social History:  He  reports that he has never smoked. He has never been exposed to tobacco smoke. He has never used smokeless tobacco. He reports that he does not currently use alcohol. He reports that he does not use drugs.    Tobacco:  He has never smoked tobacco.        Patient Care Team:  Nitin Mejía DO as PCP - General (Family Medicine)    Review of Systems   Respiratory:  Negative for shortness of breath.    Cardiovascular:  Negative for chest pain.   Gastrointestinal:  Positive for diarrhea. Negative for blood in stool.   Neurological:  Positive for numbness.          Objective:   Physical Exam    Physical Exam   Constitutional: He appears well-developed and well-nourished. No distress.   Head: Normocephalic.   Right Ear: Tympanic membrane and ear canal normal.   Left Ear: Tympanic membrane and ear canal normal.   Nose: No mucosal edema or rhinorrhea.  Mouth/Throat: Oropharynx is clear and moist and mucous membranes are normal.   Eyes: Conjunctivae and EOM are normal. Pupils are equal, round, and reactive to light.   Neck: Normal range of motion. No thyromegaly present. Tenderness in trapezius and left cervical paraspinal muscles. SS testing full strength bilaterally.   Cardiovascular: Normal rate, regular rhythm and no murmur heard.   Pulmonary/Chest: Effort normal and breath sounds normal. No respiratory distress.   Lymphadenopathy: He has no cervical adenopathy.   Neurological: He is alert and oriented to person, place, and time. He has normal reflexes upper and lower extremities. No cranial nerve deficit.  Negative tinel's bilateral wrist.  Decreased sensation left hand.  Skin: Skin  is warm and dry. No rash noted.   Psychiatric: He has a normal mood and affect.  Lower legs: No edema of the legs bilaterally. 2+ pedal pulses bilaterally.      Vitals reviewed.      /87 (BP Location: Right arm, Patient Position: Sitting, Cuff Size: adult)   Pulse 85   Ht 5' 4\" (1.626 m)   Wt 138 lb   BMI 23.69 kg/m²  Estimated body mass index is 23.69 kg/m² as calculated from the following:    Height as of this encounter: 5' 4\" (1.626 m).    Weight as of this encounter: 138 lb.      Assessment & Plan:   Chao Reese is a 29 year old male who presents for a Medicare Assessment.     Diagnoses and all orders for this visit:    Numbness of left hand  -     EMG  I went ahead and ordered the EMG/NCV of the left upper and lower extremity.  Acute pain of left shoulder  As above.  He was given a physical therapy prescription which I printed out and told him he can actually take it upstairs to our physical therapy department.  Left foot pain  -     EMG    Paresthesia of left foot  -     EMG    Diarrhea, unspecified type  Just supportive treatment.  He states it is not terrible.  He does not feel dizzy because of this.  Spina bifida, unspecified hydrocephalus presence, unspecified spinal region (HCC)  -     EMG    Chiari malformation type II (HCC)  -     EMG    Neck pain on left side  -     EMG        Referrals (if applicable)  No orders of the defined types were placed in this encounter.        Follow up if symptoms persist.  Take medicine (if given) as prescribed.  Approach to treatment discussed and patient/family member understands and agrees to plan.     Return in about 2 weeks (around 11/14/2024).    The patient indicates understanding of these issues and agrees to the plan.  Reinforced healthy diet, lifestyle, and exercise.      Return in about 2 weeks (around 11/14/2024).     Shital Bowie, 10/31/2024   By signing my name below, I, Shital Bowie,  attest that this documentation has been prepared under  the direction and in the presence of Nitin Mejía DO.   Electronically Signed: Shital Stewardter, 10/31/2024, 12:31 PM.     I, Nitin Mejía DO,  personally performed the services described in this documentation. All medical record entries made by the scribe were at my direction and in my presence.  I have reviewed the chart and discharge instructions (if applicable) and agree that the record reflects my personal performance and is accurate and complete.  Nitin Mejía DO, 10/31/2024, 12:35 PM    Supplementary Documentation:   General Health:       Health Maintenance   Topic Date Due    Annual Physical  Never done    COVID-19 Vaccine (3 - 2023-24 season) 09/01/2024    Influenza Vaccine (1) 10/01/2024    DTaP,Tdap,and Td Vaccines (2 - Td or Tdap) 03/22/2028    Annual Depression Screening  Completed    Pneumococcal Vaccine: Birth to 64yrs  Aged Out

## 2024-11-06 ENCOUNTER — IMAGING SERVICES (OUTPATIENT)
Dept: OTHER | Age: 29
End: 2024-11-06

## 2024-11-14 ENCOUNTER — OFFICE VISIT (OUTPATIENT)
Dept: FAMILY MEDICINE CLINIC | Facility: CLINIC | Age: 29
End: 2024-11-14

## 2024-11-14 VITALS
BODY MASS INDEX: 24.24 KG/M2 | DIASTOLIC BLOOD PRESSURE: 80 MMHG | WEIGHT: 142 LBS | SYSTOLIC BLOOD PRESSURE: 128 MMHG | HEIGHT: 64 IN | TEMPERATURE: 98 F | HEART RATE: 80 BPM

## 2024-11-14 DIAGNOSIS — M79.672 PAIN IN LEFT FOOT: Primary | ICD-10-CM

## 2024-11-14 DIAGNOSIS — R20.2 PARESTHESIAS IN LEFT HAND: ICD-10-CM

## 2024-11-14 PROCEDURE — 99214 OFFICE O/P EST MOD 30 MIN: CPT | Performed by: FAMILY MEDICINE

## 2024-11-14 NOTE — PROGRESS NOTES
Patient ID: Chao Reese is a 29 year old male.    HPI  Chief Complaint   Patient presents with    Follow - Up     Follow up- left side numbness and pain, states it has improved since last visit.      Last seen on 10/31/2024.      Pt is still off of work. He needs an extended note until he follows up with Dr. Bai, physiatrist.     Pt states that left sided numbness and pain has been getting better. In his left foot he is still having stabbing pain intermittent but less frequent. He is also still having paraesthesia in his left hand, although it has gotten better. Pt has been sleeping better and has been going to the gym and exercising although not as strenuously as before.. He is consulting with Dr. Bai, physiatrist, and had an MRI of his lumbar spine. He next follows up with him on 11/19/2024. He hasn't scheduled his EMG yet, I advised him to consult with Dr. Bai on whether or not he should get one.     He has no other questions at this time.      Wt Readings from Last 6 Encounters:   11/14/24 142 lb   10/31/24 138 lb   10/22/24 135 lb   10/18/24 135 lb   09/17/24 139 lb 12.8 oz   08/14/24 140 lb       BMI Readings from Last 6 Encounters:   11/14/24 24.37 kg/m²   10/31/24 23.69 kg/m²   10/22/24 23.17 kg/m²   10/18/24 23.17 kg/m²   09/17/24 24.00 kg/m²   08/26/24 24.03 kg/m²       BP Readings from Last 6 Encounters:   11/14/24 128/80   10/31/24 129/87   10/18/24 131/84   09/17/24 124/88   08/26/24 131/83   08/14/24 124/82         Review of Systems   Respiratory:  Negative for shortness of breath.    Cardiovascular:  Negative for chest pain.   Neurological:  Positive for numbness.           Medical History:      Past Medical History:    Bilateral leg weakness    chronic    Constipation    Depression    Foot drop, bilateral    Gait disturbance    High blood pressure    Neurogenic bladder    Pt self straight caths at home QID and irrigates BID PRN    Other ill-defined conditions(799.89)    shunt from hydrocephalus     PONV (postoperative nausea and vomiting)    if masked used    S/P  shunt    Spina bifida (HCC)    Management:  closure    Strabismus    Done at [facility]    Strabismus       Past Surgical History:   Procedure Laterality Date    Other surgical history  2010    bladder augmentation/catheterizable channel    Other surgical history  07/06/2020    fistulotomy    Spine surgery procedure unlisted  1994    Strabismus surgery Left 1999 or 2000          Current Outpatient Medications   Medication Sig Dispense Refill    traMADol 50 MG Oral Tab Take 1 tablet (50 mg total) by mouth every 8 (eight) hours as needed. (Patient not taking: Reported on 10/31/2024) 40 tablet 0    gabapentin 300 MG Oral Cap Start with night time dose only. If well tolerated, increase to two times daily. If well tolerated, increase to three times daily. 90 capsule 0    CHOLECALCIFEROL 50 MCG (2000 UT) Oral Tab Take 1 tablet (2,000 Units total) by mouth daily. 90 tablet 0    ketoconazole 2 % External Shampoo You can apply this not only to the scalp 2 or 3 times a week in the shower but also to your beard area and then also the chest where the rash is.  Can also use on the face. 120 mL 11    carvedilol 6.25 MG Oral Tab Take 1 tablet (6.25 mg total) by mouth 2 (two) times daily with meals. Her blood pressure 180 tablet 1    sodium chloride 0.9 % Irrigation Solution Irrigate with 1,000 mL as directed 2 (two) times daily as needed (Irrigates bladder twice daily due to catheterization.). (Patient not taking: Reported on 10/31/2024)      oxyBUTYnin Chloride ER 15 MG Oral Tablet 24 Hr Take 1 tablet (15 mg total) by mouth daily. 90 tablet 3    polyethylene glycol, PEG 3350, 17 g Oral Powd Pack Take 17 g by mouth daily. (Patient not taking: Reported on 10/31/2024) 30 each 3    docusate sodium (COLACE) 100 MG Oral Cap Take 1 capsule (100 mg total) by mouth 2 (two) times daily as needed for constipation. 60 capsule 0    bisacodyl 10 MG Rectal  Suppos Place 1 suppository (10 mg total) rectally daily as needed. 12 suppository 0    camphor/menthol/methyl salicylate 10-15 % External Cream Apply 1 Application topically 3 (three) times daily as needed. 1 each 0    Acetaminophen Extra Strength 500 MG Oral Tab Take 2 tablets (1,000 mg total) by mouth every 6 (six) hours.       Allergies:Allergies[1]     Physical Exam:       Physical Exam  Blood pressure 146/86, pulse 80, temperature 98.4 °F (36.9 °C), temperature source Oral, height 5' 4\" (1.626 m), weight 142 lb.    Vitals:    11/14/24 1136 11/14/24 1145   BP: 146/86 128/80   BP Location: Right arm    Patient Position: Sitting    Cuff Size: adult    Pulse: 80    Temp: 98.4 °F (36.9 °C)    TempSrc: Oral    Weight: 142 lb    Height: 5' 4\" (1.626 m)         Physical Exam   Constitutional: Patient is oriented to person, place, and time. Patient appears well-developed and well-nourished. No distress. AFO braces bilaterally.   Neck: Normal range of motion. No thyromegaly present.   Cardiovascular: Normal rate, regular rhythm and normal heart sounds.    Pulmonary/Chest: Effort normal and breath sounds normal. No respiratory distress.   Lymphadenopathy: Patient has no cervical adenopathy.  Neurological: Patient is alert and oriented to person, place, and time. DTR 2/4 BLE. Full strength with hand grasp bilaterally. Full quadriceps strength bilaterally.   Skin: Skin is warm.   Psychiatry: Normal mood and affect.    Vitals reviewed.           Assessment/Plan:      Diagnoses and all orders for this visit:    Pain in left foot  Everything is quite stable although he does feel like he is getting better.  We will continue to keep him off of work as he will see Dr. Bai of the physiatrist in 5 days and then they can determine if these ready to return back to work or not.  Paresthesias in left hand  As above      Referrals (if applicable)  No orders of the defined types were placed in this encounter.      Follow up if symptoms  persist.  Take medicine (if given) as prescribed.  Approach to treatment discussed and patient/family member understands and agrees to plan.     No follow-ups on file.    There are no Patient Instructions on file for this visit.    Shital Bowie    11/14/2024    By signing my name below, IShital,  attest that this documentation has been prepared under the direction and in the presence of Nitin Mejía DO.   Electronically Signed: Shital Bowie, 11/14/2024, 11:37 AM.    I, Nitin Mejía DO,  personally performed the services described in this documentation. All medical record entries made by the scribe were at my direction and in my presence.  I have reviewed the chart and discharge instructions (if applicable) and agree that the record reflects my personal performance and is accurate and complete.  Nitin Mejía DO, 11/14/2024, 12:17 PM                  [1]   Allergies  Allergen Reactions    Latex RASH

## 2024-11-15 NOTE — PROGRESS NOTES
Called patient for Chronic Care Management      Left message to call back   Call #3  Letter sent through [facility]  Care Management Department  [phone] work

## 2024-11-18 ENCOUNTER — TELEPHONE (OUTPATIENT)
Dept: FAMILY MEDICINE CLINIC | Facility: CLINIC | Age: 29
End: 2024-11-18

## 2024-11-18 NOTE — TELEPHONE ENCOUNTER
Patient calling,verified name and date of birth.  His appointment with Dr Bai for left foot pain  was rescheduled to 11/25/24.  He asks if Dr Mejía can adjust the 11/14/24 work note to reflect that date.    Dr Mejía, letter pended for your review/approval.

## 2024-11-22 NOTE — PROGRESS NOTES
Called patient for Chronic Care Management      Left message to call back   Call #4   Letter sent through [employer] on 11/15/24 has not been read    Ambreen Fonseca  [facility]  Care Management Department  [phone] work

## 2024-11-25 ENCOUNTER — TELEMEDICINE (OUTPATIENT)
Dept: PHYSICAL MEDICINE AND REHAB | Facility: CLINIC | Age: 29
End: 2024-11-25
Payer: MEDICARE

## 2024-11-25 DIAGNOSIS — M54.12 CERVICAL RADICULOPATHY: ICD-10-CM

## 2024-11-25 DIAGNOSIS — M54.16 BILATERAL LUMBAR RADICULOPATHY: ICD-10-CM

## 2024-11-25 DIAGNOSIS — Q05.9 MYELOMENINGOCELE (HCC): ICD-10-CM

## 2024-11-25 DIAGNOSIS — R29.898 LEG WEAKNESS, BILATERAL: Primary | ICD-10-CM

## 2024-11-25 DIAGNOSIS — Q05.1 SPINA BIFIDA OF THORACOLUMBAR REGION WITH HYDROCEPHALUS (HCC): ICD-10-CM

## 2024-11-25 DIAGNOSIS — I10 ESSENTIAL HYPERTENSION: ICD-10-CM

## 2024-11-25 DIAGNOSIS — Q07.01 CHIARI MALFORMATION TYPE II (HCC): ICD-10-CM

## 2024-11-25 NOTE — PROGRESS NOTES
[facility]    Telemedicine Visit - Follow Up Evaluation    Telehealth Verbal Consent   I conducted a telehealth visit with Chao Reese today, 11/25/24, which was completed using two-way, real-time interactive audio and video communication. This has been done in good janene to provide continuity of care in the best interest of the provider-patient relationship, due to the COVID -19 public health crisis/national emergency where restrictions of face-to-face office visits are ongoing. Every conscious effort was taken to allow for sufficient and adequate time to complete the visit.  The patient was made aware of the limitations of the telehealth visit, including treatment limitations as no physical exam could be performed.  The patient was advised to call 911 or to go to the ER in case there was an emergency.  The patient was also advised of the potential privacy & security concerns related to the telehealth platform.   The patient was made aware of where to find [facility]'s notice of privacy practices, telehealth consent form and other related consent forms and documents.  which are located on the [facility] website. The patient verbally agreed to telehealth consent form, related consents and the risks discussed.    Lastly, the patient confirmed that they were in [place].   Included in this visit, time may have been spent reviewing labs, medications, radiology tests and decision making. Appropriate medical decision-making and tests are ordered as detailed in the plan of care above.  Coding/billing information is submitted for this visit based on complexity of care and/or time spent for the visit.      HISTORY OF PRESENT ILLNESS:     Patient is following up for lower back pain with radiation in the legs and numbness tingling in the left leg worse than the right.  He had MRI imaging of the lumbar spine which she is here to review.  He states that the left arm symptoms have somewhat  improved and the hand weakness is improving he was unable to schedule the MRI of the cervical spine yet.  He denies any changes in bowel bladder since last visit.      IMAGING:   Lumbar spine completed 11/6/2024 was personally reviewed which is notable for lumbar dysraphism with lipomyelomeningocele and a thin tethered spinal cord.  There is no significant disc bulging at any level.    All imaging results were reviewed and discussed with patient.      ASSESSMENT:     1. Leg weakness, bilateral    2. Bilateral lumbar radiculopathy    3. Cervical radiculopathy    4. Spina bifida of thoracolumbar region with hydrocephalus (HCC)    5. Myelomeningocele (HCC)    6. Chiari malformation type II (HCC)    7. Essential hypertension          PLAN:   Chao Reese is a 29 year old male following up for low back pain with radiation of the legs bilaterally left worse than right with numbness tingling sensation.  He had MRI imaging of the lumbar spine which is notable for a thin tethered spinal cord with spinal myelo meningocele.  I recommend that he see neurosurgery for further evaluation and also to obtain the MRI imaging of the cervical spine as we discussed during his last appointment.      Follow-up:   After imaging    We discussed that a telemedicine visit is in place of an office visit; however, this limits the ability to perform a thorough physical examination which may affect objective findings related to a specific condition and can affect treatment.    The patient verbalized understanding with this plan and was in agreement.  There are no barriers to learning.  All questions were answered.  Please note Dragon dictation software was used to dictate this note which may result in inadvertent typos.    Brianne Bai D.O. FAAPMR & CAQSM  Physical Medicine and Rehabilitation/Sports Medicine    PAST MEDICAL HISTORY:     Past Medical History:    Bilateral leg weakness    chronic    Constipation    Depression    Foot drop, bilateral     Gait disturbance    High blood pressure    Neurogenic bladder    Pt self straight caths at home QID and irrigates BID PRN    Other ill-defined conditions(799.89)    shunt from hydrocephalus    PONV (postoperative nausea and vomiting)    if masked used    S/P  shunt    Spina bifida (HCC)    Management:  closure    Strabismus    Done at [facility]    Strabismus         PAST SURGICAL HISTORY:     Past Surgical History:   Procedure Laterality Date    Other surgical history  2010    bladder augmentation/catheterizable channel    Other surgical history  07/06/2020    fistulotomy    Spine surgery procedure unlisted  1994    Strabismus surgery Left 1999 or 2000         CURRENT MEDICATIONS:     Current Outpatient Medications   Medication Sig Dispense Refill    traMADol 50 MG Oral Tab Take 1 tablet (50 mg total) by mouth every 8 (eight) hours as needed. (Patient not taking: Reported on 11/14/2024) 40 tablet 0    gabapentin 300 MG Oral Cap Start with night time dose only. If well tolerated, increase to two times daily. If well tolerated, increase to three times daily. 90 capsule 0    CHOLECALCIFEROL 50 MCG (2000 UT) Oral Tab Take 1 tablet (2,000 Units total) by mouth daily. 90 tablet 0    ketoconazole 2 % External Shampoo You can apply this not only to the scalp 2 or 3 times a week in the shower but also to your beard area and then also the chest where the rash is.  Can also use on the face. 120 mL 11    carvedilol 6.25 MG Oral Tab Take 1 tablet (6.25 mg total) by mouth 2 (two) times daily with meals. Her blood pressure 180 tablet 1    sodium chloride 0.9 % Irrigation Solution Irrigate with 1,000 mL as directed 2 (two) times daily as needed (Irrigates bladder twice daily due to catheterization.). (Patient not taking: Reported on 11/14/2024)      oxyBUTYnin Chloride ER 15 MG Oral Tablet 24 Hr Take 1 tablet (15 mg total) by mouth daily. 90 tablet 3    polyethylene glycol, PEG 3350, 17 g Oral Powd Pack Take  17 g by mouth daily. (Patient not taking: Reported on 11/14/2024) 30 each 3    docusate sodium (COLACE) 100 MG Oral Cap Take 1 capsule (100 mg total) by mouth 2 (two) times daily as needed for constipation. 60 capsule 0    bisacodyl 10 MG Rectal Suppos Place 1 suppository (10 mg total) rectally daily as needed. 12 suppository 0    camphor/menthol/methyl salicylate 10-15 % External Cream Apply 1 Application topically 3 (three) times daily as needed. 1 each 0    Acetaminophen Extra Strength 500 MG Oral Tab Take 2 tablets (1,000 mg total) by mouth every 6 (six) hours.           ALLERGIES:   Allergies[1]      FAMILY HISTORY:     Family History   Problem Relation Age of Onset    No Known Problems Father     No Known Problems Mother     Diabetes Neg     Glaucoma Neg           SOCIAL HISTORY:     Social History     Socioeconomic History    Marital status: Single   Tobacco Use    Smoking status: Never     Passive exposure: Never    Smokeless tobacco: Never   Vaping Use    Vaping status: Never Used   Substance and Sexual Activity    Alcohol use: Not Currently    Drug use: No   Other Topics Concern    Caffeine Concern Yes     Comment: 1 cup a day.    Exercise No    Pt has a pacemaker No    Pt has a defibrillator No    Reaction to local anesthetic No   Social History Narrative    The patient uses the following assistive device(s):  Splint on R leg .      The patient does live in a home with stairs.     Social Drivers of Health     Financial Resource Strain: Low Risk  (11/3/2023)    [facility]    Financial Resource Strain     In the past year, have you or any family members you live with been unable to get any of the following when it was really needed? Check all that apply.: None   Food Insecurity: No Food Insecurity (3/8/2024)    Food Insecurity     Food Insecurity: Never true   Transportation Needs: No Transportation Needs (3/8/2024)    Transportation Needs     Lack of  Transportation: No   Social Connections: Low Risk  (11/3/2023)    [facility], [facility]    Social Connections     How often do you see or talk to people that you care about and feel close to? (For example: talking to friends on the phone, visiting friends or family, going to [employer] or club meetings): 5 or more times a week   Housing Stability: Low Risk  (3/8/2024)    Housing Stability     Housing Instability: No          REVIEW OF SYSTEMS:   As noted in HPI      PHYSICAL EXAM:   General: No immediate distress  Head: Normocephalic/ Atraumatic  Eyes: Extra-occular movements intact  Ears/Nose/Throat:  External appearance identifies normal appearance without obvious deformity  Cardiovascular: No cyanosis, clubbing or edema  Respiratory: Non-labored respirations  Skin: No lesions noted   Neurological: alert & oriented x 3, attentive, able to follow commands, comprehention intact, spontaneous speech intact  Psychiatric: Mood and affect appropriate  Musculoskeletal Exam:  No change since last exam        LABS:   No results found for: \"EAG\", \"A1C\"  Lab Results   Component Value Date    WBC 13.6 (H) 03/12/2024    RBC 5.50 03/12/2024    HGB 16.1 03/12/2024    HCT 47.0 03/12/2024    MCV 85.5 06/10/2024    MCH 29.3 03/12/2024    MCHC 33.9 06/10/2024    RDW 12.6 03/12/2024    .0 03/12/2024    MPV 9.8 03/22/2018     Lab Results   Component Value Date     (H) 03/12/2024    BUN 10 03/12/2024    BUNCREA 10.3 03/12/2024    CREATSERUM 0.97 03/12/2024    ANIONGAP 6 03/12/2024    GFRNAA 116 04/02/2022    GFRAA 134 04/02/2022    CA 10.0 03/12/2024    OSMOCALC 285 03/12/2024    ALKPHO 69 03/07/2024    AST 20 03/07/2024    ALT 33 03/07/2024    BILT 1.2 03/07/2024    TP 7.7 03/07/2024    ALB 5.0 (H) 03/07/2024    GLOBULIN 2.7 (L) 03/07/2024     03/12/2024    K 4.9 03/12/2024     03/12/2024    CO2 30.0 03/12/2024     Lab Results   Component Value Date    PTP 14.8 10/16/2023     INR 1.09 10/16/2023     Lab Results   Component Value Date    VITD 89.8 07/24/2023              [1]   Allergies  Allergen Reactions    Latex RASH

## 2024-11-29 ENCOUNTER — TELEPHONE (OUTPATIENT)
Dept: PHYSICAL THERAPY | Facility: HOSPITAL | Age: 29
End: 2024-11-29

## 2024-12-02 ENCOUNTER — HOSPITAL ENCOUNTER (OUTPATIENT)
Age: 29
Discharge: HOME | End: 2024-12-02
Payer: MEDICARE

## 2024-12-02 ENCOUNTER — TELEPHONE (OUTPATIENT)
Dept: PHYSICAL MEDICINE AND REHAB | Facility: CLINIC | Age: 29
End: 2024-12-02

## 2024-12-02 VITALS
TEMPERATURE: 98 F | RESPIRATION RATE: 18 BRPM | SYSTOLIC BLOOD PRESSURE: 144 MMHG | HEART RATE: 75 BPM | DIASTOLIC BLOOD PRESSURE: 95 MMHG | OXYGEN SATURATION: 96 %

## 2024-12-02 DIAGNOSIS — N39.0 URINARY TRACT INFECTION WITHOUT HEMATURIA, SITE UNSPECIFIED: Primary | ICD-10-CM

## 2024-12-02 LAB
BILIRUB UR QL STRIP: NEGATIVE
GLUCOSE UR STRIP-MCNC: NEGATIVE MG/DL
KETONES UR STRIP-MCNC: NEGATIVE MG/DL
NITRITE UR QL STRIP: POSITIVE
PH UR STRIP: 6 [PH]
PROT UR STRIP-MCNC: NEGATIVE MG/DL
SP GR UR STRIP: <=1.005
UROBILINOGEN UR STRIP-ACNC: <2 MG/DL

## 2024-12-02 PROCEDURE — 81002 URINALYSIS NONAUTO W/O SCOPE: CPT | Performed by: PHYSICIAN ASSISTANT

## 2024-12-02 PROCEDURE — 99213 OFFICE O/P EST LOW 20 MIN: CPT | Performed by: PHYSICIAN ASSISTANT

## 2024-12-02 RX ORDER — CEFADROXIL 500 MG/1
500 CAPSULE ORAL 2 TIMES DAILY
Qty: 20 CAPSULE | Refills: 0 | Status: SHIPPED | OUTPATIENT
Start: 2024-12-02 | End: 2024-12-12

## 2024-12-02 NOTE — ED PROVIDER NOTES
Patient Seen in: [facility]      History     Chief Complaint   Patient presents with    Urinary Symptoms     Stated Complaint: head and body aches; pain when urinatingy    Subjective:   HPI    29-year-old male with history of spina bifida, neurogenic bladder who self caths presents to the immediate care with concern for urinary tract infection.  Patient states he has had some bodyaches for the last 2 to 3 days, yesterday developed discomfort while cathing.  Mother who assist patient with catheterizing denies blood in the urine, denies odor.  Patient does feel like the urine appeared cloudy yesterday.  He denies fevers, abdominal or back pain.    Objective:     Past Medical History:    Bilateral leg weakness    chronic    Constipation    Depression    Foot drop, bilateral    Gait disturbance    High blood pressure    Neurogenic bladder    Pt self straight caths at home QID and irrigates BID PRN    Other ill-defined conditions(799.89)    shunt from hydrocephalus    PONV (postoperative nausea and vomiting)    if masked used    S/P  shunt    Spina bifida (HCC)    Management:  closure    Strabismus    Done at [facility]    Strabismus              Past Surgical History:   Procedure Laterality Date    Other surgical history  2010    bladder augmentation/catheterizable channel    Other surgical history  07/06/2020    fistulotomy    Spine surgery procedure unlisted  1994    Strabismus surgery Left 1999 or 2000                Social History     Socioeconomic History    Marital status: Single   Tobacco Use    Smoking status: Never     Passive exposure: Never    Smokeless tobacco: Never   Vaping Use    Vaping status: Never Used   Substance and Sexual Activity    Alcohol use: Not Currently    Drug use: No   Other Topics Concern    Caffeine Concern Yes     Comment: 1 cup a day.    Exercise No    Pt has a pacemaker No    Pt has a defibrillator No    Reaction to local anesthetic No   Social History  Narrative    The patient uses the following assistive device(s):  Splint on R leg .      The patient does live in a home with stairs.     Social Drivers of Health     Financial Resource Strain: Low Risk  (11/3/2023)    [facility] from [facility], [facility]    Financial Resource Strain     In the past year, have you or any family members you live with been unable to get any of the following when it was really needed? Check all that apply.: None   Food Insecurity: No Food Insecurity (3/8/2024)    Food Insecurity     Food Insecurity: Never true   Transportation Needs: No Transportation Needs (3/8/2024)    Transportation Needs     Lack of Transportation: No   Social Connections: Low Risk  (11/3/2023)    [facility] from [facility], [facility]    Social Connections     How often do you see or talk to people that you care about and feel close to? (For example: talking to friends on the phone, visiting friends or family, going to Religious or club meetings): 5 or more times a week   Housing Stability: Low Risk  (3/8/2024)    Housing Stability     Housing Instability: No              Review of Systems    Positive for stated complaint: head and body aches; pain when urinatingy  Other systems are as noted in HPI.  Constitutional and vital signs reviewed.      All other systems reviewed and negative except as noted above.    Physical Exam     ED Triage Vitals [12/02/24 1544]   BP (!) 144/95   Pulse 75   Resp 18   Temp 98.1 °F (36.7 °C)   Temp src Oral   SpO2 96 %   O2 Device None (Room air)       Current Vitals:   Vital Signs  BP: (!) 144/95  Pulse: 75  Resp: 18  Temp: 98.1 °F (36.7 °C)  Temp src: Oral    Oxygen Therapy  SpO2: 96 %  O2 Device: None (Room air)        Physical Exam  Vitals and nursing note reviewed.   Constitutional:       General: He is not in acute distress.  HENT:      Head: Normocephalic and atraumatic.      Right Ear: External ear normal.      Left Ear: External ear  normal.      Nose: Nose normal.      Mouth/Throat:      Mouth: Mucous membranes are moist.   Eyes:      Extraocular Movements: Extraocular movements intact.      Pupils: Pupils are equal, round, and reactive to light.   Cardiovascular:      Rate and Rhythm: Normal rate.   Pulmonary:      Effort: Pulmonary effort is normal.   Abdominal:      General: Abdomen is flat.   Musculoskeletal:         General: Normal range of motion.      Cervical back: Normal range of motion.   Skin:     General: Skin is warm.   Neurological:      General: No focal deficit present.      Mental Status: He is alert and oriented to person, place, and time.   Psychiatric:         Mood and Affect: Mood normal.         Behavior: Behavior normal.             ED Course     Labs Reviewed   [facility] POCT URINALYSIS DIPSTICK - Abnormal; Notable for the following components:       Result Value    Urine Clarity Cloudy (*)     Blood, Urine Trace-lysed (*)     Nitrite Urine Positive (*)     Leukocyte esterase urine Moderate (*)     All other components within normal limits   URINE CULTURE, ROUTINE        29-year-old male presenting with concern for urinary tract infection.  Patient with discomfort while urinating, body aches.  UA cloudy with moderate leukocyte, nitrite positive and trace blood.  Culture is pending.  Will DC with Mary[facility] as he has tolerated this in the past  Ddx-cystitis, UTI, pyelonephritis, viral illness           MDM              Medical Decision Making      Disposition and Plan     Clinical Impression:  1. Urinary tract infection without hematuria, site unspecified         Disposition:  Discharge  12/2/2024  4:20 pm    Follow-up:  Nitin Mejía DO  [address] [place] [id number]  [phone]                Medications Prescribed:  Discharge Medication List as of 12/2/2024  4:28 PM        START taking these medications    Details   cefadroxil 500 MG Oral Cap Take 1 capsule (500 mg total) by mouth 2 (two) times daily for 10  days., Normal, Disp-20 capsule, R-0                 Supplementary Documentation:

## 2024-12-02 NOTE — TELEPHONE ENCOUNTER
[email or web address]  URL: [email or web address]  

## 2024-12-02 NOTE — ED INITIAL ASSESSMENT (HPI)
Pt complaining of HA and body aches started Saturday.  Pt states he has pain when he self cath yesterday.

## 2024-12-02 NOTE — TELEPHONE ENCOUNTER
Interval History: No acute event overnight.  Exam is stable.  Had some agitation on Precedex.    Medications:  Continuous Infusions:   dexmedetomidine (PRECEDEX) infusion Stopped (12/04/19 0915)     Scheduled Meds:   amantadine HCl  100 mg Per NG tube BID    amlodipine-benazepril 5-10 mg  1 capsule Per NG tube Daily    bisacodyl  10 mg Rectal Daily    diltiaZEM  30 mg Per NG tube Q6H    heparin (porcine)  5,000 Units Subcutaneous Q8H    levetiracetam IVPB  1,000 mg Intravenous Q12H    levothyroxine  100 mcg Per NG tube Before breakfast    metoprolol tartrate  25 mg Per NG tube BID    mupirocin  1 g Nasal BID    polyethylene glycol  17 g Per NG tube Daily    QUEtiapine  25 mg Per NG tube QHS    silodosin  4 mg Per NG tube Daily    thiamine (VITAMIN B1) IVPB  100 mg Intravenous Daily     PRN Meds:acetaminophen, acetaminophen, albuterol-ipratropium, clonazePAM, Dextrose 10% Bolus, glucagon (human recombinant), hydrALAZINE, HYDROcodone-acetaminophen, insulin aspart U-100, labetalol, magnesium oxide, magnesium oxide, OLANZapine, ondansetron, potassium chloride 10%, potassium chloride 10%, potassium chloride 10%, potassium, sodium phosphates, potassium, sodium phosphates, potassium, sodium phosphates, sodium chloride 0.9%     Review of Systems    Objective:     Weight: 102.1 kg (225 lb)  Body mass index is 30.52 kg/m².  Vital Signs (Most Recent):  Temp: 98.3 °F (36.8 °C) (12/05/19 0705)  Pulse: 86 (12/05/19 0830)  Resp: (!) 24 (12/05/19 0830)  BP: (!) 166/74 (12/05/19 0805)  SpO2: 99 % (12/05/19 0830) Vital Signs (24h Range):  Temp:  [98.3 °F (36.8 °C)-99.3 °F (37.4 °C)] 98.3 °F (36.8 °C)  Pulse:  [] 86  Resp:  [16-28] 24  SpO2:  [94 %-100 %] 99 %  BP: (110-169)/(60-89) 166/74  Arterial Line BP: ()/(57-94) 92/73     Date 12/05/19 0700 - 12/06/19 0659   Shift 0330-3656 8932-0022 3752-4460 24 Hour Total   INTAKE   I.V.(mL/kg) 10(0.1)   10(0.1)   NG/   130   Shift Total(mL/kg) 140(1.4)    Received Imaging report containing results of MRI Limbar Spine, placed report in  folder by nurse knapp for review.   140(1.4)   OUTPUT   Urine(mL/kg/hr) 225   225   Shift Total(mL/kg) 225(2.2)   225(2.2)   Weight (kg) 102.1 102.1 102.1 102.1                        NG/OG Tube 12/02/19 0001 Left nostril (Active)   Placement Check placement verified by distal tube length measurement;placement verified by x-ray 12/4/2019  3:02 AM   Advancement advanced manually 12/3/2019  3:02 PM   Tolerance no signs/symptoms of discomfort 12/4/2019  3:02 AM   Securement secured to nostril center w/ adhesive device 12/4/2019  3:02 AM   Clamp Status/Tolerance clamped 12/4/2019  3:02 AM   Suction Setting/Drainage Method dependent drainage 12/3/2019  3:02 PM   Insertion Site Appearance no redness, warmth, tenderness, skin breakdown, drainage 12/4/2019  3:02 AM   Drainage None 12/4/2019  3:02 AM   Flush/Irrigation flushed w/;water;no resistance met 12/4/2019  3:02 AM   Feeding Method continuous 12/4/2019  3:02 AM   Feeding Action feeding held 12/4/2019  3:02 AM   Current Rate (mL/hr) 0 mL/hr 12/4/2019  3:02 AM   Goal Rate (mL/hr) 65 mL/hr 12/4/2019  3:02 AM   Intake (mL) 0 mL 12/4/2019  6:02 AM   Water Bolus (mL) 0 mL 12/4/2019  6:02 AM   Rate Formula Tube Feeding (mL/hr) 65 mL/hr 12/4/2019  3:02 AM   Formula Name Glucerna 12/4/2019  3:02 AM   Intake (mL) - Formula Tube Feeding 10 12/4/2019  8:05 AM   Residual Amount (ml) 0 ml 12/4/2019  3:02 AM       Neurosurgery Physical Exam  [id number]  Oriented to himself and place  PERRL  FCX4    Significant Labs:  Recent Labs   Lab 12/04/19  0221 12/04/19  0942 12/05/19  0302   *  --  144*     --  137   K 3.6 4.1 4.0     --  104   CO2 23  --  24   BUN 6*  --  10   CREATININE 0.7  --  0.6   CALCIUM 8.6*  --  8.9   MG 1.9  --  2.0     Recent Labs   Lab 12/04/19  0221 12/05/19  0302   WBC 7.83 8.31   HGB 9.1* 10.2*   HCT 27.9* 31.3*    269     No results for input(s): LABPT, INR, APTT in the last 48 hours.  Microbiology Results (last 7 days)     Procedure Component Value Units Date/Time     Culture, Respiratory with Gram Stain [[id number]]  (Abnormal)  (Susceptibility) Collected:  12/02/19 0825    Order Status:  Completed Specimen:  Respiratory from Tracheal Aspirate Updated:  12/04/19 1344     Respiratory Culture No S aureus or Pseudomonas isolated.      SERRATIA MARCESCENS  Many       Gram Stain (Respiratory) <10 epithelial cells per low power field.     Gram Stain (Respiratory) No WBC's     Gram Stain (Respiratory) Moderate Gram negative rods    Blood culture [[id number]] Collected:  12/02/19 0824    Order Status:  Completed Specimen:  Blood from Peripheral, Hand, Right Updated:  12/04/19 1212     Blood Culture, Routine No Growth to date      No Growth to date      No Growth to date            Significant Diagnostics:  CT head: reviewed, stable post drain removal.

## 2024-12-03 ENCOUNTER — TELEPHONE (OUTPATIENT)
Dept: PHYSICAL THERAPY | Age: 29
End: 2024-12-03

## 2024-12-03 ENCOUNTER — TELEPHONE (OUTPATIENT)
Dept: NEUROSURGERY | Age: 29
End: 2024-12-03

## 2024-12-03 ENCOUNTER — APPOINTMENT (OUTPATIENT)
Dept: PHYSICAL THERAPY | Age: 29
End: 2024-12-03
Attending: FAMILY MEDICINE
Payer: MEDICARE

## 2024-12-05 ENCOUNTER — APPOINTMENT (OUTPATIENT)
Dept: PHYSICAL THERAPY | Age: 29
End: 2024-12-05
Attending: FAMILY MEDICINE
Payer: MEDICARE

## 2024-12-09 ENCOUNTER — OFFICE VISIT (OUTPATIENT)
Dept: FAMILY MEDICINE CLINIC | Facility: CLINIC | Age: 29
End: 2024-12-09
Payer: MEDICARE

## 2024-12-09 VITALS
SYSTOLIC BLOOD PRESSURE: 130 MMHG | TEMPERATURE: 97 F | WEIGHT: 142 LBS | HEART RATE: 86 BPM | BODY MASS INDEX: 24.24 KG/M2 | HEIGHT: 64 IN | DIASTOLIC BLOOD PRESSURE: 80 MMHG

## 2024-12-09 DIAGNOSIS — Z01.00 ENCOUNTER FOR VISION SCREENING: ICD-10-CM

## 2024-12-09 DIAGNOSIS — Q05.9 SPINA BIFIDA, UNSPECIFIED HYDROCEPHALUS PRESENCE, UNSPECIFIED SPINAL REGION (HCC): ICD-10-CM

## 2024-12-09 DIAGNOSIS — I10 ESSENTIAL HYPERTENSION: ICD-10-CM

## 2024-12-09 DIAGNOSIS — I70.1 RENAL ARTERY STENOSIS (HCC): ICD-10-CM

## 2024-12-09 DIAGNOSIS — N39.0 URINARY TRACT INFECTION WITHOUT HEMATURIA, SITE UNSPECIFIED: ICD-10-CM

## 2024-12-09 DIAGNOSIS — N31.9 NEUROGENIC BLADDER: ICD-10-CM

## 2024-12-09 DIAGNOSIS — N30.90 CYSTITIS: Primary | ICD-10-CM

## 2024-12-09 RX ORDER — CARVEDILOL 6.25 MG/1
6.25 TABLET ORAL 2 TIMES DAILY WITH MEALS
Qty: 180 TABLET | Refills: 1 | Status: SHIPPED | OUTPATIENT
Start: 2024-12-09

## 2024-12-09 NOTE — PROGRESS NOTES
Patient ID: Chao Reese is a 29 year old male.    HPI  Chief Complaint   Patient presents with    Urgent Care F/u     Was having abd pain, has a UTI, taking meds, feeling better today     Last seen on 11/14/2024.      Pt went to the Urgent Care on 12/2/2024 for urinary symptoms, headaches, myalgias, and abdominal pain. He was diagnosed with a UTI and his urine sample was positive for cystitis. He is self-catheterizing. He was placed on cefadroxil, he has three more days left. Pt would like to take another sample after he finishes the antibiotic to check if he is still having growth, I gave the order. He states he is feeling better and his symptoms have resolved. Pt has not consulted with urology for chronic UTIs, I gave a referral. He would like a note for work for today and Wednesday, he would like to return 12/16/2024. I proveded a note.     Pt also wishes to consult on a referral to Emilee Powell at [facility], optometrist for a regular check up. I gave a referral.  He's mother states that the optometrist takes their insurance but just needed a referral.      Wt Readings from Last 6 Encounters:   12/09/24 142 lb   11/14/24 142 lb   10/31/24 138 lb   10/22/24 135 lb   10/18/24 135 lb   09/17/24 139 lb 12.8 oz       BMI Readings from Last 6 Encounters:   12/09/24 24.37 kg/m²   11/14/24 24.37 kg/m²   10/31/24 23.69 kg/m²   10/22/24 23.17 kg/m²   10/18/24 23.17 kg/m²   09/17/24 24.00 kg/m²       BP Readings from Last 6 Encounters:   12/09/24 130/80   12/02/24 (!) 144/95   11/14/24 128/80   10/31/24 129/87   10/18/24 131/84   09/17/24 124/88         Review of Systems   Respiratory:  Negative for shortness of breath.    Cardiovascular:  Negative for chest pain.           Medical History:      Past Medical History:    Bilateral leg weakness    chronic    Constipation    Depression    Foot drop, bilateral    Gait disturbance    High blood pressure    Neurogenic bladder    Pt self straight caths at home QID  and irrigates BID PRN    Other ill-defined conditions(799.89)    shunt from hydrocephalus    PONV (postoperative nausea and vomiting)    if masked used    S/P  shunt    Spina bifida (HCC)    Management:  closure    Strabismus    Done at [facility]    Strabismus       Past Surgical History:   Procedure Laterality Date    Other surgical history  2010    bladder augmentation/catheterizable channel    Other surgical history  07/06/2020    fistulotomy    Spine surgery procedure unlisted  1994    Strabismus surgery Left 1999 or 2000          Current Outpatient Medications   Medication Sig Dispense Refill    cefadroxil 500 MG Oral Cap Take 1 capsule (500 mg total) by mouth 2 (two) times daily for 10 days. 20 capsule 0    traMADol 50 MG Oral Tab Take 1 tablet (50 mg total) by mouth every 8 (eight) hours as needed. 40 tablet 0    gabapentin 300 MG Oral Cap Start with night time dose only. If well tolerated, increase to two times daily. If well tolerated, increase to three times daily. 90 capsule 0    CHOLECALCIFEROL 50 MCG (2000 UT) Oral Tab Take 1 tablet (2,000 Units total) by mouth daily. 90 tablet 0    ketoconazole 2 % External Shampoo You can apply this not only to the scalp 2 or 3 times a week in the shower but also to your beard area and then also the chest where the rash is.  Can also use on the face. 120 mL 11    carvedilol 6.25 MG Oral Tab Take 1 tablet (6.25 mg total) by mouth 2 (two) times daily with meals. Her blood pressure 180 tablet 1    sodium chloride 0.9 % Irrigation Solution       oxyBUTYnin Chloride ER 15 MG Oral Tablet 24 Hr Take 1 tablet (15 mg total) by mouth daily. 90 tablet 3    polyethylene glycol, PEG 3350, 17 g Oral Powd Pack Take 17 g by mouth daily. 30 each 3    docusate sodium (COLACE) 100 MG Oral Cap Take 1 capsule (100 mg total) by mouth 2 (two) times daily as needed for constipation. 60 capsule 0    bisacodyl 10 MG Rectal Suppos Place 1 suppository (10 mg total) rectally  daily as needed. 12 suppository 0    camphor/menthol/methyl salicylate 10-15 % External Cream Apply 1 Application topically 3 (three) times daily as needed. 1 each 0    Acetaminophen Extra Strength 500 MG Oral Tab Take 2 tablets (1,000 mg total) by mouth every 6 (six) hours.       Allergies:Allergies[1]     Physical Exam:       Physical Exam  Blood pressure 138/88, pulse 86, temperature 97.3 °F (36.3 °C), temperature source Temporal, height 5' 4\" (1.626 m), weight 142 lb.      Vitals:    12/09/24 0916 12/09/24 0926   BP: 138/88 130/80   BP Location: Left arm    Patient Position: Sitting    Cuff Size: adult    Pulse: 86    Temp: 97.3 °F (36.3 °C)    TempSrc: Temporal    Weight: 142 lb    Height: 5' 4\" (1.626 m)         Physical Exam   Constitutional: Patient is oriented to person, place, and time. Patient appears well-developed and well-nourished. No distress.  Eyes: Conjunctivae and EOM are normal.   Neck: Normal range of motion. No thyromegaly present.   Cardiovascular: Normal rate, regular rhythm and normal heart sounds.    Pulmonary/Chest: Effort normal and breath sounds normal. No respiratory distress.   Lymphadenopathy: Patient has no cervical adenopathy.  Neurological: Patient is alert and oriented to person, place, and time.   Skin: Skin is warm.   Psychiatry: Normal mood and affect.  Abdominal: Normal appearance and bowel sounds are normal. There is    no tenderness.  There is no rigidity, no rebound, no guarding.     Vitals reviewed.           Assessment/Plan:      Diagnoses and all orders for this visit:    Cystitis  -     Urinalysis with Culture Reflex; Future  -     UROLOGY - INTERNAL  Doing well after immediate care and getting on antibiotics.  Reviewed the note.  Reviewed the urine culture.  Essential hypertension  -     carvedilol 6.25 MG Oral Tab; Take 1 tablet (6.25 mg total) by mouth 2 (two) times daily with meals. Her blood pressure  Blood pressure control.  Continue present management  Renal  artery stenosis (HCC)  -     carvedilol 6.25 MG Oral Tab; Take 1 tablet (6.25 mg total) by mouth 2 (two) times daily with meals. Her blood pressure    Spina bifida, unspecified hydrocephalus presence, unspecified spinal region (HCC)  -     UROLOGY - INTERNAL    Urinary tract infection without hematuria, site unspecified  -     UROLOGY - INTERNAL  See urology due to the neurogenic bladder.  Neurogenic bladder  -     UROLOGY - INTERNAL    Encounter for vision screening  -     Optometry Referral - In Network        Referrals (if applicable)  Orders Placed This Encounter   Procedures    UROLOGY - INTERNAL     If Dr Trinh is busy see his partners.     Referral Priority:   Routine     Referral Type:   OFFICE VISIT     Referred to Provider:   Clifford Trinh MD     Requested Specialty:   UROLOGY     Number of Visits Requested:   3    Optometry Referral - In Network     See Dr. Carissa Powell optometry.  Phone number is [phone].     Referral Priority:   Routine     Referral Type:   OFFICE VISIT     Requested Specialty:   OPTOMETRY     Number of Visits Requested:   3       Follow up if symptoms persist.  Take medicine (if given) as prescribed.  Approach to treatment discussed and patient/family member understands and agrees to plan.     No follow-ups on file.    There are no Patient Instructions on file for this visit.    Shital Bowie    12/9/2024    By signing my name below, IShital,  attest that this documentation has been prepared under the direction and in the presence of Nitin Mejía DO.   Electronically Signed: Shital Bowie, 12/9/2024, 9:22 AM.    I, Nitin Mejía DO,  personally performed the services described in this documentation. All medical record entries made by the scribe were at my direction and in my presence.  I have reviewed the chart and discharge instructions (if applicable) and agree that the record reflects my personal performance and is accurate and complete.   Nitin Mejía DO, 12/9/2024, 10:56 AM                  [1]   Allergies  Allergen Reactions    Latex RASH

## 2024-12-10 ENCOUNTER — APPOINTMENT (OUTPATIENT)
Dept: PHYSICAL THERAPY | Age: 29
End: 2024-12-10
Attending: FAMILY MEDICINE
Payer: MEDICARE

## 2024-12-12 ENCOUNTER — APPOINTMENT (OUTPATIENT)
Dept: PHYSICAL THERAPY | Age: 29
End: 2024-12-12
Attending: FAMILY MEDICINE
Payer: MEDICARE

## 2024-12-16 ENCOUNTER — TELEPHONE (OUTPATIENT)
Dept: FAMILY MEDICINE CLINIC | Facility: CLINIC | Age: 29
End: 2024-12-16

## 2024-12-17 ENCOUNTER — APPOINTMENT (OUTPATIENT)
Dept: PHYSICAL THERAPY | Age: 29
End: 2024-12-17
Attending: FAMILY MEDICINE
Payer: MEDICARE

## 2024-12-17 NOTE — TELEPHONE ENCOUNTER
Called patient verified full name and . Asked for clarification for work note request, patient states that he does not feel well and is going to repeat the urinalysis on . Needs a excuse letter for days missing  and  . Please advise

## 2024-12-18 ENCOUNTER — MED REC SCAN ONLY (OUTPATIENT)
Dept: PHYSICAL MEDICINE AND REHAB | Facility: CLINIC | Age: 29
End: 2024-12-18

## 2024-12-18 NOTE — TELEPHONE ENCOUNTER
Radiologist report placed in Dr. Bai's mailbox for his review. Report can be sent to scanning once review is completed.

## 2024-12-19 ENCOUNTER — APPOINTMENT (OUTPATIENT)
Dept: PHYSICAL THERAPY | Age: 29
End: 2024-12-19
Attending: FAMILY MEDICINE
Payer: MEDICARE

## 2024-12-19 ENCOUNTER — LAB ENCOUNTER (OUTPATIENT)
Dept: LAB | Age: 29
End: 2024-12-19
Attending: FAMILY MEDICINE
Payer: MEDICARE

## 2024-12-19 DIAGNOSIS — B96.20 E-COLI UTI: ICD-10-CM

## 2024-12-19 DIAGNOSIS — N39.0 E-COLI UTI: ICD-10-CM

## 2024-12-19 DIAGNOSIS — N30.90 CYSTITIS: ICD-10-CM

## 2024-12-19 DIAGNOSIS — N30.01 ACUTE CYSTITIS WITH HEMATURIA: ICD-10-CM

## 2024-12-19 LAB
BILIRUB UR QL: NEGATIVE
CLARITY UR: CLEAR
COLOR UR: COLORLESS
GLUCOSE UR-MCNC: NORMAL MG/DL
KETONES UR-MCNC: NEGATIVE MG/DL
LEUKOCYTE ESTERASE UR QL STRIP.AUTO: 250
NITRITE UR QL STRIP.AUTO: NEGATIVE
PH UR: 6.5 [PH] (ref 5–8)
PROT UR-MCNC: NEGATIVE MG/DL
SP GR UR STRIP: <1.005 (ref 1–1.03)
UROBILINOGEN UR STRIP-ACNC: NORMAL

## 2024-12-19 PROCEDURE — 87086 URINE CULTURE/COLONY COUNT: CPT

## 2024-12-19 PROCEDURE — 81001 URINALYSIS AUTO W/SCOPE: CPT

## 2024-12-19 NOTE — TELEPHONE ENCOUNTER
Dr Mejía, please advise    Patient (name and  verified) is requesting a work note for this week.    Letter pended for review/approval.

## 2024-12-26 ENCOUNTER — APPOINTMENT (OUTPATIENT)
Dept: PHYSICAL THERAPY | Age: 29
End: 2024-12-26
Attending: FAMILY MEDICINE
Payer: MEDICARE

## 2025-01-02 ENCOUNTER — APPOINTMENT (OUTPATIENT)
Dept: PHYSICAL THERAPY | Age: 30
End: 2025-01-02
Attending: FAMILY MEDICINE
Payer: MEDICARE

## 2025-01-07 ENCOUNTER — APPOINTMENT (OUTPATIENT)
Dept: PHYSICAL THERAPY | Age: 30
End: 2025-01-07
Attending: FAMILY MEDICINE
Payer: MEDICARE

## 2025-01-09 ENCOUNTER — APPOINTMENT (OUTPATIENT)
Dept: PHYSICAL THERAPY | Age: 30
End: 2025-01-09
Attending: FAMILY MEDICINE
Payer: MEDICARE

## 2025-01-14 ENCOUNTER — APPOINTMENT (OUTPATIENT)
Dept: PHYSICAL THERAPY | Age: 30
End: 2025-01-14
Attending: FAMILY MEDICINE
Payer: MEDICARE

## 2025-01-16 ENCOUNTER — MED REC SCAN ONLY (OUTPATIENT)
Dept: FAMILY MEDICINE CLINIC | Facility: CLINIC | Age: 30
End: 2025-01-16

## 2025-01-16 RX ORDER — SULFAMETHOXAZOLE AND TRIMETHOPRIM 800; 160 MG/1; MG/1
1 TABLET ORAL 2 TIMES DAILY
Qty: 14 TABLET | Refills: 0 | Status: SHIPPED | OUTPATIENT
Start: 2025-01-16 | End: 2025-01-23

## 2025-01-22 ENCOUNTER — TELEPHONE (OUTPATIENT)
Dept: SURGERY | Facility: CLINIC | Age: 30
End: 2025-01-22

## 2025-01-22 ENCOUNTER — OFFICE VISIT (OUTPATIENT)
Dept: SURGERY | Facility: CLINIC | Age: 30
End: 2025-01-22
Payer: MEDICARE

## 2025-01-22 VITALS
BODY MASS INDEX: 24.24 KG/M2 | HEART RATE: 79 BPM | DIASTOLIC BLOOD PRESSURE: 87 MMHG | WEIGHT: 142 LBS | SYSTOLIC BLOOD PRESSURE: 130 MMHG | HEIGHT: 64 IN

## 2025-01-22 DIAGNOSIS — N31.9 NEUROGENIC BLADDER: ICD-10-CM

## 2025-01-22 DIAGNOSIS — N31.9 NEUROGENIC BLADDER: Primary | ICD-10-CM

## 2025-01-22 PROCEDURE — 99213 OFFICE O/P EST LOW 20 MIN: CPT | Performed by: UROLOGY

## 2025-01-22 RX ORDER — SYRINGE DISPOSABLE IRRIG,70 ML
SYRINGE (EA) MISCELLANEOUS
Qty: 30 EACH | Refills: 3 | Status: SHIPPED | OUTPATIENT
Start: 2025-01-22 | End: 2025-01-22

## 2025-01-22 RX ORDER — SYRINGE DISPOSABLE IRRIG,70 ML
SYRINGE (EA) MISCELLANEOUS
Qty: 30 EACH | Refills: 3 | Status: SHIPPED | OUTPATIENT
Start: 2025-01-22

## 2025-01-22 NOTE — TELEPHONE ENCOUNTER
I s/w pt and informed him that the [facility] called to inform that they do not have the Viktoria syringes and want to know if we want to send to a different pharmacy      I called pt to ask him about this and I told him that I have sent this to [facility] in the past for other pt's and they were able to get it there. He agreed to this and stated that there is a [facility] in [place]. I sent the script to this pharmacy.

## 2025-01-22 NOTE — PROGRESS NOTES
[facility] Urology  Follow-Up Visit    HPI: Chao Reese is a 29 year old male presents for a follow up visit. Patient was last seen on 5/11/22.  Accompanied by his mother.    INTERVAL HISTORY: Previously seen in 2022.  Extensive past urological history with spina bifida, bladder augment in 2011, and urethral strictures.    He performs self CIC 4 times daily.  He does void in between.  He is on oxybutynin 15 mg daily.    He also irrigates the bladder with the help of his mother.    Follows with gastric urology at [facility].  Last visit February 2023.    He was in the [facility] center December 2024 with complaints of bodyaches and pain with urination/discomfort while cathing.  No blood in the urine.    Urine culture grew Klebsiella pneumonia.  Largely sensitive.  Treated with antibiotics.    Repeat urine culture 12/19/2024 was negative.  Urine culture on 1/14/25 with Klebsiella.  He was prescribed antibiotics however did not start.  He currently denies any UTI symptoms.    CT abdomen pelvis June 2024 revealing nonobstructing left kidney stones that were present on previous CT.  Nonspecific bladder wall thickening.  Left renal cortical scarring.      1. 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 Urethral Stricture  3. Bladder Lesion  Patient has a history of congenital spina bifida with myelomeningocele and known neurogenic bladder.  He is status post augmentation cystoplasty at [facility] in 2011.  Urological care has been through their pediatric urology department, last visit with Dr. Mock 10/2019.     Per progress notes from care everywhere, patient has a urethral stricture upon office cystoscopy in 2015 that was not able to be negotiated with the cystoscope.  He was supposed to have cystoscopy under general anesthesia with possible urethral stricture dilation and bladder Botox injection in 2019 by Dr. Mock.  Patient did not schedule surgery, due to  COVID-19 pandemic.     He self catheterizes every 3-4 hours with a 12 Filipino catheter however, he reports that sometimes he goes longer than the 3 to 4 hours recommended interval.  He occasionally irrigates his bladder once or twice a week.     No recent breakthrough UTIs.  No fevers or chills.     Patient had a recent renal bladder ultrasound ordered by his PCP 3/2022 for evaluation for renal artery stenosis.  It did reveal right renal artery stenosis however incidentally revealed a 2.8 cm focal bladder wall thickening posterior margin of the bladder.  Further evaluation recommended.     Patient denies gross hematuria.  He is on oxybutynin 15 mg XL daily.  He has occasional urge type incontinence and uses depends.     He reports having bowel movements every other day.  Currently no constipation.        PAST MEDICAL HISTORY: Spina bifida, hypertension, strabismus, depression.     PAST SURGICAL HISTORY: Augmentation cystoplasty 2011,  shunt with revision x3, strabismus surgery, anal fistulotomy 2020.     SOCIAL HISTORY: Patient is single and has no children.  He denies smoking or illicit drug use.  He does not drink alcohol.  He worked to do so by [employer].     Reviewed past medical, surgical, family, and social history.  Reviewed med list and allergies.      REVIEW OF SYSTEMS:  Pertinent positives and negatives per HPI. A 10-point ROS was performed and is otherwise negative.       EXAM:  /87 (BP Location: Right arm, Patient Position: Sitting, Cuff Size: adult)   Pulse 79   Ht 5' 4\" (1.626 m)   Wt 142 lb (64.4 kg)   BMI 24.37 kg/m²     Physical Exam  Constitutional:       Appearance: He is well-developed.   HENT:      Head: Normocephalic.   Eyes:      General: No scleral icterus.  Cardiovascular:      Rate and Rhythm: Normal rate.   Pulmonary:      Effort: Pulmonary effort is normal.   Skin:     General: Skin is warm and dry.   Neurological:      Mental Status: He is alert and oriented to person,  place, and time.   Psychiatric:         Mood and Affect: Mood normal.         Behavior: Behavior normal.       PATHOLOGY:  No results.      LABS:  See HPI.      IMAGING:  No results found.      UROLOGY PROCEDURE:  None performed      IMPRESSION & PLAN:  29 year old male with history of spina bifida, myelomeningocele, neurogenic bladder. Status post augmentation cystoplasty in 2011 at [facility]. Urological care at [facility], last visit in 2/2023.     Patient self catheterizes urinary bladder 4 times daily.    He has asymptomatic bacteriuria.  He did have a symptomatic UTI back in December 2024 which was treated with sensitive antibiotics.    Reviewed with patient and mother at length.  Discussed indications for treatment of bacteria in self catheterizing patient population.    He has been under the care of pediatric urology at [facility] who still see him.  They previously discussed options of a catheterizable channel or bladder neck reconstruction.  I recommend that he continue to follow-up with them.    In the interim, he is continue to perform CIC as instructed.  Also continue bladder irrigation with sterile NS.    All questions answered.  They are in agreement with the plan.      Clifford Trinh MD  1/22/2025

## 2025-01-22 NOTE — TELEPHONE ENCOUNTER
[facility] called.  Pt was rx. 70 ml olga syringe.  Supplier does not carry.  Can rx. Be changed or sent to another pharmacy.   Please call

## 2025-01-28 ENCOUNTER — OFFICE VISIT (OUTPATIENT)
Dept: FAMILY MEDICINE CLINIC | Facility: CLINIC | Age: 30
End: 2025-01-28
Payer: MEDICARE

## 2025-01-28 VITALS
DIASTOLIC BLOOD PRESSURE: 84 MMHG | TEMPERATURE: 97 F | WEIGHT: 143 LBS | BODY MASS INDEX: 24.41 KG/M2 | SYSTOLIC BLOOD PRESSURE: 127 MMHG | HEIGHT: 64 IN | HEART RATE: 73 BPM

## 2025-01-28 DIAGNOSIS — I70.1 RENAL ARTERY STENOSIS: ICD-10-CM

## 2025-01-28 DIAGNOSIS — L02.214 ABSCESS OF RIGHT GROIN: Primary | ICD-10-CM

## 2025-01-28 DIAGNOSIS — I10 ESSENTIAL HYPERTENSION: ICD-10-CM

## 2025-01-28 PROCEDURE — 99214 OFFICE O/P EST MOD 30 MIN: CPT | Performed by: FAMILY MEDICINE

## 2025-01-28 PROCEDURE — G2211 COMPLEX E/M VISIT ADD ON: HCPCS | Performed by: FAMILY MEDICINE

## 2025-01-28 RX ORDER — CEFADROXIL 500 MG/1
500 CAPSULE ORAL 2 TIMES DAILY
Qty: 20 CAPSULE | Refills: 0 | Status: SHIPPED | OUTPATIENT
Start: 2025-01-28 | End: 2025-02-07

## 2025-01-28 RX ORDER — CARVEDILOL 6.25 MG/1
6.25 TABLET ORAL 2 TIMES DAILY WITH MEALS
Qty: 180 TABLET | Refills: 1 | Status: SHIPPED | OUTPATIENT
Start: 2025-01-28

## 2025-01-28 NOTE — PROGRESS NOTES
Patient ID: Chao Reese is a 29 year old male.    HPI  Chief Complaint   Patient presents with    Abscess      right inguinal area      Last seen on 12/09/2024.      Pt c/o abscess in the right inguinal region that he noticed about 7 days ago. It started off the size of a pimple and has not noticeably grown.  It popped and he noticed blood and purulent discharge once he took off the bandage. He used ointment and a warm tea bag to help with swelling. He also washed it with soap in the shower. It is mildly painful but improving from onset. It has currently dried up and no longer bleeding. Denies fevers. I discussed with him. He missed work yesterday and would like a note; I provided one.     Pt is going to the gym and is eating better. He has more energy and is sleeping better. He also thinks it is helping with stability while standing. Pt also wanted to consult on creatine to help build up some muscle and understands that he needs to drink more water so he does not damage his kidneys. I discussed with him and let him know to stay hydrated while he uses it.       Wt Readings from Last 6 Encounters:   01/28/25 143 lb   01/22/25 142 lb   12/09/24 142 lb   11/14/24 142 lb   10/31/24 138 lb   10/22/24 135 lb       BMI Readings from Last 6 Encounters:   01/28/25 24.55 kg/m²   01/22/25 24.37 kg/m²   12/09/24 24.37 kg/m²   11/14/24 24.37 kg/m²   10/31/24 23.69 kg/m²   10/22/24 23.17 kg/m²       BP Readings from Last 6 Encounters:   01/28/25 127/84   01/22/25 130/87   12/09/24 130/80   12/02/24 (!) 144/95   11/14/24 128/80   10/31/24 129/87         Review of Systems   Constitutional:  Negative for fever.   Respiratory:  Negative for shortness of breath.    Cardiovascular:  Negative for chest pain.   Skin:  Positive for wound.           Medical History:      Past Medical History:    Bilateral leg weakness    chronic    Constipation    Depression    Foot drop, bilateral    Gait disturbance    High blood pressure    Neurogenic  bladder    Pt self straight caths at home QID and irrigates BID PRN    Other ill-defined conditions(799.89)    shunt from hydrocephalus    PONV (postoperative nausea and vomiting)    if masked used    S/P  shunt    Spina bifida (HCC)    Management:  closure    Strabismus    Done at [facility]    Strabismus       Past Surgical History:   Procedure Laterality Date    Other surgical history  2010    bladder augmentation/catheterizable channel    Other surgical history  07/06/2020    fistulotomy    Spine surgery procedure unlisted  1994    Strabismus surgery Left 1999 or 2000          Current Outpatient Medications   Medication Sig Dispense Refill    Syringe, Disposable, (OLGA SYRINGE) 70 ML Does not apply Misc Durable medical equipment. 70 ml olga syringe. 30 each 3    carvedilol 6.25 MG Oral Tab Take 1 tablet (6.25 mg total) by mouth 2 (two) times daily with meals. Her blood pressure 180 tablet 1    traMADol 50 MG Oral Tab Take 1 tablet (50 mg total) by mouth every 8 (eight) hours as needed. 40 tablet 0    gabapentin 300 MG Oral Cap Start with night time dose only. If well tolerated, increase to two times daily. If well tolerated, increase to three times daily. 90 capsule 0    CHOLECALCIFEROL 50 MCG (2000 UT) Oral Tab Take 1 tablet (2,000 Units total) by mouth daily. 90 tablet 0    ketoconazole 2 % External Shampoo You can apply this not only to the scalp 2 or 3 times a week in the shower but also to your beard area and then also the chest where the rash is.  Can also use on the face. 120 mL 11    sodium chloride 0.9 % Irrigation Solution       oxyBUTYnin Chloride ER 15 MG Oral Tablet 24 Hr Take 1 tablet (15 mg total) by mouth daily. 90 tablet 3    bisacodyl 10 MG Rectal Suppos Place 1 suppository (10 mg total) rectally daily as needed. 12 suppository 0    camphor/menthol/methyl salicylate 10-15 % External Cream Apply 1 Application topically 3 (three) times daily as needed. 1 each 0     Acetaminophen Extra Strength 500 MG Oral Tab Take 2 tablets (1,000 mg total) by mouth every 6 (six) hours.      docusate sodium (COLACE) 100 MG Oral Cap Take 1 capsule (100 mg total) by mouth 2 (two) times daily as needed for constipation. (Patient not taking: Reported on 1/28/2025) 60 capsule 0     Allergies:Allergies[1]     Physical Exam:       Physical Exam  Genitourinary:         Comments: Firm lesion in the right groin.      Blood pressure 143/79, pulse 73, temperature 97.2 °F (36.2 °C), temperature source Temporal, height 5' 4\" (1.626 m), weight 143 lb.      Vitals:    01/28/25 0940 01/28/25 0958   BP: 143/79 127/84   Pulse: 73    Temp: 97.2 °F (36.2 °C)    TempSrc: Temporal    Weight: 143 lb    Height: 5' 4\" (1.626 m)         Physical Exam   Constitutional: Patient is oriented to person, place, and time. Patient appears well-developed and well-nourished. No distress.   Neurological: Patient is alert and oriented to person, place, and time.   Skin: Skin is warm.   Psychiatry: Normal mood and affect.  Inguinal Region: In the right inguinal crease he has a firm 1 cm possible abscess without any redness or drainage at this time. It is tender when I squeeze it. No inguinal lymphadenopathy bilaterally.     Vitals reviewed.           Assessment/Plan:      Diagnoses and all orders for this visit:    Abscess of right groin  -     cefadroxil 500 MG Oral Cap; Take 1 capsule (500 mg total) by mouth 2 (two) times daily for 10 days. To prevent infection due to abscess of the right groin.  Patient Instructions   Warm compresses in the right groin for 5 to 10 minutes.  Do this about twice a day.  Apply a bandage to the abscess in the groin so he does not get a rub done.  Once it is much softer and better you can go ahead and take the bandage off.  Continue the blood pressure medications and then around 2/28/2025 go ahead and do a kidney function test for me.  You do not need to fast.    Essential hypertension  -     Basic  Metabolic Panel (8); Future  -     carvedilol 6.25 MG Oral Tab; Take 1 tablet (6.25 mg total) by mouth 2 (two) times daily with meals. Her blood pressure  In 1 month go ahead and do the kidney function so we can make sure that the creatinine that you been taking has not decreased your kidney function.  He agrees with plan.  Renal artery stenosis  -     carvedilol 6.25 MG Oral Tab; Take 1 tablet (6.25 mg total) by mouth 2 (two) times daily with meals. Her blood pressure        Referrals (if applicable)  No orders of the defined types were placed in this encounter.      Follow up if symptoms persist.  Take medicine (if given) as prescribed.  Approach to treatment discussed and patient/family member understands and agrees to plan.     No follow-ups on file.    Patient Instructions   Warm compresses in the right groin for 5 to 10 minutes.  Do this about twice a day.  Apply a bandage to the abscess in the groin so he does not get a rub done.  Once it is much softer and better you can go ahead and take the bandage off.  Continue the blood pressure medications and then around 2/28/2025 go ahead and do a kidney function test for me.  You do not need to fast.    Shital Bowie    1/28/2025    By signing my name below, IShital,  attest that this documentation has been prepared under the direction and in the presence of Nitin Mejía DO.   Electronically Signed: Shital Bowie, 1/28/2025, 9:51 AM.    I, Nitin Mejía DO,  personally performed the services described in this documentation. All medical record entries made by the scribe were at my direction and in my presence.  I have reviewed the chart and discharge instructions (if applicable) and agree that the record reflects my personal performance and is accurate and complete.  Nitin Mejía DO, 1/28/2025, 10:14 AM                  [1]   Allergies  Allergen Reactions    Latex RASH

## 2025-01-28 NOTE — PATIENT INSTRUCTIONS
Warm compresses in the right groin for 5 to 10 minutes.  Do this about twice a day.  Apply a bandage to the abscess in the groin so he does not get a rub done.  Once it is much softer and better you can go ahead and take the bandage off.  Continue the blood pressure medications and then around 2/28/2025 go ahead and do a kidney function test for me.  You do not need to fast.

## 2025-02-06 ENCOUNTER — OFFICE VISIT (OUTPATIENT)
Dept: FAMILY MEDICINE CLINIC | Facility: CLINIC | Age: 30
End: 2025-02-06
Payer: MEDICARE

## 2025-02-06 ENCOUNTER — LAB ENCOUNTER (OUTPATIENT)
Dept: LAB | Age: 30
End: 2025-02-06
Attending: FAMILY MEDICINE
Payer: MEDICARE

## 2025-02-06 VITALS
WEIGHT: 139 LBS | BODY MASS INDEX: 23.73 KG/M2 | SYSTOLIC BLOOD PRESSURE: 123 MMHG | HEART RATE: 75 BPM | DIASTOLIC BLOOD PRESSURE: 74 MMHG | OXYGEN SATURATION: 96 % | TEMPERATURE: 98 F | HEIGHT: 64 IN

## 2025-02-06 DIAGNOSIS — R19.7 DIARRHEA, UNSPECIFIED TYPE: Primary | ICD-10-CM

## 2025-02-06 DIAGNOSIS — L21.9 SEBORRHEIC DERMATITIS: ICD-10-CM

## 2025-02-06 DIAGNOSIS — R14.0 BLOATING SYMPTOM: ICD-10-CM

## 2025-02-06 DIAGNOSIS — I10 ESSENTIAL HYPERTENSION: ICD-10-CM

## 2025-02-06 DIAGNOSIS — K52.9 GASTROENTERITIS: ICD-10-CM

## 2025-02-06 LAB
ANION GAP SERPL CALC-SCNC: 8 MMOL/L (ref 0–18)
BUN BLD-MCNC: 9 MG/DL (ref 9–23)
BUN/CREAT SERPL: 10.3 (ref 10–20)
CALCIUM BLD-MCNC: 9.6 MG/DL (ref 8.7–10.4)
CHLORIDE SERPL-SCNC: 104 MMOL/L (ref 98–112)
CO2 SERPL-SCNC: 30 MMOL/L (ref 21–32)
CREAT BLD-MCNC: 0.87 MG/DL
EGFRCR SERPLBLD CKD-EPI 2021: 119 ML/MIN/1.73M2 (ref 60–?)
FASTING STATUS PATIENT QL REPORTED: NO
GLUCOSE BLD-MCNC: 103 MG/DL (ref 70–99)
OSMOLALITY SERPL CALC.SUM OF ELEC: 293 MOSM/KG (ref 275–295)
POTASSIUM SERPL-SCNC: 4.2 MMOL/L (ref 3.5–5.1)
SODIUM SERPL-SCNC: 142 MMOL/L (ref 136–145)

## 2025-02-06 PROCEDURE — 36415 COLL VENOUS BLD VENIPUNCTURE: CPT

## 2025-02-06 PROCEDURE — 80048 BASIC METABOLIC PNL TOTAL CA: CPT

## 2025-02-06 PROCEDURE — 99213 OFFICE O/P EST LOW 20 MIN: CPT

## 2025-02-06 RX ORDER — CLOTRIMAZOLE 1 %
1 CREAM (GRAM) TOPICAL 2 TIMES DAILY
Qty: 60 G | Refills: 0 | Status: SHIPPED | OUTPATIENT
Start: 2025-02-06 | End: 2025-02-20

## 2025-02-06 RX ORDER — FAMOTIDINE 20 MG/1
20 TABLET, FILM COATED ORAL 2 TIMES DAILY PRN
Qty: 40 TABLET | Refills: 1 | Status: SHIPPED | OUTPATIENT
Start: 2025-02-06

## 2025-02-06 NOTE — PATIENT INSTRUCTIONS
Can add over the counter hydrocortisone cream twice daily for 14 days if needed to chest and face.

## 2025-02-06 NOTE — PROGRESS NOTES
Subjective:   Chao Reese is a 29 year old male who presents for Stomach Pain (Past 3 days, watery stool on tues night but right now getting back to normal, burping & acid reflux, thinks its something he aint before going to the gym )   Patient is a pleasant 29 year old male with past medical history consistent for spina bifida,  shunt, and neurogenic bladder who self caths QID, who presents to be evaluated for abdominal pain, watery diarrhea, and burping. Patient states on Tuesday night he was getting ready to go to the gym. He was stretching and went to the bathroom he had liquid stool and had a cramping type pain. Had lots of burping and reflux. He trialed a bland diet and tums. He feels better today. NO blood in stool. Able to toleate po. No fever or chills. Needs a note for work yesterday. He has started a preworkout as well. Advised this is likely cause. He is on a combo pro/prebiotic. He also has noticed a red dry itchy rash to chest similar to T zone on face. It has been about one week and gets worse when gets out of the shower.           Past Medical History:    Bilateral leg weakness    chronic    Constipation    Depression    Foot drop, bilateral    Gait disturbance    High blood pressure    Neurogenic bladder    Pt self straight caths at home QID and irrigates BID PRN    Other ill-defined conditions(799.89)    shunt from hydrocephalus    PONV (postoperative nausea and vomiting)    if masked used    S/P  shunt    Spina bifida (HCC)    Management:  closure    Strabismus    Done at [facility]    Strabismus      Past Surgical History:   Procedure Laterality Date    Other surgical history  2010    bladder augmentation/catheterizable channel    Other surgical history  07/06/2020    fistulotomy    Spine surgery procedure unlisted  1994    Strabismus surgery Left 1999 or 2000        History/Other:    Chief Complaint Reviewed and Verified  Nursing Notes Reviewed and   Verified  Tobacco  Reviewed  Allergies Reviewed  Medications Reviewed    Problem List Reviewed  Medical History Reviewed  Surgical History   Reviewed  Family History Reviewed  Social History Reviewed         Tobacco:  He has never smoked tobacco.    Current Outpatient Medications   Medication Sig Dispense Refill    clotrimazole 1 % External Cream Apply 1 Application topically 2 (two) times daily for 14 days. 60 g 0    famotidine 20 MG Oral Tab Take 1 tablet (20 mg total) by mouth 2 (two) times daily as needed for Heartburn. 40 tablet 1    cefadroxil 500 MG Oral Cap Take 1 capsule (500 mg total) by mouth 2 (two) times daily for 10 days. To prevent infection due to abscess of the right groin. 20 capsule 0    carvedilol 6.25 MG Oral Tab Take 1 tablet (6.25 mg total) by mouth 2 (two) times daily with meals. Her blood pressure 180 tablet 1    Syringe, Disposable, (OLGA SYRINGE) 70 ML Does not apply Misc Durable medical equipment. 70 ml olga syringe. 30 each 3    ketoconazole 2 % External Shampoo You can apply this not only to the scalp 2 or 3 times a week in the shower but also to your beard area and then also the chest where the rash is.  Can also use on the face. 120 mL 11    sodium chloride 0.9 % Irrigation Solution       oxyBUTYnin Chloride ER 15 MG Oral Tablet 24 Hr Take 1 tablet (15 mg total) by mouth daily. 90 tablet 3    docusate sodium (COLACE) 100 MG Oral Cap Take 1 capsule (100 mg total) by mouth 2 (two) times daily as needed for constipation. 60 capsule 0    bisacodyl 10 MG Rectal Suppos Place 1 suppository (10 mg total) rectally daily as needed. 12 suppository 0    camphor/menthol/methyl salicylate 10-15 % External Cream Apply 1 Application topically 3 (three) times daily as needed. 1 each 0    traMADol 50 MG Oral Tab Take 1 tablet (50 mg total) by mouth every 8 (eight) hours as needed. (Patient not taking: Reported on 2/6/2025) 40 tablet 0    gabapentin 300 MG Oral Cap Start with night time dose only. If well  tolerated, increase to two times daily. If well tolerated, increase to three times daily. (Patient not taking: Reported on 2/6/2025) 90 capsule 0    CHOLECALCIFEROL 50 MCG (2000 UT) Oral Tab Take 1 tablet (2,000 Units total) by mouth daily. (Patient not taking: Reported on 2/6/2025) 90 tablet 0    Acetaminophen Extra Strength 500 MG Oral Tab Take 2 tablets (1,000 mg total) by mouth every 6 (six) hours. (Patient not taking: Reported on 2/6/2025)           Review of Systems:  Review of Systems   Constitutional:  Negative for chills and fever.   Respiratory:  Negative for chest tightness and shortness of breath.    Cardiovascular:  Negative for chest pain.   Gastrointestinal:  Positive for abdominal pain and diarrhea. Negative for blood in stool, nausea and vomiting.   Skin:  Positive for color change and rash.         Objective:   /74 (BP Location: Left arm, Patient Position: Sitting, Cuff Size: large)   Pulse 75   Temp 97.6 °F (36.4 °C) (Oral)   Ht 5' 4\" (1.626 m)   Wt 139 lb (63 kg)   SpO2 96%   BMI 23.86 kg/m²  Estimated body mass index is 23.86 kg/m² as calculated from the following:    Height as of this encounter: 5' 4\" (1.626 m).    Weight as of this encounter: 139 lb (63 kg).  Physical Exam  Vitals and nursing note reviewed.   Constitutional:       Appearance: Normal appearance. He is normal weight.   Cardiovascular:      Rate and Rhythm: Normal rate and regular rhythm.      Pulses: Normal pulses.      Heart sounds: Normal heart sounds. No murmur heard.  Pulmonary:      Effort: Pulmonary effort is normal.      Breath sounds: Normal breath sounds.   Abdominal:      General: Abdomen is flat. Bowel sounds are normal. There is no distension.      Palpations: Abdomen is soft. There is no mass.      Tenderness: There is no abdominal tenderness. There is no guarding or rebound.      Hernia: No hernia is present.   Skin:     Capillary Refill: Capillary refill takes less than 2 seconds.      Findings:  Erythema and rash present.             Comments: Sebhorreic dermatitis to face  Fungal rash with itching to mid chest   Neurological:      Mental Status: He is alert and oriented to person, place, and time.           Assessment & Plan:   1. Diarrhea, unspecified type (Primary)  2. Gastroenteritis  3. Seborrheic dermatitis  -     Clotrimazole; Apply 1 Application topically 2 (two) times daily for 14 days.  Dispense: 60 g; Refill: 0  4. Bloating symptom  -     Famotidine; Take 1 tablet (20 mg total) by mouth 2 (two) times daily as needed for Heartburn.  Dispense: 40 tablet; Refill: 1    1. Diarrhea, unspecified type  Likely secondary to stimulant pre workout (energy amino)  Advised increased water intake  Tolerating po  Resolving  Continue pre/probiotic    2. Gastroenteritis  [place] diet  Advance as tolerated    3. Seborrheic dermatitis  - encouraged frequent emollient application  - to apply topical antifungal twice daily as instructed  - increase hydration and rest as tolerated  - encouraged to wrap area to avoid excessive friction  - discussed red flags for urgent reevaluation  - follow-up with Derm if no improvement after 2 weeks    - clotrimazole 1 % External Cream; Apply 1 Application topically 2 (two) times daily for 14 days.  Dispense: 60 g; Refill: 0    4. Bloating symptom  Likely reflux  Improved with tums  Start pepcid BID PRN  - famotidine 20 MG Oral Tab; Take 1 tablet (20 mg total) by mouth 2 (two) times daily as needed for Heartburn.  Dispense: 40 tablet; Refill: 1      Patient aware of plan of care. All questions answered to satisfaction of the patient. Patient instructed to call office or reach out via [employer] if any issues arise. For urgent issues and/or reviewed red flags please proceed to the urgent care or ER.  Also, inform the nurse practitioner with any new symptoms or medication side effects.        Return for Annual physical.    OLS Wu, 2/6/2025, 8:05 AM

## 2025-02-20 ENCOUNTER — TELEPHONE (OUTPATIENT)
Dept: FAMILY MEDICINE CLINIC | Facility: CLINIC | Age: 30
End: 2025-02-20

## 2025-02-20 DIAGNOSIS — H52.12 MYOPIA OF LEFT EYE: Primary | ICD-10-CM

## 2025-02-20 DIAGNOSIS — H52.01 HYPEROPIA OF RIGHT EYE: ICD-10-CM

## 2025-02-20 DIAGNOSIS — H52.223 REGULAR ASTIGMATISM OF BOTH EYES: ICD-10-CM

## 2025-02-20 NOTE — TELEPHONE ENCOUNTER
Patient requesting Optometrist referral. Please call patient when referral is faxed so patient is able to schedule appointment.     Emilee Powell   [address] [facility]    P [phone]  F [phone]

## 2025-02-21 ENCOUNTER — NURSE TRIAGE (OUTPATIENT)
Dept: FAMILY MEDICINE CLINIC | Facility: CLINIC | Age: 30
End: 2025-02-21

## 2025-02-21 NOTE — TELEPHONE ENCOUNTER
Action Requested: Summary for Provider     []  Critical Lab, Recommendations Needed  [] Need Additional Advice  []   FYI    []   Need Orders  [] Need Medications Sent to Pharmacy  []  Other     SUMMARY: Middle abdominal pain 4/10, body chills, and aches started this morning. He  didn't go to work. He had a small, hard bowel movement last night with normal color and no bleeding. Mild nausea and  burping a lot . He is taking colace and  probiotic pill.   He prefers to see Dr Mejía next week.    Future Appointments   Date Time Provider Department Center   2/25/2025 10:00 AM Nitin Mejía DO [facility]     PROBLEM LIST :    Gastrointestinal and Abdominal  Gastroenteritis  Diarrhea, unspecified type  Constipation  Abdominal pain of unknown etiology  Reason for call: Abdominal Pain  Onset: today           Reason for Disposition   Patient wants to be seen    Protocols used: Abdominal Pain - Male-[facility]-[place]

## 2025-02-25 ENCOUNTER — HOSPITAL ENCOUNTER (OUTPATIENT)
Dept: GENERAL RADIOLOGY | Age: 30
Discharge: HOME | End: 2025-02-25
Attending: FAMILY MEDICINE
Payer: MEDICARE

## 2025-02-25 ENCOUNTER — OFFICE VISIT (OUTPATIENT)
Dept: FAMILY MEDICINE CLINIC | Facility: CLINIC | Age: 30
End: 2025-02-25
Payer: MEDICARE

## 2025-02-25 VITALS
TEMPERATURE: 98 F | DIASTOLIC BLOOD PRESSURE: 85 MMHG | HEART RATE: 72 BPM | BODY MASS INDEX: 23.36 KG/M2 | HEIGHT: 64 IN | SYSTOLIC BLOOD PRESSURE: 134 MMHG | WEIGHT: 136.81 LBS

## 2025-02-25 DIAGNOSIS — K21.9 GASTROESOPHAGEAL REFLUX DISEASE, UNSPECIFIED WHETHER ESOPHAGITIS PRESENT: ICD-10-CM

## 2025-02-25 DIAGNOSIS — R10.13 EPIGASTRIC ABDOMINAL PAIN: ICD-10-CM

## 2025-02-25 DIAGNOSIS — K59.09 OTHER CONSTIPATION: ICD-10-CM

## 2025-02-25 DIAGNOSIS — Q05.9 SPINA BIFIDA, UNSPECIFIED HYDROCEPHALUS PRESENCE, UNSPECIFIED SPINAL REGION (HCC): ICD-10-CM

## 2025-02-25 DIAGNOSIS — K62.5 RECTAL BLEED: ICD-10-CM

## 2025-02-25 DIAGNOSIS — K59.09 OTHER CONSTIPATION: Primary | ICD-10-CM

## 2025-02-25 PROCEDURE — G2211 COMPLEX E/M VISIT ADD ON: HCPCS | Performed by: FAMILY MEDICINE

## 2025-02-25 PROCEDURE — 99214 OFFICE O/P EST MOD 30 MIN: CPT | Performed by: FAMILY MEDICINE

## 2025-02-25 PROCEDURE — 74018 RADEX ABDOMEN 1 VIEW: CPT | Performed by: FAMILY MEDICINE

## 2025-02-25 RX ORDER — DOCUSATE SODIUM 100 MG/1
100 CAPSULE, LIQUID FILLED ORAL 2 TIMES DAILY PRN
Qty: 60 CAPSULE | Refills: 0 | Status: SHIPPED | OUTPATIENT
Start: 2025-02-25

## 2025-02-25 NOTE — PROGRESS NOTES
Patient ID: Chao Reese is a 29 year old male.    HPI  Chief Complaint   Patient presents with    Body ache and/or chills     X 5 days    Constipation     X 5 days    Epigastric Pain     Last seen on 01/28/2025.    Pt began felling constipated on 2/20/2025 and started taking an OTC probiotic to help loosen the stool. On 2/21/2025 he also began to experience epigastric abdominal pain and was burping. He also started having an acid taste in the throat. Denies fevers, positive myalgias but feels this was more just on the abdomen due to the constipation. Abdominal pain has improved since then and he is no longer constipated. He last had a bowel movement yesterday, and it was normal, although had some wipe type blood prior due to straining. The acid in the throat has also improved, he took Tums with relief as needed. Pt hasn't been using colace, I discussed with him. I provided a note for work for 2/21/2025 and 2/24/2025.  He states that the abdominal discomfort was in the epigastric area.    Wt Readings from Last 6 Encounters:   02/25/25 136 lb 12.8 oz   02/06/25 139 lb   01/28/25 143 lb   01/22/25 142 lb   12/09/24 142 lb   11/14/24 142 lb       BMI Readings from Last 6 Encounters:   02/25/25 23.48 kg/m²   02/06/25 23.86 kg/m²   01/28/25 24.55 kg/m²   01/22/25 24.37 kg/m²   12/09/24 24.37 kg/m²   11/14/24 24.37 kg/m²       BP Readings from Last 6 Encounters:   02/25/25 134/85   02/06/25 123/74   01/28/25 127/84   01/22/25 130/87   12/09/24 130/80   12/02/24 (!) 144/95         Review of Systems   Respiratory:  Negative for shortness of breath.    Cardiovascular:  Negative for chest pain.   Gastrointestinal:  Positive for abdominal pain.           Medical History:      Past Medical History:    Bilateral leg weakness    chronic    Constipation    Depression    Foot drop, bilateral    Gait disturbance    High blood pressure    Neurogenic bladder    Pt self straight caths at home QID and irrigates BID PRN    Other  ill-defined conditions(799.89)    shunt from hydrocephalus    PONV (postoperative nausea and vomiting)    if masked used    S/P  shunt    Spina bifida (HCC)    Management:  closure    Strabismus    Done at [facility]    Strabismus       Past Surgical History:   Procedure Laterality Date    Other surgical history  2010    bladder augmentation/catheterizable channel    Other surgical history  07/06/2020    fistulotomy    Spine surgery procedure unlisted  1994    Strabismus surgery Left 1999 or 2000          Current Outpatient Medications   Medication Sig Dispense Refill    famotidine 20 MG Oral Tab Take 1 tablet (20 mg total) by mouth 2 (two) times daily as needed for Heartburn. 40 tablet 1    carvedilol 6.25 MG Oral Tab Take 1 tablet (6.25 mg total) by mouth 2 (two) times daily with meals. Her blood pressure 180 tablet 1    Syringe, Disposable, (OLGA SYRINGE) 70 ML Does not apply Misc Durable medical equipment. 70 ml olga syringe. 30 each 3    traMADol 50 MG Oral Tab Take 1 tablet (50 mg total) by mouth every 8 (eight) hours as needed. (Patient not taking: Reported on 2/6/2025) 40 tablet 0    gabapentin 300 MG Oral Cap Start with night time dose only. If well tolerated, increase to two times daily. If well tolerated, increase to three times daily. (Patient not taking: Reported on 2/6/2025) 90 capsule 0    CHOLECALCIFEROL 50 MCG (2000 UT) Oral Tab Take 1 tablet (2,000 Units total) by mouth daily. (Patient not taking: Reported on 2/6/2025) 90 tablet 0    ketoconazole 2 % External Shampoo You can apply this not only to the scalp 2 or 3 times a week in the shower but also to your beard area and then also the chest where the rash is.  Can also use on the face. 120 mL 11    sodium chloride 0.9 % Irrigation Solution       oxyBUTYnin Chloride ER 15 MG Oral Tablet 24 Hr Take 1 tablet (15 mg total) by mouth daily. 90 tablet 3    docusate sodium (COLACE) 100 MG Oral Cap Take 1 capsule (100 mg total) by  mouth 2 (two) times daily as needed for constipation. 60 capsule 0    bisacodyl 10 MG Rectal Suppos Place 1 suppository (10 mg total) rectally daily as needed. 12 suppository 0    camphor/menthol/methyl salicylate 10-15 % External Cream Apply 1 Application topically 3 (three) times daily as needed. 1 each 0    Acetaminophen Extra Strength 500 MG Oral Tab Take 2 tablets (1,000 mg total) by mouth every 6 (six) hours. (Patient not taking: Reported on 2/6/2025)       Allergies:Allergies[1]     Physical Exam:       Physical Exam  Blood pressure 134/85, pulse 72, temperature 97.8 °F (36.6 °C), temperature source Temporal, height 5' 4\" (1.626 m), weight 136 lb 12.8 oz.    Physical Exam   Constitutional: Patient is oriented to person, place, and time. Patient appears well-developed and well-nourished. No distress.   Throat: Oropharynx is clear without exudate   Eyes: Conjunctivae and EOM are normal.   Neck: Normal range of motion. No thyromegaly present.   Cardiovascular: Normal rate, regular rhythm and normal heart sounds.    Pulmonary/Chest: Effort normal and breath sounds normal. No respiratory distress.   Lymphadenopathy: Patient has no cervical adenopathy.  Neurological: Patient is alert and oriented to person, place, and time.   Skin: Skin is warm.   Psychiatry: Normal mood and affect.  Abdominal: Bowel sounds are hyperactive. There is    no tenderness.  There is no rebound, no guarding. Dullness to percussion and he is a bit distended in the abdomen.     Vitals reviewed.           Assessment/Plan:      Diagnoses and all orders for this visit:    Other constipation  -     docusate sodium (COLACE) 100 MG Oral Cap; Take 1 capsule (100 mg total) by mouth 2 (two) times daily as needed for constipation.  -     XR ABDOMEN (1 VIEW) (CPT=74018); Future  The constipation is much better.  He can continue the probiotics but I wrote for the Colace again as he has not used that and I did let him know that if he is having more  trouble than usual even with the probiotics then go ahead and add the Colace.  I would like to get an x-ray of the abdomen as he is slightly distended although not uncomfortable.  He is no longer having excessive burping.  Epigastric abdominal pain  -     docusate sodium (COLACE) 100 MG Oral Cap; Take 1 capsule (100 mg total) by mouth 2 (two) times daily as needed for constipation.  -     XR ABDOMEN (1 VIEW) (CPT=74018); Future  Much better  Gastroesophageal reflux disease, unspecified whether esophagitis present  Periodically and he takes Tums only if needed  Rectal bleed  Wipe type bleeding with a small streak when he was straining  Spina bifida, unspecified hydrocephalus presence, unspecified spinal region (HCC)  Needed a note for work as he was off Friday and yesterday and will return back to work tomorrow.      Referrals (if applicable)  No orders of the defined types were placed in this encounter.      Follow up if symptoms persist.  Take medicine (if given) as prescribed.  Approach to treatment discussed and patient/family member understands and agrees to plan.     No follow-ups on file.    There are no Patient Instructions on file for this visit.    Shital Bowie    2/25/2025    By signing my name below, IShital,  attest that this documentation has been prepared under the direction and in the presence of Nitin Mejía DO.   Electronically Signed: Shital Bowie, 2/25/2025, 9:39 AM.    I, Nitin Mejía DO,  personally performed the services described in this documentation. All medical record entries made by the scribe were at my direction and in my presence.  I have reviewed the chart and discharge instructions (if applicable) and agree that the record reflects my personal performance and is accurate and complete.  Nitin Mejía DO, 2/25/2025, 10:13 AM                  [1]   Allergies  Allergen Reactions    Latex RASH

## 2025-03-04 ENCOUNTER — TELEPHONE (OUTPATIENT)
Dept: FAMILY MEDICINE CLINIC | Facility: CLINIC | Age: 30
End: 2025-03-04

## 2025-03-14 ENCOUNTER — NURSE TRIAGE (OUTPATIENT)
Dept: FAMILY MEDICINE CLINIC | Facility: CLINIC | Age: 30
End: 2025-03-14

## 2025-03-14 NOTE — TELEPHONE ENCOUNTER
Action Requested: Summary for Provider     []  Critical Lab, Recommendations Needed  [] Need Additional Advice  []   FYI    []   Need Orders  [] Need Medications Sent to Pharmacy  [x]  Other: declined disposition      SUMMARY: Recommended emergency room for chest pressure. Patient is declining, states he does not feel like symptoms is that bad today and would like to be scheduled for visit. Nurse did explain specific rationale for recommendation and concern for serious/life threatening reason for symptoms including but not limited to blood clot/heart abnormality. Patient did verbalize understanding of information but is declining emergency room visit at this time.     Reason for call: Chest Pressure and Abdominal Pain  Onset: 3 days     Mild abdominal pain present for 2 days intermittently, has been seen for this in the past. Patient states he had 2 bowel movements today.     Patient also reports chest discomfort/pressure \"someone is sitting on my chest\" started 3/12/25 and has improved but is still present today. Sensation is consistent. Fariba shortness of breath.     Patient stayed home from work Wednesday and today     Reason for Disposition   MILD pain (e.g., does not interfere with normal activities) and pain comes and goes (cramps) lasts > 48 hours  (Exception: This same abdominal pain is a chronic symptom recurrent or ongoing AND present > 4 weeks.)   Chest pain lasting longer than 5 minutes and occurred in last 3 days (72 hours) (Exception: Feels exactly the same as previously diagnosed heartburn and has accompanying sour taste in mouth.)    Protocols used: Abdominal Pain - Male-A-OH, Chest Pain-A-OH

## 2025-03-16 NOTE — TELEPHONE ENCOUNTER
If he is still having complaints I think I have a 1:30 PM open on Monday for a focused visit.  If he is not having any pain then no need to come in.

## 2025-03-17 NOTE — TELEPHONE ENCOUNTER
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    He reports that he has no  chest or abdominal discomfort today and is back at work, pushing fluids and eating a bland diet.    He requests an appointment with Dr Mejía for tomorrow because he missed two days of work and needs a note.  Appointment scheduled:  Future Appointments   Date Time Provider Department Center   3/18/2025  9:15 AM Nitin Mejía DO [facility]

## 2025-03-18 ENCOUNTER — OFFICE VISIT (OUTPATIENT)
Dept: FAMILY MEDICINE CLINIC | Facility: CLINIC | Age: 30
End: 2025-03-18
Payer: MEDICARE

## 2025-03-18 VITALS
DIASTOLIC BLOOD PRESSURE: 83 MMHG | SYSTOLIC BLOOD PRESSURE: 130 MMHG | BODY MASS INDEX: 23.73 KG/M2 | WEIGHT: 139 LBS | HEART RATE: 69 BPM | TEMPERATURE: 98 F | HEIGHT: 64 IN

## 2025-03-18 DIAGNOSIS — K59.09 OTHER CONSTIPATION: ICD-10-CM

## 2025-03-18 DIAGNOSIS — R14.0 BLOATING SYMPTOM: ICD-10-CM

## 2025-03-18 DIAGNOSIS — I10 ESSENTIAL HYPERTENSION: ICD-10-CM

## 2025-03-18 DIAGNOSIS — R10.84 GENERALIZED ABDOMINAL PAIN: Primary | ICD-10-CM

## 2025-03-18 DIAGNOSIS — R07.0 THROAT DISCOMFORT: ICD-10-CM

## 2025-03-18 DIAGNOSIS — K21.9 GASTROESOPHAGEAL REFLUX DISEASE, UNSPECIFIED WHETHER ESOPHAGITIS PRESENT: ICD-10-CM

## 2025-03-18 PROCEDURE — G2211 COMPLEX E/M VISIT ADD ON: HCPCS | Performed by: FAMILY MEDICINE

## 2025-03-18 PROCEDURE — 99214 OFFICE O/P EST MOD 30 MIN: CPT | Performed by: FAMILY MEDICINE

## 2025-03-18 RX ORDER — FAMOTIDINE 20 MG/1
20 TABLET, FILM COATED ORAL 2 TIMES DAILY PRN
Qty: 180 TABLET | Refills: 1 | Status: SHIPPED | OUTPATIENT
Start: 2025-03-18

## 2025-03-18 NOTE — PROGRESS NOTES
Patient ID: Chao Reese is a 30 year old male.       The following individual(s) verbally consented to be recorded using ambient AI listening technology and understand that they can each withdraw their consent to this listening technology at any point by asking the clinician to turn off or pause the recording:    Patient name: Chao Reese  Additional names:  His mother Pranay JUAN  Chief Complaint   Patient presents with    Note For Work     Pt missed work Wednesday and Friday last week     Action Requested: Summary for Provider      []  Critical Lab, Recommendations Needed  [] Need Additional Advice  []   FYI    []   Need Orders  [] Need Medications Sent to Pharmacy  [x]  Other: declined disposition       SUMMARY: Recommended emergency room for chest pressure. Patient is declining, states he does not feel like symptoms is that bad today and would like to be scheduled for visit. Nurse did explain specific rationale for recommendation and concern for serious/life threatening reason for symptoms including but not limited to blood clot/heart abnormality. Patient did verbalize understanding of information but is declining emergency room visit at this time.      Reason for call: Chest Pressure and Abdominal Pain  Onset: 3 days      Mild abdominal pain present for 2 days intermittently, has been seen for this in the past. Patient states he had 2 bowel movements today.      Patient also reports chest discomfort/pressure \"someone is sitting on my chest\" started 3/12/25 and has improved but is still present today. Sensation is consistent. Fariba shortness of breath.      Patient stayed home from work Wednesday and today      Reason for Disposition   MILD pain (e.g., does not interfere with normal activities) and pain comes and goes (cramps) lasts > 48 hours  (Exception: This same abdominal pain is a chronic symptom recurrent or ongoing AND present > 4 weeks.)   Chest pain lasting longer than 5 minutes and occurred in last 3  days (72 hours) (Exception: Feels exactly the same as previously diagnosed heartburn and has accompanying sour taste in mouth.)    Protocols used: Abdominal Pain - Male-A-OH, Chest Pain-A-OH    Last seen on 02/25/2025.    Pt missed work on 3/12 and 3/14 due to chest pain and stomach pain form constipation. On 3/11 after going to the gym he began to have a sore throat, as well as chest pain and abdominal pain. Pt had not passed a stool in a few days and believes the pain was due to constipation. He is currently feeling better and would like a note for work. He last had a normal bowel movement yesterday. Pt hasn't taken Miralax in a while but takes a daily probiotic which helps his constipation and is easier on his stomach. I discussed with him and let him know to take famotidine as needed for chest pain due to acid reflux.   History of Present Illness  Chao Reese is a 30 year old male who presents with recent episodes of chest pain and abdominal discomfort.    He has been experiencing chest pain and a scratchy throat since Tuesday night after going to the gym. The chest pain is concerning to him, but he denies any fever during this time. He occasionally uses famotidine for acid reflux, taking it only as needed. He is currently on carvedilol 6.25 mg twice a day for blood pressure management.    He experienced abdominal discomfort, which he attributes to constipation. He felt bloated on Wednesday morning but had a bowel movement later that day, which improved his symptoms. He has not been using Miralax recently but takes a probiotic daily, which he finds helpful for his stomach. No fever was reported, and he confirms having a normal bowel movement yesterday.            Wt Readings from Last 6 Encounters:   03/18/25 139 lb   02/25/25 136 lb 12.8 oz   02/06/25 139 lb   01/28/25 143 lb   01/22/25 142 lb   12/09/24 142 lb       BMI Readings from Last 6 Encounters:   03/18/25 23.86 kg/m²   02/25/25 23.48 kg/m²   02/06/25  23.86 kg/m²   01/28/25 24.55 kg/m²   01/22/25 24.37 kg/m²   12/09/24 24.37 kg/m²       BP Readings from Last 6 Encounters:   03/18/25 130/83   02/25/25 134/85   02/06/25 123/74   01/28/25 127/84   01/22/25 130/87   12/09/24 130/80         Review of Systems   Respiratory:  Negative for shortness of breath.    Cardiovascular:  Negative for chest pain.           Medical History:      Past Medical History:    Bilateral leg weakness    chronic    Constipation    Depression    Foot drop, bilateral    Gait disturbance    High blood pressure    Neurogenic bladder    Pt self straight caths at home QID and irrigates BID PRN    Other ill-defined conditions(799.89)    shunt from hydrocephalus    PONV (postoperative nausea and vomiting)    if masked used    S/P  shunt    Spina bifida (HCC)    Management:  closure    Strabismus    Done at [facility]    Strabismus       Past Surgical History:   Procedure Laterality Date    Other surgical history  2010    bladder augmentation/catheterizable channel    Other surgical history  07/06/2020    fistulotomy    Spine surgery procedure unlisted  1994    Strabismus surgery Left 1999 or 2000          Current Outpatient Medications   Medication Sig Dispense Refill    docusate sodium (COLACE) 100 MG Oral Cap Take 1 capsule (100 mg total) by mouth 2 (two) times daily as needed for constipation. 60 capsule 0    famotidine 20 MG Oral Tab Take 1 tablet (20 mg total) by mouth 2 (two) times daily as needed for Heartburn. 40 tablet 1    carvedilol 6.25 MG Oral Tab Take 1 tablet (6.25 mg total) by mouth 2 (two) times daily with meals. Her blood pressure 180 tablet 1    Syringe, Disposable, (OLGA SYRINGE) 70 ML Does not apply Misc Durable medical equipment. 70 ml olga syringe. 30 each 3    ketoconazole 2 % External Shampoo You can apply this not only to the scalp 2 or 3 times a week in the shower but also to your beard area and then also the chest where the rash is.  Can also use  on the face. 120 mL 11    sodium chloride 0.9 % Irrigation Solution       oxyBUTYnin Chloride ER 15 MG Oral Tablet 24 Hr Take 1 tablet (15 mg total) by mouth daily. 90 tablet 3    bisacodyl 10 MG Rectal Suppos Place 1 suppository (10 mg total) rectally daily as needed. 12 suppository 0    camphor/menthol/methyl salicylate 10-15 % External Cream Apply 1 Application topically 3 (three) times daily as needed. 1 each 0    CHOLECALCIFEROL 50 MCG (2000 UT) Oral Tab Take 1 tablet (2,000 Units total) by mouth daily. (Patient not taking: Reported on 3/18/2025) 90 tablet 0    Acetaminophen Extra Strength 500 MG Oral Tab Take 2 tablets (1,000 mg total) by mouth every 6 (six) hours. (Patient not taking: Reported on 3/18/2025)       Allergies:Allergies[1]     Physical Exam:       Physical Exam  Blood pressure 129/90, pulse 69, temperature 97.7 °F (36.5 °C), temperature source Tympanic, height 5' 4\" (1.626 m), weight 139 lb.    Vitals:    03/18/25 0903 03/18/25 0913   BP: 129/90 130/83   Pulse: 69    Temp: 97.7 °F (36.5 °C)    TempSrc: Tympanic    Weight: 139 lb    Height: 5' 4\" (1.626 m)         Physical Exam   Constitutional: Patient is oriented to person, place, and time. Patient appears well-developed and well-nourished. No distress.   Neck: Normal range of motion. No thyromegaly present.   Cardiovascular: Normal rate, regular rhythm and normal heart sounds.    Pulmonary/Chest: Effort normal and breath sounds normal. No respiratory distress.   Lymphadenopathy: Patient has no cervical adenopathy.  Neurological: Patient is alert and oriented to person, place, and time.   Skin: Skin is warm.  No pallor or diaphoresis.  Psychiatry: Normal mood and affect.  Abdominal: Normal appearance and bowel sounds are normal. There is    no tenderness.  There is no rigidity, no rebound, no guarding.     Vitals reviewed.           Assessment/Plan:      Diagnoses and all orders for this visit:    Generalized abdominal pain  -     famotidine 20  MG Oral Tab; Take 1 tablet (20 mg total) by mouth 2 (two) times daily as needed for Heartburn.  He feels much better now.  He attributes much of this to the constipation he was having.  He went back to work yesterday and needs a note to continue working regular duty which I gave him today.  Throat discomfort  Resolved  Other constipation  Resolved and takes a probiotic he says that helps  Essential hypertension  Controlled and will continue present management  Gastroesophageal reflux disease, unspecified whether esophagitis present  -     famotidine 20 MG Oral Tab; Take 1 tablet (20 mg total) by mouth 2 (two) times daily as needed for Heartburn.  I do not think he realizes he had famotidine in the medication list so I went ahead and refilled it and explained when and why to take it.  Bloating symptom  -     famotidine 20 MG Oral Tab; Take 1 tablet (20 mg total) by mouth 2 (two) times daily as needed for Heartburn.        Referrals (if applicable)  No orders of the defined types were placed in this encounter.      Follow up if symptoms persist.  Take medicine (if given) as prescribed.  Approach to treatment discussed and patient/family member understands and agrees to plan.     No follow-ups on file.    There are no Patient Instructions on file for this visit.    Shital Bowie    3/18/2025    By signing my name below, IShital,  attest that this documentation has been prepared under the direction and in the presence of Nitin Mejía DO.   Electronically Signed: Shital Bowie, 3/18/2025, 8:59 AM.    I, Nitin Mejía DO,  personally performed the services described in this documentation. All medical record entries made by the scribe were at my direction and in my presence.  I have reviewed the chart and discharge instructions (if applicable) and agree that the record reflects my personal performance and is accurate and complete.  Nitin Mejía DO, 3/18/2025, 12:41 PM                  [1]    Allergies  Allergen Reactions    Latex RASH

## 2025-03-18 NOTE — ASSESSMENT & PLAN NOTE
Feeling better, pain minimal. Continuing abx as prescribed.    MEDICAL SCREENING EXAM (MSE) NOTE      Luz Jimenes is a 86 year old female who presented to the ER today with multiple complaints.     Patient states that her symptoms started back in the beginning of February.  Was seen in the ER 3 times, admitted 2 out of the 3 times.  Patient states that all of her symptoms are still present and worsening.  Nausea, vomiting, dizziness, generalized weakness, headaches.    Brief Physical Exam  General: Well-appearing  Neurologic: Alert and oriented      This patient was screened by me for the purpose of initial evaluation.  I have screened the patient for an acute medical condition and have placed orders for the patient's diagnosis and treatment.       Dilma Haney PA-C  03/18/25 4850

## 2025-03-29 ENCOUNTER — APPOINTMENT (OUTPATIENT)
Dept: GENERAL RADIOLOGY | Age: 30
End: 2025-03-29
Attending: NURSE PRACTITIONER
Payer: MEDICARE

## 2025-03-29 ENCOUNTER — HOSPITAL ENCOUNTER (OUTPATIENT)
Age: 30
Discharge: HOME | End: 2025-03-29
Payer: MEDICARE

## 2025-03-29 VITALS
OXYGEN SATURATION: 99 % | TEMPERATURE: 98 F | HEART RATE: 103 BPM | SYSTOLIC BLOOD PRESSURE: 156 MMHG | DIASTOLIC BLOOD PRESSURE: 95 MMHG | RESPIRATION RATE: 18 BRPM

## 2025-03-29 DIAGNOSIS — M79.602 LEFT ARM PAIN: Primary | ICD-10-CM

## 2025-03-29 PROCEDURE — 73110 X-RAY EXAM OF WRIST: CPT | Performed by: NURSE PRACTITIONER

## 2025-03-29 PROCEDURE — 99214 OFFICE O/P EST MOD 30 MIN: CPT | Performed by: NURSE PRACTITIONER

## 2025-03-29 PROCEDURE — 73080 X-RAY EXAM OF ELBOW: CPT | Performed by: NURSE PRACTITIONER

## 2025-03-29 PROCEDURE — L3908 WHO COCK-UP NONMOLDE PRE OTS: HCPCS | Performed by: NURSE PRACTITIONER

## 2025-03-29 RX ORDER — CYCLOBENZAPRINE HCL 10 MG
10 TABLET ORAL 3 TIMES DAILY PRN
Qty: 10 TABLET | Refills: 0 | Status: SHIPPED | OUTPATIENT
Start: 2025-03-29

## 2025-03-29 NOTE — ED INITIAL ASSESSMENT (HPI)
Pt reports left wrist pain that extends up to left elbow after working out, using punching bags last Saturday.

## 2025-03-29 NOTE — ED PROVIDER NOTES
Patient Seen in: [facility]      History     Chief Complaint   Patient presents with    Musculoskeletal Problem    Arm Pain     Stated Complaint: L hand injury  Subjective:   30-year-old male with spina bifida presents from home.  Patient is here with complaint of left arm pain.  Associates onset of pain with punching a bag at the gym 1 week ago.  No specific injury.  Symptoms started about 4 days after the gym.  He initially started with some tingling to his left arm.  Then developed some pain.  States most of the pain is in the left wrist and shoots up to his left elbow.  States that he noticed some swelling at home.  He has pain with movement and palpation.  He has applied IcyHot to the area.  States that hand feels tingly.  No weakness.  He is right-hand dominant.    The history is provided by the patient. No  was used.     Objective:   No pertinent past medical history.        HISTORY:  Past Medical History:    Bilateral leg weakness    chronic    Constipation    Depression    Foot drop, bilateral    Gait disturbance    High blood pressure    Neurogenic bladder    Pt self straight caths at home QID and irrigates BID PRN    Other ill-defined conditions(799.89)    shunt from hydrocephalus    PONV (postoperative nausea and vomiting)    if masked used    S/P  shunt    Spina bifida (HCC)    Management:  closure    Strabismus    Done at [facility]    Strabismus      Past Surgical History:   Procedure Laterality Date    Other surgical history  2010    bladder augmentation/catheterizable channel    Other surgical history  07/06/2020    fistulotomy    Spine surgery procedure unlisted  1994    Strabismus surgery Left 1999 or 2000      Family History   Problem Relation Age of Onset    No Known Problems Father     No Known Problems Mother     Diabetes Neg     Glaucoma Neg       Social History     Socioeconomic History    Marital status: Single   Tobacco Use    Smoking  status: Never     Passive exposure: Never    Smokeless tobacco: Never   Vaping Use    Vaping status: Never Used   Substance and Sexual Activity    Alcohol use: Not Currently    Drug use: No   Other Topics Concern    Caffeine Concern Yes     Comment: 1 cup a day.    Exercise No    Pt has a pacemaker No    Pt has a defibrillator No    Reaction to local anesthetic No   Social History Narrative    The patient uses the following assistive device(s):  Splint on R leg .      The patient does live in a home with stairs.     Social Drivers of Health     Food Insecurity: No Food Insecurity (3/8/2024)    Food Insecurity     Food Insecurity: Never true   Transportation Needs: No Transportation Needs (3/8/2024)    Transportation Needs     Lack of Transportation: No   Housing Stability: Low Risk  (3/8/2024)    Housing Stability     Housing Instability: No          No pertinent past surgical history.            No pertinent social history.          Review of Systems    Positive for stated complaint: Musculoskeletal Problem and Arm Pain    Other systems are as noted in HPI.  Constitutional and vital signs reviewed.      All other systems reviewed and negative except as noted above.    Physical Exam     ED Triage Vitals   BP 03/29/25 1133 (!) 156/95   Pulse 03/29/25 1133 103   Resp 03/29/25 1133 18   Temp 03/29/25 1135 98.3 °F (36.8 °C)   Temp src 03/29/25 1133 Oral   SpO2 03/29/25 1133 99 %   O2 Device 03/29/25 1133 None (Room air)     Current:BP (!) 156/95   Pulse 103   Temp 98.3 °F (36.8 °C) (Oral)   Resp 18   SpO2 99%     Physical Exam  Vitals and nursing note reviewed.   Constitutional:       General: He is not in acute distress.     Appearance: Normal appearance. He is not ill-appearing or toxic-appearing.   HENT:      Head: Normocephalic and atraumatic.      Nose: Nose normal.      Mouth/Throat:      Mouth: Mucous membranes are moist.      Pharynx: Oropharynx is clear.   Eyes:      Pupils: Pupils are equal, round, and  reactive to light.   Cardiovascular:      Rate and Rhythm: Normal rate and regular rhythm.      Pulses: Normal pulses.   Pulmonary:      Effort: Pulmonary effort is normal. No respiratory distress.      Breath sounds: Normal breath sounds.      Comments: Lungs clear.  No adventitious lung sounds.  No distress.  No hypoxia.  Pulse ox 99% ra. Which is normal    Chest:      Chest wall: No tenderness.   Abdominal:      General: Abdomen is flat.      Palpations: Abdomen is soft.   Musculoskeletal:         General: No signs of injury. Normal range of motion.      Left shoulder: Normal. No bony tenderness.      Left upper arm: Normal. No bony tenderness.      Left elbow: No swelling. Normal range of motion. Tenderness present in olecranon process.      Left forearm: Bony tenderness (proximal) present. No swelling.      Left wrist: Bony tenderness present. No swelling.      Left hand: Normal. No swelling or bony tenderness.      Cervical back: Normal range of motion and neck supple.      Comments: No left arm swelling appreciated.  Left hand grasp intact.  Distal CMS intact.   Skin:     General: Skin is warm and dry.      Capillary Refill: Capillary refill takes less than 2 seconds.   Neurological:      General: No focal deficit present.      Mental Status: He is alert and oriented to person, place, and time.      GCS: GCS eye subscore is 4. GCS verbal subscore is 5. GCS motor subscore is 6.   Psychiatric:         Mood and Affect: Mood normal.         Behavior: Behavior normal.         Thought Content: Thought content normal.         Judgment: Judgment normal.         ED Course   Radiology:  XR WRIST COMPLETE (MIN 3 VIEWS), LEFT (CPT=73110)    Result Date: 3/29/2025  CONCLUSION: Normal examination.     Dictated by (CST): Froy Barger MD on 3/29/2025 at 1:25 PM     Finalized by (CST): Froy Barger MD on 3/29/2025 at 1:25 PM          XR ELBOW, COMPLETE (MIN 3 VIEWS), LEFT (CPT=73080)    Result Date:  3/29/2025  CONCLUSION: Normal examination.     Dictated by (CST): Froy Barger MD on 3/29/2025 at 12:49 PM     Finalized by (STEVEN): Froy Barger MD on 3/29/2025 at 12:49 PM         Labs Reviewed - No data to display    MDM     Medical Decision Making  Differential diagnoses reflecting the complexity of care include: Left wrist fracture, left wrist sprain, tendinitis, radiculopathy  Patient with diffuse left arm pain after punching a punching bag at the gym.  He was wearing a boxing glove.  No specific injury.  Has point tenderness to the wrist,  proximal forearm, elbow.  No swelling.  No erythema.  No deformity.  Left hand grasp is intact.  He does complain of some tingling to the fingers but neurologically intact.  CMS intact.  X-rays of the left wrist and left elbow including forearm are negative for fracture.  No dislocation.  He is well-appearing on exam.  Velcro wrist splint placed for support.  Discussed radiculopathy, tendinitis.    Plan of Care: Tylenol or Motrin as needed for pain.  Rx Flexeril for the tingling.  Discussed sedative effects.  Ice.  Wrist splint.  Follow-up with Ortho if no improvement, referral provided    Results and plan of care discussed with the patient/family. They are in agreement with discharge. They understand to follow up with their primary doctor or the referral physician for further evaluation, especially if no improvement.  Also discussed the limitations of immediate care, patient is aware that if symptoms are worse they should go to the emergency room. Verbal and written discharge instructions were given.     My independent interpretation of studies of: No fracture of the left elbow or left wrist  Diagnostic tests and medications considered but not ordered were: No MRI available at this site  Shared decision making was done by: Patient requesting wrist splint  Comorbidities that add complexity to management include: Spina bifida  External chart review was done and was noted:  Reviewed, recently seen by primary physician for abdominal pain and chest pain  History obtained by an independent source was from: N/A  Discussions and management was done with: N/A  Social determinants of health that affect care: N/A              Problems Addressed:  Left arm pain: acute illness or injury    Amount and/or Complexity of Data Reviewed  Radiology: ordered and independent interpretation performed. Decision-making details documented in ED Course.    Risk  OTC drugs.  Prescription drug management.        Disposition and Plan     Clinical Impression:  1. Left arm pain         Disposition:  Discharge  3/29/2025  1:28 pm    Follow-up:  Stephanie Whiteside PA  [address] [place] [id number] [phone]    Call             Medications Prescribed:  Discharge Medication List as of 3/29/2025  1:29 PM        START taking these medications    Details   cyclobenzaprine 10 MG Oral Tab Take 1 tablet (10 mg total) by mouth 3 (three) times daily as needed for Muscle spasms., Normal, Disp-10 tablet, R-0

## 2025-03-29 NOTE — DISCHARGE INSTRUCTIONS
No abnormality seen on the x-ray.  Take Tylenol or Motrin as needed for the pain.  You may try taking the Flexeril for the tingling.  This may make you sleepy, do not mix with alcohol or take prior to going to work or driving.  Apply ice.  Wear the wrist brace for support.  Recheck with orthopedics if no improvement in symptoms

## 2025-03-31 ENCOUNTER — TELEPHONE (OUTPATIENT)
Dept: FAMILY MEDICINE CLINIC | Facility: CLINIC | Age: 30
End: 2025-03-31

## 2025-03-31 NOTE — TELEPHONE ENCOUNTER
The patient calling to state he was seen in the urgent care on 3/29/25 for left arm pain.  He is to follow up with Dr. Mejía.    He had to leave work early today due to the pain.  He has been using Ice hot.  Taking the tylenol and Motin.   He has not taken the Flexeril  Per the patient the hand swelling has increase.    Follow up appointment  made for tomorrow 4/1/25 with Dr. Francis

## 2025-04-01 ENCOUNTER — TELEPHONE (OUTPATIENT)
Dept: FAMILY MEDICINE CLINIC | Facility: CLINIC | Age: 30
End: 2025-04-01

## 2025-04-01 ENCOUNTER — OFFICE VISIT (OUTPATIENT)
Dept: FAMILY MEDICINE CLINIC | Facility: CLINIC | Age: 30
End: 2025-04-01

## 2025-04-01 VITALS
BODY MASS INDEX: 23 KG/M2 | DIASTOLIC BLOOD PRESSURE: 78 MMHG | HEART RATE: 80 BPM | WEIGHT: 135 LBS | SYSTOLIC BLOOD PRESSURE: 134 MMHG

## 2025-04-01 DIAGNOSIS — M25.522 LEFT ELBOW PAIN: ICD-10-CM

## 2025-04-01 DIAGNOSIS — M25.532 LEFT WRIST PAIN: Primary | ICD-10-CM

## 2025-04-01 DIAGNOSIS — M79.642 LEFT HAND PAIN: ICD-10-CM

## 2025-04-01 PROCEDURE — 99214 OFFICE O/P EST MOD 30 MIN: CPT | Performed by: FAMILY MEDICINE

## 2025-04-01 NOTE — TELEPHONE ENCOUNTER
Pt stated he was seen today referred to Dr DONALD Bai -Ortho but no availability 5 /20/25- did not accept appt but was offered an appt 4/23/25 but did not accept it asking if there is someone else he can see - he's off until next Monday

## 2025-04-01 NOTE — PROGRESS NOTES
Chief Complaint   Patient presents with    Urgent Care F/u     Continued left arm pain after hitting heavy bag at the gym, has missed work due to pain and inability to type.       PT OF DR. MONTANEZ  HPI:   Chao Reese is a 30 year old male who presents to clinic for urgent care follow-up.    Patient was punching bags at the gym 2 weeks ago and a week after the injury he went to urgent care for evaluation of left hand/wrist/elbow pain.  Most of the pain and lack of mobility is in his left wrist and it can shoot up to his left elbow medially.  Did notice some swelling which has improved.  He has been applying topical ointments.  He is right-hand dominant.    Does not think that the pain and paresthesias in his left hand and wrist have improved  REVIEW OF SYSTEMS:   Negative, except per HPI.     EXAM:   /78 (BP Location: Right arm, Patient Position: Sitting, Cuff Size: adult)   Pulse 80   Wt 135 lb (61.2 kg)   BMI 23.17 kg/m²   Body mass index is 23.17 kg/m².  GENERAL: well developed, well nourished, in no apparent distress  SKIN: no rashes, no suspicious lesions  HEENT: atraumatic, normocephalic  EYES: PERRLA, EOMI,conjunctiva are clear  NECK: supple, no adenopathy  MUSCULOSKELETAL: Very limited range of motion left wrist.  No swelling appreciated.  No bruising appreciated.  ASSESSMENT AND PLAN:     1. Left wrist pain  -X-ray reviewed  Would have patient see orthopedic specialist to determine whether MRI or occupational therapy would be beneficial to determine the type of injury.  - Ortho Referral - In Network    2. Left hand pain  - Ortho Referral - In Network    3. Left elbow pain  X-ray reviewed  - Ortho Referral - In Network     RTC if no improvement in symptoms. Red flags discussed to go to [facility].     Edith Francis MD  4/1/2025  10:30 AM

## 2025-04-01 NOTE — TELEPHONE ENCOUNTER
referral to physiatry group placed.  Can see first available      _______________________________________________________         Referred to Department Information      Department Address City Phone     [facility] [address] [facility] [address] [place] [phone]

## 2025-04-02 ENCOUNTER — TELEPHONE (OUTPATIENT)
Dept: NEUROSURGERY | Age: 30
End: 2025-04-02

## 2025-04-07 ENCOUNTER — TELEPHONE (OUTPATIENT)
Dept: NEUROSURGERY | Age: 30
End: 2025-04-07

## 2025-04-08 ENCOUNTER — APPOINTMENT (OUTPATIENT)
Dept: NEUROSURGERY | Age: 30
End: 2025-04-08

## 2025-04-08 VITALS — BODY MASS INDEX: 23.78 KG/M2 | WEIGHT: 138.56 LBS

## 2025-04-08 DIAGNOSIS — Q05.4: Primary | ICD-10-CM

## 2025-04-08 PROCEDURE — 99213 OFFICE O/P EST LOW 20 MIN: CPT | Performed by: NEUROLOGICAL SURGERY

## 2025-04-08 RX ORDER — CARVEDILOL 6.25 MG/1
6.25 TABLET ORAL
COMMUNITY
Start: 2025-01-28

## 2025-04-08 ASSESSMENT — ENCOUNTER SYMPTOMS
HEMATOLOGIC/LYMPHATIC NEGATIVE: 1
CONSTITUTIONAL NEGATIVE: 1
GASTROINTESTINAL NEGATIVE: 1
PSYCHIATRIC NEGATIVE: 1
RESPIRATORY NEGATIVE: 1
ALLERGIC/IMMUNOLOGIC NEGATIVE: 1
EYES NEGATIVE: 1
ENDOCRINE NEGATIVE: 1

## 2025-04-16 ENCOUNTER — HOSPITAL ENCOUNTER (OUTPATIENT)
Age: 30
Discharge: HOME | End: 2025-04-16
Payer: MEDICARE

## 2025-04-16 VITALS
OXYGEN SATURATION: 98 % | SYSTOLIC BLOOD PRESSURE: 135 MMHG | HEART RATE: 91 BPM | RESPIRATION RATE: 18 BRPM | TEMPERATURE: 98 F | DIASTOLIC BLOOD PRESSURE: 74 MMHG

## 2025-04-16 DIAGNOSIS — B34.9 VIRAL ILLNESS: Primary | ICD-10-CM

## 2025-04-16 DIAGNOSIS — T83.518A CATHETER CYSTITIS, INITIAL ENCOUNTER: ICD-10-CM

## 2025-04-16 DIAGNOSIS — N30.90 CATHETER CYSTITIS, INITIAL ENCOUNTER: ICD-10-CM

## 2025-04-16 LAB
BILIRUB UR QL STRIP: NEGATIVE
COLOR UR: YELLOW
GLUCOSE UR STRIP-MCNC: NEGATIVE MG/DL
KETONES UR STRIP-MCNC: NEGATIVE MG/DL
NITRITE UR QL STRIP: NEGATIVE
PH UR STRIP: 6 [PH]
POCT INFLUENZA A: NEGATIVE
POCT INFLUENZA B: NEGATIVE
PROT UR STRIP-MCNC: NEGATIVE MG/DL
SARS-COV-2 RNA RESP QL NAA+PROBE: NOT DETECTED
SP GR UR STRIP: 1.01
UROBILINOGEN UR STRIP-ACNC: <2 MG/DL

## 2025-04-16 PROCEDURE — 99213 OFFICE O/P EST LOW 20 MIN: CPT | Performed by: NURSE PRACTITIONER

## 2025-04-16 PROCEDURE — 87502 INFLUENZA DNA AMP PROBE: CPT | Performed by: NURSE PRACTITIONER

## 2025-04-16 PROCEDURE — 81002 URINALYSIS NONAUTO W/O SCOPE: CPT | Performed by: NURSE PRACTITIONER

## 2025-04-16 PROCEDURE — U0002 COVID-19 LAB TEST NON-CDC: HCPCS | Performed by: NURSE PRACTITIONER

## 2025-04-16 NOTE — ED PROVIDER NOTES
Patient Seen in: [facility]      History     Chief Complaint   Patient presents with    Cough/URI     Stated Complaint: flu like symptoms    Subjective:   30-year-old male presents to immediate care for sinus congestion cough and bodyaches.  Patient has a history of spina bifida and  shunt reports that he has not had any fever.  Also reports his mom was sick prior to him having similar symptoms.  Patient states he self caths there was concern for UTI as well.  Denies any shortness of breath or chest pain at this time      History of Present Illness               Objective:     Past Medical History:    Bilateral leg weakness    chronic    Constipation    Depression    Foot drop, bilateral    Gait disturbance    High blood pressure    Neurogenic bladder    Pt self straight caths at home QID and irrigates BID PRN    Other ill-defined conditions(799.89)    shunt from hydrocephalus    PONV (postoperative nausea and vomiting)    if masked used    S/P  shunt    Spina bifida (HCC)    Management:  closure    Strabismus    Done at [facility]    Strabismus              Past Surgical History:   Procedure Laterality Date    Other surgical history  2010    bladder augmentation/catheterizable channel    Other surgical history  07/06/2020    fistulotomy    Spine surgery procedure unlisted  1994    Strabismus surgery Left 1999 or 2000                Social History     Socioeconomic History    Marital status: Single   Tobacco Use    Smoking status: Never     Passive exposure: Never    Smokeless tobacco: Never   Vaping Use    Vaping status: Never Used   Substance and Sexual Activity    Alcohol use: Not Currently    Drug use: No   Other Topics Concern    Caffeine Concern Yes     Comment: 1 cup a day.    Exercise No    Pt has a pacemaker No    Pt has a defibrillator No    Reaction to local anesthetic No   Social History Narrative    The patient uses the following assistive device(s):  Splint on R leg  .      The patient does live in a home with stairs.     Social Drivers of Health     Food Insecurity: No Food Insecurity (3/8/2024)    Food Insecurity     Food Insecurity: Never true   Transportation Needs: No Transportation Needs (3/8/2024)    Transportation Needs     Lack of Transportation: No   Housing Stability: Low Risk  (3/8/2024)    Housing Stability     Housing Instability: No              Review of Systems   Constitutional: Negative.    Respiratory: Negative.     Cardiovascular: Negative.    Gastrointestinal: Negative.    Skin: Negative.    Neurological: Negative.        Positive for stated complaint: flu like symptoms  Other systems are as noted in HPI.  Constitutional and vital signs reviewed.      All other systems reviewed and negative except as noted above.                  Physical Exam     ED Triage Vitals [04/16/25 0810]   /74   Pulse 91   Resp 18   Temp 97.6 °F (36.4 °C)   Temp src Oral   SpO2 98 %   O2 Device None (Room air)       Current Vitals:   Vital Signs  BP: 135/74  Pulse: 91  Resp: 18  Temp: 97.6 °F (36.4 °C)  Temp src: Oral    Oxygen Therapy  SpO2: 98 %  O2 Device: None (Room air)        Physical Exam  Vitals and nursing note reviewed.   Constitutional:       General: He is not in acute distress.  HENT:      Head: Normocephalic.      Right Ear: Tympanic membrane and ear canal normal.      Left Ear: Tympanic membrane and ear canal normal.      Nose: Congestion present.   Cardiovascular:      Rate and Rhythm: Normal rate.      Pulses: Normal pulses.      Heart sounds: Normal heart sounds.   Pulmonary:      Effort: Pulmonary effort is normal.      Breath sounds: Normal breath sounds.   Musculoskeletal:         General: Normal range of motion.   Skin:     General: Skin is warm and dry.   Neurological:      General: No focal deficit present.      Mental Status: He is alert and oriented to person, place, and time.           Physical Exam                ED Course     Labs Reviewed   [facility]  POCT URINALYSIS DIPSTICK - Abnormal; Notable for the following components:       Result Value    Urine Clarity Slightly cloudy (*)     Blood, Urine Trace-Intact (*)     Leukocyte esterase urine Small (*)     All other components within normal limits   POCT FLU TEST - Normal    Narrative:     This assay is a rapid molecular in vitro test utilizing nucleic acid amplification of influenza A and B viral RNA.   RAPID SARS-COV-2 BY PCR - Normal   URINE CULTURE, ROUTINE          Results         MDM      Medical Decision Making  Pertinent Labs & Imaging studies reviewed. (See chart for details).  Patient coming in with sinus pain and pressure congestion and bodyaches.   Differential diagnosis includes sinusitis, COVID, influenza     Patient is comfortable with plan of care.     Overall Pt looks good. Non-toxic, well-hydrated and in no respiratory distress. Vital signs are reassuring. Exam is reassuring. I do not believe pt requires and additional diagnostic studies or intervention. I believe pt can be discharged home to continue evaluation as an outpatient. Follow-up provider given. Discharge instructions given and reviewed. Return for any problems. All understand and agrees with the plan.        Problems Addressed:  Catheter cystitis, initial encounter: acute illness or injury  Encounter for laboratory testing for COVID-19 virus: acute illness or injury  Viral illness: acute illness or injury        Disposition and Plan     Clinical Impression:  1. Viral illness    2. Catheter cystitis, initial encounter         Disposition:  Discharge  4/16/2025  8:50 am    Follow-up:  Nitin Mejía DO  [address] [place] [id number]  [phone]                Medications Prescribed:  Discharge Medication List as of 4/16/2025  8:51 AM          Supplementary Documentation:

## 2025-04-16 NOTE — ED INITIAL ASSESSMENT (HPI)
Pt with body aches, chills, cough, sore throat, and runny nose since Sunday night. Pt denied fevers.

## 2025-04-16 NOTE — DISCHARGE INSTRUCTIONS
Most people get better in 10 to 14 days. You may continue to cough for 2 to 3 weeks. Colds are caused by viruses and do not get better with antibiotics.  Any over-the-counter medications will help relieve your symptoms.  Start taking one of the following allergy medications Zyrtec, Claritin or Xyzal daily  Cough syrup such as Delsym or Robitussin can help  Nasal sprays such as Flonase, Astepro.  If using Afrin (oxymetazoline) do not use for more than 3 days in a row  Drink plenty of fluids  Rest, Tylenol and Motrin for aches and pains  Return for worsening of symptoms, or any other concerns

## 2025-04-18 RX ORDER — SULFAMETHOXAZOLE AND TRIMETHOPRIM 800; 160 MG/1; MG/1
1 TABLET ORAL 2 TIMES DAILY
Qty: 14 TABLET | Refills: 0 | Status: SHIPPED | OUTPATIENT
Start: 2025-04-18 | End: 2025-04-25

## 2025-04-22 ENCOUNTER — TELEPHONE (OUTPATIENT)
Dept: FAMILY MEDICINE CLINIC | Facility: CLINIC | Age: 30
End: 2025-04-22

## 2025-04-22 NOTE — TELEPHONE ENCOUNTER
[facility] calling to follow up on authorization for urology supplies, will be re-faxing, verified fax number.

## 2025-04-23 ENCOUNTER — HOSPITAL ENCOUNTER (OUTPATIENT)
Age: 30
Discharge: HOME | End: 2025-04-23
Payer: MEDICARE

## 2025-04-23 VITALS
OXYGEN SATURATION: 95 % | TEMPERATURE: 97 F | RESPIRATION RATE: 18 BRPM | SYSTOLIC BLOOD PRESSURE: 148 MMHG | HEART RATE: 84 BPM | DIASTOLIC BLOOD PRESSURE: 97 MMHG

## 2025-04-23 DIAGNOSIS — N30.90 CYSTITIS: Primary | ICD-10-CM

## 2025-04-23 LAB
#MXD IC: 0.5 X10ˆ3/UL (ref 0.1–1)
BILIRUB UR QL STRIP: NEGATIVE
BUN BLD-MCNC: 13 MG/DL (ref 7–18)
CHLORIDE BLD-SCNC: 100 MMOL/L (ref 98–112)
CLARITY UR: CLEAR
CO2 BLD-SCNC: 27 MMOL/L (ref 21–32)
COLOR UR: YELLOW
CREAT BLD-MCNC: 1 MG/DL (ref 0.7–1.3)
EGFRCR SERPLBLD CKD-EPI 2021: 103 ML/MIN/1.73M2 (ref 60–?)
GLUCOSE BLD-MCNC: 93 MG/DL (ref 70–99)
GLUCOSE UR STRIP-MCNC: NEGATIVE MG/DL
HCT VFR BLD AUTO: 44.3 % (ref 39–53)
HCT VFR BLD CALC: 50 % (ref 37–53)
HGB BLD-MCNC: 15.7 G/DL (ref 13–17.5)
ISTAT IONIZED CALCIUM FOR CHEM 8: 1.27 MMOL/L (ref 1.12–1.32)
KETONES UR STRIP-MCNC: NEGATIVE MG/DL
LYMPHOCYTES # BLD AUTO: 2.4 X10ˆ3/UL (ref 1–4)
LYMPHOCYTES NFR BLD AUTO: 31.8 %
MCH RBC QN AUTO: 29.8 PG (ref 26–34)
MCHC RBC AUTO-ENTMCNC: 35.4 G/DL (ref 31–37)
MCV RBC AUTO: 84.2 FL (ref 80–100)
MIXED CELL %: 6.5 %
NEUTROPHILS # BLD AUTO: 4.5 X10ˆ3/UL (ref 1.5–7.7)
NEUTROPHILS NFR BLD AUTO: 61.7 %
NITRITE UR QL STRIP: NEGATIVE
PH UR STRIP: 6 [PH]
PLATELET # BLD AUTO: 236 X10ˆ3/UL (ref 150–450)
POTASSIUM BLD-SCNC: 4.7 MMOL/L (ref 3.6–5.1)
PROT UR STRIP-MCNC: NEGATIVE MG/DL
RBC # BLD AUTO: 5.26 X10ˆ6/UL (ref 4.3–5.7)
SODIUM BLD-SCNC: 138 MMOL/L (ref 136–145)
SP GR UR STRIP: 1.01
UROBILINOGEN UR STRIP-ACNC: <2 MG/DL
WBC # BLD AUTO: 7.4 X10ˆ3/UL (ref 4–11)

## 2025-04-23 PROCEDURE — 81002 URINALYSIS NONAUTO W/O SCOPE: CPT | Performed by: NURSE PRACTITIONER

## 2025-04-23 PROCEDURE — S0119 ONDANSETRON 4 MG: HCPCS | Performed by: PHYSICIAN ASSISTANT

## 2025-04-23 PROCEDURE — 80047 BASIC METABLC PNL IONIZED CA: CPT | Performed by: NURSE PRACTITIONER

## 2025-04-23 PROCEDURE — 99214 OFFICE O/P EST MOD 30 MIN: CPT | Performed by: NURSE PRACTITIONER

## 2025-04-23 PROCEDURE — 85025 COMPLETE CBC W/AUTO DIFF WBC: CPT | Performed by: NURSE PRACTITIONER

## 2025-04-23 RX ORDER — ONDANSETRON 4 MG/1
4 TABLET, ORALLY DISINTEGRATING ORAL EVERY 6 HOURS PRN
Qty: 12 TABLET | Refills: 0 | Status: SHIPPED | OUTPATIENT
Start: 2025-04-23 | End: 2025-04-28

## 2025-04-23 RX ORDER — ONDANSETRON 4 MG/1
4 TABLET, ORALLY DISINTEGRATING ORAL ONCE
Status: COMPLETED | OUTPATIENT
Start: 2025-04-23 | End: 2025-04-23

## 2025-04-23 NOTE — ED INITIAL ASSESSMENT (HPI)
Woke up this AM w/ abdominal pain and watery stool. Also reports symptoms of acid reflux and increased burping. Has had one episode of emesis this AM.

## 2025-04-23 NOTE — ED PROVIDER NOTES
Patient Seen in: [facility]    History   CC: n/v/d  HPI: Chao Reese 30 year old male w/ Spina Bifida with  shunt who straight caths presents c/o abd pains, nausea with single episode of vomiting this morning. +loose stool this am. Denies cp, zaire, fever, rash, urinary s/s. Has not eaten solids today d/t nausea. Denies recent travel or abx use. Eval'ed in this IC for URI s/s 5 days ago and had urine performed d/t hx of frequent UTIs. Pt states Bactrim was called in for him but he has not yet initiated the abx as he was not having urinary s/s.     Past Medical History[1]    Past Surgical History[2]    Family History[3]    Short Social Hx on File[4]    ROS:  Systems reviewed: All pertinent positives noted in HPI. Unless otherwise noted, additional systems reviewed are negative.   Vital signs reviewed.    Positive for stated complaint: abdominal pain; nausea  Other systems are as noted in HPI.  Constitutional and vital signs reviewed.      All other systems reviewed and negative except as noted above.    PSFH elements reviewed from today and agreed except as otherwise stated in HPI.             Constitutional and vital signs reviewed.        Physical Exam     ED Triage Vitals [04/23/25 0848]   BP (!) 148/97   Pulse 84   Resp 18   Temp 97 °F (36.1 °C)   Temp src Oral   SpO2 95 %   O2 Device None (Room air)       Current:BP (!) 148/97   Pulse 84   Temp 97 °F (36.1 °C) (Oral)   Resp 18   SpO2 95%         PE:  General - Appears well, non-toxic and in NAD  Head - Appears symmetrical without deformity/swelling cranium, scalp, or facial bones  GI - Appears round and flat, BS +x4 quadrants, no tenderness/guarding with palpation, - rebound tenderness, - McBurneys, - Rovsings   - no CVA tenderness   Skin - no rashes or petechiae noted, pink warm and dry throughout, mmm, cap refill <2seconds  Neuro - A&O x4, steady gait  MSK - makes purposeful movements of all extremities  Psych - Interactive and  appropriate      ED Course     Labs Reviewed   [facility] POCT URINALYSIS DIPSTICK - Abnormal; Notable for the following components:       Result Value    Blood, Urine Trace-lysed (*)     Leukocyte esterase urine Moderate (*)     All other components within normal limits   POCT ISTAT CHEM8 CARTRIDGE - Normal   POCT CBC   URINE CULTURE, ROUTINE       MDM     DDx: cystitis, pyelonephritis, cholecystitis, appendicitis, pancreatitis, viral gastroenteritis     Discussed with pt + Klebsiella infection on urine cx from 4/18 sample. Pt did not take his rx'ed Bactrim as he was not having urinary s/s at the time per pt. Urine dip with blood and LE. Pt requesting new cx. Encouraged pt to initiate abx as there was confirmed UTI on cx that showed susceptibility to Bactrim as rx'ed. Pt ultimately agreeable. CBC and Chemistry wnl. Results discussed with pt. Pt given single dose Zofran ODT with resolution in his n/v. +tolerating PO at present. Will dx home at this time.  No abdominal tenderness on exam and patient is afebrile. Zofran sent as prescription and small quantity, general diet/hydration instructions, strict ED precautions reviewed, follow-up and cystitis instructions reviewed.  Patient is historian and demonstrates understanding of all instruction and agrees with plan of care.  This case was also discussed with Dr. Person who agrees with plan of care.       Disposition and Plan     Clinical Impression:  1. Cystitis        Disposition:  Discharge    Follow-up:  Nitin Mejía DO  [address] [address]  [phone]    Go in 1 week        Medications Prescribed:  Discharge Medication List as of 4/23/2025  9:58 AM        START taking these medications    Details   ondansetron 4 MG Oral Tablet Dispersible Take 1 tablet (4 mg total) by mouth every 6 (six) hours as needed for Nausea., Normal, Disp-12 tablet, R-0                      [1]   Past Medical History:   Bilateral leg weakness    chronic     Constipation    Depression    Foot drop, bilateral    Gait disturbance    High blood pressure    Neurogenic bladder    Pt self straight caths at home QID and irrigates BID PRN    Other ill-defined conditions(799.89)    shunt from hydrocephalus    PONV (postoperative nausea and vomiting)    if masked used    S/P  shunt    Spina bifida (HCC)    Management:  closure    Strabismus    Done at [facility]    Strabismus   [2]   Past Surgical History:  Procedure Laterality Date    Other surgical history  2010    bladder augmentation/catheterizable channel    Other surgical history  07/06/2020    fistulotomy    Spine surgery procedure unlisted  1994    Strabismus surgery Left 1999 or 2000   [3]   Family History  Problem Relation Age of Onset    No Known Problems Father     No Known Problems Mother     Diabetes Neg     Glaucoma Neg    [4]   Social History  Socioeconomic History    Marital status: Single   Tobacco Use    Smoking status: Never     Passive exposure: Never    Smokeless tobacco: Never   Vaping Use    Vaping status: Never Used   Substance and Sexual Activity    Alcohol use: Not Currently    Drug use: No   Other Topics Concern    Caffeine Concern Yes     Comment: 1 cup a day.    Exercise No    Pt has a pacemaker No    Pt has a defibrillator No    Reaction to local anesthetic No   Social History Narrative    The patient uses the following assistive device(s):  Splint on R leg .      The patient does live in a home with stairs.     Social Drivers of Health     Food Insecurity: No Food Insecurity (3/8/2024)    Food Insecurity     Food Insecurity: Never true   Transportation Needs: No Transportation Needs (3/8/2024)    Transportation Needs     Lack of Transportation: No   Housing Stability: Low Risk  (3/8/2024)    Housing Stability     Housing Instability: No

## 2025-04-24 ENCOUNTER — TELEPHONE (OUTPATIENT)
Dept: FAMILY MEDICINE CLINIC | Facility: CLINIC | Age: 30
End: 2025-04-24

## 2025-04-24 NOTE — TELEPHONE ENCOUNTER
Pt came to office needing a referral written for Dr. Mccoy. Pt recently received a bill from  dates from March/April 2024 with [facility] totaling $92.84. Pt called his [employer] insurance and they stated that they need the referral from  to be covered and for it to be faxed to [phone]. Pt would like call back for updates. Please advise.

## 2025-04-25 ENCOUNTER — OFFICE VISIT (OUTPATIENT)
Dept: FAMILY MEDICINE CLINIC | Facility: CLINIC | Age: 30
End: 2025-04-25

## 2025-04-25 ENCOUNTER — TELEPHONE (OUTPATIENT)
Dept: FAMILY MEDICINE CLINIC | Facility: CLINIC | Age: 30
End: 2025-04-25

## 2025-04-25 VITALS
DIASTOLIC BLOOD PRESSURE: 77 MMHG | WEIGHT: 137.19 LBS | SYSTOLIC BLOOD PRESSURE: 131 MMHG | BODY MASS INDEX: 24 KG/M2 | HEART RATE: 92 BPM

## 2025-04-25 DIAGNOSIS — R51.9 HEADACHE, UNSPECIFIED HEADACHE TYPE: ICD-10-CM

## 2025-04-25 DIAGNOSIS — R11.10 VOMITING, UNSPECIFIED VOMITING TYPE, UNSPECIFIED WHETHER NAUSEA PRESENT: ICD-10-CM

## 2025-04-25 DIAGNOSIS — R10.84 ABDOMINAL PAIN, GENERALIZED: ICD-10-CM

## 2025-04-25 DIAGNOSIS — N30.90 CYSTITIS: Primary | ICD-10-CM

## 2025-04-25 PROCEDURE — G2211 COMPLEX E/M VISIT ADD ON: HCPCS | Performed by: FAMILY MEDICINE

## 2025-04-25 PROCEDURE — 99214 OFFICE O/P EST MOD 30 MIN: CPT | Performed by: FAMILY MEDICINE

## 2025-04-25 NOTE — TELEPHONE ENCOUNTER
The patient called stating he was seen in the urgent care for UTI symptoms on 04/23 and he missed work for two days and would like a work note for this as well as to follow up with Dr. Mejía. Booked an appointment for today.     Future Appointments   Date Time Provider Department Center   4/25/2025 11:00 AM Nitin Mejía DO [facility]

## 2025-04-25 NOTE — PROGRESS NOTES
Patient ID: Chao Reese is a 30 year old male.         The following individual(s) verbally consented to be recorded using ambient AI listening technology and understand that they can each withdraw their consent to this listening technology at any point by asking the clinician to turn off or pause the recording:    Patient name: Chao Reese  Additional names:           HPI  Chief Complaint   Patient presents with    Urgent Care F/u     F/u from u/c       History of Present Illness  I reviewed the immediate care note from April 23, 2025 who presented with abdominal pain and nausea with a single episode of vomiting that morning.  Also had loose stool that morning.  Otherwise he denied any fevers or urinary symptoms.  Temperature was 97 °F.  Patient did not start the Bactrim that was written for prior even though he had Klebsiella in the urine as he felt he was asymptomatic.  He was told by the immediate care provider that he needs to start this.  The urine culture that I see that came back this morning shows less than 10,000 colonies of gram-negative pili.      Chao Reese is a 30 year old male who presents with symptoms related to antibiotic treatment for a urinary tract infection.    He was diagnosed with a urinary tract infection on April 23, 2025, and was prescribed Bactrim. Initially, he was advised to hold off on taking the medication if he did not have symptoms. However, by Wednesday, he experienced significant abdominal pain and vomiting, which prompted him to start the antibiotics. The vomiting was non-bilious and consisted of liquid.    Since starting Bactrim, he has experienced increased urinary frequency, needing to urinate every 20 minutes, which has impacted his ability to work. He missed work on Wednesday and Friday and plans to take off Monday as well to complete the antibiotic course. He also reports experiencing loose stools and increased burping, which he attributes to the antibiotics.    He has a  history of constipation but reports having a large, smooth bowel movement on Tuesday, April 22, 2025. He has been eating light meals, primarily soups, and drinking plenty of water and tea.    He mentions a previous referral to Dr. Mccoy, a neurologist, for headaches, and is currently dealing with insurance issues related to this referral.    Wt Readings from Last 6 Encounters:   04/25/25 137 lb 3.2 oz (62.2 kg)   04/01/25 135 lb (61.2 kg)   03/18/25 139 lb (63 kg)   02/25/25 136 lb 12.8 oz (62.1 kg)   02/06/25 139 lb (63 kg)   01/28/25 143 lb (64.9 kg)       BMI Readings from Last 6 Encounters:   04/25/25 23.55 kg/m²   04/01/25 23.17 kg/m²   03/18/25 23.86 kg/m²   02/25/25 23.48 kg/m²   02/06/25 23.86 kg/m²   01/28/25 24.55 kg/m²       BP Readings from Last 6 Encounters:   04/25/25 131/77   04/23/25 (!) 148/97   04/16/25 135/74   04/01/25 134/78   03/29/25 (!) 156/95   03/18/25 130/83       Results  LABS  Urine culture: No Klebsiella (04/25/2025)  Kidney function: Normal (04/23/2025)  Sodium: Within normal limits (04/23/2025)  Potassium: Within normal limits (04/23/2025)  Blood glucose: Within normal limits (04/23/2025)    Review of Systems  No exertional cardiac chest pain or shortness of breath unless stated in HPI which would take precedence.  See HPI for further review of systems.        Medical History:      Past Medical History:    Bilateral leg weakness    chronic    Constipation    Depression    Foot drop, bilateral    Gait disturbance    High blood pressure    Neurogenic bladder    Pt self straight caths at home QID and irrigates BID PRN    Other ill-defined conditions(799.89)    shunt from hydrocephalus    PONV (postoperative nausea and vomiting)    if masked used    S/P  shunt    Spina bifida (HCC)    Management:  closure    Strabismus    Done at [facility]    Strabismus       Past Surgical History:   Procedure Laterality Date    Other surgical history  2010    bladder  augmentation/catheterizable channel    Other surgical history  07/06/2020    fistulotomy    Spine surgery procedure unlisted  1994    Strabismus surgery Left 1999 or 2000          Current Outpatient Medications   Medication Sig Dispense Refill    ondansetron 4 MG Oral Tablet Dispersible Take 1 tablet (4 mg total) by mouth every 6 (six) hours as needed for Nausea. 12 tablet 0    sulfamethoxazole-trimethoprim -160 MG Oral Tab per tablet Take 1 tablet by mouth 2 (two) times daily for 7 days. 14 tablet 0    cyclobenzaprine 10 MG Oral Tab Take 1 tablet (10 mg total) by mouth 3 (three) times daily as needed for Muscle spasms. 10 tablet 0    famotidine 20 MG Oral Tab Take 1 tablet (20 mg total) by mouth 2 (two) times daily as needed for Heartburn. 180 tablet 1    docusate sodium (COLACE) 100 MG Oral Cap Take 1 capsule (100 mg total) by mouth 2 (two) times daily as needed for constipation. 60 capsule 0    carvedilol 6.25 MG Oral Tab Take 1 tablet (6.25 mg total) by mouth 2 (two) times daily with meals. Her blood pressure 180 tablet 1    Syringe, Disposable, (OLGA SYRINGE) 70 ML Does not apply Misc Durable medical equipment. 70 ml olga syringe. 30 each 3    ketoconazole 2 % External Shampoo You can apply this not only to the scalp 2 or 3 times a week in the shower but also to your beard area and then also the chest where the rash is.  Can also use on the face. 120 mL 11    sodium chloride 0.9 % Irrigation Solution       oxyBUTYnin Chloride ER 15 MG Oral Tablet 24 Hr Take 1 tablet (15 mg total) by mouth daily. 90 tablet 3    bisacodyl 10 MG Rectal Suppos Place 1 suppository (10 mg total) rectally daily as needed. 12 suppository 0    camphor/menthol/methyl salicylate 10-15 % External Cream Apply 1 Application topically 3 (three) times daily as needed. 1 each 0    Acetaminophen Extra Strength 500 MG Oral Tab Take 2 tablets (1,000 mg total) by mouth every 6 (six) hours.      CHOLECALCIFEROL 50 MCG (2000 UT) Oral Tab  Take 1 tablet (2,000 Units total) by mouth daily. (Patient not taking: Reported on 4/25/2025) 90 tablet 0     Allergies:  Allergies   Allergen Reactions    Latex RASH        Physical Exam:       Physical Exam  Blood pressure 131/77, pulse 92, weight 137 lb 3.2 oz (62.2 kg).       Physical Exam   Constitutional: Patient is oriented to person, place, and time. Patient appears well-developed and well-nourished.   HENT:   Mouth/Throat: Mucous membranes are normal.   Neck: Normal range of motion. Neck supple. No thyromegaly   Cardiovascular: Normal rate, regular rhythm and normal heart sounds.    Pulmonary/Chest: Effort normal and breath sounds normal. No respiratory distress.   Abdominal: Normal appearance and bowel sounds are normal. There is    no tenderness.  There is no rigidity, no rebound, no guarding   Neurological: Patient is alert and oriented to person, place, and time.   Skin: Skin is warm and dry.   Psychiatric: Patient has a normal mood and affect.   Vitals reviewed.      Physical Exam  VITALS: BP- 131/77        Assessment/Plan:        Diagnoses and all orders for this visit:    Cystitis  Reviewed the immediate care notes.  He is feeling much better.  I reviewed the urine culture from today.  He is finishing off the antibiotics.  Abdominal pain, generalized  This is also better   Vomiting, unspecified vomiting type, unspecified whether nausea present  Only once.  Headache, unspecified headache type  -     NEURO - INTERNAL  He needed this for a retroactive referral so he can stop getting billed.      Referrals (if applicable)  Orders Placed This Encounter   Procedures    NEURO - INTERNAL     If he is busy you can see one of his partners.     Referral Priority:   Routine     Referral Type:   OFFICE VISIT     Referred to Provider:   Trip Mccoy DO     Requested Specialty:   NEUROLOGY     Number of Visits Requested:   3         Follow up if symptoms persist.  Take medicine (if given) as prescribed.  Approach  to treatment discussed and patient/family member understands and agrees to plan.     No follow-ups on file.      Assessment & Plan  Urinary tract infection  Recent urinary tract infection with Klebsiella, treated with Bactrim. Symptoms of abdominal pain and vomiting have improved. Reports increased urination frequency due to antibiotics. Urine culture shows no presence of Klebsiella, indicating resolution of infection.  - Complete current course of Bactrim.  - Return to work on April 30, 2025, after completing antibiotics.    Referral for headache management  Requires referral to neurologist Dr. Mccoy for headache management. Insurance requires referral for coverage.  - Submit referral to Dr. Mccoy for headache management.    Essential hypertension  Blood pressure is well-controlled at 131/77 mmHg.    Constipation  Reports a large, smooth bowel movement on Tuesday, indicating improvement in constipation symptoms. No current issues reported.    Nitin Mejía,   4/25/2025

## 2025-04-25 NOTE — TELEPHONE ENCOUNTER
Italo GAO  There are no appointments listed with Dr. Mccoy on patient's appointment desk.   Patient needs to confirm DOS.  Thank you

## 2025-04-28 NOTE — TELEPHONE ENCOUNTER
Managed Care does not obtain authorization for DME.   Please review request for urology supplies with Dr Mejía and fax back to [facility] if appropriate.

## 2025-05-05 ENCOUNTER — TELEPHONE (OUTPATIENT)
Dept: CASE MANAGEMENT | Age: 30
End: 2025-05-05

## 2025-05-05 DIAGNOSIS — Z01.00 ENCOUNTER FOR EYE EXAM: Primary | ICD-10-CM

## 2025-05-05 NOTE — TELEPHONE ENCOUNTER
Dr. Mejía,     Patient called requesting referral to Dr. Powell for eye exam.     Pended referral please review diagnosis and sign off if you agree.    Thank you.  Lou Sun  [facility]

## 2025-05-08 DIAGNOSIS — E55.9 VITAMIN D DEFICIENCY: ICD-10-CM

## 2025-05-08 NOTE — TELEPHONE ENCOUNTER
Please review. Protocol Failed; No Protocol    Medication(s) to Refill:   Requested Prescriptions     Pending Prescriptions Disp Refills    CHOLECALCIFEROL 50 MCG (2000 UT) Oral Tab [Pharmacy Med Name: Vitamin D3 50 Mcg Tab Rugb] 90 tablet 0     Sig: Take 1 tablet (2,000 Units total) by mouth daily.         Reason for Medication Refill being sent to Provider / Reason Protocol Failed:  [x] Non-Protocol Medication        Recent Labs:  Lab Results   Component Value Date    VITD 89.8 07/24/2023

## 2025-05-09 RX ORDER — CHOLECALCIFEROL (VITAMIN D3) 50 MCG
2000 TABLET ORAL DAILY
Qty: 90 TABLET | Refills: 0 | Status: SHIPPED | OUTPATIENT
Start: 2025-05-09

## 2025-05-14 ENCOUNTER — TELEPHONE (OUTPATIENT)
Dept: INTERNAL MEDICINE CLINIC | Facility: CLINIC | Age: 30
End: 2025-05-14

## 2025-05-14 DIAGNOSIS — Q05.9 SPINA BIFIDA, UNSPECIFIED HYDROCEPHALUS PRESENCE, UNSPECIFIED SPINAL REGION (HCC): Primary | ICD-10-CM

## 2025-05-14 DIAGNOSIS — N31.9 NEUROGENIC BLADDER: ICD-10-CM

## 2025-05-14 NOTE — TELEPHONE ENCOUNTER
[facility] calling to follow up on pending authorization for medical supplies, asking if doctor can sign.

## 2025-05-15 ENCOUNTER — HOSPITAL ENCOUNTER (OUTPATIENT)
Age: 30
Discharge: HOME | End: 2025-05-15
Payer: MEDICARE

## 2025-05-15 VITALS
DIASTOLIC BLOOD PRESSURE: 85 MMHG | OXYGEN SATURATION: 96 % | SYSTOLIC BLOOD PRESSURE: 149 MMHG | RESPIRATION RATE: 18 BRPM | HEART RATE: 82 BPM | TEMPERATURE: 99 F

## 2025-05-15 DIAGNOSIS — N30.00 ACUTE CYSTITIS WITHOUT HEMATURIA: Primary | ICD-10-CM

## 2025-05-15 LAB
BILIRUB UR QL STRIP: NEGATIVE
CLARITY UR: CLEAR
COLOR UR: YELLOW
GLUCOSE UR STRIP-MCNC: NEGATIVE MG/DL
HGB UR QL STRIP: NEGATIVE
KETONES UR STRIP-MCNC: NEGATIVE MG/DL
NITRITE UR QL STRIP: NEGATIVE
PH UR STRIP: 7 [PH]
PROT UR STRIP-MCNC: NEGATIVE MG/DL
SP GR UR STRIP: 1.01
UROBILINOGEN UR STRIP-ACNC: <2 MG/DL

## 2025-05-15 PROCEDURE — 81002 URINALYSIS NONAUTO W/O SCOPE: CPT

## 2025-05-15 PROCEDURE — 99213 OFFICE O/P EST LOW 20 MIN: CPT

## 2025-05-15 RX ORDER — SULFAMETHOXAZOLE AND TRIMETHOPRIM 800; 160 MG/1; MG/1
1 TABLET ORAL 2 TIMES DAILY
Qty: 20 TABLET | Refills: 0 | Status: SHIPPED | OUTPATIENT
Start: 2025-05-15 | End: 2025-05-25

## 2025-05-15 NOTE — ED PROVIDER NOTES
Patient Seen in: [facility]      History     Chief Complaint   Patient presents with    Urinary Symptoms    Stomach Pain     Stated Complaint: Abdominal Pain  Subjective:   Chao is a 30-year-old male presenting to the immediate care complaining of abdominal pain yesterday which was completely resolved.  Also states that yesterday he had increased urinary frequency.  Patient has a history of spina bifida and self caths.  States that he had the sensation of needing to self cath more frequently yesterday.  Patient states that he has not had a fever, flank pain, back pain.  Denies any abdominal pain today.  He does have a history of kidney stones, states this does not feel like previous kidney stones.  Denies any redness, swelling or pain to his penis or his testicles.  He has been eating and drinking well and is well-hydrated.  He denies any other concerns or complaints.        Objective:   Past Medical History:    Bilateral leg weakness    chronic    Constipation    Depression    Foot drop, bilateral    Gait disturbance    High blood pressure    Neurogenic bladder    Pt self straight caths at home QID and irrigates BID PRN    Other ill-defined conditions(799.89)    shunt from hydrocephalus    PONV (postoperative nausea and vomiting)    if masked used    S/P  shunt    Spina bifida (HCC)    Management:  closure    Strabismus    Done at [facility]    Strabismus            Past Surgical History:   Procedure Laterality Date    Other surgical history  2010    bladder augmentation/catheterizable channel    Other surgical history  07/06/2020    fistulotomy    Spine surgery procedure unlisted  1994    Strabismus surgery Left 1999 or 2000              Social History     Socioeconomic History    Marital status: Single   Tobacco Use    Smoking status: Never     Passive exposure: Never    Smokeless tobacco: Never   Vaping Use    Vaping status: Never Used   Substance and Sexual Activity     Alcohol use: Not Currently    Drug use: No   Other Topics Concern    Caffeine Concern Yes     Comment: 1 cup a day.    Exercise No    Pt has a pacemaker No    Pt has a defibrillator No    Reaction to local anesthetic No   Social History Narrative    The patient uses the following assistive device(s):  Splint on R leg .      The patient does live in a home with stairs.     Social Drivers of Health     Food Insecurity: No Food Insecurity (3/8/2024)    Food Insecurity     Food Insecurity: Never true   Transportation Needs: No Transportation Needs (3/8/2024)    Transportation Needs     Lack of Transportation: No   Housing Stability: Low Risk  (3/8/2024)    Housing Stability     Housing Instability: No            Review of Systems    Positive for stated complaint: Urinary Symptoms and Stomach Pain    Other systems are as noted in HPI.  Constitutional and vital signs reviewed.      All other systems reviewed and negative except as noted above.    Physical Exam     ED Triage Vitals [05/15/25 1059]   /85   Pulse 82   Resp 18   Temp 98.6 °F (37 °C)   Temp src Oral   SpO2 96 %   O2 Device None (Room air)     Current:/85   Pulse 82   Temp 98.6 °F (37 °C) (Oral)   Resp 18   SpO2 96%     Physical Exam  Vitals and nursing note reviewed.   Constitutional:       General: He is not in acute distress.     Appearance: Normal appearance. He is not ill-appearing, toxic-appearing or diaphoretic.   HENT:      Head: Normocephalic.   Cardiovascular:      Rate and Rhythm: Normal rate.   Pulmonary:      Effort: Pulmonary effort is normal. No respiratory distress.   Abdominal:      General: Abdomen is flat. Bowel sounds are normal. There is no distension.      Palpations: Abdomen is soft. There is no mass.      Tenderness: There is no abdominal tenderness. There is no right CVA tenderness, left CVA tenderness, guarding or rebound.      Hernia: No hernia is present.   Musculoskeletal:         General: Normal range of motion.       Cervical back: Normal range of motion.   Skin:     General: Skin is warm and dry.      Capillary Refill: Capillary refill takes less than 2 seconds.   Neurological:      General: No focal deficit present.      Mental Status: He is alert and oriented to person, place, and time.   Psychiatric:         Mood and Affect: Mood normal.         Behavior: Behavior normal.         Thought Content: Thought content normal.         Judgment: Judgment normal.         ED Course     Radiology:    No orders to display     Labs Reviewed   [facility] POCT URINALYSIS DIPSTICK - Abnormal; Notable for the following components:       Result Value    Leukocyte esterase urine Small (*)     All other components within normal limits   URINE CULTURE, ROUTINE       MDM     Medical Decision Making  Differential diagnoses reflecting the complexity of care include but are not limited to UTI, pyelonephritis, kidney stone.    Comorbidities that add complexity to management include: Spina bifida, neurogenic bladder, self caths  History obtained by an independent source was from: Patient  Patient is well appearing, non-toxic and in no acute distress.  Vital signs are stable.     Patient's history and physical exam are consistent with a UTI.  No CVA tenderness, no fever, no abdominal pain.    Urine dip was cloudy and positive for leukocytes.    Discussed with the patient the possibility of starting an antibiotic today and potentially stopping if culture is negative versus waiting for culture result for antibiotic treatment.  Patient  prefers to start antibiotics and possibly stop pending culture.  Prescription was sent for Bactrim to treat UTI given history of self cath/neurogenic bladder.  Urine culture was sent and will follow results.  Recommended to the patient to take a probiotic to prevent yeast infections.  Recommended that the patient increase p.o. fluid intake, take Tylenol and Motrin for pain or fever.  I recommended that if the patient develops  any abdominal pain, flank pain, back pain, vomiting, high fever that does not resolve with medications or any other concerning complaints they should go to the emergency department.    ED precautions discussed.  Patient (guardian) advised to follow up with PCP in 2-3 days.  Patient (guardian) agrees with this plan of care.  Patient (guardian) verbalizes understanding of discharge instructions and plan of care.      Amount and/or Complexity of Data Reviewed  Labs: ordered. Decision-making details documented in ED Course.    Risk  OTC drugs.  Prescription drug management.        Disposition and Plan     Clinical Impression:  1. Acute cystitis without hematuria         Disposition:  Discharge  5/15/2025 11:29 am    Follow-up:  No follow-up provider specified.        Medications Prescribed:  Discharge Medication List as of 5/15/2025 11:36 AM

## 2025-05-15 NOTE — ED INITIAL ASSESSMENT (HPI)
Intermittent stomach that started yesterday and lasted for a few hours but subsided and has not returned. Also reports increased urination. Pt straight caths. Concerned for UTI.

## 2025-05-15 NOTE — TELEPHONE ENCOUNTER
Orders have been reviewed by provider. Orders have been faxed over to managed care. Confirmation paper has been received.

## 2025-05-15 NOTE — DISCHARGE INSTRUCTIONS
Your urine showed signs of a urinary tract infection.  Please take the antibiotics as prescribed.  We will send the urine for culture and call you in 2 days if the antibiotic needs to change.  Drink lots of fluids.  Take Tylenol or Motrin for pain or fever.  You can take a probiotic to prevent yeast infections.  If you develop any abdominal pain, back pain, vomiting or any other concerning complaints you should go to the emergency department.  Otherwise follow-up with your primary care doctor.

## 2025-05-19 NOTE — PROGRESS NOTES
Subjective:   Chao Reese is a 30 year old male who presents for Urgent Care F/u (5/15/25 urinary sysptoms & stomach pain: still has really bad pain, haven't been to work 5/14 & 5/19 need note)   Patient is a pleasant 30 year old male with past medical history consistent for spina bifida,  shunt, and neurogenic bladder who self caths QID, who presents to office today for UC follow up. Patient was seen in UC on 5/15/25 for reported abd pain and increased urinary frequency. Urine dip was positive for leuks. Culture was <10,000 CFU/ML Gram Negative Jerry Abnormal. He was prescribed bactrim from . He follows up in office today and states urinary symptoms have improved. Will take abx to completion since he feels better since being on the medication. Has a new issue as well he would like to discuss    Patient states a few days ago he started to get some lower back pain. With radiation down to legs. Bilateral. He has not been taking any meds for pain. He doesn't have numbness but positive tingling. He also has some weakness as well. He feel unbalanced when standing for long periods of time. No changes in bowel or bladder habits. No new injuries, no new falls, no new activities. Has missed work and needs a note for days missed. He also is having issues sitting at work. Does not feel like he can work until resolved. Will provide note for missed days and return next Monday.           Past Medical History[1]   Past Surgical History[2]     History/Other:    Chief Complaint Reviewed and Verified  Nursing Notes Reviewed and   Verified  Tobacco Reviewed  Allergies Reviewed  Medications Reviewed    Medical History Reviewed  Surgical History Reviewed  Family History   Reviewed  Social History Reviewed         Tobacco:  He has never smoked tobacco.    Current Medications[3]      Review of Systems:  Review of Systems   Constitutional:  Negative for chills and fever.   Respiratory:  Negative for cough and shortness of  breath.    Cardiovascular:  Negative for chest pain.   Gastrointestinal:  Negative for constipation, diarrhea, nausea and vomiting.   Genitourinary:  Negative for difficulty urinating, dysuria and frequency.   Musculoskeletal:  Positive for arthralgias, back pain and myalgias.   Neurological:  Negative for numbness.         Objective:   /72 (BP Location: Right arm, Patient Position: Sitting, Cuff Size: adult)   Pulse 81   Ht 5' 4\" (1.626 m)   Wt 139 lb 12.8 oz (63.4 kg)   SpO2 97%   BMI 24.00 kg/m²  Estimated body mass index is 24 kg/m² as calculated from the following:    Height as of this encounter: 5' 4\" (1.626 m).    Weight as of this encounter: 139 lb 12.8 oz (63.4 kg).  Physical Exam  Vitals and nursing note reviewed.   Constitutional:       Appearance: Normal appearance. He is normal weight.   Cardiovascular:      Rate and Rhythm: Normal rate and regular rhythm.      Pulses: Normal pulses.      Heart sounds: Normal heart sounds. No murmur heard.  Pulmonary:      Effort: Pulmonary effort is normal.      Breath sounds: Normal breath sounds.   Musculoskeletal:         General: Tenderness present. No swelling, deformity or signs of injury.      Comments: Positive straight leg raise bilateral  Tender to palpation bilateral sacroiliac    Skin:     Capillary Refill: Capillary refill takes less than 2 seconds.   Neurological:      Mental Status: He is alert and oriented to person, place, and time.           Assessment & Plan:   1. Cystitis (Primary)  2. Bilateral sciatica  -     methylPREDNISolone; As directed.  Dispense: 21 each; Refill: 0  -     Naproxen; Take 1 tablet (250 mg total) by mouth 2 (two) times daily as needed.  Dispense: 30 tablet; Refill: 0  -     XR LUMBAR SPINE (MIN 2 VIEWS) (CPT=72100); Future; Expected date: 05/20/2025  -     Physical Therapy Referral - [facility]  3. History of spina bifida  -     XR LUMBAR SPINE (MIN 2 VIEWS) (CPT=72100); Future; Expected date:  05/20/2025    1. Cystitis  Resolving  Improved on abx  Take to completion    2. Bilateral sciatica  Patient exam is consistent for positive straight leg raise on bilateral side.  Patient denies bowel and bladder issues aside from normal variations  Patient reports weakness to lower extremity.  Patient reports  tingling to lower extremity  Take your cyclobenzaprine 10 mg 3 times daily as needed  Start Medrol Dosepak  Start naproxen twice daily as needed for pain  PT eval and treat  Obtain imaging for acute findings   If symptoms persist will refer to physiatry.    - methylPREDNISolone (MEDROL) 4 MG Oral Tablet Therapy Pack; As directed.  Dispense: 21 each; Refill: 0  - naproxen 250 MG Oral Tab; Take 1 tablet (250 mg total) by mouth 2 (two) times daily as needed.  Dispense: 30 tablet; Refill: 0  - XR LUMBAR SPINE (MIN 2 VIEWS) (CPT=72100); Future  - Physical Therapy Referral - [facility]    3. History of spina bifida  Pain at surgical site  Xray to eval for acute changes  - XR LUMBAR SPINE (MIN 2 VIEWS) (CPT=72100); Future    Patient aware of plan of care. All questions answered to satisfaction of the patient. Patient instructed to call office or reach out via [employer] if any issues arise. For urgent issues and/or reviewed red flags please proceed to the urgent care or ER.  Also, inform the nurse practitioner with any new symptoms or medication side effects.        Return for Annual physical.    LOS Wu, 5/19/2025, 9:24 AM          [1]   Past Medical History:   Bilateral leg weakness    chronic    Constipation    Depression    Foot drop, bilateral    Gait disturbance    High blood pressure    Neurogenic bladder    Pt self straight caths at home QID and irrigates BID PRN    Other ill-defined conditions(799.89)    shunt from hydrocephalus    PONV (postoperative nausea and vomiting)    if masked used    S/P  shunt    Spina bifida (HCC)    Management:  closure    Strabismus    [facility]    Strabismus   [2]   Past Surgical History:  Procedure Laterality Date    Other surgical history  2010    bladder augmentation/catheterizable channel    Other surgical history  07/06/2020    fistulotomy    Spine surgery procedure unlisted  1994    Strabismus surgery Left 1999 or 2000   [3]   Current Outpatient Medications   Medication Sig Dispense Refill    methylPREDNISolone (MEDROL) 4 MG Oral Tablet Therapy Pack As directed. 21 each 0    naproxen 250 MG Oral Tab Take 1 tablet (250 mg total) by mouth 2 (two) times daily as needed. 30 tablet 0    sulfamethoxazole-trimethoprim -160 MG Oral Tab per tablet Take 1 tablet by mouth 2 (two) times daily for 10 days. 20 tablet 0    cholecalciferol 50 MCG (2000 UT) Oral Tab Take 1 tablet (2,000 Units total) by mouth daily. 90 tablet 0    cyclobenzaprine 10 MG Oral Tab Take 1 tablet (10 mg total) by mouth 3 (three) times daily as needed for Muscle spasms. 10 tablet 0    famotidine 20 MG Oral Tab Take 1 tablet (20 mg total) by mouth 2 (two) times daily as needed for Heartburn. 180 tablet 1    docusate sodium (COLACE) 100 MG Oral Cap Take 1 capsule (100 mg total) by mouth 2 (two) times daily as needed for constipation. 60 capsule 0    carvedilol 6.25 MG Oral Tab Take 1 tablet (6.25 mg total) by mouth 2 (two) times daily with meals. Her blood pressure 180 tablet 1    ketoconazole 2 % External Shampoo You can apply this not only to the scalp 2 or 3 times a week in the shower but also to your beard area and then also the chest where the rash is.  Can also use on the face. 120 mL 11    sodium chloride 0.9 % Irrigation Solution       oxyBUTYnin Chloride ER 15 MG Oral Tablet 24 Hr Take 1 tablet (15 mg total) by mouth daily. 90 tablet 3    bisacodyl 10 MG Rectal Suppos Place 1 suppository (10 mg total) rectally daily as needed. 12 suppository 0    camphor/menthol/methyl salicylate 10-15 % External Cream Apply 1 Application topically 3 (three) times daily as  needed. 1 each 0    Acetaminophen Extra Strength 500 MG Oral Tab Take 2 tablets (1,000 mg total) by mouth every 6 (six) hours.      Syringe, Disposable, (OLGA SYRINGE) 70 ML Does not apply Misc Durable medical equipment. 70 ml olga syringe. 30 each 3

## 2025-05-20 ENCOUNTER — OFFICE VISIT (OUTPATIENT)
Dept: FAMILY MEDICINE CLINIC | Facility: CLINIC | Age: 30
End: 2025-05-20

## 2025-05-20 ENCOUNTER — HOSPITAL ENCOUNTER (OUTPATIENT)
Dept: GENERAL RADIOLOGY | Age: 30
Discharge: HOME | End: 2025-05-20
Payer: MEDICARE

## 2025-05-20 VITALS
DIASTOLIC BLOOD PRESSURE: 72 MMHG | HEIGHT: 64 IN | WEIGHT: 139.81 LBS | SYSTOLIC BLOOD PRESSURE: 133 MMHG | HEART RATE: 81 BPM | OXYGEN SATURATION: 97 % | BODY MASS INDEX: 23.87 KG/M2

## 2025-05-20 DIAGNOSIS — Z87.728 HISTORY OF SPINA BIFIDA: ICD-10-CM

## 2025-05-20 DIAGNOSIS — M54.31 BILATERAL SCIATICA: ICD-10-CM

## 2025-05-20 DIAGNOSIS — M54.32 BILATERAL SCIATICA: ICD-10-CM

## 2025-05-20 DIAGNOSIS — N30.90 CYSTITIS: Primary | ICD-10-CM

## 2025-05-20 PROCEDURE — 72110 X-RAY EXAM L-2 SPINE 4/>VWS: CPT

## 2025-05-20 PROCEDURE — 99214 OFFICE O/P EST MOD 30 MIN: CPT

## 2025-05-20 RX ORDER — METHYLPREDNISOLONE 4 MG/1
TABLET ORAL
Qty: 21 EACH | Refills: 0 | Status: SHIPPED | OUTPATIENT
Start: 2025-05-20

## 2025-05-20 RX ORDER — NAPROXEN 250 MG/1
250 TABLET ORAL 2 TIMES DAILY PRN
Qty: 30 TABLET | Refills: 0 | Status: SHIPPED | OUTPATIENT
Start: 2025-05-20

## 2025-05-27 ENCOUNTER — MED REC SCAN ONLY (OUTPATIENT)
Dept: FAMILY MEDICINE CLINIC | Facility: CLINIC | Age: 30
End: 2025-05-27

## 2025-05-29 ENCOUNTER — TELEPHONE (OUTPATIENT)
Dept: FAMILY MEDICINE CLINIC | Facility: CLINIC | Age: 30
End: 2025-05-29

## 2025-05-29 DIAGNOSIS — Q05.9 SPINA BIFIDA, UNSPECIFIED HYDROCEPHALUS PRESENCE, UNSPECIFIED SPINAL REGION (HCC): Primary | ICD-10-CM

## 2025-05-29 DIAGNOSIS — N31.9 NEUROGENIC BLADDER: ICD-10-CM

## 2025-05-29 NOTE — TELEPHONE ENCOUNTER
[facility] [phone].  Called and notified patient.

## 2025-05-29 NOTE — TELEPHONE ENCOUNTER
Patient called states he was on the line for DME supplier and they are awaiting pre-authorization from Dr. Mejía for male catheter internal [address] that he needs to use twice daily for neurogenic bladder and spina bifida; they also need an order for gloves, normal saline, and jelly that comes with catheter. I made him aware I can see the order for the catheter, but do not see the order for the additional supplies. The name of the DME supplier is [employer] is the supplier--># [phone]. He would like a call back as soon as determination is obtained. I made her aware I will convey the above to Dr. Mejía [and Triage Support to assist]. Patient verbalized understanding. No further questions or concerns at this time.    Dr. Mejía - please review and advise; DME order pended, sign if appropriate [otherwise cancel pended DME order and advise]    Triage Support - please assist

## 2025-05-31 DIAGNOSIS — N31.9 NEUROGENIC BLADDER: ICD-10-CM

## 2025-05-31 DIAGNOSIS — Q05.9 SPINA BIFIDA, UNSPECIFIED HYDROCEPHALUS PRESENCE, UNSPECIFIED SPINAL REGION (HCC): ICD-10-CM

## 2025-06-02 RX ORDER — OXYBUTYNIN CHLORIDE 15 MG/1
15 TABLET, EXTENDED RELEASE ORAL DAILY
Qty: 90 TABLET | Refills: 3 | Status: SHIPPED | OUTPATIENT
Start: 2025-06-02

## 2025-06-02 NOTE — TELEPHONE ENCOUNTER
Refill passed per Clinic protocol.  Requested Prescriptions   Pending Prescriptions Disp Refills    OXYBUTYNIN CHLORIDE ER 15 MG Oral Tablet 24 Hr [Pharmacy Med Name: Oxybutynin Chloride Er 24hr 15 Mg Tab Zydu] 90 tablet 0     Sig: TAKE ONE TABLET BY MOUTH ONE TIME DAILY       Genitourinary Medications Passed - 6/2/2025  8:00 AM        Passed - Patient does not have pulmonary hypertension on problem list        Passed - In person appointment or virtual visit in the past 12 mos or appointment in next 3 mos     Recent Outpatient Visits              1 week ago Cystitis    [facility], Justin Jeronimo APRN    Office Visit    1 month ago Cystitis    [facility], Nitin Tanner DO    Office Visit    2 months ago [facility]    [facility] Visit    2 months ago [facility]    Office Visit    3 months ago Other const[facility]    [facility]          Future Appointments         Provider Department Appt Notes    In 1 month [facility]                     Passed - Medication is active on med list

## 2025-06-13 ENCOUNTER — HOSPITAL ENCOUNTER (OUTPATIENT)
Age: 30
Discharge: HOME | End: 2025-06-13
Payer: MEDICARE

## 2025-06-13 VITALS
TEMPERATURE: 98 F | RESPIRATION RATE: 20 BRPM | DIASTOLIC BLOOD PRESSURE: 85 MMHG | SYSTOLIC BLOOD PRESSURE: 152 MMHG | OXYGEN SATURATION: 98 % | HEART RATE: 83 BPM

## 2025-06-13 DIAGNOSIS — R10.9 ABDOMINAL PAIN, ACUTE: Primary | ICD-10-CM

## 2025-06-13 PROCEDURE — 99213 OFFICE O/P EST LOW 20 MIN: CPT | Performed by: PHYSICIAN ASSISTANT

## 2025-06-13 NOTE — ED PROVIDER NOTES
Patient Seen in: [facility]        History  Chief Complaint   Patient presents with    Abdominal Pain     Stated Complaint: Stomach pain    Subjective:   HPI            Patient is a 30-year-old male with a past medical history of spina bifida, neurogenic bladder that self caths, recurrent UTIs, hypertension,  shunt that presents to immediate care due to abdominal pain.  Patient states that he had a protein shake last night.  States that he has lactose intolerant and had abdominal discomfort.  Patient states that symptoms have now resolved.  States that symptoms felt like indigestion.  Denies urinary symptoms, vomiting diarrhea fever.  Patient requesting work note.      Objective:     Past Medical History:    Bilateral leg weakness    chronic    Constipation    Depression    Foot drop, bilateral    Gait disturbance    High blood pressure    Neurogenic bladder    Pt self straight caths at home QID and irrigates BID PRN    Other ill-defined conditions(799.89)    shunt from hydrocephalus    PONV (postoperative nausea and vomiting)    if masked used    S/P  shunt    Spina bifida (HCC)    Management:  closure    Strabismus    Done at [facility]    Strabismus              Past Surgical History:   Procedure Laterality Date    Other surgical history  2010    bladder augmentation/catheterizable channel    Other surgical history  07/06/2020    fistulotomy    Spine surgery procedure unlisted  1994    Strabismus surgery Left 1999 or 2000                Social History     Socioeconomic History    Marital status: Single   Tobacco Use    Smoking status: Never     Passive exposure: Never    Smokeless tobacco: Never   Vaping Use    Vaping status: Never Used   Substance and Sexual Activity    Alcohol use: Not Currently    Drug use: No   Other Topics Concern    Caffeine Concern Yes     Comment: 1 cup a day.    Exercise No    Pt has a pacemaker No    Pt has a defibrillator No    Reaction to local  anesthetic No   Social History Narrative    The patient uses the following assistive device(s):  Splint on R leg .      The patient does live in a home with stairs.     Social Drivers of Health     Food Insecurity: No Food Insecurity (3/8/2024)    Food Insecurity     Food Insecurity: Never true   Transportation Needs: No Transportation Needs (3/8/2024)    Transportation Needs     Lack of Transportation: No   Housing Stability: Low Risk  (3/8/2024)    Housing Stability     Housing Instability: No              Review of Systems    Positive for stated complaint: Stomach pain  Other systems are as noted in HPI.  Constitutional and vital signs reviewed.      All other systems reviewed and negative except as noted above.                  Physical Exam    ED Triage Vitals [06/13/25 1120]   /85   Pulse 83   Resp 20   Temp 98.4 °F (36.9 °C)   Temp src Oral   SpO2 98 %   O2 Device None (Room air)       Current Vitals:   Vital Signs  BP: 152/85  Pulse: 83  Resp: 20  Temp: 98.4 °F (36.9 °C)  Temp src: Oral    Oxygen Therapy  SpO2: 98 %  O2 Device: None (Room air)            Physical Exam  Vital signs reviewed. Nursing note reviewed.  Constitutional: Well-developed. Well-nourished. In no acute distress  HENT: Mucous membranes moist.   EYES: No scleral icterus or conjunctival injection.  NECK: Full ROM. Supple.   CARDIAC: Normal rate. Normal S1/ S2. 2+ distal pulses. No edema  PULM/CHEST: Clear to auscultation bilaterally. No wheezes  ABD: Soft, non-tender, non-distended.   : No CVA tenderness.  RECTAL: deferred  Extremities: Full ROM  NEURO: Awake, alert, following commands, moving extremities, answering questions.   SKIN: Warm and dry. No rash or lesions.  PSYCH: Normal judgment. Normal affect.                     MDM     Patient is a 30-year-old male who presents to immediate care due to abdominal pain that began last night.  Patient arrives with stable vitals sitting comfortably.  Physical exam showing no abdominal  tenderness.  Most likely resolved abdominal pain, indigestion.  Unlikely UTI, abdominal pathology including SBO,  shunt complication, diverticulitis appendicitis.  Offered labs and CT however patient declines at this time requesting work note.  Discussed with patient ED precautions including return of abdominal pain worsening symptoms hematuria vomiting diarrhea fever.  Patient agreeable to plan all questions answered.  History given by patient.        Medical Decision Making      Disposition and Plan     Clinical Impression:  1. Abdominal pain, acute         Disposition:  Discharge  6/13/2025 11:39 am    Follow-up:  No follow-up provider specified.        Medications Prescribed:  Discharge Medication List as of 6/13/2025 11:50 AM                Supplementary Documentation:

## 2025-06-13 NOTE — ED INITIAL ASSESSMENT (HPI)
Pt complaining of abd pain since last noc. No vomiting or diarrhea. No fever. Pt needs a work note for today as well.

## 2025-06-13 NOTE — DISCHARGE INSTRUCTIONS
Today you were seen for abdominal pain.  You have declined workup including urine labs and CT at this time.  You state that you feel it is indigestion from your protein shake.  If you feel that symptoms are progressively getting worse go directly to nearest emergency department for further evaluation and management.

## 2025-06-20 ENCOUNTER — TELEPHONE (OUTPATIENT)
Dept: FAMILY MEDICINE CLINIC | Facility: CLINIC | Age: 30
End: 2025-06-20

## 2025-06-20 NOTE — TELEPHONE ENCOUNTER
Abigail from [facility] called to check status on form for medical supplies that she said she faxed on 6/17/25. Abigail said she faxed form to [phone] and [phone]. Please advise       See telephone encounter 5/29/25

## 2025-06-21 ENCOUNTER — OFFICE VISIT (OUTPATIENT)
Dept: FAMILY MEDICINE CLINIC | Facility: CLINIC | Age: 30
End: 2025-06-21
Payer: MEDICARE

## 2025-06-21 VITALS
DIASTOLIC BLOOD PRESSURE: 88 MMHG | SYSTOLIC BLOOD PRESSURE: 138 MMHG | BODY MASS INDEX: 23.42 KG/M2 | WEIGHT: 137.19 LBS | HEART RATE: 80 BPM | HEIGHT: 64 IN

## 2025-06-21 DIAGNOSIS — K59.09 OTHER CONSTIPATION: ICD-10-CM

## 2025-06-21 DIAGNOSIS — Q05.9 SPINA BIFIDA, UNSPECIFIED HYDROCEPHALUS PRESENCE, UNSPECIFIED SPINAL REGION (HCC): ICD-10-CM

## 2025-06-21 DIAGNOSIS — R10.13 EPIGASTRIC ABDOMINAL PAIN: Primary | ICD-10-CM

## 2025-06-21 DIAGNOSIS — N31.9 NEUROGENIC BLADDER: ICD-10-CM

## 2025-06-21 DIAGNOSIS — N30.90 CYSTITIS: ICD-10-CM

## 2025-06-21 PROCEDURE — 99214 OFFICE O/P EST MOD 30 MIN: CPT | Performed by: NURSE PRACTITIONER

## 2025-06-21 RX ORDER — IBUPROFEN 200 MG
200 TABLET ORAL EVERY 6 HOURS PRN
COMMUNITY

## 2025-06-21 RX ORDER — OMEPRAZOLE 40 MG/1
40 CAPSULE, DELAYED RELEASE ORAL DAILY
Qty: 90 CAPSULE | Refills: 0 | Status: SHIPPED | OUTPATIENT
Start: 2025-06-21

## 2025-06-21 RX ORDER — MAGNESIUM HYDROXIDE 1200 MG/15ML
3000 LIQUID ORAL AS NEEDED
Qty: 3000 ML | Refills: 11 | Status: SHIPPED | OUTPATIENT
Start: 2025-06-21

## 2025-06-21 RX ORDER — MAGNESIUM HYDROXIDE 1200 MG/15ML
LIQUID ORAL
Qty: 3,000 ML | Refills: 0 | OUTPATIENT
Start: 2025-06-21

## 2025-06-21 RX ORDER — DOCUSATE SODIUM 100 MG/1
100 CAPSULE, LIQUID FILLED ORAL 2 TIMES DAILY PRN
Qty: 60 CAPSULE | Refills: 1 | Status: SHIPPED | OUTPATIENT
Start: 2025-06-21

## 2025-06-21 NOTE — PROGRESS NOTES
HPI    Patient presents for concerns of heartburn and epigastric pain for the past 4 days.  Having indigestion and bloating.  Requesting refill on saline for bladder irrigation/straight cath.  With concerns of intermittent constipation.  Was previously using MiraLAX as needed but felt like it was too strong    Review of Systems   Gastrointestinal:  Positive for abdominal pain and constipation.        Heartburn, bloating.   All other systems reviewed and are negative.       Vitals:    06/21/25 1145   BP: 138/88   Pulse: 80   Weight: 137 lb 3.2 oz (62.2 kg)   Height: 5' 4\" (1.626 m)     Body mass index is 23.55 kg/m².    Health Maintenance   Topic Date Due    Annual Physical  Never done    COVID-19 Vaccine (3 - 2024-25 season) 09/01/2024    Influenza Vaccine (Season Ended) 10/01/2025    DTaP,Tdap,and Td Vaccines (2 - Td or Tdap) 03/22/2028    Annual Depression Screening  Completed    Pneumococcal Vaccine: Birth to 50yrs  Aged Out    Meningococcal B Vaccine  Aged Out       Past Medical History[1]    .Past Surgical History[2]    Family History[3]    Social History     Socioeconomic History    Marital status: Single     Spouse name: Not on file    Number of children: Not on file    Years of education: Not on file    Highest education level: Not on file   Occupational History    Not on file   Tobacco Use    Smoking status: Never     Passive exposure: Never    Smokeless tobacco: Never   Vaping Use    Vaping status: Never Used   Substance and Sexual Activity    Alcohol use: Not Currently    Drug use: No    Sexual activity: Not on file   Other Topics Concern     Service Not Asked    Blood Transfusions Not Asked    Caffeine Concern Yes     Comment: 1 cup a day.    Occupational Exposure Not Asked    Hobby Hazards Not Asked    Sleep Concern Not Asked    Stress Concern Not Asked    Weight Concern Not Asked    Special Diet Not Asked    Back Care Not Asked    Exercise No    Bike Helmet Not Asked    Seat Belt Not Asked     Self-Exams Not Asked    Grew up on a farm Not Asked    History of tanning Not Asked    Outdoor occupation Not Asked    Pt has a pacemaker No    Pt has a defibrillator No    Reaction to local anesthetic No   Social History Narrative    The patient uses the following assistive device(s):  Splint on R leg .      The patient does live in a home with stairs.     Social Drivers of Health     Food Insecurity: No Food Insecurity (3/8/2024)    Food Insecurity     Food Insecurity: Never true   Transportation Needs: No Transportation Needs (3/8/2024)    Transportation Needs     Lack of Transportation: No   Stress: Not on file   Housing Stability: Low Risk  (3/8/2024)    Housing Stability     Housing Instability: No     Housing Instability Emergency: Not on file     Crib or Bassinette: Not on file       Current Medications[4]    Allergies:  Allergies[5]    Physical Exam  Vitals and nursing note reviewed.   Constitutional:       General: He is not in acute distress.  Cardiovascular:      Rate and Rhythm: Normal rate and regular rhythm.      Heart sounds: Normal heart sounds.   Pulmonary:      Effort: Pulmonary effort is normal. No respiratory distress.      Breath sounds: Normal breath sounds. No stridor. No wheezing, rhonchi or rales.   Chest:      Chest wall: No tenderness.   Abdominal:      General: Abdomen is flat. Bowel sounds are normal. There is no distension.      Palpations: Abdomen is soft. There is no mass.      Tenderness: There is abdominal tenderness in the epigastric area. There is no guarding or rebound.      Hernia: No hernia is present.   Neurological:      Mental Status: He is alert and oriented to person, place, and time.   Psychiatric:         Behavior: Behavior normal.          Assessment and Plan:   Problem List Items Addressed This Visit          HCC Problems    Spina bifida (HCC)    Relevant Medications    sodium chloride 0.9 % Irrigation Solution       Gastrointestinal and Abdominal    Constipation     Relevant Medications    Omeprazole 40 MG Oral Capsule Delayed Release    docusate sodium (COLACE) 100 MG Oral Cap       Genitourinary and Reproductive    Cystitis    Relevant Medications    sodium chloride 0.9 % Irrigation Solution    Neurogenic bladder    Relevant Medications    sodium chloride 0.9 % Irrigation Solution     Other Visit Diagnoses         Epigastric abdominal pain    -  Primary    Relevant Medications    Omeprazole 40 MG Oral Capsule Delayed Release    docusate sodium (COLACE) 100 MG Oral Cap           Home once daily, Colace twice daily as needed.  Refill of irrigation to pharmacy on file.  Supportive care discussed.  Follow-up as needed if no relief of symptoms.    Discussed plan of care with patient and patient is in agreement.  All questions answered. Patient to call with questions or concerns.    Encouraged to sign up for My Chart if not already registered.        [1]   Past Medical History:   Bilateral leg weakness    chronic    Constipation    Depression    Foot drop, bilateral    Gait disturbance    High blood pressure    Neurogenic bladder    Pt self straight caths at home QID and irrigates BID PRN    Other ill-defined conditions(799.89)    shunt from hydrocephalus    PONV (postoperative nausea and vomiting)    if masked used    S/P  shunt    Spina bifida (HCC)    Management:  closure    Strabismus    Done at [facility]    Strabismus   [2]   Past Surgical History:  Procedure Laterality Date    Other surgical history  2010    bladder augmentation/catheterizable channel    Other surgical history  07/06/2020    fistulotomy    Spine surgery procedure unlisted  1994    Strabismus surgery Left 1999 or 2000   [3]   Family History  Problem Relation Age of Onset    No Known Problems Father     No Known Problems Mother     Diabetes Neg     Glaucoma Neg    [4]   Current Outpatient Medications   Medication Sig Dispense Refill    ibuprofen 200 MG Oral Tab Take 1 tablet (200 mg total)  by mouth every 6 (six) hours as needed for Pain.      sodium chloride 0.9 % Irrigation Solution Irrigate with 3,000 mL as directed as needed. Use as needed for straight cath. 3000 mL 11    Omeprazole 40 MG Oral Capsule Delayed Release Take 1 capsule (40 mg total) by mouth daily. 90 capsule 0    docusate sodium (COLACE) 100 MG Oral Cap Take 1 capsule (100 mg total) by mouth 2 (two) times daily as needed for constipation. 60 capsule 1    oxyBUTYnin Chloride ER 15 MG Oral Tablet 24 Hr Take 1 tablet (15 mg total) by mouth daily. 90 tablet 3    methylPREDNISolone (MEDROL) 4 MG Oral Tablet Therapy Pack As directed. (Patient taking differently: as needed. As directed.) 21 each 0    cholecalciferol 50 MCG (2000 UT) Oral Tab Take 1 tablet (2,000 Units total) by mouth daily. 90 tablet 0    cyclobenzaprine 10 MG Oral Tab Take 1 tablet (10 mg total) by mouth 3 (three) times daily as needed for Muscle spasms. 10 tablet 0    famotidine 20 MG Oral Tab Take 1 tablet (20 mg total) by mouth 2 (two) times daily as needed for Heartburn. 180 tablet 1    carvedilol 6.25 MG Oral Tab Take 1 tablet (6.25 mg total) by mouth 2 (two) times daily with meals. Her blood pressure 180 tablet 1    Syringe, Disposable, (OLGA SYRINGE) 70 ML Does not apply Misc Durable medical equipment. 70 ml olga syringe. 30 each 3    ketoconazole 2 % External Shampoo You can apply this not only to the scalp 2 or 3 times a week in the shower but also to your beard area and then also the chest where the rash is.  Can also use on the face. 120 mL 11    bisacodyl 10 MG Rectal Suppos Place 1 suppository (10 mg total) rectally daily as needed. 12 suppository 0    camphor/menthol/methyl salicylate 10-15 % External Cream Apply 1 Application topically 3 (three) times daily as needed. 1 each 0    Acetaminophen Extra Strength 500 MG Oral Tab Take 2 tablets (1,000 mg total) by mouth every 6 (six) hours.      naproxen 250 MG Oral Tab Take 1 tablet (250 mg total) by mouth 2  (two) times daily as needed. 30 tablet 0   [5]   Allergies  Allergen Reactions    Latex RASH

## 2025-06-23 NOTE — TELEPHONE ENCOUNTER
Abigail from [facility] is following up on the fax sent this morning and status for patient medication supplies.     Please see closed telephone encounter 5/29/25

## 2025-06-23 NOTE — TELEPHONE ENCOUNTER
Spoke to [employer] and informed order has not been received. They will refax order to [phone].

## 2025-06-24 NOTE — TELEPHONE ENCOUNTER
Spoke with patient, Date of Birth verified  He was informed of below message, he stated he will need  medical supply catheter, gel, saline water and gloves.   pls advise, thanks in advance.

## 2025-06-25 ENCOUNTER — HOSPITAL ENCOUNTER (EMERGENCY)
Facility: HOSPITAL | Age: 30
Discharge: HOME | End: 2025-06-25
Attending: EMERGENCY MEDICINE
Payer: MEDICARE

## 2025-06-25 ENCOUNTER — APPOINTMENT (OUTPATIENT)
Dept: GENERAL RADIOLOGY | Facility: HOSPITAL | Age: 30
End: 2025-06-25
Attending: EMERGENCY MEDICINE
Payer: MEDICARE

## 2025-06-25 VITALS
OXYGEN SATURATION: 97 % | DIASTOLIC BLOOD PRESSURE: 83 MMHG | SYSTOLIC BLOOD PRESSURE: 123 MMHG | RESPIRATION RATE: 10 BRPM | WEIGHT: 137 LBS | HEART RATE: 66 BPM | TEMPERATURE: 98 F | BODY MASS INDEX: 23.39 KG/M2 | HEIGHT: 64 IN

## 2025-06-25 DIAGNOSIS — R07.9 CHEST PAIN OF UNCERTAIN ETIOLOGY: Primary | ICD-10-CM

## 2025-06-25 LAB
ALBUMIN SERPL-MCNC: 5.3 G/DL (ref 3.2–4.8)
ALBUMIN/GLOB SERPL: 2.3 {RATIO} (ref 1–2)
ALP LIVER SERPL-CCNC: 61 U/L (ref 45–117)
ALT SERPL-CCNC: 51 U/L (ref 10–49)
ANION GAP SERPL CALC-SCNC: 11 MMOL/L (ref 0–18)
AST SERPL-CCNC: 25 U/L (ref ?–34)
ATRIAL RATE: 79 BPM
ATRIAL RATE: 84 BPM
BASOPHILS # BLD AUTO: 0.04 X10(3) UL (ref 0–0.2)
BASOPHILS NFR BLD AUTO: 0.4 %
BILIRUB SERPL-MCNC: 1.1 MG/DL (ref 0.3–1.2)
BUN BLD-MCNC: 11 MG/DL (ref 9–23)
BUN/CREAT SERPL: 11.7 (ref 10–20)
CALCIUM BLD-MCNC: 9.8 MG/DL (ref 8.7–10.4)
CHLORIDE SERPL-SCNC: 104 MMOL/L (ref 98–112)
CO2 SERPL-SCNC: 24 MMOL/L (ref 21–32)
CREAT BLD-MCNC: 0.94 MG/DL (ref 0.7–1.3)
DEPRECATED RDW RBC AUTO: 37.5 FL (ref 35.1–46.3)
EGFRCR SERPLBLD CKD-EPI 2021: 111 ML/MIN/1.73M2 (ref 60–?)
EOSINOPHIL # BLD AUTO: 0.13 X10(3) UL (ref 0–0.7)
EOSINOPHIL NFR BLD AUTO: 1.4 %
ERYTHROCYTE [DISTWIDTH] IN BLOOD BY AUTOMATED COUNT: 12.1 % (ref 11–15)
GLOBULIN PLAS-MCNC: 2.3 G/DL (ref 2–3.5)
GLUCOSE BLD-MCNC: 103 MG/DL (ref 70–99)
GLUCOSE BLDC GLUCOMTR-MCNC: 93 MG/DL (ref 70–99)
HCT VFR BLD AUTO: 46.3 % (ref 39–53)
HGB BLD-MCNC: 16.4 G/DL (ref 13–17.5)
IMM GRANULOCYTES # BLD AUTO: 0.03 X10(3) UL (ref 0–1)
IMM GRANULOCYTES NFR BLD: 0.3 %
LIPASE SERPL-CCNC: 28 U/L (ref 12–53)
LYMPHOCYTES # BLD AUTO: 3.29 X10(3) UL (ref 1–4)
LYMPHOCYTES NFR BLD AUTO: 35.3 %
MCH RBC QN AUTO: 30 PG (ref 26–34)
MCHC RBC AUTO-ENTMCNC: 35.4 G/DL (ref 31–37)
MCV RBC AUTO: 84.8 FL (ref 80–100)
MONOCYTES # BLD AUTO: 0.65 X10(3) UL (ref 0.1–1)
MONOCYTES NFR BLD AUTO: 7 %
NEUTROPHILS # BLD AUTO: 5.17 X10 (3) UL (ref 1.5–7.7)
NEUTROPHILS # BLD AUTO: 5.17 X10(3) UL (ref 1.5–7.7)
NEUTROPHILS NFR BLD AUTO: 55.6 %
OSMOLALITY SERPL CALC.SUM OF ELEC: 288 MOSM/KG (ref 275–295)
P AXIS: 40 DEGREES
P AXIS: 46 DEGREES
P-R INTERVAL: 122 MS
P-R INTERVAL: 132 MS
PLATELET # BLD AUTO: 309 10(3)UL (ref 150–450)
POTASSIUM SERPL-SCNC: 3.8 MMOL/L (ref 3.5–5.1)
PROT SERPL-MCNC: 7.6 G/DL (ref 5.7–8.2)
Q-T INTERVAL: 342 MS
Q-T INTERVAL: 348 MS
QRS DURATION: 82 MS
QRS DURATION: 88 MS
QTC CALCULATION (BEZET): 392 MS
QTC CALCULATION (BEZET): 411 MS
R AXIS: 57 DEGREES
R AXIS: 65 DEGREES
RBC # BLD AUTO: 5.46 X10(6)UL (ref 4.3–5.7)
SODIUM SERPL-SCNC: 139 MMOL/L (ref 136–145)
T AXIS: 27 DEGREES
T AXIS: 36 DEGREES
TROPONIN I SERPL HS-MCNC: <3 NG/L (ref ?–53)
TROPONIN I SERPL HS-MCNC: <3 NG/L (ref ?–53)
VENTRICULAR RATE: 79 BPM
VENTRICULAR RATE: 84 BPM
WBC # BLD AUTO: 9.3 X10(3) UL (ref 4–11)

## 2025-06-25 PROCEDURE — 82962 GLUCOSE BLOOD TEST: CPT

## 2025-06-25 PROCEDURE — 93005 ELECTROCARDIOGRAM TRACING: CPT

## 2025-06-25 PROCEDURE — 71045 X-RAY EXAM CHEST 1 VIEW: CPT | Performed by: EMERGENCY MEDICINE

## 2025-06-25 PROCEDURE — 99285 EMERGENCY DEPT VISIT HI MDM: CPT

## 2025-06-25 PROCEDURE — 93010 ELECTROCARDIOGRAM REPORT: CPT

## 2025-06-25 PROCEDURE — 84484 ASSAY OF TROPONIN QUANT: CPT | Performed by: EMERGENCY MEDICINE

## 2025-06-25 PROCEDURE — 83690 ASSAY OF LIPASE: CPT | Performed by: EMERGENCY MEDICINE

## 2025-06-25 PROCEDURE — 96374 THER/PROPH/DIAG INJ IV PUSH: CPT

## 2025-06-25 PROCEDURE — 85025 COMPLETE CBC W/AUTO DIFF WBC: CPT | Performed by: EMERGENCY MEDICINE

## 2025-06-25 PROCEDURE — 80053 COMPREHEN METABOLIC PANEL: CPT | Performed by: EMERGENCY MEDICINE

## 2025-06-25 RX ORDER — FAMOTIDINE 20 MG/1
20 TABLET, FILM COATED ORAL 2 TIMES DAILY
Qty: 14 TABLET | Refills: 0 | Status: SHIPPED | OUTPATIENT
Start: 2025-06-25 | End: 2025-07-02

## 2025-06-25 RX ORDER — FAMOTIDINE 10 MG/ML
20 INJECTION, SOLUTION INTRAVENOUS ONCE
Status: COMPLETED | OUTPATIENT
Start: 2025-06-25 | End: 2025-06-25

## 2025-06-25 RX ORDER — MAGNESIUM HYDROXIDE/ALUMINUM HYDROXICE/SIMETHICONE 120; 1200; 1200 MG/30ML; MG/30ML; MG/30ML
30 SUSPENSION ORAL ONCE
Status: COMPLETED | OUTPATIENT
Start: 2025-06-25 | End: 2025-06-25

## 2025-06-25 NOTE — ED PROVIDER NOTES
Patient Seen in: [facility] Emergency Department    History     Chief Complaint   Patient presents with    Chest Pain Angina       HPI    30-year-old male with a history of spina bifida was able to ambulate without any support and states that getting 3 days ago he began having epigastric burning sensation radiating to his chest however came to the emergency department given that he was having a bloating pressure sensation at right chest which started 2 hours prior to arrival and after passing flatus in the emergency department, states that the symptoms resolved.  Reports recent constipation.    History reviewed. Past Medical History[1]    History reviewed. Past Surgical History[2]      Medications :  Prescriptions Prior to Admission[3]     Family History[4]    Smoking Status: Social Hx on file[5]    Constitutional and vital signs reviewed.      Social History and Family History elements reviewed from today, pertinent positives to the presenting problem noted.    Physical Exam     ED Triage Vitals [06/25/25 1100]   BP (!) 127/91   Pulse 85   Resp 22   Temp 97.6 °F (36.4 °C)   Temp src Oral   SpO2 97 %   O2 Device None (Room air)       All measures to prevent infection transmission during my interaction with the patient were taken. The patient was already wearing a droplet mask on my arrival to the room. Personal protective equipment was worn throughout the duration of the exam.  Handwashing was performed prior to and after the exam.  Stethoscope and any equipment used during my examination was cleaned with super sani-cloth germicidal wipes following the exam.     Physical Exam    General: NAD  Head: Normocephalic and atraumatic.  Mouth/Throat/Ears/Nose: Oropharynx is clear and moist.   Eyes: Conjunctivae and EOM are normal.   Neck: Normal range of motion. Supple.   Cardiovascular: Normal rate, regular rhythm, normal heart sounds.  Respiratory/Chest: Clear and equal bilaterally. Exhibits no  tenderness.  Gastrointestinal: Soft, non-tender, non-distended. Bowel sounds are normal.   Musculoskeletal:No swelling or deformity.   Neurological: Alert and appropriate. No focal deficits.   Skin: Skin is warm and dry. No pallor.  Psychiatric: Has a normal mood and affect.      ED Course        Labs Reviewed   COMP METABOLIC PANEL (14) - Abnormal; Notable for the following components:       Result Value    Glucose 103 (*)     ALT 51 (*)     Albumin 5.3 (*)     A/G Ratio 2.3 (*)     All other components within normal limits   TROPONIN I HIGH SENSITIVITY - Normal   LIPASE - Normal   TROPONIN I HIGH SENSITIVITY - Normal   POCT GLUCOSE - Normal   CBC WITH DIFFERENTIAL WITH PLATELET     EKG    Rate, intervals and axes as noted on EKG Report.  Rate: 79  Rhythm: Sinus Rhythm  Reading: No STEMI.     Repeat EKG at 12:51 PM: Normal sinus rhythm at rate 84, normal axis, normal intervals, no significant difference from the earlier EKG. No STEMI.            As Interpreted by me    Imaging Results Available and Reviewed while in ED: XR CHEST AP PORTABLE  (CPT=71045)  Result Date: 6/25/2025  CONCLUSION: There is no evidence of active cardiopulmonary disease on this single portable chest radiograph. Electronically Verified and Signed by Attending Radiologist: Corina Bai MD 6/25/2025 11:43 AM Workstation: [email or web address]    ED Medications Administered:   Medications   famotidine (Pepcid) 20 mg/2mL injection 20 mg (20 mg Intravenous Given 6/25/25 1121)   alum-mag hydroxide-simethicone (Maalox) 200-200-20 MG/5ML oral suspension 30 mL (30 mL Oral Given 6/25/25 1121)         MDM     Vitals:    06/25/25 1100 06/25/25 1115 06/25/25 1145   BP: (!) 127/91 131/86 123/83   Pulse: 85 72 66   Resp: 22 20 10   Temp: 97.6 °F (36.4 °C)     TempSrc: Oral     SpO2: 97% 97% 97%   Weight: 62.1 kg     Height: 162.6 cm (5' 4\")       *I personally reviewed and interpreted all ED vitals.    Pulse Ox: 97%, Room air, Normal     Monitor Interpretation:    normal sinus rhythm as interpreted by me.  The cardiac monitor was ordered given chest pain .      Medical Decision Making      Differential Diagnosis/ Diagnostic Considerations: ACS, dyspepsia    Complicating Factors: The patient already has spina bifida to contribute to the complexity of this ED evaluation.    I reviewed prior chart records including office note from June 21, 2025.  Considered admission however patient is with serial troponins X2 with negative, presentation inconsistent with ACS.  Chest x-ray without pneumothorax on my interpretation.  PERC negative, presentation inconsistent with PE.  After passing flatus in the emergency department, symptoms resolved. Provided with bowel regimen     Dc In stable condition.  Patient is comfortable with the plan.  Prescriptions: miralax and pepcid       Disposition and Plan     Clinical Impression:  1. Chest pain of uncertain etiology        Disposition:  Discharge    Follow-up:  Nitin Mejía DO  [address]  [facility] [address]  [phone]    Schedule an appointment as soon as possible for a visit in 2 day(s)      Gabe Peña MD  [address] [facility] [address]  [facility] [id number] [phone]    Schedule an appointment as soon as possible for a visit in 1 day(s)        Medications Prescribed:  Discharge Medication List as of 6/25/2025  1:40 PM        START taking these medications    Details   !! famotidine 20 MG Oral Tab Take 1 tablet (20 mg total) by mouth 2 (two) times daily for 7 days., Normal, Disp-14 tablet, R-0       !! - Potential duplicate medications found. Please discuss with provider.                           [1]   Past Medical History:   Bilateral leg weakness    chronic    Constipation    Depression    Foot drop, bilateral    Gait disturbance    High blood pressure    Neurogenic bladder    Pt self straight caths at home QID and irrigates BID PRN    Other ill-defined conditions(799.89)    shunt from hydrocephalus    PONV  (postoperative nausea and vomiting)    if masked used    S/P  shunt    Spina bifida (HCC)    Management:  closure    Strabismus    Done at [facility]    Strabismus   [2]   Past Surgical History:  Procedure Laterality Date    Other surgical history  2010    bladder augmentation/catheterizable channel    Other surgical history  07/06/2020    fistulotomy    Spine surgery procedure unlisted  1994    Strabismus surgery Left 1999 or 2000   [3] (Not in a hospital admission)   [4]   Family History  Problem Relation Age of Onset    No Known Problems Father     No Known Problems Mother     Diabetes Neg     Glaucoma Neg    [5]   Social History  Socioeconomic History    Marital status: Single   Tobacco Use    Smoking status: Never     Passive exposure: Never    Smokeless tobacco: Never   Vaping Use    Vaping status: Never Used   Substance and Sexual Activity    Alcohol use: Not Currently    Drug use: No   Other Topics Concern    Caffeine Concern Yes     Comment: 1 cup a day.    Exercise No    Pt has a pacemaker No    Pt has a defibrillator No    Reaction to local anesthetic No

## 2025-06-25 NOTE — ED INITIAL ASSESSMENT (HPI)
Pt arrived via EMS from work d/t right CP that is radiating to the right arm. He states he had GERD this past week but it feels different      Nitroglycerin x1, Zofran and 24 asa given in the field     Hx spina bifida,  shunt

## 2025-07-12 ENCOUNTER — OFFICE VISIT (OUTPATIENT)
Dept: FAMILY MEDICINE CLINIC | Facility: CLINIC | Age: 30
End: 2025-07-12

## 2025-07-12 VITALS
HEIGHT: 64 IN | BODY MASS INDEX: 23.73 KG/M2 | SYSTOLIC BLOOD PRESSURE: 128 MMHG | HEART RATE: 78 BPM | DIASTOLIC BLOOD PRESSURE: 82 MMHG | TEMPERATURE: 99 F | WEIGHT: 139 LBS

## 2025-07-12 DIAGNOSIS — R15.2 FECAL URGENCY: ICD-10-CM

## 2025-07-12 DIAGNOSIS — K59.09 OTHER CONSTIPATION: ICD-10-CM

## 2025-07-12 DIAGNOSIS — E55.9 VITAMIN D DEFICIENCY: ICD-10-CM

## 2025-07-12 DIAGNOSIS — I10 ESSENTIAL HYPERTENSION: ICD-10-CM

## 2025-07-12 DIAGNOSIS — N31.9 NEUROGENIC BLADDER: ICD-10-CM

## 2025-07-12 DIAGNOSIS — Z00.00 ADULT GENERAL MEDICAL EXAM: Primary | ICD-10-CM

## 2025-07-12 DIAGNOSIS — Q05.9 SPINA BIFIDA, UNSPECIFIED HYDROCEPHALUS PRESENCE, UNSPECIFIED SPINAL REGION (HCC): ICD-10-CM

## 2025-07-12 DIAGNOSIS — I70.1 RENAL ARTERY STENOSIS: ICD-10-CM

## 2025-07-12 DIAGNOSIS — Z00.00 ENCOUNTER FOR ANNUAL HEALTH EXAMINATION: ICD-10-CM

## 2025-07-12 DIAGNOSIS — K21.9 GASTROESOPHAGEAL REFLUX DISEASE, UNSPECIFIED WHETHER ESOPHAGITIS PRESENT: ICD-10-CM

## 2025-07-12 DIAGNOSIS — R10.33 ABDOMINAL PAIN, PERIUMBILICAL: ICD-10-CM

## 2025-07-12 RX ORDER — CARVEDILOL 6.25 MG/1
6.25 TABLET ORAL 2 TIMES DAILY WITH MEALS
Qty: 180 TABLET | Refills: 3 | Status: SHIPPED | OUTPATIENT
Start: 2025-07-12

## 2025-07-12 NOTE — PROGRESS NOTES
Subjective:   Chao Reese is a 30 year old male who presents for a Initial Annual Wellness Visit (outside the first 12 months of Medicare eligibility, no prior AWV) and scheduled follow up of multiple significant but stable problems.            The following individual(s) verbally consented to be recorded using ambient AI listening technology and understand that they can each withdraw their consent to this listening technology at any point by asking the clinician to turn off or pause the recording:    Patient name: Chao Reese  Additional names: His mother Pranay is here with him.         History of Present Illness  Chao Reese is a 30 year old male with spina bifida who presents for a Medicare exam and follow-up on recent ER visit for breathing difficulties.    He recently experienced an episode of difficulty breathing and near syncope at work, prompting an ER visit. No cardiac issues were identified during the evaluation. He is scheduled for a cardiology consultation on July 22, 2025.    He is on carvedilol 6.25 mg twice daily for hypertension and uses famotidine as needed for acid reflux and also has omeprazole if needed, especially after large meals. He is making efforts to eat healthier and exercises at the gym with his sister.    He has spina bifida and uses AFO braces on both legs. He experiences constipation and occasionally uses Miralax. He feels embarrassed at work due to urgency to defecate, which he attributes to his condition.  This usually happens in the morning after breakfast so he tries to avoid eating breakfast.    He is having difficulty obtaining medical supplies for his neurogenic bladder, specifically catheters, due to referral issues with the supplier, [employer].  He is going to call them and asked them what they need is we have sent numerous referrals already.    He works three days a week and has had issues with unexcused absences due to the abdominal issues that cause the fecal urgency.   He is  considering obtaining an FMLA form to address work absences related to his medical condition.    Results  LABS  CBC: No anemia (06/25/2025)  Renal function: Normal (06/25/2025)  Hepatic function: Normal (06/25/2025)    History/Other:   Fall Risk Assessment:   He has been screened for Falls and is High Risk. Fall Prevention information provided to patient in After Visit Summary.    Do you feel unsteady when standing or walking?: No  Do you worry about falling?: No  Have you fallen in the past year?: Yes  How many times have you fallen?: 1  Were you injured?: No     Cognitive Assessment:   He had a completely normal cognitive assessment - see flowsheet entries     Functional Ability/Status:   Chao Reese has some abnormal functions as listed below:  He has Driving difficulties based on screening of functional status. He has Meal Preparation difficulties based on screening of functional status.He has Walking problems based on screening of functional status.       Depression Screening (PHQ):  PHQ-2 SCORE: 0  , done 7/12/2025   If you checked off any problems, how difficult have these problems made it for you to do your work, take care of things at home, or get along with other people?: Not difficult at all    Last [facility] Suicide Screening on 7/12/2025 was No Risk.          Advanced Directives:   He does NOT have a Living Will. [ ]  He does NOT have a Power of  for Health Care. [ ]  Discussed Advance Care Planning with patient (and family/surrogate if present). Standard forms made available to patient in After Visit Summary.      Patient Active Problem List   Diagnosis    Spina bifida (HCC)    Weakness of both lower extremities    Foot drop, bilateral    Acne vulgaris    Neurogenic bladder    Myelomeningocele (HCC)    Tachycardia    Strabismus    Regular astigmatism of both eyes    Right arm numbness    Constipation    Gastroenteritis    Gait disturbance    Chiari malformation type II (HCC)    Right foot pain     Right posterior tibial strain    Myalgia    Essential hypertension    Right renal artery stenosis    Lesion of urinary bladder    Hyperopia of right eye    Myopia of left eye    Exotropia    Foraminal stenosis of lumbar region    Right lumbar pain    Communicating hydrocephalus (HCC)    S/P  shunt    Pain in both lower extremities    Hospital discharge follow-up    Cystitis    Renal stone    Vaccine counseling    Urinary tract infection without hematuria, site unspecified    Abdominal pain of unknown etiology    Diarrhea, unspecified type    Spasticity    Tendinopathy of left rotator cuff    Left shoulder pain    Decreased ROM of left shoulder     Allergies:  He is allergic to latex.    Current Medications:  Outpatient Medications Marked as Taking for the 7/12/25 encounter (Office Visit) with Nitin Mejía DO   Medication Sig    carvedilol 6.25 MG Oral Tab Take 1 tablet (6.25 mg total) by mouth 2 (two) times daily with meals. Her blood pressure    ibuprofen 200 MG Oral Tab Take 1 tablet (200 mg total) by mouth every 6 (six) hours as needed for Pain.    sodium chloride 0.9 % Irrigation Solution Irrigate with 3,000 mL as directed as needed. Use as needed for straight cath.    Omeprazole 40 MG Oral Capsule Delayed Release Take 1 capsule (40 mg total) by mouth daily.    docusate sodium (COLACE) 100 MG Oral Cap Take 1 capsule (100 mg total) by mouth 2 (two) times daily as needed for constipation.    oxyBUTYnin Chloride ER 15 MG Oral Tablet 24 Hr Take 1 tablet (15 mg total) by mouth daily.    naproxen 250 MG Oral Tab Take 1 tablet (250 mg total) by mouth 2 (two) times daily as needed.    cholecalciferol 50 MCG (2000 UT) Oral Tab Take 1 tablet (2,000 Units total) by mouth daily.    cyclobenzaprine 10 MG Oral Tab Take 1 tablet (10 mg total) by mouth 3 (three) times daily as needed for Muscle spasms.    famotidine 20 MG Oral Tab Take 1 tablet (20 mg total) by mouth 2 (two) times daily as needed for Heartburn.    Syringe,  Disposable, (OLGA SYRINGE) 70 ML Does not apply Misc Durable medical equipment. 70 ml olga syringe.    ketoconazole 2 % External Shampoo You can apply this not only to the scalp 2 or 3 times a week in the shower but also to your beard area and then also the chest where the rash is.  Can also use on the face.    bisacodyl 10 MG Rectal Suppos Place 1 suppository (10 mg total) rectally daily as needed.    camphor/menthol/methyl salicylate 10-15 % External Cream Apply 1 Application topically 3 (three) times daily as needed.    Acetaminophen Extra Strength 500 MG Oral Tab Take 2 tablets (1,000 mg total) by mouth every 6 (six) hours.       Medical History:  He  has a past medical history of Bilateral leg weakness, Constipation, Depression, Foot drop, bilateral, Gait disturbance, High blood pressure, Neurogenic bladder, Other ill-defined conditions(799.89), PONV (postoperative nausea and vomiting), S/P  shunt, Spina bifida (HCC), Strabismus (1998), and Strabismus.  Surgical History:  He  has a past surgical history that includes Strabismus surgery (Left, 1999 or 2000); spine surgery procedure unlisted (1994); other surgical history (2010); and other surgical history (07/06/2020).   Family History:  His family history includes No Known Problems in his father and mother.  Social History:  He  reports that he has never smoked. He has never been exposed to tobacco smoke. He has never used smokeless tobacco. He reports that he does not currently use alcohol. He reports that he does not use drugs.    Tobacco:  He has never smoked tobacco.    CAGE Alcohol Screen:   CAGE screening score of 0 on 7/12/2025, showing low risk of alcohol abuse.      Patient Care Team:  Nitin Mejía DO as PCP - General (Family Medicine)    Review of Systems  No exertional cardiac chest pain or shortness of breath unless stated in HPI which would take precedence.  See HPI for further review of systems.      Objective:   Physical Exam    Physical  Exam   Constitutional: He is oriented to person, place, and time. He appears well-developed and well-nourished. No distress   HENT:   Head: Normocephalic.   Right Ear: Tympanic membrane and ear canal normal.   Left Ear: Tympanic membrane and ear canal normal.   Mouth/Throat: Oropharynx is clear and moist and mucous membranes are normal.   Eyes: Conjunctivae and EOM are normal. Pupils are equal, round, and reactive to light.   Neck: Normal range of motion. Neck supple. No thyromegaly present.   Cardiovascular: Normal rate, regular rhythm and no  murmur heard.  Pulmonary/Chest: Effort normal and breath sounds normal. No respiratory distress.   Abdominal: Soft. Bowel sounds are normal. There is no hepatosplenomegaly. There is no tenderness.   Lymphadenopathy:     He has no cervical adenopathy.   Neurological: He has normal reflexes. No cranial nerve deficit.   Skin: Skin is warm and dry. No rash noted.   Lower legs: No edema of the legs bilaterally.  AFO braces bilaterally  Psychiatric: He has a normal mood and affect.   Vitals reviewed.    Physical Exam  VITALS: BP- 128/82  HEENT: Nasal tissue swollen.  NECK: No lymphadenopathy. Thyroid normal.  CHEST: Clear to auscultation bilaterally.  CARDIOVASCULAR: Normal heart rate and rhythm, S1 and S2 normal without murmurs.  ABDOMEN: Soft, non-tender, non-distended, without organomegaly.  EXTREMITIES: No edema.    Vitals:    07/12/25 1037 07/12/25 1049   BP: (!) 139/91 128/82   BP Location: Right arm    Patient Position: Sitting    Cuff Size: adult    Pulse: 78    Temp: 98.6 °F (37 °C)    TempSrc: Tympanic    Weight: 139 lb (63 kg)    Height: 5' 4\" (1.626 m)          /82   Pulse 78   Temp 98.6 °F (37 °C) (Tympanic)   Ht 5' 4\" (1.626 m)   Wt 139 lb (63 kg)   BMI 23.86 kg/m²  Estimated body mass index is 23.86 kg/m² as calculated from the following:    Height as of this encounter: 5' 4\" (1.626 m).    Weight as of this encounter: 139 lb (63 kg).    Medicare Hearing  Assessment:   Hearing Screening    Time taken: 7/12/2025 10:38 AM  Entry User: Rosa Espinosa CMA  Screening Method: Finger Rub  Finger Rub Result: Pass         Visual Acuity:   Right Eye Visual Acuity: Uncorrected Right Eye Chart Acuity: 20/20   Left Eye Visual Acuity: Uncorrected Left Eye Chart Acuity: 20/20   Both Eyes Visual Acuity: Uncorrected Both Eyes Chart Acuity: 20/20   Able To Tolerate Visual Acuity: Yes        Assessment & Plan:   Chao Reese is a 30 year old male who presents for a Medicare Assessment.     Diagnoses and all orders for this visit:    Adult general medical exam  Medicare exam done.  Spina bifida, unspecified hydrocephalus presence, unspecified spinal region (HCC)  -     Assay, Thyroid Stim Hormone; Future  I ordered labs for him.  He will get them done fasting.  I did review the emergency room notes and the testing that was done as well  Neurogenic bladder  -     Assay, Thyroid Stim Hormone; Future  -     UROLOGY - INTERNAL  He needs to follow-up with urology and referral done  Vitamin D deficiency  -     Vitamin D; Future    Gastroesophageal reflux disease, unspecified whether esophagitis present  Takes medication only if needed  Essential hypertension  -     Assay, Thyroid Stim Hormone; Future  -     Lipid Panel; Future  -     carvedilol 6.25 MG Oral Tab; Take 1 tablet (6.25 mg total) by mouth 2 (two) times daily with meals. Her blood pressure  -     Comp Metabolic Panel (14); Future  Controlled, continue present management  Encounter for annual health examination    Renal artery stenosis  -     carvedilol 6.25 MG Oral Tab; Take 1 tablet (6.25 mg total) by mouth 2 (two) times daily with meals. Her blood pressure  Mild  Other constipation  Has MiraLAX.  Increase water and fiber intake  Fecal urgency  He is going to get an FMLA form from work and he can bring it to our  for the fecal urgency and abdominal pain that happens periodically  Abdominal pain, periumbilical  As above or  fecal urgency.      Referrals (if applicable)  Orders Placed This Encounter   Procedures    UROLOGY - INTERNAL     Urology referral.++++++ call [phone]++++++++++     Referral Priority:   Routine     Referral Type:   OFFICE VISIT     Requested Specialty:   UROLOGY     Number of Visits Requested:   3         Follow up if symptoms persist.  Take medicine (if given) as prescribed.  Approach to treatment discussed and patient/family member understands and agrees to plan.     No follow-ups on file.      Assessment & Plan  Spina Bifida with Neurogenic Bladder  Spina bifida affects his bladder function, necessitating catheter use. Persistent issues with obtaining DME supplies for catheterization due to referral problems with the supplier. Multiple referrals sent to [employer] for DME supplies, but issues persist.  - Contact [employer] to resolve DME supply issues for catheters  - Consider referral to urology for further management of neurogenic bladder    Constipation  Constipation likely related to spina bifida and neurogenic bowel. Uses Miralax as needed. Concerns about urgency and embarrassment at work due to bowel issues.  - Continue Miralax as needed for constipation    Hypertension  Blood pressure normalized to 128/82 mmHg after resting. Currently on carvedilol 6.25 mg twice daily, effectively managing blood pressure.  - Continue carvedilol 6.25 mg twice daily  - Refill carvedilol prescription for one year    Nasal Allergies with Deviated Septum  Significant nasal tissue swelling likely due to allergies, contributing to breathing difficulties. Nasal septal deviation may exacerbate symptoms. Allergies may affect breathing, as evidenced by a recent incident at work with breathing difficulty.    Gastroesophageal Reflux Disease (GERD)  Experiences acid reflux, takes famotidine as needed, advised to use before large meals.  - Continue famotidine as needed for acid reflux    General Health Maintenance  Requires routine lab work  for thyroid function, cholesterol, and vitamin D levels. Advised to fast for 10 hours before tests.  - Order lab tests for thyroid function, cholesterol, and vitamin D  - Advise fasting for 10 hours before lab tests    Follow-up  Follow-up appointment with cardiologist on July 22, 2025. Advised to bring FMLA forms for work-related accommodations if needed.  - Follow up with cardiologist on July 22, 2025  - Bring FMLA forms to the  for completion if needed    The patient indicates understanding of these issues and agrees to the plan.  Reinforced healthy diet, lifestyle, and exercise.      No follow-ups on file.     Nitin Mejía DO, 7/12/2025     Supplementary Documentation:   General Health:  In the past six months, have you lost more than 10 pounds without trying?: 2 - No  Has your appetite been poor?: No  Type of Diet: Balanced  How does the patient maintain a good energy level?: Other  How would you describe your daily physical activity?: Light  How would you describe your current health state?: Good  How do you maintain positive mental well-being?: Visiting Friends, Visiting Family, Games, Social Interaction  On a scale of 0 to 10, with 0 being no pain and 10 being severe pain, what is your pain level?: 0 - (None)  In the past six months, have you experienced urine leakage?: 0-No  At any time do you feel concerned for the safety/well-being of yourself and/or your children, in your home or elsewhere?: No  Have you had any immunizations at another office such as Influenza, Hepatitis B, Tetanus, or Pneumococcal?: No    Health Maintenance   Topic Date Due    Annual Physical  Never done    COVID-19 Vaccine (3 - 2024-25 season) 09/01/2024    Influenza Vaccine (1) 10/01/2025    DTaP,Tdap,and Td Vaccines (2 - Td or Tdap) 03/22/2028    Annual Depression Screening  Completed    Pneumococcal Vaccine: Birth to 50yrs  Aged Out    Meningococcal B Vaccine  Aged Out

## 2025-07-14 ENCOUNTER — TELEPHONE (OUTPATIENT)
Dept: PEDIATRIC NEUROLOGY | Age: 30
End: 2025-07-14

## 2025-07-15 ENCOUNTER — TELEPHONE (OUTPATIENT)
Dept: FAMILY MEDICINE CLINIC | Facility: CLINIC | Age: 30
End: 2025-07-15

## 2025-07-15 NOTE — TELEPHONE ENCOUNTER
Patient walked in requesting an update on his catheter supplies. I informed her DME order was signed and faxed back to [facility]. I also informed him that [facility] is responsible for obtaining authorization for medical supplies.     While in office, patient reached out to DME company and spoke to [facility] who informed him that all is needed from our office is the last progress notes. I faxed the most recent OV notes to both fax numbers: [phone] & [phone]. A confirmation was received 2x.

## 2025-07-15 NOTE — TELEPHONE ENCOUNTER
Contacted Laine from [facility] and verified what is needed from our office. She states they need the last progress notes that state patients medical need for supplies. I faxed last OV notes, a confirmation was received. I will reach out tomorrow to confirm last OV notes were received. Per Laine, it takes 24-48 hours for their system to receive incoming faxes.

## 2025-07-16 NOTE — TELEPHONE ENCOUNTER
Reached out to [facility] to confirm progress notes have been received. They states it takes 24-48 hours and they have not revived the complete fax. I contacted patient to give an update as requested, left VM.

## 2025-07-29 ENCOUNTER — MED REC SCAN ONLY (OUTPATIENT)
Dept: FAMILY MEDICINE CLINIC | Facility: CLINIC | Age: 30
End: 2025-07-29

## 2025-08-07 ENCOUNTER — LAB ENCOUNTER (OUTPATIENT)
Dept: LAB | Age: 30
End: 2025-08-07
Attending: FAMILY MEDICINE

## 2025-08-07 DIAGNOSIS — N31.9 NEUROGENIC BLADDER: ICD-10-CM

## 2025-08-07 DIAGNOSIS — Q05.9 SPINA BIFIDA, UNSPECIFIED HYDROCEPHALUS PRESENCE, UNSPECIFIED SPINAL REGION (HCC): ICD-10-CM

## 2025-08-07 DIAGNOSIS — I10 ESSENTIAL HYPERTENSION: ICD-10-CM

## 2025-08-07 DIAGNOSIS — E55.9 VITAMIN D DEFICIENCY: ICD-10-CM

## 2025-08-07 LAB
ALBUMIN SERPL-MCNC: 5.2 G/DL (ref 3.2–4.8)
ALBUMIN/GLOB SERPL: 2 (ref 1–2)
ALP LIVER SERPL-CCNC: 65 U/L (ref 45–117)
ALT SERPL-CCNC: 29 U/L (ref 10–49)
ANION GAP SERPL CALC-SCNC: 6 MMOL/L (ref 0–18)
AST SERPL-CCNC: 20 U/L (ref ?–34)
BILIRUB SERPL-MCNC: 1.1 MG/DL (ref 0.3–1.2)
BUN BLD-MCNC: 15 MG/DL (ref 9–23)
BUN/CREAT SERPL: 13.5 (ref 10–20)
CALCIUM BLD-MCNC: 9.8 MG/DL (ref 8.7–10.4)
CHLORIDE SERPL-SCNC: 103 MMOL/L (ref 98–112)
CHOLEST SERPL-MCNC: 194 MG/DL (ref ?–200)
CO2 SERPL-SCNC: 32 MMOL/L (ref 21–32)
CREAT BLD-MCNC: 1.11 MG/DL (ref 0.7–1.3)
EGFRCR SERPLBLD CKD-EPI 2021: 91 ML/MIN/1.73M2 (ref 60–?)
FASTING PATIENT LIPID ANSWER: YES
FASTING STATUS PATIENT QL REPORTED: YES
GLOBULIN PLAS-MCNC: 2.6 G/DL (ref 2–3.5)
GLUCOSE BLD-MCNC: 96 MG/DL (ref 70–99)
HDLC SERPL-MCNC: 43 MG/DL (ref 40–59)
LDLC SERPL CALC-MCNC: 100 MG/DL (ref ?–100)
NONHDLC SERPL-MCNC: 151 MG/DL (ref ?–130)
OSMOLALITY SERPL CALC.SUM OF ELEC: 293 MOSM/KG (ref 275–295)
POTASSIUM SERPL-SCNC: 4.4 MMOL/L (ref 3.5–5.1)
PROT SERPL-MCNC: 7.8 G/DL (ref 5.7–8.2)
SODIUM SERPL-SCNC: 141 MMOL/L (ref 136–145)
TRIGL SERPL-MCNC: 300 MG/DL (ref 30–149)
TSI SER-ACNC: 1.22 UIU/ML (ref 0.55–4.78)
VIT D+METAB SERPL-MCNC: 42.9 NG/ML (ref 30–100)
VLDLC SERPL CALC-MCNC: 51 MG/DL (ref 0–30)

## 2025-08-07 PROCEDURE — 82306 VITAMIN D 25 HYDROXY: CPT

## 2025-08-07 PROCEDURE — 36415 COLL VENOUS BLD VENIPUNCTURE: CPT

## 2025-08-07 PROCEDURE — 84443 ASSAY THYROID STIM HORMONE: CPT

## 2025-08-07 PROCEDURE — 80061 LIPID PANEL: CPT

## 2025-08-07 PROCEDURE — 80053 COMPREHEN METABOLIC PANEL: CPT

## 2025-08-14 DIAGNOSIS — E55.9 VITAMIN D DEFICIENCY: ICD-10-CM

## 2025-08-18 RX ORDER — CHOLECALCIFEROL (VITAMIN D3) 50 MCG
2000 TABLET ORAL DAILY
Qty: 90 TABLET | Refills: 0 | Status: SHIPPED | OUTPATIENT
Start: 2025-08-18

## 2025-08-22 ENCOUNTER — HOSPITAL ENCOUNTER (OUTPATIENT)
Age: 30
Discharge: HOME | End: 2025-08-22

## 2025-08-22 ENCOUNTER — APPOINTMENT (OUTPATIENT)
Dept: GENERAL RADIOLOGY | Age: 30
End: 2025-08-22
Attending: NURSE PRACTITIONER

## 2025-08-22 ENCOUNTER — NURSE TRIAGE (OUTPATIENT)
Dept: FAMILY MEDICINE CLINIC | Facility: CLINIC | Age: 30
End: 2025-08-22

## 2025-08-22 VITALS
RESPIRATION RATE: 20 BRPM | DIASTOLIC BLOOD PRESSURE: 77 MMHG | OXYGEN SATURATION: 97 % | SYSTOLIC BLOOD PRESSURE: 143 MMHG | TEMPERATURE: 98 F | HEART RATE: 79 BPM

## 2025-08-22 DIAGNOSIS — R07.9 RIGHT-SIDED CHEST PAIN: Primary | ICD-10-CM

## 2025-08-22 DIAGNOSIS — T14.8XXA MUSCULOSKELETAL STRAIN: ICD-10-CM

## 2025-08-22 LAB
#MXD IC: 0.5 X10ˆ3/UL (ref 0.1–1)
BUN BLD-MCNC: 11 MG/DL (ref 7–18)
CHLORIDE BLD-SCNC: 103 MMOL/L (ref 98–112)
CO2 BLD-SCNC: 27 MMOL/L (ref 21–32)
CREAT BLD-MCNC: 1 MG/DL (ref 0.7–1.3)
DDIMER WHOLE BLOOD: <200 NG/ML DDU (ref ?–400)
EGFRCR SERPLBLD CKD-EPI 2021: 103 ML/MIN/1.73M2 (ref 60–?)
GLUCOSE BLD-MCNC: 91 MG/DL (ref 70–99)
HCT VFR BLD AUTO: 45.2 % (ref 39–53)
HCT VFR BLD CALC: 48 % (ref 37–53)
HGB BLD-MCNC: 15.4 G/DL (ref 13–17.5)
ISTAT IONIZED CALCIUM FOR CHEM 8: 1.24 MMOL/L (ref 1.12–1.32)
LYMPHOCYTES # BLD AUTO: 2.4 X10ˆ3/UL (ref 1–4)
LYMPHOCYTES NFR BLD AUTO: 36 %
MCH RBC QN AUTO: 29.8 PG (ref 26–34)
MCHC RBC AUTO-ENTMCNC: 34.1 G/DL (ref 31–37)
MCV RBC AUTO: 87.6 FL (ref 80–100)
MIXED CELL %: 8.3 %
NEUTROPHILS # BLD AUTO: 3.7 X10ˆ3/UL (ref 1.5–7.7)
NEUTROPHILS NFR BLD AUTO: 55.7 %
PLATELET # BLD AUTO: 210 X10ˆ3/UL (ref 150–450)
POTASSIUM BLD-SCNC: 4.7 MMOL/L (ref 3.6–5.1)
RBC # BLD AUTO: 5.16 X10ˆ6/UL (ref 4.3–5.7)
SODIUM BLD-SCNC: 140 MMOL/L (ref 136–145)
WBC # BLD AUTO: 6.6 X10ˆ3/UL (ref 4–11)

## 2025-08-22 PROCEDURE — 80047 BASIC METABLC PNL IONIZED CA: CPT | Performed by: NURSE PRACTITIONER

## 2025-08-22 PROCEDURE — 99213 OFFICE O/P EST LOW 20 MIN: CPT | Performed by: NURSE PRACTITIONER

## 2025-08-22 PROCEDURE — 85025 COMPLETE CBC W/AUTO DIFF WBC: CPT | Performed by: NURSE PRACTITIONER

## 2025-08-22 PROCEDURE — 71046 X-RAY EXAM CHEST 2 VIEWS: CPT | Performed by: NURSE PRACTITIONER

## (undated) DIAGNOSIS — N31.9 NEUROGENIC BLADDER: ICD-10-CM

## (undated) DIAGNOSIS — L21.9 SEBORRHEIC DERMATITIS: ICD-10-CM

## (undated) DIAGNOSIS — Q05.9 SPINA BIFIDA, UNSPECIFIED HYDROCEPHALUS PRESENCE, UNSPECIFIED SPINAL REGION (HCC): ICD-10-CM

## (undated) DEVICE — SUTURE VCL+ 3-0 SH 18IN CNTRL RELS BRAID 8 STRN

## (undated) DEVICE — GOWN SURG XL XLONG L4 RAGLAN SLV BRTHBL STRL LF DISP

## (undated) DEVICE — TOWEL SURG 26X15IN BLUE TIBURON NABSB DRAPE ADH STRIP STRL

## (undated) DEVICE — SUTURE 4-0 SH 27IN CR MONO ABS BRN G121H

## (undated) DEVICE — MINOR GENERAL: Brand: MEDLINE INDUSTRIES, INC.

## (undated) DEVICE — ABDOMINAL PAD: Brand: CURITY

## (undated) DEVICE — MATERNITY KNIT PANTS,SEAMLESS: Brand: WINGS

## (undated) DEVICE — DRESSING TRANS 4.75X4IN ADH HPOAL WTPRF TEGADERM PU STD STRL

## (undated) DEVICE — SUTURE CHROMIC GUT 2-0 UR-6

## (undated) DEVICE — STOPCOCK IV RED HFL MEDEX MEDFUSION 4W SWVL MALE LL LIPID

## (undated) DEVICE — Device

## (undated) DEVICE — STERILE SURGICAL LUBRICANT, METAL TUBE: Brand: SURGILUBE

## (undated) DEVICE — PAD ALC 43.5X24IN PREP  LF

## (undated) DEVICE — BOOT SUT .437IN .062IN STD FOAM AID RADOPQ PAD ADH BCK VASC

## (undated) DEVICE — ELECTRODE ESURG 360D BLADE PNCL 10FT 38IN 78IN ADPR RADOPQ

## (undated) DEVICE — TRAY SKIN PREP PVP-1

## (undated) DEVICE — CATHETER IV 16GA 1.25IN DEHP-FR PVC FREE STRGT HUB PU

## (undated) DEVICE — POUCH INST 11X7IN 2 ADH STRIP 2 CMPRT STRL STRDRP PLASTIC

## (undated) DEVICE — SUPER SPONGES,MEDIUM: Brand: KERLIX

## (undated) DEVICE — SUTURE NUROLON 3-0 27IN BRAID 10 STRN CSTM NABSB BLK D9563

## (undated) DEVICE — GLOVE SURG 7 PROTEXIS PI LF CRM PF BEAD CUFF STRL PLISPRN

## (undated) DEVICE — SHEATH 61CM INTRO TRNLU REPL PP STRL REUSE PRTNL

## (undated) DEVICE — SOL  .9 1000ML BTL

## (undated) DEVICE — GLOVE SURG 6.5 PROTEXIS PI LF CRM PF BEAD CUFF STRL PLISPRN

## (undated) DEVICE — GAUZE,PACKING STRIP,IODOFORM,1/2"X5YD,ST: Brand: CURAD

## (undated) NOTE — LETTER
Date & Time: 1/5/2024, 12:42 PM  Patient: Chao Reese  Encounter Provider(s):    Belén Perez APRN       To Whom It May Concern:    Chao Reese was seen and treated in our department on 1/5/2024. He should not return to work until 01/08/2024 .    If you have any questions or concerns, please do not hesitate to call.    PAVAN Reid    _____________________________  Physician/APC Signature

## (undated) NOTE — LETTER
12/14/2021              Chao Reese        [address]        [place] [address]         To whom it may concern,    Lobito Marks is currently a patient under my medical care.  Patient was seen today and he can return back to work regular duty wit

## (undated) NOTE — LETTER
4/1/2025          To Whom It May Concern:    Chao Reese is currently under my medical care and may not return to work at this time.    Please excuse Chao for his work absence.   He may return to work on 4/7/25.  Activity is restricted as follows: per orthopedic specialist     If you require additional information please contact our office.        Sincerely,      Edith Francis MD          Document generated by:  Edith Francis MD

## (undated) NOTE — LETTER
8/26/2024              Chao Reese        [address]        [place] [address]         To whom it may concern,    Chao Reese is currently a patient under my medical care.   Patient was seen today for injury.  At this time he will have to be off of work starting today through August 29, 2024 and then can return back to work on August 30, 2024 without restrictions.  If you require additional information please do not hesitate to contact my office.        Sincerely,     Nitin Mejía DO  [address]  [place] [address]  [phone]        Document electronically generated by:  Nitin Mejía DO

## (undated) NOTE — ED AVS SNAPSHOT
Navdeep Callaway   MRN: [id number]    Department:  [facility] Emergency Department   Date of Visit:  8/20/2019           Disclosure     Insurance plans vary and the physician(s) referred by the ER may not be covered by your plan.  Please contact your within the next three months to obtain basic health screening including reassessment of your blood pressure.     IF THERE IS ANY CHANGE OR WORSENING OF YOUR CONDITION, CALL YOUR PRIMARY CARE PHYSICIAN AT ONCE OR RETURN IMMEDIATELY TO THE EMERGENCY DEPARTMEN

## (undated) NOTE — LETTER
24          Chao Reese  :  3/18/1995      To Whom It May Concern:    This patient was seen in our office on 24 .  Work status:  Remain off work until re-evaluation at scheduled appointment in approximately 6  weeks. He will be re-evaluated at that time. He is continuing to improve.         If this office may be of further assistance, please do not hesitate to contact us.      Sincerely,          Trip Mccoy, DO

## (undated) NOTE — LETTER
7/15/2022      To Whom It May Concern:    Deena Ruff is currently under my medical care and may not return to work at this time. Please excuse Jade Tucker from work today. He may return to work on 7/18/22. Activity is restricted as follows: none. If you require additional information please contact our office.       Sincerely,     Kinza Olson DO  [address]   [phone]      Document generated by:  Raj Swan RN

## (undated) NOTE — LETTER
10/3/2023              Chao Reese        [address]        [facility] [address]         To whom it may concern,    Zuleyka Herrmann is currently a patient under my medical care. Patient has been off of work since September 27, 2023. He needs to remain off of work due to his pain. We will see him on October 10, 2023 at which time we will reevaluate his ability to go back to work. If you require additional information please do not hesitate to contact my office.         Sincerely,     Jameel Barrett DO  [address] [address] [phone]        Document electronically generated by:  Jameel Barrett DO

## (undated) NOTE — Clinical Note
2/17/2017              Chao Reese        [address]        [place] [address]         To whom it may concern,    Jazlyn Prince is currently a patient under my medical care.   Patient can return back to full duty at work on 2/21/2017 if you requi

## (undated) NOTE — Clinical Note
11/18/2024          To Whom It May Concern:    Chao Reese is currently under my medical care and may not return to {em school/work:319218838} at this time.    Please excuse Chao for {NUMBERS 0-10:3282} {days weeks:3323}.  {HE/SHE :6250} may return to {em school/work:443983459} on ***.  Activity is restricted as follows: {EM SCHOOL/WORK RESTRICTIONS:325026756}.    If you require additional information please contact our office.        Sincerely,    Nitin Mejía, DO          Document generated by:  Tiana NAVARRETE RN

## (undated) NOTE — LETTER
Date & Time: 4/23/2025, 9:56 AM  Patient: Chao Reese  Encounter Provider(s):    Maria Isabel Suero APRN       To Whom It May Concern:    Chao Reese was seen and treated in our department on 4/23/2025. He  should be excused from work today .    Thank you,        _____________________________  Physician/APC Signature

## (undated) NOTE — LETTER
2/14/2022              Chaojc Reese        [address]        [facility] [place] [facility] [place] [address]         To whom it may concern,    Radha Zapien is currently a patient under my medical care. Patient was seen today for illness and needs to be off of work today. He can then return back to work at his next scheduled shift without restrictions. If you require additional information please do not hesitate to contact my office.         Sincerely,     Mercedes Blevins DO  [facility], [facility], [address] [place] [address]  [phone]        Document electronically generated by:  Mercedes Blevins DO

## (undated) NOTE — LETTER
12/19/2024          To Whom It May Concern:    Chao Reese is currently under my medical care and may not return to work at this time.    Please excuse Chao for 5 days.  He may return to work on 12/23/24.  Activity is restricted as follows: none.    If you require additional information please contact our office.        Sincerely,     Nitin Mejía, DO

## (undated) NOTE — LETTER
Date & Time: 4/2/2022, 7:16 PM  Patient: Deena Ruff  Encounter Provider(s):    Kari Prader, MD       To Whom It May Concern:    Deena Ruff was seen and treated in our department on 4/2/2022. He should not return to work until 4/4/2022.     If you have any questions or concerns, please do not hesitate to call.        _____________________________  Physician/APC Signature

## (undated) NOTE — LETTER
9/21/2023          To Whom It May Concern:    Kb Sesay is currently under my medical care and may not return to work until 9/25/23, no restriction. If you require additional information please contact our office.         Sincerely,    Ray Do MD          Document generated by:  Ray Do MD

## (undated) NOTE — LETTER
10/10/2023              Chao Reese        [address]        [facility] [address]         To whom it may concern,    Lady Beyer is currently a patient under my medical care. Patient was seen today and continues to be in pain. He will be seeing his specialists. They will have to clear him for work so at this time he cannot return. If you require additional information please do not hesitate to contact my office.         Sincerely,     Sandip Mccarthy DO  [address] [address] [phone]        Document electronically generated by:  Sandip Mccarthy DO

## (undated) NOTE — LETTER
1/28/2025              Chao Reese        [address]        [facility] [address]         To whom it may concern,    Chao Reese is currently a patient under my medical care.  Patient was seen today for office visit.  He had to be off of work yesterday January 27, 2025 as well as today January 28, 2025 and can return back to regular duty without restrictions on January 29, 2025 if you require additional information please do not hesitate to contact my office.        Sincerely,     Nitin Mejía DO  [address] [address] [phone]        Document electronically generated by:  Nitin Mejía DO

## (undated) NOTE — LETTER
Date & Time: 5/23/2022, 4:31 PM  Patient: Jessica Zuniga  Encounter Provider(s):    LSO Grider       To Whom It May Concern:    Jessica Zuniga was seen and treated in our department on 5/23/2022. He should not return to work until 5/24/22.      If you have any questions or concerns, please do not hesitate to call.        _____________________________  Physician/APC Signature

## (undated) NOTE — LETTER
Date & Time: 2/23/2024, 2:57 PM  Patient: Chao Reese  Encounter Provider(s):    Jasmeet Oh PA       To Whom It May Concern:    Chao Reese was seen and treated in our department on 2/23/2024. He should not return to work until tuesday .    If you have any questions or concerns, please do not hesitate to call.      Jasmeet Oh   _____________________________  Physician/APC Signature

## (undated) NOTE — LETTER
7/24/2023              [facility]        [address]        [facility] [address]         To whom it may concern,    Esequiel Lara is currently a patient under my medical care. Patient was seen today for illness and will need to be off of work. He can return back to work regular duty without restrictions on July 25, 2023. If you require additional information please do not hesitate to contact my office.         Sincerely,     Jeff Holguin DO  [address] [address] [phone]        Document electronically generated by:  Jeff Holguin DO

## (undated) NOTE — LETTER
7/12/2025              Chao Reese        [address]        [facility] [address]         To whom it may concern,    Chao Reese is currently a patient under my medical care.  Patient was seen today for his exam and he was sick and could not go to work on July 7, 2025 and July 9, 2025 but now can return back to work regular duty at his next scheduled shift.  If you require additional information please do not hesitate to contact my office.        Sincerely,     Nitin Mejía DO  [address]  [facility] [address]  [phone]        Document electronically generated by:  Nitin Mejía DO

## (undated) NOTE — LETTER
Date & Time: 6/25/2025, 1:52 PM  Patient: Chao Reese  Encounter Provider(s):    Ronn Cantrell MD       To Whom It May Concern:    Chao Reese was seen and treated in our department on 6/25/2025. He can return to work 6/28/25.    If you have any questions or concerns, please do not hesitate to call.        ___Dr. Cantrell____________  Physician/APC Signature

## (undated) NOTE — Clinical Note
6/16/2017              Chao Reese        [address]        [facility] [address]         To whom it may concern,    Leighton Davidson is currently a patient under my medical care. This is a Worker's Compensation case.   He was in physical therapy due

## (undated) NOTE — LETTER
Date & Time: 5/23/2022, 4:21 PM  Patient: Jessica Zuniga  Encounter Provider(s):    LOS Grider       To Whom It May Concern:    Jessica Zuniga was seen and treated in our department on 5/23/2022. He should not return to work until 5/23/22.     If you have any questions or concerns, please do not hesitate to call.        _____________________________  Physician/APC Signature

## (undated) NOTE — Clinical Note
6/14/2017          To Whom It May Concern:    Navdeep Callaway is currently under my medical care and may not return to work at this time. Please excuse Yu Ly for 3 days. He may return to work on 6/17/2017.       If you require additional information please co

## (undated) NOTE — LETTER
Date & Time: 5/15/2025, 11:30 AM  Patient: Chao Reese  Encounter Provider(s):    Itzel Peterson APRN       To Whom It May Concern:    Chao Reese was seen and treated in our department on 5/15/2025. He should not return to work until 5/19/2025.    If you have any questions or concerns, please do not hesitate to call.        _____________________________  LOS Reyes

## (undated) NOTE — LETTER
Date & Time: 6/13/2025, 11:38 AM  Patient: Chao Reese  Encounter Provider(s):    Mansi Pacheco PA-C       To Whom It May Concern:    Chao Reese was seen and treated in our department on 6/13/2025. He can return to work on 6/14/2025.    If you have any questions or concerns, please do not hesitate to call.        _____________________________  Physician/APC Signature

## (undated) NOTE — LETTER
3/7/2022              [facility]        [address]        [place] [address]         To Whom it may concern: This is to certify that Janet Mayen had an appointment on 3/7/2022 at 11:44 AM with Tawanda Mccollum DO.         Sincerely,    Tawanda Mccollum DO  [address] [facility], [address], [place]  [address]  [phone]

## (undated) NOTE — LETTER
6/13/2024              Chao Reese        [address]        [facility] [address]         To whom it may concern,    Chao Reese is currently a patient under my medical care.   Patient was seen in office visit.  He has had to be off of work since the afternoon of Gloria 10, 2024 as he had to leave early.  We will continue keeping him off of work at this time as he is being treated and he can return back to regular duty without restrictions on June 17, 2024.  If you require additional information please do not hesitate to contact my office.        Sincerely,     Nitin Mejía DO  [facility]  [address]  [facility] [id number] [phone]        Document electronically generated by:  Nitin Mejía DO

## (undated) NOTE — LETTER
9/2/2022          To Whom It May Concern:    Rd Mcdowell is currently under my medical care and may not return to work at this time. Please excuse Kecia Shoulders for the following days 08/25/2022 - 09/11/2022. He may return to work on 09/11/2022. Activity is restricted as follows: none. If you require additional information please contact our office.         Sincerely,    Mercedes Garrido, DO

## (undated) NOTE — LETTER
Date & Time: 7/21/2023, 9:47 AM  Patient: Juan Nguyen  Encounter Provider(s):    LOS Hubbard       To Whom It May Concern:    Juan Nguyen was seen and treated in our department on 7/21/2023. Please excuse him from work 07/19/2023 and today 07/21/2023  If you have any questions or concerns, please do not hesitate to call.       VANDA Lentz-BC  _____________________________  SLBUNDFVT/NHX Signature

## (undated) NOTE — LETTER
Date & Time: 8/14/2024, 9:41 AM  Patient: Chao Reese  Encounter Provider(s):    Tom Bustillos APRN       To Whom It May Concern:    Chao Reese was seen and treated in our department on 8/14/2024. Please excuse him from work today. He can return to work on Friday 8/16/2024 .    If you have any questions or concerns, please do not hesitate to call.      YOLI HeadP-BC  _____________________________  Physician/APC Signature

## (undated) NOTE — LETTER
6/20/2018              Chao Reese        [address]        [facility] [address]         To whom it may concern,    Navdeep Callaway is currently a patient under my medical care.   Patient has spina bifida and leg weakness and needs to have modificati

## (undated) NOTE — LETTER
2/6/2025          To Whom It May Concern:    Chao Reese is currently under my medical care and may not return to work at this time.    Please excuse Chao for 1 days.  He may return to work on Next scheduled day.  Activity is restricted as follows: none.    If you require additional information please contact our office.        Sincerely,    LOS Wu            Document generated by:  LOS Wu

## (undated) NOTE — LETTER
2/26/2024          To Whom It May Concern:    Chao Reese is currently under my medical care and may not return to work at this time.    Please excuse Chao for 1 days.  He may return to work on Tuesday 2/27/24.  Activity is restricted as follows: /none.  If you require additional information please contact our office.        Sincerely,    LOS Wu          Document generated by:  LOS Wu

## (undated) NOTE — LETTER
7/1/2022              [facility]        [address]        [facility] [address]         To whom it may concern,    Raymond Corrales is currently a patient under my medical care. Patient was seen today. No work today but he can start working tomorrow which is July 2, 2022. Please allow him to put his foot up if need be to help with discomfort and swelling. If you require additional information please do not hesitate to contact my office.         Sincerely,    Gary Torres DO  [facility], [facility], [address] [place] [address]  [phone]        Document electronically generated by:  Gary Torres DO

## (undated) NOTE — LETTER
10/18/2022              Chao Reese        [address]        [facility] [address]         To whom it may concern,    Herlinda Cullen is currently a patient under my medical care. Patient was seen today for illness and he was off on 10/14/2022 and 10/17/2022. He can return back to work regular duty at his next scheduled shift. If you require additional information please do not hesitate to contact my office.         Sincerely,     Elissa Parkinson DO  [facility], [address], [address] [place] [address]  [phone]        Document electronically generated by:  Elissa Parkinson DO

## (undated) NOTE — LETTER
9/17/2024          To Whom It May Concern:    Chao Reese is currently under my medical care and may not return to work at this time.    Please excuse Chao for 3 days.  He may return to work on Friday 9/20/24.  Activity is restricted as follows: none.    If you require additional information please contact our office.        Sincerely,    LOS Wu        Document generated by:  LOS Wu

## (undated) NOTE — LETTER
12/9/2021      To Whom It May Concern:    Santos Calderón is currently under my medical care and may not return to work at this time. Patient will be re-evaluated on 12/14/21 and determine if patient can return to work.      If you require additional info

## (undated) NOTE — LETTER
Date & Time: 8/8/2022, 10:28 AM  Patient: Jax Ferguson  Encounter Provider(s):    LOS Millard       To Whom It May Concern:    Jax Ferguson was seen and treated in our department on 8/8/2022. Please excuse from work yesterday and today.   If you have any questions or concerns, please do not hesitate to call.        _____________________________  Physician/APC Signature

## (undated) NOTE — LETTER
10/31/2024              Chao Reese        [address]        [facility] [address]         To whom it may concern,    Chao Reese is currently a patient under my medical care.  Patient was evaluated today.  Due to his symptoms he will be unable to return back to work at this time.  I will reevaluate him November 14, 2024 and we can determine at that time if he can return back to work.  If you require additional information please do not hesitate to contact my office.        Sincerely,     Nitin Mejía DO  [address]  [facility] [address] [phone]        Document electronically generated by:  Nitin Mejía DO

## (undated) NOTE — LETTER
[facility]  part of [facility]     PICC INSERTION CONSENT     I agree to have a Peripherally Inserted Central Catheter (PICC) placed in my arm.   1. The PICC insertion procedure, care, maintenance, risks, benefits, and complications have been explained to me by my physician, ________________________, and I understand them.   2. I understand that this may not be the only way I can receive my medication. I understand that my physician has determined that the PICC would be the safest and most effective means of administering my medication at this time. If there are other options of giving medication into my veins those options have been explained to me by my physician and I have chosen this one.   3. I realize a nurse who has been specially trained and certified by the hospital and ’s representative to insert a PICC will perform this procedure. My catheter will be inserted by _____________________________.   4. I have been informed by my doctor of the nature and purpose of this procedure and the risks involved and the possibility of complications. I realize that this is an invasive procedure and has certain risks such as air embolism (air entering the catheter or my vein), arterial puncture (a tearing of one of my arteries), infection, irregular heartbeat and venous thrombosis (a blood clot in a vein) nerve injury and fracture of the catheter with or without migration.   5. In order to numb the area where the line will be placed, a small amount of anesthetic medication will be injected as ordered by my physician.   6. I understand that while the catheter will be placed in my upper arm the end of the catheter will come to rest in an area near my heart.     7. The person performing this procedure has discussed the potential benefits, risks, and side effects of the PICC; the likelihood of achieving goals; and potential problems that might occur during recuperation. They also discussed  reasonable alternatives to the PICC, including risks, benefits, and side effects related to the alternatives and risks related to not receiving this procedure.    8.  I have expressed any questions about this procedure to my physician or the PICC Proceduralist and he/she has answered them.  I certify that I have read and understand this consent to the insertion of a PICC.      _________________________________________________________   Date     Time     Patient/Guardian Signature       ____________________________________   Printed name of Patient/Guardian          ________________________________________________________________    Date        Time                   Witnessing RN Signature      Patient Name: Chao Reese     : 3/18/1995                 Printed: 2024     Medical Record #: [id number]

## (undated) NOTE — LETTER
8/22/2022              Chao Reese        [address]        [facility] [address]         To whom it may concern,    Deena Ruff is currently a patient under my medical care. Patient was seen today for illness and he had to be off work on 8/21/2022. He can return back to work at his next scheduled shift. If you require additional information please do not hesitate to contact my office.         Sincerely,     Kinza Olson DO  [facility], [address], [address] [place] [address]  [phone]        Document electronically generated by:  Kinaz Olson DO

## (undated) NOTE — ED AVS SNAPSHOT
[facility] Emergency Department    [address]     [place] [address]    Phone:  [phone]    Fax:  [address] [facility]   MRN: [id number]    Department:  [facility] Emergency Department   Date of Visit:  6/18/2017 and Class Registration line at [phone] or find a doctor online by visiting [email or web address].    IF THERE IS ANY CHANGE OR WORSENING OF YOUR CONDITION, CALL YOUR PRIMARY CARE PHYSICIAN AT ONCE OR RETURN IMMEDIATELY TO [address] [place] [address].     If

## (undated) NOTE — LETTER
Date & Time: 10/31/2018, 8:12 PM  Patient: Navdeep Callaway  Encounter Provider(s):    August Quintero MD       To Whom It May Concern:    Navdeep Callaway was seen and treated in our department on 10/31/2018. He cannot work 11/1 and 11/2/2018.     If you have any qu

## (undated) NOTE — LETTER
5/2/2024              Chao Reese        [address]        [facility] [address]         To whom it may concern,    Chao Reese is currently a patient under my medical care.  Can return to work on May 7, 2024 but no lifting over 10 pounds especially with the left arm as he needs to do physical therapy as well.  We will see him back in 6 weeks time.  If you require additional information please do not hesitate to contact my office.        Sincerely,     Nitin Mejía DO  [address]  [facility] [address] [phone]        Document electronically generated by:  Nitin Mejía DO

## (undated) NOTE — [place] [address]  Authorization for Invasive Procedures  Date: 3/12/24           Time: 20:05    I hereby authorize Dr. Moore or assistant, my physician and his/her assistants (if applicable), which may include medical students, residents, and/or fellows, to perform the following surgical operation/ procedure and administer such anesthesia as may be determined necessary by my physician: Left shoulder aspiration and injection of cortisone on Chao Sisic  2.   I recognize that during the surgical operation/procedure, unforeseen conditions may necessitate additional or different procedures than those listed above.  I, therefore, further authorize and request that the above-named surgeon, assistants, or designees perform such procedures as are, in their judgment, necessary and desirable.    3.   My surgeon/physician has discussed prior to my surgery the potential benefits, risks and side effects of this procedure; the likelihood of achieving goals; and potential problems that might occur during recuperation.  They also discussed reasonable alternatives to the procedure, including risks, benefits, and side effects related to the alternatives and risks related to not receiving this procedure.  I have had all my questions answered and I acknowledge that no guarantee has been made as to the result that may be obtained.    4.   Should the need arise during my operation/procedure, which includes change of level of care prior to discharge, I also consent to the administration of blood and/or blood products.  Further, I understand that despite careful testing and screening of blood or blood products by collecting agencies, I may still be subject to ill effects as a result of receiving a blood transfusion and/or blood products.  The following are some, but not all, of the potential risks that can occur: fever and allergic reactions, hemolytic reactions, transmission of diseases such as Hepatitis,  AIDS and Cytomegalovirus (CMV) and fluid overload.  In the event that I wish to have an autologous transfusion of my own blood, or a directed donor transfusion, I will discuss this with my physician.   Check only if Refusing Blood or Blood Products  I understand refusal of blood or blood products as deemed necessary by my physician may have serious consequences to my condition to include possible death. I hereby assume responsibility for my refusal and release the hospital, its personnel, and my physicians from any responsibility for the consequences of my refusal.         o  Refuse         5.   I authorize the use of any specimen, organs, tissues, body parts or foreign objects that may be removed from my body during the operation/procedure for diagnosis, research or teaching purposes and their subsequent disposal by hospital authorities.  I also authorize the release of specimen test results and/or written reports to my treating physician on the hospital medical staff or other referring or consulting physicians involved in my care, at the discretion of the Pathologist or my treating physician.    6.   I consent to the photographing or videotaping of the operations or procedures to be performed, including appropriate portions of my body for medical, scientific, or educational purposes, provided my identity is not revealed by the pictures or by descriptive texts accompanying them.  If the procedure has been photographed/videotaped, the surgeon will obtain the original picture, image, videotape or CD.  The hospital will not be responsible for storage, release or maintenance of the picture, image, tape or CD.    7.   I consent to the presence of a  or observers in the operating room as deemed necessary by my physician or their designees.    8.   I recognize that in the event my procedure results in extended X-Ray/fluoroscopy time, I may develop a skin reaction.    9. If I have a Do Not Attempt  Resuscitation (DNAR) order in place, that status will be suspended while in the operating room, procedural suite, and during the recovery period unless otherwise explicitly stated by me (or a person authorized to consent on my behalf). The surgeon or my attending physician will determine when the applicable recovery period ends for purposes of reinstating the DNAR order.  10. Patients having a sterilization procedure: I understand that if the procedure is successful the results will be permanent and it will therefore be impossible for me to inseminate, conceive, or bear children.  I also understand that the procedure is intended to result in sterility, although the result has not been guaranteed.   11. I acknowledge that my physician has explained sedation/analgesia administration to me including the risk and benefits I consent to the administration of sedation/analgesia as may be necessary or desirable in the judgment of my physician.    I CERTIFY THAT I HAVE READ AND FULLY UNDERSTAND THE ABOVE CONSENT TO OPERATION and/or OTHER PROCEDURE.        ____________________________________       _________________________________      ______________________________  Signature of Patient         Signature of Responsible Person        Printed Name of Responsible Person        ____________________________________      _________________________________      ______________________________       Signature of Witness          Relationship to Patient                       Date                                       Time  Patient Name: Chao Reese  : 3/18/1995    Reviewed: 2024   Printed: 2024  Medical Record #: [id number] Page 1 of 2             STATEMENT OF PHYSICIAN My signature below affirms that prior to the time of the procedure; I have explained to the patient and/or his/her legal representative, the risks and benefits involved in the proposed treatment and any reasonable alternative to the proposed  treatment. I have also explained the risks and benefits involved in refusal of the proposed treatment and alternatives to the proposed treatment and have answered the patient's questions. If I have a significant financial interest in a co-management agreement or a significant financial interest in any product or implant, or other significant relationship used in this procedure/surgery, I have disclosed this and had a discussion with my patient.     _______________________________________________________________ _____________________________  (Signature of Physician)                                                                                         (Date)                                   (Time)  Patient Name: Chao Reese  : 3/18/1995    Reviewed: 2024   Printed: 2024  Medical Record #: [id number] Page 2 of 2

## (undated) NOTE — LETTER
To Whom It May Concern:    Juve Curyr has been under our care regarding ongoing medical issues. Because of this, he has been required to restrict her physical activities.     He may resume his usual activities, including work, on August 11, 2020 with the

## (undated) NOTE — LETTER
Date & Time: 4/23/2022, 1:33 PM  Patient: Alden Grant  Encounter Provider(s):    LOS Booth       To Whom It May Concern:    Alden Grant was seen and treated in our department on 4/23/2022. Please excuse Jayson Menchaca from work until 4/27/2022 due to injury. If you have any questions or concerns, please do not hesitate to call.         LOS Booth    Physician/APC Signature

## (undated) NOTE — LETTER
11/20/2020          To Whom It May Concern:    Caprice Stewart is currently under my medical care and may not return to work at this time. Please excuse Pranay  for 1 days. He may return to work on 11/21/2020. Activity is restricted as follows: none.     If yo

## (undated) NOTE — LETTER
3/16/2024          To Whom It May Concern:    Chao Resee is currently under my medical care and may not return to work at this time.    Please excuse Chao for days missed    He may return to work on After evaluated by neurology and orthopedics unless light duty can be accommodated.  Activity is restricted as follows: light duty, no lifting, and no prolonged standing.    If you require additional information please contact our office.        Sincerely,    LOS Wu          Document generated by:  LOS Wu

## (undated) NOTE — LETTER
To Whom It May Concern: This certifies that Deniz Amaro was seen in my office today. Please allow patient to remain off from work until follow up. Do not hesitate to call with any questions or concerns. Sincerely,    Olvin Smith.  DO [facility] [address] [facility], [address]  [phone]        Document generated by:  Eunice Rossi MA

## (undated) NOTE — LETTER
7/1/2022              Chao Reese        [address]        [facility] [address]         To whom it may concern,    Rohit Kaminski is currently a patient under my medical care. Diagnosis is right Achilles tendinitis and right heel pain. He has spina bifida and decreased motion of his right ankle. He needs a cam walker boot for comfort. If you require additional information please do not hesitate to contact my office.         Sincerely,    Irving Rodriguez DO  [facility], [address], [address] [place] [address]  [phone]        Document electronically generated by:  Irving Rodriguez DO

## (undated) NOTE — LETTER
Date & Time: 6/10/2024, 2:40 PM  Patient: Chao Reese  Encounter Provider(s):    Wilma Burnett APRN       To Whom It May Concern:    Chao Reese was seen and treated in our department on 6/10/2024. He should not return to work until 6/11/2024 .    If you have any questions or concerns, please do not hesitate to call.        _____________________________  Physician/APC Signature

## (undated) NOTE — LETTER
1/19/2023              Chao Reese        [address]        [facility] [address]         To whom it may concern,    Missy Link is currently a patient under my medical care. Patient will be off on January 23, 2023 through January 25, 2023 due to illness. He can return back to work full duty January 26, 2003 which is Thursday. .  If you require additional information please do not hesitate to contact my office.         Sincerely,     Stephanie Gill DO  [place] [id number] [phone]        Document electronically generated by:  Stephanie Gill DO

## (undated) NOTE — LETTER
Date & Time: 9/15/2023, 11:45 AM  Patient: Herlinda Cullen  Encounter Provider(s):    Conchis Miller MD       To Whom It May Concern:    Herlinda Cullen was seen and treated in our department on 9/15/2023. He should not return to work until 9/18/2023 .     If you have any questions or concerns, please do not hesitate to call.        _____________________________  Physician/APC Signature

## (undated) NOTE — LETTER
Patient Name: Rich Orosco  : 3/18/1995  MRN: [id number]  Patient Address: [address] [place] [address]      Coronavirus Disease 2019 (COVID-19)     [facility] is committed to the safety and well-being of our patients, members, 2. Monitor your symptoms carefully. If your symptoms get worse, call your healthcare provider immediately. 3. Get rest and stay hydrated.    4. If you have a medical appointment, call the healthcare provider ahead of time and tell them that you have or may ? At least 24 hours have passed since recovery defined as resolution of fever without the use of fever-reducing medications; and  · Improvement in respiratory symptoms (e.g., cough, shortness of breath); and  · At least 10 days have passed since symptoms f If you would be interested in donating your plasma to help treat others diagnosed with the virus, please contact [facility] directly on their website: [email or web address]    Who is eligible to donate convalescent plasma?

## (undated) NOTE — LETTER
[facility]  part of [facility]     PICC INSERTION CONSENT     I agree to have a Peripherally Inserted Central Catheter (PICC) placed in my arm.   1. The PICC insertion procedure, care, maintenance, risks, benefits, and complications have been explained to me by my physician, ________________________, and I understand them.   2. I understand that this may not be the only way I can receive my medication. I understand that my physician has determined that the PICC would be the safest and most effective means of administering my medication at this time. If there are other options of giving medication into my veins those options have been explained to me by my physician and I have chosen this one.   3. I realize a nurse who has been specially trained and certified by the hospital and ’s representative to insert a PICC will perform this procedure. My catheter will be inserted by _____________________________.   4. I have been informed by my doctor of the nature and purpose of this procedure and the risks involved and the possibility of complications. I realize that this is an invasive procedure and has certain risks such as air embolism (air entering the catheter or my vein), arterial puncture (a tearing of one of my arteries), infection, irregular heartbeat and venous thrombosis (a blood clot in a vein) nerve injury and fracture of the catheter with or without migration.   5. In order to numb the area where the line will be placed, a small amount of anesthetic medication will be injected as ordered by my physician.   6. I understand that while the catheter will be placed in my upper arm the end of the catheter will come to rest in an area near my heart.     7. The person performing this procedure has discussed the potential benefits, risks, and side effects of the PICC; the likelihood of achieving goals; and potential problems that might occur during recuperation. They also discussed  reasonable alternatives to the PICC, including risks, benefits, and side effects related to the alternatives and risks related to not receiving this procedure.    8.  I have expressed any questions about this procedure to my physician or the PICC Proceduralist and he/she has answered them.  I certify that I have read and understand this consent to the insertion of a PICC.      _________________________________________________________   Date     Time     Patient/Guardian Signature       ____________________________________   Printed name of Patient/Guardian          ________________________________________________________________    Date        Time                   Witnessing RN Signature      Patient Name: Chao Reese     : 3/18/1995                 Printed: 2024     Medical Record #: [id number]

## (undated) NOTE — ED AVS SNAPSHOT
Jademarielena Pritchard   MRN: [id number]    Department:  [facility] Emergency Department   Date of Visit:  7/2/2018           Disclosure     Insurance plans vary and the physician(s) referred by the ER may not be covered by your plan.  Please contact your i CARE PHYSICIAN AT ONCE OR RETURN IMMEDIATELY TO THE EMERGENCY DEPARTMENT. If you have been prescribed any medication(s), please fill your prescription right away and begin taking the medication(s) as directed.   If you believe that any of the medications

## (undated) NOTE — MR AVS SNAPSHOT
[address]  [address] [facility]  [phone]               Thank you for choosing us for your health care visit with Antoinette Medina MD.  We are glad to serve you and happy to provide you with this summa

## (undated) NOTE — Clinical Note
1/24/2017              Chao Reese        [address]        [place] [id number]         To whom it may concern,    Christina Penny is currently a patient under my medical care.   Patient has a brace that broke for his right ankle and is unable to wa

## (undated) NOTE — LETTER
3/18/2025              Chao Reese        [address]        [facility] [address]         To whom it may concern,    Chao Reese is currently a patient under my medical care.  The patient's medical condition makes serving on jury duty inadvisable at this time.  Please excuse them from serving.  If you require additional information please do not hesitate to contact my office.        Sincerely,    Nitin Mejía DO  [address] [id number] [phone]        Document electronically generated by:  Nitin Mejía DO

## (undated) NOTE — MR AVS SNAPSHOT
[address]  [address] [facility]  [phone]               Thank you for choosing us for your health care visit with Lyn Marcelo DO.   We are glad to serve you and happy to provide you with this summary Commonly known as:  [facility]                   MyChart     Call the [employer] for assistance with your inactive [employer] account    If you have questions, you can call [phone] to talk to our [facility] Staff.  Remember, [employer] is NOT to be used

## (undated) NOTE — LETTER
1/16/2024              Chao Reese        [address]        [facility] [address]         To whom it may concern,    Chao Reese is currently a patient under my medical care.  Patient was seen for office visit.  He needs to be off of work tomorrow which is January 17, 2024 and then can return back to regular duty at his next scheduled shift.  If you require additional information please do not hesitate to contact my office.        Sincerely,     Nitin Mejía DO  [address]  [facility] [address]  [phone]        Document electronically generated by:  Nitin Mejía DO

## (undated) NOTE — LETTER
12/27/2023              [facility]        [address] [place] [address] [address]         Dear Radha Renae records indicate that the tests ordered for you by Devin Cates MD  have not been done. H. Pylori Stool Ag, EIA (Order #[id number]) on 9/5/23        If you have, in fact, already completed the tests or you do not wish to have the tests done, please contact our office at [address] [place] [address]. Otherwise, please proceed with the testing. Enclosed is a duplicate order for your convenience.         Sincerely,    Devin Cates MD  [facility], [address] [address] [phone]

## (undated) NOTE — LETTER
Date & Time: 4/16/2025, 8:51 AM  Patient: Chao Reese  Encounter Provider(s):    Julissa Thurman APRN       To Whom It May Concern:    Chao Reese was seen and treated in our department on 4/16/2025, please excuse dates that missed this week for illness. He should not return to work until 02/18/2025 .    If you have any questions or concerns, please do not hesitate to call.      Julissa Thurman NP-C  Nurse Practitioner    This note has been electronically signed

## (undated) NOTE — LETTER
[facility]  part of [facility]     PICC INSERTION CONSENT     I agree to have a Peripherally Inserted Central Catheter (PICC) placed in my arm.   1. The PICC insertion procedure, care, maintenance, risks, benefits, and complications have been explained to me by my physician, ________________________, and I understand them.   2. I understand that this may not be the only way I can receive my medication. I understand that my physician has determined that the PICC would be the safest and most effective means of administering my medication at this time. If there are other options of giving medication into my veins those options have been explained to me by my physician and I have chosen this one.   3. I realize a nurse who has been specially trained and certified by the hospital and ’s representative to insert a PICC will perform this procedure. My catheter will be inserted by _____________________________.   4. I have been informed by my doctor of the nature and purpose of this procedure and the risks involved and the possibility of complications. I realize that this is an invasive procedure and has certain risks such as air embolism (air entering the catheter or my vein), arterial puncture (a tearing of one of my arteries), infection, irregular heartbeat and venous thrombosis (a blood clot in a vein) nerve injury and fracture of the catheter with or without migration.   5. In order to numb the area where the line will be placed, a small amount of anesthetic medication will be injected as ordered by my physician.   6. I understand that while the catheter will be placed in my upper arm the end of the catheter will come to rest in an area near my heart.     7. The person performing this procedure has discussed the potential benefits, risks, and side effects of the PICC; the likelihood of achieving goals; and potential problems that might occur during recuperation. They also discussed  reasonable alternatives to the PICC, including risks, benefits, and side effects related to the alternatives and risks related to not receiving this procedure.    8.  I have expressed any questions about this procedure to my physician or the PICC Proceduralist and he/she has answered them.  I certify that I have read and understand this consent to the insertion of a PICC.      _________________________________________________________   Date     Time     Patient/Guardian Signature       ____________________________________   Printed name of Patient/Guardian          ________________________________________________________________    Date        Time                   Witnessing RN Signature      Patient Name: Chao Reese     : 3/18/1995                 Printed: 2024     Medical Record #: [id number]

## (undated) NOTE — ED AVS SNAPSHOT
[facility] Emergency Department    [address]     [place] [address]    Phone:  [phone]    Fax:  [address] [facility]   MRN: [id number]    Department:  [facility] Emergency Department   Date of Visit:  4/26/2017 and Class Registration line at [phone] or find a doctor online by visiting [email or web address].    IF THERE IS ANY CHANGE OR WORSENING OF YOUR CONDITION, CALL YOUR PRIMARY CARE PHYSICIAN AT ONCE OR RETURN IMMEDIATELY TO [address] [place] [address].     If

## (undated) NOTE — LETTER
2/25/2025              Chao Reese        [address]        [facility] [address]         To whom it may concern,    Chao Reese is currently a patient under my medical care.  Due to patient's illness he had to be off work on 2/21/2025 and 2/24/2025.  He could return back to work on his next scheduled shift which is 2/26/2025 without restrictions.  If you require additional information please do not hesitate to contact my office.        Sincerely,     Nitin Mejía DO  [facility] [address]  [facility] [id number] [phone]        Document electronically generated by:  Nitin Mejía DO

## (undated) NOTE — LETTER
4/4/2022          To Whom It May Concern:    Isabel Mary is currently under my medical care. Please Excuse Chao on 4/4/2022. Activity is restricted as follows: none. If you require additional information please contact our office.         Sincerely,    Emerita Flaherty, DO

## (undated) NOTE — LETTER
12/6/2021              Chao Reese        [address]        [facility] [address]         To Whom it may concern: This is to certify that Milton Gallardo had an appointment on 12/6/2021 at 3:21 PM with James Norris DO.         Sincerely,    Aislinn Day

## (undated) NOTE — LETTER
3/18/2025              Chao Reese        [address]        [facility] [address]         To whom it may concern,    Chao Reese is currently a patient under my medical care.  Patient was seen for illness and was off of work March 12, 2025 and March 14, 2025 and returned yesterday which was March 17, 2025 and can remain working full duty without restrictions.  If you require additional information please do not hesitate to contact my office.        Sincerely,     Nitin Mejía DO  [address] [address] [phone]        Document electronically generated by:  Nitin Mejía DO

## (undated) NOTE — LETTER
6/21/2025          To Whom It May Concern:    Chao Reese is currently under my medical care and may not return to work at this time.    Please excuse Chao for 2 days; 6/18/, 6/20.  He may return to work on 6/23/2025.  Activity is restricted as follows: none.    If you require additional information please contact our office.        Sincerely,    LOS Alamo, FNP-BC          Document generated by:  LOS Alamo

## (undated) NOTE — LETTER
8/16/2023              Chao Reese        [address]        [facility] [address]       To whom it may concern,    Please excuse Zohra Arita from work today (08/16/23), 1 day, This patient was seen for a dermatology appointment for chronic treatment of a rash.        Sincerely,    Nay Moore MD  [facility], Darrell Lucas, [facility]  [address] [place] [address] [phone]

## (undated) NOTE — LETTER
7/11/2024          To Whom It May Concern:    Chao Reese is currently under my medical care and may return to work at this time.    Please excuse Chao for 1 days when he had to leave early since he was not feeling well. He has seen me in office and corrected issue.   He may return to work on next scheduled day.  Activity is restricted as follows: none.    If you require additional information please contact our office.        Sincerely,    LOS Wu          Document generated by:  LOS Wu

## (undated) NOTE — LETTER
8/17/2023              Chao Hugh        [address]        [facility] [address]         To whom it may concern,    Rd Mcdowell is currently a patient under my medical care. Patient was seen today. He has been off of work August 7, 2023 through August 8, 2023. He also has been off of work August 14, 2023 and we will keep him off of work and can return back to work August 21, 2023. If you require additional information please do not hesitate to contact my office.         Sincerely,     Mercedes Garrido DO  [address] [address] [phone]        Document electronically generated by:  Mercedes Garrido DO

## (undated) NOTE — Clinical Note
MARIZ[place], [facility] [place], see NCM notes. [facility] Confirmed PCP office TCM appointment with patient on 3/16/24 at 9:40 am. With LOS Wu.

## (undated) NOTE — LETTER
Date & Time: 3/29/2025, 1:28 PM  Patient: Chao Reese  Encounter Provider(s):    Isatu Sprague APRN       To Whom It May Concern:    Chao Reese was seen and treated in our department on 3/29/2025. He should not return to work until 3/31/25 .    If you have any questions or concerns, please do not hesitate to call.        _____________________________  Physician/APC Signature

## (undated) NOTE — LETTER
9/25/2023              Chao Reese        [address]        [facility] [address]         To whom it may concern,    Herlinda Cullen is currently a patient under my medical care. Patient was seen today for office visit. He will need to be off of work September 26, 2023 and September 27, 2023 and then can return back to work at his next scheduled shift. .  If you require additional information please do not hesitate to contact my office.         Sincerely,     Elissa Parkinson DO  [place] [id number] [phone]        Document electronically generated by:  Elissa Parkinson DO

## (undated) NOTE — LETTER
1/19/2023              Chao Reese        [address]        [facility] [address]         To whom it may concern,    Jc Wolf is currently a patient under my medical care. Because of his medical condition he needs to carry sterile water with him so please allow him to carry that in his carry-on baggage. If you require additional information please do not hesitate to contact my office.         Sincerely,     Irving Spears DO  [address] [address] [phone]        Document electronically generated by:  Irving Spears DO

## (undated) NOTE — LETTER
Date & Time: 2/15/2024, 9:40 AM  Patient: Chao Reese  Encounter Provider(s):    Tom Bustillos APRN       To Whom It May Concern:    Chao Reese was seen and treated in our department on 2/15/2024. Please excuse him from work yesterday. He can return to work Monday 2/19/2024 .    If you have any questions or concerns, please do not hesitate to call.      VANDA Head-BC  _____________________________  Physician/APC Signature

## (undated) NOTE — LETTER
5/21/2024              Chao Reese        [address]        [facility] [address]         Dear Chao,    Our records indicate that the tests ordered for you by Syed Castañeda MD  have not been done.    Helicobacter Pylori Breath Test, Adult (Order #[id number])       If you have, in fact, already completed the tests or you do not wish to have the tests done, please contact our office at THE NUMBER LISTED BELOW.  Otherwise, please proceed with the testing.          Sincerely,    Syed Castañeda MD  [address]  [facility] [address]  [phone]

## (undated) NOTE — LETTER
Date & Time: 4/16/2025, 4:10 PM  Patient: Chao Reese  Encounter Provider(s):    Julissa Thurman APRN       To Whom It May Concern:    Chao Reese was seen and treated in our department on 4/16/2025. He should not return to work until 04/18/2025 .    If you have any questions or concerns, please do not hesitate to call.        Julissa Thurman NP-C  Nurse Practitioner

## (undated) NOTE — LETTER
11/18/2022              Chao Reese        [address]        [facility] [address]         To whom it may concern,    Deniz Amaro is currently a patient under my medical care. Patient was seen today for illness and will be off of work today. He can return back to work at his next scheduled shift without restrictions which is 11/20/2022. .  If you require additional information please do not hesitate to contact my office.         Sincerely,    Anderson Johnson DO  [facility], [address], [address] [place] [address]  [phone]        Document electronically generated by:  Anderson Johnson DO

## (undated) NOTE — ED AVS SNAPSHOT
[facility] Emergency Department    [address]     [place] [address]    Phone:  [phone]    Fax:  554 [facility]   MRN: [id number]    Department:  [facility]   Date of Visit:  6/18/2017 If you have difficulty scheduling your follow-up appointment as directed, please call our  at [phone]. Si tiene problemas para programar waylon lobito de seguimiento según lo indicado, llame al encargado de schuyler al [phone].     It i continue to take your medications as instructed by your Primary Care doctor until you can check with your doctor. Please bring the medication list to your next doctor's appointment.     Any imaging studies and labs completed today can be reviewed in your [place] plans. To get signed up and covered, please call [phone] and ask to get set up for an insurance coverage that is in-network with [facility] [employer].         Yvonne     Call the [employer] for assistance with your inactive qcue account

## (undated) NOTE — MR AVS SNAPSHOT
[address]  [address] [facility]  [phone]               Thank you for choosing us for your health care visit with Maggy Sheikh DO.   We are glad to serve you and happy to provide you with this summary Order:  Physiatry - Internal    Shana Whiteside   [address] [facility] [address]   Phone:  [phone]   Fax:  [phone]             Adams Aranda DO   [address]   Phone:  [phone]   Fax:  [phone] PHYSIATRY - INTERNAL [[id number] CUSTOM]  Order #:  [id number]         **REFERRAL REQUEST**    Your physician has referred you to a specialist.  Your physician or the clinic staff will provide you with the phone number you should call to schedule your appoin

## (undated) NOTE — LETTER
ENCOUNTER  Patient Class:   Admitting Provider: No admitting provider for patient encounter. Unit:     Hospital Service: No service for patient encounter. Attending Provider: No current attending provider for patient encounter.  Bed:     Visit Type:   Re Subscriber Name [address] Birth Date Member ID    Richard Montano 3/18/1995 [id number]    Guarantor Name (ID) Guarantor Birth Date Guarantor Address Guarantor Type   CARLOS MARTIN ([id number]) 3/18/1995 [address] [place] [address] Personal/Family     [place] [id number] _______________________________________________________          Referred to Location Information      Location Address [facility] Phone     [address]            Electronically Signed By: LOS De La Torre

## (undated) NOTE — LETTER
12/9/2024              Chao Reese        [address]        [facility] [address]         To whom it may concern,    Chao Reese is currently a patient under my medical care.  Patient was seen today for illness and is being treated.  He has to be off of work at this time and can return back to work full duty without restrictions on December 16, 2024..  If you require additional information please do not hesitate to contact my office.        Sincerely,     Nitin Mejía DO  [address]  [facility] [address]  [phone]        Document electronically generated by:  Nitin Mejía DO

## (undated) NOTE — LETTER
11/5/2018              Chao Reese        [address]        [facility] [address]         To whom it may concern,    Yamilet Murillo is currently a patient under my medical care. Patient was seen today and is looking much better.   He can return alessandro

## (undated) NOTE — LETTER
10/18/2024              Chao Reese        [address]        [facility] [address]         To whom it may concern,    Chao Reese is currently a patient under my medical care.  Patient has not been to work due to medical complaints on 10/16/2024 and 10/18/2024.  I want him off for the next week as well to recover and he can return back to work regular duty without restrictions on 10/28/2024.  If you require additional information please do not hesitate to contact my office.         Sincerely,     Nitin Mejía DO  [facility] [address]  [facility] [id number] [phone]        Document electronically generated by:  Nitin Mejía DO

## (undated) NOTE — LETTER
April 26, 2017    Patient: Chari Cruz   Date of Visit: 4/26/2017       To Whom It May Concern:    Chari Cruz was seen and treated in our emergency department on 4/26/2017. He should not return to work until 5/2/17.     If you have any questions or mario

## (undated) NOTE — LETTER
10/29/2024              Chao Reese        [address]        [facility] [address]         To whom it may concern,    Chao Reese is currently a patient under my medical care.  Patient was evaluated and at this time is unable to return back to work.  He will follow-up with me on 10/31/2024 and we will decide at that time about his work status.  He will be out at least until then.  If you require additional information please do not hesitate to contact my office.        Sincerely,    Nitin Mejía DO  [address]  [facility] [address] [phone]        Document electronically generated by:  Nitin Mejía DO

## (undated) NOTE — Clinical Note
2/17/2017              Chao Reese        [address]        [facility] [address]         To whom it may concern,    Yamilet Murillo is currently a patient under my medical care.   Patient can return back to work full duty today which is February 17,

## (undated) NOTE — LETTER
5/20/2025          To Whom It May Concern:    Chao Reese is currently under my medical care and may not return to work at this time.    Please excuse Chao for days missed and additional days until recovered.  He may return to work on Monday, May 26, 2025.  Activity is restricted as follows: none.    If you require additional information please contact our office.        Sincerely,    LOS Wu            Document generated by:  LOS Wu

## (undated) NOTE — LETTER
4/27/2023          To Whom It May Concern:    Laz Bell is currently under my medical care and may not return to work at this time. Please excuse Louwillian Pastngoc for 2 days. He may return to work on 4/28/23. Activity is restricted as follows: none. If you require additional information please contact our office.         Sincerely,      LOS Whittaker          Document generated by:  LOS Whittaker

## (undated) NOTE — LETTER
4/25/2025              Chao Reese        [address]        [facility] [address]         To whom it may concern,    Chao Reese is currently a patient under my medical care.  Patient was seen today for illness and he has been off of work this past Monday and this past Wednesday.  He will also have to be off of work today through Monday of next week and then can return back to regular duty without restrictions on April 30, 2025 which is his next scheduled day of work.  If you require additional information please do not hesitate to contact my office.        Sincerely,    Nitin Mejía DO  [facility]  [address]  [facility] [address] [phone]        Document electronically generated by:  Nitin Mejía DO

## (undated) NOTE — LETTER
[facility]  part of [facility]     PICC INSERTION CONSENT     I agree to have a Peripherally Inserted Central Catheter (PICC) placed in my arm.   1. The PICC insertion procedure, care, maintenance, risks, benefits, and complications have been explained to me by my physician, ________________________, and I understand them.   2. I understand that this may not be the only way I can receive my medication. I understand that my physician has determined that the PICC would be the safest and most effective means of administering my medication at this time. If there are other options of giving medication into my veins those options have been explained to me by my physician and I have chosen this one.   3. I realize a nurse who has been specially trained and certified by the hospital and ’s representative to insert a PICC will perform this procedure. My catheter will be inserted by _____________________________.   4. I have been informed by my doctor of the nature and purpose of this procedure and the risks involved and the possibility of complications. I realize that this is an invasive procedure and has certain risks such as air embolism (air entering the catheter or my vein), arterial puncture (a tearing of one of my arteries), infection, irregular heartbeat and venous thrombosis (a blood clot in a vein) nerve injury and fracture of the catheter with or without migration.   5. In order to numb the area where the line will be placed, a small amount of anesthetic medication will be injected as ordered by my physician.   6. I understand that while the catheter will be placed in my upper arm the end of the catheter will come to rest in an area near my heart.     7. The person performing this procedure has discussed the potential benefits, risks, and side effects of the PICC; the likelihood of achieving goals; and potential problems that might occur during recuperation. They also discussed  reasonable alternatives to the PICC, including risks, benefits, and side effects related to the alternatives and risks related to not receiving this procedure.    8.  I have expressed any questions about this procedure to my physician or the PICC Proceduralist and he/she has answered them.  I certify that I have read and understand this consent to the insertion of a PICC.      _________________________________________________________   Date     Time     Patient/Guardian Signature       ____________________________________   Printed name of Patient/Guardian          ________________________________________________________________    Date        Time                   Witnessing RN Signature      Patient Name: Chao Reese     : 3/18/1995                 Printed: 2024     Medical Record #: [id number]

## (undated) NOTE — LETTER
11/14/2024              Chao Reese        [address]        [facility] [address]         To whom it may concern,    Chao Reese is currently a patient under my medical care.  Patient was seen today and needs to stay off work at this time.  He will see his specialist on November 19, 2024 and they can determine if he can return back to work or not.  If you require additional information please do not hesitate to contact my office.        Sincerely,     Nitin Mejía DO  [address]  [facility] [address]  [phone]        Document electronically generated by:  Nitin Mejía DO

## (undated) NOTE — LETTER
June 18, 2017    Patient: Ángela Elliott   Date of Visit: 6/18/2017       To Whom It May Concern:    Ángela Elliott was seen and treated in our emergency department on 6/18/2017. He can come back to work on  6/20/17.     If you have any questions or concerns, p

## (undated) NOTE — LETTER
Date & Time: 12/3/2021, 7:40 PM  Patient: Jb Laird  Encounter Provider(s):    David Juarez PA-C       To Whom It May Concern:    Jb Laird was seen and treated in our department on 12/3/2021. He can return to work 12/7/2021.     If you have any q

## (undated) NOTE — LETTER
12/4/2018              [facility]        [address]        [facility] [address]         To whom it may concern,    Yudith Joyce is currently a patient under my medical care. Patient's YOB: 1995.   I agree with him having modifi

## (undated) NOTE — LETTER
Date & Time: 11/21/2022, 12:01 PM  Patient: Rohit Kaminski  Encounter Provider(s):    Bren Knox MD       To Whom It May Concern:    Rohit Kaminski was seen and treated in our department on 11/21/2022. He should not return to work until 11/22/2022.     If you have any questions or concerns, please do not hesitate to call.        _____________________________  Physician/APC Signature

## (undated) NOTE — LETTER
8/1/2022              Chao Reese        [address]        [facility] [address]         To whom it may concern,    Missy Link is currently a patient under my medical care. Patient was seen today for illness and could not go to work today. He can return back to work this coming Friday which is August 5, 2022. If you require additional information please do not hesitate to contact my office.         Sincerely,    Stephanie Gill DO  [facility], [address] [facility] [address], [address] [place], [place] [phone]        Document electronically generated by:  Stephanie Gill DO

## (undated) NOTE — LETTER
11/14/2024    Chao Reese  [address]  [facility] [address]       To whom it may concern,    Chao Reese is currently a patient under my medical care.  Patient was seen today and needs to stay off work at this time.  He will see his specialist on November 25, 2024 and they can determine when he can return back to work.  If you require additional information please do not hesitate to contact my office.      Sincerely,     Nitin Mejía, DO  [address] [facility] [address]  [facility] [address]  Ph: [phone]  Fax: [phone]             Document electronically generated by:  Tiana NAVARRETE RN

## (undated) NOTE — LETTER
Date & Time: 8/11/2023, 11:13 AM  Patient: Jessica Zuniga  Encounter Provider(s):    Brionna Davis MD       To Whom It May Concern:    Jessica Zuniga was seen and treated in our department on 8/11/2023. He should not return to work until 8/12/2023 .     If you have any questions or concerns, please do not hesitate to call.        _____________________________  Physician/APC Signature

## (undated) NOTE — ED AVS SNAPSHOT
[facility] Emergency Department    [address]     [place] [address]    Phone:  [phone]    Fax:  [address] [facility]   MRN: [id number]    Department:  [facility]   Date of Visit:  4/26/2017 coverage and benefits available for follow-up care and referrals. If you have difficulty scheduling your follow-up appointment as directed, please call our  at [phone].      Si tiene problemas para programar waylon lobito de seguimiento s different from what your Primary Care doctor has instructed you - please continue to take your medications as instructed by your Primary Care doctor until you can check with your doctor. Please bring the medication list to your next doctor's appointment. can help with your Affordable Care Act coverage, as well as all types of Medicaid plans. To get signed up and covered, please call [phone] and ask to get set up for an insurance coverage that is in-network with [employer]

## (undated) NOTE — LETTER
Date & Time: 11/11/2022, 1:12 PM  Patient: Hoa Castillo  Encounter Provider(s):    Ifeanyi Rodriguez DO       To Whom It May Concern:    Hoa Castillo was seen and treated in our department on 11/11/2022.     If you have any questions or concerns, please do not hesitate to call.        _____________________________  Physician/APC Signature

## (undated) NOTE — LETTER
4/27/2023              Chao Reese        [address]        [facility] [address]             To Whom It May Concern:    Hoa Castillo, date of birth 3/18/1995, is currently under my medical care. He has a history of spina bifida and is required to self catheterize to empty his bladder. He uses sterile saline for irrigation to complete this process. Please allow him to travel with the necessary equipment and sterile saline filled bottles to care for his personal medical needs,     If you have any questions, please do not hesitate to contact our office at [phone].     Sincerely,    PAVAN Arzate, FNP-C  [address] [place] [address] [phone]    Document electronically generated by:  LOS Arzate

## (undated) NOTE — LETTER
8/1/2023          To Whom It May Concern:    Alden Grant is currently under my medical care and had appointments on 7/31 and 8/1, please excuse from work on those dates. If you require additional information please contact our office.         Sincerely,    Zion Richards MD          Document generated by:  Zion Richards MD

## (undated) NOTE — LETTER
Date & Time: 3/6/2022, 4:19 PM  Patient: Randall Baires  Encounter Provider(s):    Reyes Byers, MD       To Whom It May Concern:    Randall Baires was seen and treated in our department on 3/6/2022. He should not return to work until 03/09/2022.     If you have any questions or concerns, please do not hesitate to call.        _____________________________  Physician/APC Signature

## (undated) NOTE — LETTER
Date & Time: 7/28/2023, 1:19 PM  Patient: Radha Zapien  Encounter Provider(s):    Flor Tello MD       To Whom It May Concern:    Radha Zapien was seen and treated in our department on 7/28/2023. He should not return to work until 7/31/2023 or until seen by primary care if still not feeling better .     If you have any questions or concerns, please do not hesitate to call.        _____________________________  Physician/[facility] Signature

## (undated) NOTE — LETTER
Date & Time: 1/8/2020, 2:14 PM  Patient: Tevni Seo  Encounter Provider(s):    Carissa Shaw MD       To Whom It May Concern:    Tevin Seo was seen and treated in our department on 1/8/2020. He should not return to work until 1/11/20.     If you hav